# Patient Record
Sex: MALE | Race: OTHER | Employment: UNEMPLOYED | URBAN - METROPOLITAN AREA
[De-identification: names, ages, dates, MRNs, and addresses within clinical notes are randomized per-mention and may not be internally consistent; named-entity substitution may affect disease eponyms.]

---

## 2017-09-12 ENCOUNTER — APPOINTMENT (OUTPATIENT)
Dept: MRI IMAGING | Age: 37
DRG: 639 | End: 2017-09-12
Attending: SINGLE SPECIALTY
Payer: SELF-PAY

## 2017-09-12 ENCOUNTER — HOSPITAL ENCOUNTER (INPATIENT)
Age: 37
LOS: 2 days | Discharge: HOME | DRG: 639 | End: 2017-09-14
Attending: EMERGENCY MEDICINE | Admitting: FAMILY MEDICINE
Payer: SELF-PAY

## 2017-09-12 ENCOUNTER — APPOINTMENT (OUTPATIENT)
Dept: GENERAL RADIOLOGY | Age: 37
DRG: 639 | End: 2017-09-12
Attending: EMERGENCY MEDICINE
Payer: SELF-PAY

## 2017-09-12 DIAGNOSIS — E10.621 DIABETIC ULCER OF RIGHT MIDFOOT ASSOCIATED WITH TYPE 1 DIABETES MELLITUS, UNSPECIFIED ULCER STAGE (HCC): Primary | ICD-10-CM

## 2017-09-12 DIAGNOSIS — L97.419 DIABETIC ULCER OF RIGHT MIDFOOT ASSOCIATED WITH TYPE 1 DIABETES MELLITUS, UNSPECIFIED ULCER STAGE (HCC): Primary | ICD-10-CM

## 2017-09-12 PROBLEM — E11.9 DM (DIABETES MELLITUS) (HCC): Chronic | Status: ACTIVE | Noted: 2017-09-12

## 2017-09-12 PROBLEM — N17.9 AKI (ACUTE KIDNEY INJURY) (HCC): Status: ACTIVE | Noted: 2017-09-12

## 2017-09-12 PROBLEM — D64.9 ANEMIA: Status: ACTIVE | Noted: 2017-09-12

## 2017-09-12 LAB
ALBUMIN SERPL-MCNC: 2.1 G/DL (ref 3.4–5)
ALBUMIN/GLOB SERPL: 0.6 {RATIO} (ref 0.8–1.7)
ALP SERPL-CCNC: 109 U/L (ref 45–117)
ALT SERPL-CCNC: 19 U/L (ref 16–61)
ANION GAP BLD CALC-SCNC: 13 MMOL/L (ref 10–20)
ANION GAP SERPL CALC-SCNC: 9 MMOL/L (ref 3–18)
AST SERPL-CCNC: 16 U/L (ref 15–37)
BASOPHILS # BLD: 0.1 K/UL (ref 0–0.06)
BASOPHILS NFR BLD: 1 % (ref 0–2)
BILIRUB SERPL-MCNC: <0.1 MG/DL (ref 0.2–1)
BUN BLD-MCNC: 33 MG/DL (ref 7–18)
BUN SERPL-MCNC: 29 MG/DL (ref 7–18)
BUN/CREAT SERPL: 16 (ref 12–20)
CA-I BLD-MCNC: 1.18 MMOL/L (ref 1.12–1.32)
CALCIUM SERPL-MCNC: 7.9 MG/DL (ref 8.5–10.1)
CHLORIDE BLD-SCNC: 110 MMOL/L (ref 100–108)
CHLORIDE SERPL-SCNC: 112 MMOL/L (ref 100–108)
CO2 BLD-SCNC: 22 MMOL/L (ref 19–24)
CO2 SERPL-SCNC: 21 MMOL/L (ref 21–32)
CREAT SERPL-MCNC: 1.76 MG/DL (ref 0.6–1.3)
CREAT UR-MCNC: 1.8 MG/DL (ref 0.6–1.3)
CRP SERPL-MCNC: 2.6 MG/DL (ref 0–0.3)
DIFFERENTIAL METHOD BLD: ABNORMAL
EOSINOPHIL # BLD: 0.3 K/UL (ref 0–0.4)
EOSINOPHIL NFR BLD: 2 % (ref 0–5)
ERYTHROCYTE [DISTWIDTH] IN BLOOD BY AUTOMATED COUNT: 13.3 % (ref 11.6–14.5)
ERYTHROCYTE [SEDIMENTATION RATE] IN BLOOD: >140 MM/HR (ref 0–15)
EST. AVERAGE GLUCOSE BLD GHB EST-MCNC: 186 MG/DL
GLOBULIN SER CALC-MCNC: 3.7 G/DL (ref 2–4)
GLUCOSE BLD STRIP.AUTO-MCNC: 112 MG/DL (ref 70–110)
GLUCOSE BLD STRIP.AUTO-MCNC: 121 MG/DL (ref 74–106)
GLUCOSE SERPL-MCNC: 127 MG/DL (ref 74–99)
HBA1C MFR BLD: 8.1 % (ref 4.2–5.6)
HCT VFR BLD AUTO: 31 % (ref 36–48)
HCT VFR BLD CALC: 29 % (ref 36–49)
HGB BLD-MCNC: 10.2 G/DL (ref 13–16)
HGB BLD-MCNC: 9.9 G/DL (ref 12–16)
LACTATE BLD-SCNC: 1.2 MMOL/L (ref 0.4–2)
LYMPHOCYTES # BLD: 2.2 K/UL (ref 0.9–3.6)
LYMPHOCYTES NFR BLD: 17 % (ref 21–52)
MCH RBC QN AUTO: 29.3 PG (ref 24–34)
MCHC RBC AUTO-ENTMCNC: 32.9 G/DL (ref 31–37)
MCV RBC AUTO: 89.1 FL (ref 74–97)
MONOCYTES # BLD: 0.8 K/UL (ref 0.05–1.2)
MONOCYTES NFR BLD: 6 % (ref 3–10)
NEUTS SEG # BLD: 9.6 K/UL (ref 1.8–8)
NEUTS SEG NFR BLD: 74 % (ref 40–73)
PLATELET # BLD AUTO: 281 K/UL (ref 135–420)
PMV BLD AUTO: 10.3 FL (ref 9.2–11.8)
POTASSIUM BLD-SCNC: 4.5 MMOL/L (ref 3.5–5.5)
POTASSIUM SERPL-SCNC: 4.4 MMOL/L (ref 3.5–5.5)
PROT SERPL-MCNC: 5.8 G/DL (ref 6.4–8.2)
RBC # BLD AUTO: 3.48 M/UL (ref 4.7–5.5)
SODIUM BLD-SCNC: 140 MMOL/L (ref 136–145)
SODIUM SERPL-SCNC: 142 MMOL/L (ref 136–145)
WBC # BLD AUTO: 13 K/UL (ref 4.6–13.2)

## 2017-09-12 PROCEDURE — 73630 X-RAY EXAM OF FOOT: CPT

## 2017-09-12 PROCEDURE — 74011250636 HC RX REV CODE- 250/636: Performed by: FAMILY MEDICINE

## 2017-09-12 PROCEDURE — 65270000029 HC RM PRIVATE

## 2017-09-12 PROCEDURE — 83036 HEMOGLOBIN GLYCOSYLATED A1C: CPT | Performed by: FAMILY MEDICINE

## 2017-09-12 PROCEDURE — 73718 MRI LOWER EXTREMITY W/O DYE: CPT

## 2017-09-12 PROCEDURE — 85025 COMPLETE CBC W/AUTO DIFF WBC: CPT | Performed by: EMERGENCY MEDICINE

## 2017-09-12 PROCEDURE — 86140 C-REACTIVE PROTEIN: CPT | Performed by: FAMILY MEDICINE

## 2017-09-12 PROCEDURE — 80047 BASIC METABLC PNL IONIZED CA: CPT

## 2017-09-12 PROCEDURE — 87186 SC STD MICRODIL/AGAR DIL: CPT | Performed by: FAMILY MEDICINE

## 2017-09-12 PROCEDURE — 85652 RBC SED RATE AUTOMATED: CPT | Performed by: FAMILY MEDICINE

## 2017-09-12 PROCEDURE — 74011000258 HC RX REV CODE- 258: Performed by: PHYSICIAN ASSISTANT

## 2017-09-12 PROCEDURE — 80053 COMPREHEN METABOLIC PANEL: CPT | Performed by: EMERGENCY MEDICINE

## 2017-09-12 PROCEDURE — 96365 THER/PROPH/DIAG IV INF INIT: CPT

## 2017-09-12 PROCEDURE — 87040 BLOOD CULTURE FOR BACTERIA: CPT | Performed by: EMERGENCY MEDICINE

## 2017-09-12 PROCEDURE — 74011250636 HC RX REV CODE- 250/636: Performed by: EMERGENCY MEDICINE

## 2017-09-12 PROCEDURE — 82962 GLUCOSE BLOOD TEST: CPT

## 2017-09-12 PROCEDURE — 77030020263 HC SOL INJ SOD CL0.9% LFCR 1000ML

## 2017-09-12 PROCEDURE — 74011250636 HC RX REV CODE- 250/636: Performed by: PHYSICIAN ASSISTANT

## 2017-09-12 PROCEDURE — 83605 ASSAY OF LACTIC ACID: CPT

## 2017-09-12 PROCEDURE — 87070 CULTURE OTHR SPECIMN AEROBIC: CPT | Performed by: FAMILY MEDICINE

## 2017-09-12 PROCEDURE — 99284 EMERGENCY DEPT VISIT MOD MDM: CPT

## 2017-09-12 PROCEDURE — 87077 CULTURE AEROBIC IDENTIFY: CPT | Performed by: FAMILY MEDICINE

## 2017-09-12 RX ORDER — MORPHINE SULFATE 2 MG/ML
2 INJECTION, SOLUTION INTRAMUSCULAR; INTRAVENOUS
Status: DISCONTINUED | OUTPATIENT
Start: 2017-09-12 | End: 2017-09-14 | Stop reason: HOSPADM

## 2017-09-12 RX ORDER — INSULIN LISPRO 100 [IU]/ML
INJECTION, SOLUTION INTRAVENOUS; SUBCUTANEOUS EVERY 6 HOURS
Status: DISCONTINUED | OUTPATIENT
Start: 2017-09-12 | End: 2017-09-14

## 2017-09-12 RX ORDER — NALOXONE HYDROCHLORIDE 0.4 MG/ML
0.4 INJECTION, SOLUTION INTRAMUSCULAR; INTRAVENOUS; SUBCUTANEOUS AS NEEDED
Status: DISCONTINUED | OUTPATIENT
Start: 2017-09-12 | End: 2017-09-14 | Stop reason: HOSPADM

## 2017-09-12 RX ORDER — SODIUM CHLORIDE 9 MG/ML
125 INJECTION, SOLUTION INTRAVENOUS CONTINUOUS
Status: DISCONTINUED | OUTPATIENT
Start: 2017-09-12 | End: 2017-09-14 | Stop reason: HOSPADM

## 2017-09-12 RX ORDER — MAGNESIUM SULFATE 100 %
4 CRYSTALS MISCELLANEOUS AS NEEDED
Status: DISCONTINUED | OUTPATIENT
Start: 2017-09-12 | End: 2017-09-14 | Stop reason: HOSPADM

## 2017-09-12 RX ORDER — PRAVASTATIN SODIUM 20 MG/1
10 TABLET ORAL
Status: DISCONTINUED | OUTPATIENT
Start: 2017-09-12 | End: 2017-09-14 | Stop reason: HOSPADM

## 2017-09-12 RX ORDER — DEXTROSE 50 % IN WATER (D50W) INTRAVENOUS SYRINGE
25-50 AS NEEDED
Status: DISCONTINUED | OUTPATIENT
Start: 2017-09-12 | End: 2017-09-14 | Stop reason: HOSPADM

## 2017-09-12 RX ORDER — HYDROCODONE BITARTRATE AND ACETAMINOPHEN 5; 325 MG/1; MG/1
1 TABLET ORAL
Status: DISCONTINUED | OUTPATIENT
Start: 2017-09-12 | End: 2017-09-14 | Stop reason: HOSPADM

## 2017-09-12 RX ORDER — HEPARIN SODIUM 5000 [USP'U]/ML
5000 INJECTION, SOLUTION INTRAVENOUS; SUBCUTANEOUS EVERY 8 HOURS
Status: DISCONTINUED | OUTPATIENT
Start: 2017-09-12 | End: 2017-09-14 | Stop reason: HOSPADM

## 2017-09-12 RX ORDER — GUAIFENESIN 100 MG/5ML
81 LIQUID (ML) ORAL DAILY
Status: DISCONTINUED | OUTPATIENT
Start: 2017-09-13 | End: 2017-09-14 | Stop reason: HOSPADM

## 2017-09-12 RX ADMIN — HEPARIN SODIUM 5000 UNITS: 5000 INJECTION, SOLUTION INTRAVENOUS; SUBCUTANEOUS at 23:20

## 2017-09-12 RX ADMIN — SODIUM CHLORIDE 125 ML/HR: 900 INJECTION, SOLUTION INTRAVENOUS at 22:46

## 2017-09-12 RX ADMIN — PIPERACILLIN SODIUM,TAZOBACTAM SODIUM 3.38 G: 3; .375 INJECTION, POWDER, FOR SOLUTION INTRAVENOUS at 20:27

## 2017-09-12 RX ADMIN — SODIUM CHLORIDE 1250 MG: 900 INJECTION, SOLUTION INTRAVENOUS at 22:58

## 2017-09-12 NOTE — ED PROVIDER NOTES
HPI Comments: Patient is a 41 y/o male w/ PMH IDDM, previous diabetic foot ulcer, who presents to the ER c/o hole in right foot. Patient states he first noticed the hole in his right foot about 3 weeks prior. He was previously admitted for a similar episode in June of 2016, however states he has not followed up with anyone since his discharge. Patient denied any known injury to his foot. He denied any fevers, chills, SOB, chest pain, abdominal pain, nausea, vomiting and has no other complaints. Patient reports increased pain with weight bearing and ambulation. Patient is a 40 y.o. male presenting with foot pain. The history is provided by the patient. The history is limited by a language barrier (Turkish speaking; able to answer questions w/o ). No  was used. Foot Pain           Past Medical History:   Diagnosis Date    Cellulitis     rt. foot    Diabetes ([facility]     Osteomyelitis ([facility]     rt toe    Other ill-defined conditions        Past Surgical History:   Procedure Laterality Date    HX ORTHOPAEDIC      rt.toe         History reviewed. No pertinent family history. Social History     Social History    Marital status: SINGLE     Spouse name: N/A    Number of children: N/A    Years of education: N/A     Occupational History    Not on file. Social History Main Topics    Smoking status: Heavy Tobacco Smoker     Packs/day: 0.50    Smokeless tobacco: Never Used    Alcohol use No    Drug use: Yes     Special: Marijuana      Comment: 9/5/17    Sexual activity: Not on file     Other Topics Concern    Not on file     Social History Narrative         ALLERGIES: Review of patient's allergies indicates no known allergies. Review of Systems   Constitutional: Negative for chills, fatigue and fever. HENT: Negative. Negative for sore throat. Eyes: Negative. Respiratory: Negative for cough and shortness of breath.     Cardiovascular: Negative for chest pain and palpitations. Gastrointestinal: Negative for abdominal pain, nausea and vomiting. Genitourinary: Negative for dysuria. Musculoskeletal: Negative. Skin: Positive for wound. Right foot ulcer   Neurological: Negative for dizziness, weakness, light-headedness and headaches. Psychiatric/Behavioral: Negative. All other systems reviewed and are negative. Vitals:    09/12/17 1911   BP: 140/86   Pulse: 84   Resp: 16   Temp: 99.8 °F (37.7 °C)   SpO2: 100%   Weight: 53.9 kg (118 lb 13.3 oz)   Height: 5' (1.524 m)            Physical Exam   Constitutional: He is oriented to person, place, and time. He appears well-developed and well-nourished. He appears distressed. HENT:   Head: Normocephalic and atraumatic. Mouth/Throat: Oropharynx is clear and moist.   Eyes: Conjunctivae are normal. No scleral icterus. Neck: Normal range of motion. Neck supple. No JVD present. No tracheal deviation present. Cardiovascular: Normal rate, regular rhythm and normal heart sounds. Pulmonary/Chest: Effort normal and breath sounds normal. No respiratory distress. He has no wheezes. Abdominal: Soft. There is no tenderness. Musculoskeletal: Normal range of motion. Feet:    Neurological: He is alert and oriented to person, place, and time. He has normal strength. GCS eye subscore is 4. GCS verbal subscore is 5. GCS motor subscore is 6. Did not see pt ambulate; however walked into ER for triage   Skin: Skin is warm and dry. He is not diaphoretic. Psychiatric: He has a normal mood and affect. Nursing note and vitals reviewed. MDM  Number of Diagnoses or Management Options  Diabetic ulcer of right midfoot associated with type 1 diabetes mellitus, unspecified ulcer stage:   Diagnosis management comments: 7:48 PM  41 y/o male c/o right foot pain and hole in his foot; onset 3 weeks prior. Known IDDM, non compliant with follow ups.   Has not seen anyone since June last year when hospitalized for the same problem. Will plan on labs. Obvious swelling noted to right foot; normal distal pulses BL. Increased warmth to touch. Xray of right foot suggestive of osteomyelitis involving right 4th, 5th distal metatarsals. Rima Escobedo PA-C    8:42 PM  Discussed patient with Dr. Jose Alfredo Vargas, hospitalist.  [facility] to accept pt for admission of diabetic foot ulcer. Advised to consult with podiatry; already placed. Patient already receiving zosyn IV; however will withhold vancomycin until speaking with them. Pt hemodynamically stable at this point. Rima Escobedo PA-C    8:47 PM  Discussed patient with Dr. Marianna Jones, podiatry. Agrees to consult with pt. Advised to continue with ordered abx and will see patient on floor tomorrow.   Rima Escobedo PA-C        Clinical Impression:  Diabetic foot ulcer         Amount and/or Complexity of Data Reviewed  Clinical lab tests: ordered and reviewed  Tests in the radiology section of CPT®: ordered and reviewed  Discuss the patient with other providers: yes (gina No podiatry)    Risk of Complications, Morbidity, and/or Mortality  Presenting problems: moderate  Diagnostic procedures: moderate  Management options: moderate    Patient Progress  Patient progress: stable    ED Course       Procedures      Vitals:  Patient Vitals for the past 12 hrs:   Temp Pulse Resp BP SpO2   09/12/17 1911 99.8 °F (37.7 °C) 84 16 140/86 100 %         Medications ordered:   Medications   vancomycin (VANCOCIN) 1,250 mg in 0.9% sodium chloride 250 mL IVPB (not administered)   piperacillin-tazobactam (ZOSYN) 3.375 g in 0.9% sodium chloride (MBP/ADV) 100 mL MBP (not administered)   vancomycin (VANCOCIN) 1,250 mg in 0.9% sodium chloride 250 mL IVPB (not administered)   VANCOMYCIN INFORMATION NOTE (not administered)   VANCOMYCIN INFORMATION NOTE (not administered)   piperacillin-tazobactam (ZOSYN) 3.375 g in 0.9% sodium chloride (MBP/ADV) 100 mL MBP (0 g IntraVENous IV Completed 9/12/17 2122)         Lab findings:  Recent Results (from the past 12 hour(s))   CBC WITH AUTOMATED DIFF    Collection Time: 09/12/17  7:45 PM   Result Value Ref Range    WBC 13.0 4.6 - 13.2 K/uL    RBC 3.48 (L) 4.70 - 5.50 M/uL    HGB 10.2 (L) 13.0 - 16.0 g/dL    HCT 31.0 (L) 36.0 - 48.0 %    MCV 89.1 74.0 - 97.0 FL    MCH 29.3 24.0 - 34.0 PG    MCHC 32.9 31.0 - 37.0 g/dL    RDW 13.3 11.6 - 14.5 %    PLATELET 754 517 - 368 K/uL    MPV 10.3 9.2 - 11.8 FL    NEUTROPHILS 74 (H) 40 - 73 %    LYMPHOCYTES 17 (L) 21 - 52 %    MONOCYTES 6 3 - 10 %    EOSINOPHILS 2 0 - 5 %    BASOPHILS 1 0 - 2 %    ABS. NEUTROPHILS 9.6 (H) 1.8 - 8.0 K/UL    ABS. LYMPHOCYTES 2.2 0.9 - 3.6 K/UL    ABS. MONOCYTES 0.8 0.05 - 1.2 K/UL    ABS. EOSINOPHILS 0.3 0.0 - 0.4 K/UL    ABS. BASOPHILS 0.1 (H) 0.0 - 0.06 K/UL    DF AUTOMATED     METABOLIC PANEL, COMPREHENSIVE    Collection Time: 09/12/17  7:45 PM   Result Value Ref Range    Sodium 142 136 - 145 mmol/L    Potassium 4.4 3.5 - 5.5 mmol/L    Chloride 112 (H) 100 - 108 mmol/L    CO2 21 21 - 32 mmol/L    Anion gap 9 3.0 - 18 mmol/L    Glucose 127 (H) 74 - 99 mg/dL    BUN 29 (H) 7.0 - 18 MG/DL    Creatinine 1.76 (H) 0.6 - 1.3 MG/DL    BUN/Creatinine ratio 16 12 - 20      GFR est AA 53 (L) >60 ml/min/1.73m2    GFR est non-AA 44 (L) >60 ml/min/1.73m2    Calcium 7.9 (L) 8.5 - 10.1 MG/DL    Bilirubin, total <0.1 (L) 0.2 - 1.0 MG/DL    ALT (SGPT) 19 16 - 61 U/L    AST (SGOT) 16 15 - 37 U/L    Alk.  phosphatase 109 45 - 117 U/L    Protein, total 5.8 (L) 6.4 - 8.2 g/dL    Albumin 2.1 (L) 3.4 - 5.0 g/dL    Globulin 3.7 2.0 - 4.0 g/dL    A-G Ratio 0.6 (L) 0.8 - 1.7     C REACTIVE PROTEIN, QT    Collection Time: 09/12/17  7:45 PM   Result Value Ref Range    C-Reactive protein 2.6 (H) 0 - 0.3 mg/dL   SED RATE (ESR)    Collection Time: 09/12/17  7:45 PM   Result Value Ref Range    Sed rate, automated >140 (H) 0 - 15 mm/hr   POC CHEM8    Collection Time: 09/12/17  7:50 PM   Result Value Ref Range CO2, POC 22 19 - 24 MMOL/L    Glucose,  (H) 74 - 106 MG/DL    BUN, POC 33 (H) 7 - 18 MG/DL    Creatinine, POC 1.8 (H) 0.6 - 1.3 MG/DL    GFRAA, POC 52 (L) >60 ml/min/1.73m2    GFRNA, POC 43 (L) >60 ml/min/1.73m2    Sodium,  136 - 145 MMOL/L    Potassium, POC 4.5 3.5 - 5.5 MMOL/L    Calcium, ionized (POC) 1.18 1.12 - 1.32 MMOL/L    Chloride,  (H) 100 - 108 MMOL/L    Anion gap, POC 13 10 - 20      Hematocrit, POC 29 (L) 36 - 49 %    Hemoglobin, POC 9.9 (L) 12 - 16 G/DL   POC LACTIC ACID    Collection Time: 09/12/17  7:55 PM   Result Value Ref Range    Lactic Acid (POC) 1.2 0.4 - 2.0 mmol/L       EKG interpretation by ED Physician:      X-Ray, CT or other radiology findings or impressions:  XR FOOT RT MIN 3 V    (Results Pending)   MRI FOOT RT WO CONT    (Results Pending)       Progress notes, Consult notes or additional Procedure notes:       Reevaluation of patient:       Disposition:  Diagnosis:   1. Diabetic ulcer of right midfoot associated with type 1 diabetes mellitus, unspecified ulcer stage        Disposition:  Admitted    Follow-up Information     None           Patient's Medications   Start Taking    No medications on file   Continue Taking    AMLODIPINE (NORVASC) 5 MG TABLET    Take 1 Tab by mouth daily. ASPIRIN 81 MG CHEWABLE TABLET    Take 1 Tab by mouth daily. DAPTOMYCIN IV    480 mg by IntraVENous route daily. Indications: Continue until 6/20    HYDROCODONE-ACETAMINOPHEN (NORCO) 5-325 MG PER TABLET    Take 1 Tab by mouth every four (4) hours as needed for Pain. Max Daily Amount: 6 Tabs. Indications: do not fill unless keflex and bactrim are filled    INSULIN NPH/INSULIN REGULAR (HUMULIN 70/30) 100 UNIT/ML (70-30) INJECTION    12 Units by SubCUTAneous route Before breakfast and dinner. INSULIN SYRINGE-NEEDLE U-100 (INSULIN SYRINGE) 1 ML 28 X 1/2\" SYRG    1 Package by Does Not Apply route two (2) times a day. LOSARTAN (COZAAR) 50 MG TABLET    Take 1 Tab by mouth daily. METFORMIN (GLUCOPHAGE) 500 MG TABLET    Take 1 Tab by mouth two (2) times daily (with meals). PRAVASTATIN (PRAVACHOL) 10 MG TABLET    Take 1 Tab by mouth nightly.    These Medications have changed    No medications on file   Stop Taking    No medications on file

## 2017-09-12 NOTE — Clinical Note
Status[de-identified] Inpatient [101] Type of Bed: Medical [8] Inpatient Hospitalization Certified Necessary for the Following Reasons: 3. Patient receiving treatment that can only be provided in an inpatient setting (further clarification in H&P documentation) Admitting Diagnosis: Diabetic foot ulcer ([facility] [[id number]] Admitting Physician: Ash Monge Attending Physician: Ash Monge Estimated Length of Stay: 2 Midnights Discharge Plan[de-identified] Home with Office Follow-up

## 2017-09-12 NOTE — IP AVS SNAPSHOT
Ledy Newton 
 
 
 [address] [place] [address] 
[phone] Patient: Ashanti Howard MRN: [id number] WUB:[id number] Current Discharge Medication List  
  
START taking these medications Dose & Instructions Dispensing Information Comments Morning Noon Evening Bedtime  
 ciprofloxacin HCl 500 mg tablet Commonly known as:  CIPRO Your last dose was: Your next dose is:    
   
   
 Dose:  500 mg Take 1 Tab by mouth every twelve (12) hours for 30 days. Quantity:  60 Tab Refills:  0 CONTINUE these medications which have NOT CHANGED Dose & Instructions Dispensing Information Comments Morning Noon Evening Bedtime  
 amLODIPine 5 mg tablet Commonly known as:  Achilles [place] Your last dose was: Your next dose is:    
   
   
 Dose:  5 mg Take 1 Tab by mouth daily. Quantity:  30 Tab Refills:  5  
     
   
   
   
  
 aspirin 81 mg chewable tablet Your last dose was: Your next dose is:    
   
   
 Dose:  81 mg Take 1 Tab by mouth daily. Quantity:  30 Tab Refills:  0 HYDROcodone-acetaminophen 5-325 mg per tablet Commonly known as:  Josue Carter Your last dose was: Your next dose is:    
   
   
 Dose:  1 Tab Take 1 Tab by mouth every four (4) hours as needed for Pain. Max Daily Amount: 6 Tabs. Indications: do not fill unless keflex and bactrim are filled Quantity:  15 Tab Refills:  0  
     
   
   
   
  
 insulin NPH/insulin regular 100 unit/mL (70-30) injection Commonly known as:  HumuLIN 70/30 Your last dose was: Your next dose is:    
   
   
 Dose:  12 Units 12 Units by SubCUTAneous route Before breakfast and dinner. Quantity:  3 Vial  
Refills:  1 Insulin Syringe-Needle U-100 1 mL 28 gauge x 1/2\" Syrg Commonly known as:  INSULIN SYRINGE Your last dose was: Your next dose is:    
   
   
 Dose:  1 Package 1 Package by Does Not Apply route two (2) times a day. Quantity:  120 Syringe Refills:  1  
     
   
   
   
  
 losartan 50 mg tablet Commonly known as:  COZAAR Your last dose was: Your next dose is:    
   
   
 Dose:  50 mg Take 1 Tab by mouth daily. Quantity:  30 Tab Refills:  0  
     
   
   
   
  
 metFORMIN 500 mg tablet Commonly known as:  GLUCOPHAGE Your last dose was: Your next dose is:    
   
   
 Dose:  500 mg Take 1 Tab by mouth two (2) times daily (with meals). Quantity:  30 Tab Refills:  0  
     
   
   
   
  
 pravastatin 10 mg tablet Commonly known as:  PRAVACHOL Your last dose was: Your next dose is:    
   
   
 Dose:  10 mg Take 1 Tab by mouth nightly. Quantity:  30 Tab Refills:  0 STOP taking these medications DAPTOMYCIN IV Where to Get Your Medications Information on where to get these meds will be given to you by the nurse or doctor. ! Ask your nurse or doctor about these medications  
  ciprofloxacin HCl 500 mg tablet

## 2017-09-12 NOTE — ED NOTES
I performed a brief evaluation, including history and physical, of the patient here in triage and I have determined that pt will need further treatment and evaluation from the fast track provider. I have placed initial orders to help in expediting patients care.      September 12, 2017 at 7:11 PM - Neomia Bence, DO        Diabetic foot ulcer

## 2017-09-12 NOTE — IP AVS SNAPSHOT
[address] [place] [address] 
[phone] Patient: Arlene Peres MRN: [id number] [id number] You are allergic to the following No active allergies Recent Documentation Height Weight BMI Smoking Status 1.524 m 53.8 kg 23.16 kg/m2 Heavy Tobacco Smoker Unresulted Labs Order Current Status CULTURE, BLOOD Preliminary result CULTURE, BLOOD Preliminary result CULTURE, WOUND W GRAM STAIN Preliminary result Emergency Contacts Name Discharge Info Relation Home Work Mobile Elbert Thompson DISCHARGE CAREGIVER [3] Friend [5] [phone] About your hospitalization You were admitted on:  September 12, 2017 You last received care in the:  [email or web address] You were discharged on:  September 14, 2017 Unit phone number:  [phone] Why you were hospitalized Your primary diagnosis was:  Diabetic Foot Ulcer (Hcc) Your diagnoses also included:  Anemia, Dale (Acute Kidney Injury) (Hcc), Dm (Diabetes Mellitus) (Hcc) Providers Seen During Your Hospitalizations Provider Role Specialty Primary office phone Antoni Sanchez DO Attending Provider Emergency Medicine [phone] Marly Escamilla MD Attending Provider [place] [phone] Flower Rogers MD Attending Provider [place] [phone] Manda Cheng DO Attending Provider Internal Medicine [phone] Your Primary Care Physician (PCP) Primary Care Physician Office Phone Office Fax NONE ** None ** ** None ** Follow-up Information Follow up With Details Comments Contact Info [address] [facility] Call  [place] [id number] [address] [place] [address]) [phone] Current Discharge Medication List  
  
START taking these medications Dose & Instructions Dispensing Information Comments Morning Noon Evening Bedtime  
 ciprofloxacin HCl 500 mg tablet Commonly known as:  CIPRO Your last dose was: Your next dose is:    
   
   
 Dose:  500 mg Take 1 Tab by mouth every twelve (12) hours for 30 days. Quantity:  60 Tab Refills:  0 CONTINUE these medications which have NOT CHANGED Dose & Instructions Dispensing Information Comments Morning Noon Evening Bedtime  
 amLODIPine 5 mg tablet Commonly known as:  Achilles Reform Your last dose was: Your next dose is:    
   
   
 Dose:  5 mg Take 1 Tab by mouth daily. Quantity:  30 Tab Refills:  5  
     
   
   
   
  
 aspirin 81 mg chewable tablet Your last dose was: Your next dose is:    
   
   
 Dose:  81 mg Take 1 Tab by mouth daily. Quantity:  30 Tab Refills:  0 HYDROcodone-acetaminophen 5-325 mg per tablet Commonly known as:  Jouse Carter Your last dose was: Your next dose is:    
   
   
 Dose:  1 Tab Take 1 Tab by mouth every four (4) hours as needed for Pain. Max Daily Amount: 6 Tabs. Indications: do not fill unless keflex and bactrim are filled Quantity:  15 Tab Refills:  0  
     
   
   
   
  
 insulin NPH/insulin regular 100 unit/mL (70-30) injection Commonly known as:  HumuLIN 70/30 Your last dose was: Your next dose is:    
   
   
 Dose:  12 Units 12 Units by SubCUTAneous route Before breakfast and dinner. Quantity:  3 Vial  
Refills:  1 Insulin Syringe-Needle U-100 1 mL 28 gauge x 1/2\" Syrg Commonly known as:  INSULIN SYRINGE Your last dose was: Your next dose is:    
   
   
 Dose:  1 Package 1 Package by Does Not Apply route two (2) times a day. Quantity:  120 Syringe Refills:  1  
     
   
   
   
  
 losartan 50 mg tablet Commonly known as:  COZAAR Your last dose was:     
   
Your next dose is:    
   
   
 Dose:  50 mg  
 Take 1 Tab by mouth daily. Quantity:  30 Tab Refills:  0  
     
   
   
   
  
 metFORMIN 500 mg tablet Commonly known as:  GLUCOPHAGE Your last dose was: Your next dose is:    
   
   
 Dose:  500 mg Take 1 Tab by mouth two (2) times daily (with meals). Quantity:  30 Tab Refills:  0  
     
   
   
   
  
 pravastatin 10 mg tablet Commonly known as:  PRAVACHOL Your last dose was: Your next dose is:    
   
   
 Dose:  10 mg Take 1 Tab by mouth nightly. Quantity:  30 Tab Refills:  0 STOP taking these medications DAPTOMYCIN IV Where to Get Your Medications Information on where to get these meds will be given to you by the nurse or doctor. ! Ask your nurse or doctor about these medications  
  ciprofloxacin HCl 500 mg tablet Discharge Instructions Patient armband removed and shredded DISCHARGE SUMMARY from Nurse The following personal items are in your possession at time of discharge: 
 
Dental Appliances: None Visual Aid: None Home Medications: None Jewelry: None Clothing: At bedside, Shirt, Pants Other Valuables: Cell Phone, With patient PATIENT INSTRUCTIONS: 
 
 
F-face looks uneven A-arms unable to move or move unevenly S-speech slurred or non-existent T-time-call 911 as soon as signs and symptoms begin-DO NOT go Back to bed or wait to see if you get better-TIME IS BRAIN. Warning Signs of HEART ATTACK Call 911 if you have these symptoms: 
? Chest discomfort.  Most heart attacks involve discomfort in the center of the chest that lasts more than a few minutes, or that goes away and comes back. It can feel like uncomfortable pressure, squeezing, fullness, or pain. ? Discomfort in other areas of the upper body. Symptoms can include pain or discomfort in one or both arms, the back, neck, jaw, or stomach. ? Shortness of breath with or without chest discomfort. ? Other signs may include breaking out in a cold sweat, nausea, or lightheadedness. Don't wait more than five minutes to call [address]! Fast action can save your life. Calling 911 is almost always the fastest way to get lifesaving treatment. Emergency Medical Services staff can begin treatment when they arrive  up to an hour sooner than if someone gets to the hospital by car. The discharge information has been reviewed with the patient. The patient verbalized understanding. Discharge medications reviewed with the patient and appropriate educational materials and side effects teaching were provided. DISCHARGE SUMMARY from Nurse The following personal items are in your possession at time of discharge: 
 
Dental Appliances: None Visual Aid: None Home Medications: None Jewelry: None Clothing: At bedside, Shirt, Pants Other Valuables: Cell Phone, With patient PATIENT INSTRUCTIONS: 
 
 
F-face looks uneven A-arms unable to move or move unevenly S-speech slurred or non-existent T-time-call 911 as soon as signs and symptoms begin-DO NOT go Back to bed or wait to see if you get better-TIME IS BRAIN. Warning Signs of HEART ATTACK Call 911 if you have these symptoms: 
? Chest discomfort.  Most heart attacks involve discomfort in the center of the chest that lasts more than a few minutes, or that goes away and comes back. It can feel like uncomfortable pressure, squeezing, fullness, or pain. ? Discomfort in other areas of the upper body. Symptoms can include pain or discomfort in one or both arms, the back, neck, jaw, or stomach. ? Shortness of breath with or without chest discomfort. ? Other signs may include breaking out in a cold sweat, nausea, or lightheadedness. Don't wait more than five minutes to call [address]! Fast action can save your life. Calling 911 is almost always the fastest way to get lifesaving treatment. Emergency Medical Services staff can begin treatment when they arrive  up to an hour sooner than if someone gets to the hospital by car. The discharge information has been reviewed with the patient. The patient verbalized understanding. Discharge medications reviewed with the patient and appropriate educational materials and side effects teaching were provided. [facility] Activation Thank you for enrolling in [address] Please follow the instructions below to securely access your online medical record. [facility] allows you to send messages to your doctor, view your test results, renew your prescriptions, schedule appointments, and more. How Do I Sign Up? 1. In your internet browser, go to [email or web address]. 2. Click on the First Time User? Click Here link in the Sign In box. You will see the New Member Sign Up page. 3. Enter your [facility] Access Code exactly as it appears below. You will not need to use this code after youve completed the sign-up process. If you do not sign up before the expiration date, you must request a new code. [facility] Access Code: [email or web address] Expires: 12/12/2017 10:09 PM  
 
4. Enter the last four digits of your Social Security Number (xxxx) and Date of Birth (mm/dd/yyyy) as indicated and click Submit. You will be taken to the next sign-up page. 5. Create a QuickoL[employer] ID. This will be your [employer] login ID and cannot be changed, so think of one that is secure and easy to remember. 6. Create a [employer] password. You can change your password at any time. 7. Enter your Password Reset Question and Answer. This can be used at a later time if you forget your password. 8. Enter your e-mail address. You will receive e-mail notification when new information is available in [address] 9. Click Sign Up. You can now view your medical record. Additional Information Remember, [employer] is NOT to be used for urgent needs. For medical emergencies, dial 911. Now available from your iPhone and Android! Patient armband removed and shredded Discharge Instructions Attachments/References DIABETES: FOOT CARE ([place]) DIABETIC FOOT ULCER ([place]) MRSA ([place]) OSTEOMYELITIS ([place]) DIABETES: TYPE 2: GENERAL INFO ([place]) Discharge Orders None [employer] Announcement We are excited to announce that we are making your provider's discharge notes available to you in [employer]oL[employer]. You will see these notes when they are completed and signed by the physician that discharged you from your recent hospital stay. If you have any questions or concerns about any information you see in QuickoL[employer], please call the Health Information Department where you were seen or reach out to your Primary Care Provider for more information about your plan of care. Introducing [facility]! Juan Francisco Flores introduces [employer] patient portal. Now you can access parts of your medical record, email your doctor's office, and request medication refills online. 1. In your internet browser, go to [email or web address] HRt 2. Click on the First Time User? Click Here link in the Sign In box. You will see the New Member Sign Up page. 3. Enter your [employer] Access Code exactly as it appears below.  You will not need to use this code after youve completed the sign-up process. If you do not sign up before the expiration date, you must request a new code. · [employer] Access Code: [email or web address] Expires: 12/12/2017 10:09 PM 
 
4. Enter the last four digits of your Social Security Number (xxxx) and Date of Birth (mm/dd/yyyy) as indicated and click Submit. You will be taken to the next sign-up page. 5. Create a [employer] ID. This will be your [facility] login ID and cannot be changed, so think of one that is secure and easy to remember. 6. Create a [facility] password. You can change your password at any time. 7. Enter your Password Reset Question and Answer. This can be used at a later time if you forget your password. 8. Enter your e-mail address. You will receive e-mail notification when new information is available in [address] 9. Click Sign Up. You can now view and download portions of your medical record. 10. Click the Download Summary menu link to download a portable copy of your medical information. If you have questions, please visit the Frequently Asked Questions section of the [employer] website. Remember, [employer] is NOT to be used for urgent needs. For medical emergencies, dial 911. Now available from your iPhone and Android! General Information Please provide this summary of care documentation to your next provider. Patient Signature:  ____________________________________________________________ Date:  ____________________________________________________________  
  
Nida Davies Provider Signature:  ____________________________________________________________ Date:  ____________________________________________________________ More Information Liz de los pies en personas con diabetes: Instrucciones de cuidado - [ Diabetes Foot Health: Care Instructions ] Instrucciones de cuidado Cuando usted tiene diabetes, alfonso pies necesitan más cuidado y [place]. La diabetes puede dañar las terminaciones nerviosas y los vasos sanguíneos de los pies y provocar que usted no se dé cuenta de que alfonso pies están lesionados. La diabetes también limita la capacidad del cuerpo para atacar las infecciones y llevar la marimar a las zonas que la requieren. Nano herida real en el pie podría convertirse en Hedy Grebe o nano infección grave. Usted puede prevenir la mayoría de estos problemas teniendo un buen cuidado de alfonso pies. Cuidar alfonso pies puede ser rápido y fácil. La mayor parte del cuidado puede hacerse cuando usted se baña o se prepara para ir a dormir. La atención de seguimiento es nano parte clave de beard tratamiento y seguridad. Asegúrese de hacer y acudir a todas las citas, y llame a beard médico si está teniendo problemas. También es nano buena idea saber los resultados de los exámenes y mantener nano lista de los medicamentos que candido. Cómo puede cuidarse en el hogar? · Mantenga beard azúcar en la marimar cerca del nivel normal observando qué y cuánto come, monitoreando beard nivel de azúcar en la marimar, tomando los medicamentos si se los thomson recetado y haciendo ejercicio con regularidad. · No fume. Fumar afecta el flujo de marimar y puede [employer] problemas de los pies. Si necesita ayuda para dejar de fumar, hable con beard médico [employer] y medicamentos para dejar de fumar. Pueden aumentar alfonso probabilidades de dejar el hábito para siempre. · Consuma nano dieta baja en grasas. Un consumo alto de grasa puede provocar nano acumulación en los vasos sanguíneos y reducir el flujo de Rajesh. · Inspeccione alfonso pies a diario para rajiv si tienen ampollas, cortaduras, grietas o llagas. Si no puede rajiv kristin, utilice un lokesh o pídale a alguien [facility]. · Cuídese los pies: 
¨ Lávese los pies todos los días. Use agua tibia (no caliente).  Revise la temperatura del agua con beard randy u otra parte del cuerpo, no con alfonso pies. ¨ Seque kristin alfonso pies. Séquelos con toques suaves de toalla. No frote demasiado la piel de los pies. Seque kristin entre los dedos de los pies. Si la piel de los pies Abbott, pueden crecer bacterias u hongos, que pueden provocar evelio infección. ¨ Mantenga beard piel suave. Use crema humectante para la piel para mantener la piel de los pies [place] y prevenir callosidades y grietas. Demar no se ponga crema entre los dedos de los pies, y deje el uso de cualquier crema que le cause salpullido. ¨ Limpie con cuidado debajo de las [employer]. No utilice objetos cortantes para limpiar debajo de las uñas de los pies. Use el extremo sin светлана de evelio lima para uñas u otro instrumento redondeado. ¨ Recorte y lime las uñas de los pies en forma recta para prevenir las uñas encarnadas (enterradas). Use un cortaúñas, no tijeras. Use evelio lima de esmeril para [employer]. · Cámbiese los calcetines a diario. Las medias sin costura son las mejores, porque las costuras suelen rozar los pies. Puede encontrar calcetines en catálogos especializados para personas con diabetes. · Revise alfonso zapatos todos los días en busca de cosas alicia piedrecillas o revestimiento rasgado del zapato, que le puedan causar ampollas o llagas. · Cómprese zapatos que le queden kristin: 
¨ Busque zapatos que tengan suficiente espacio algilor [employer] dedos. [place] ayuda a prevenir juanetes y ampollas. ¨ Pruébese los zapatos con el tipo de calcetín que usará de manera habitual con ellos. ¨ Evite los zapatos plásticos. Éstos podrían rozar alfonso pies y causar ampollas. Los zapatos de buena calidad deben estar hechos de materiales que amisha flexibles y dejen circular el aire, alicia cuero o altaf. ¨ Moldee los zapatos nuevos poco a poco usándolos no más de evelio hora al día patria varios días.  Tómese más tiempo para revisar alfonso pies para zonas enrojecidas, ampollas u otros problemas después de usar unos zapatos nuevos. · No camine descalzo. No use sandalias ni zapatos con suelas muy delgadas. Las suelas delgadas se rompen con facilidad. Tampoco protegen alfonso pies del pavimento caliente o de las temperaturas frías. · Pídale a beard médico que le revise los pies en cada consulta. Si tiene algún [employer], consulte a beard médico. No intente tratar un problema del pie con colleen caseros. Los colleen o tratamientos caseros de venta kolby (alicia los eliminadores de callos) pueden ser dañinos. · Siempre busque un tratamiento temprano para los problemas de los pies. Evelio irritación real puede causar un problema importante si no se trata pronto y de [place]. Cuándo debe pedir ayuda? Llame a beard médico ahora mismo o busque atención médica inmediata si: · Presenta evelio llaga en el pie, evelio úlcera o evelio grieta en la piel que no sanan después de 4 días, callos o callosidades que sangran, o evelio uña encarnada. · Tiene zonas de la piel de color azulado o negruzco, que pueden significar moretones o problemas de flujo de [place]. · Tiene piel descamada o ampollas diminutas entre los dedos de los pies, o piel agrietada o supurante. · Tiene fiebre por más de 24 horas y [employer] · Siente nuevo entumecimiento u hormigueo en los pies que no desaparece después de  los pies o cambiar de posición. · Tiene evelio hinchazón inexplicable o inusual en el pie o el tobillo. Preste especial atención a los cambios en beard marlena y asegúrese de comunicarse con beard médico si: 
· No puede cuidarse los pies de [place] apropiada. Dónde puede encontrar más información en inglés? Steff Lockwood a http://cammy-gordy.info/. Escriba A739 en la búsqueda para aprender más acerca de \"Marlena de los pies en personas con diabetes: Instrucciones de cuidado - [ Diabetes Foot Health: Care Instructions ]. \" 
Revisado: 13 marzo, 2017 Versión del contenido: 11.3 © 7294-6657 Healthwise, Incorporated. Las instrucciones de cuidado fueron adaptadas bajo licencia por [employer] (which disclaims liability or warranty for this information). Si usted tiene [place] [employer] afección médica o sobre estas instrucciones, siempre pregunte a beard profesional de marlena. Healthwise, Incorporated niega toda garantía o responsabilidad por beard uso de esta información. Diabetic Foot Ulcer: Care Instructions Your Care Instructions Diabetes can damage the nerve endings and blood vessels in your feet. That means you are less likely to notice when your feet are injured. A small skin problem like a callus, blister, or cracked skin can turn into a larger sore, called a foot ulcer. Foot ulcers form most often on the pad (ball) of the foot or the bottom of the big toe. You can also get them on the top and bottom of each toe. Foot ulcers can get infected. If the infection is severe, then tissue in the foot can die. This is called gangrene. In that case, one or more of the toes, part or all of the foot, and sometimes part of the leg may have to be removed (amputated). Your doctor may have removed the dead tissue and cleaned the ulcer. Your foot wound may be wrapped in a protective bandage. It is very important to keep your weight off your injured foot. After a foot ulcer has formed, it will not heal as long as you keep putting weight on the area. Always get early treatment for foot problems. A minor irritation can lead to a major problem if it's not taken care of soon. Follow-up care is a key part of your treatment and safety. Be sure to make and go to all appointments, and call your doctor if you are having problems. It's also a good idea to know your test results and keep a list of the medicines you take. How can you care for yourself at home?  
· Follow your doctor's instructions about keeping pressure off the foot ulcer. You may need to use crutches or a wheelchair. Or you may wear a cast or a walking boot. · Follow your doctor's instructions on how to clean the ulcer and change the bandage. · If your doctor prescribed antibiotics, take them as directed. Do not stop taking them just because you feel better. You need to take the full course of antibiotics. To prevent foot ulcers · Keep your blood sugar close to normal by watching what and how much you eat. Track your blood sugar, take medicines if prescribed, and get regular exercise. · Do not smoke. Smoking affects blood flow and can make foot problems worse. If you need help quitting, talk to your doctor about stop-smoking programs and medicines. These can increase your chances of quitting for good. · Do not go barefoot. Protect your feet by wearing shoes that fit well. Choose shoes that are made of materials that are flexible and breathable, such as leather or cloth. · Inspect your feet daily for blisters, cuts, cracks, or sores. If you can't see well, use a mirror or have someone help you. · Have your doctor check your feet during each visit. If you have a foot problem, see your doctor. Do not try to treat your foot problem on your own. Home remedies or treatments that you can buy without a prescription (such as corn removers) can be harmful. When should you call for help? Call your doctor now or seek immediate medical care if: 
· You have symptoms of infection, such as: 
¨ Increased pain, swelling, warmth, or redness. ¨ Red streaks leading from the area. ¨ Pus draining from the area. ¨ A fever. Watch closely for changes in your health, and be sure to contact your doctor if: 
· You have a new problem with your feet, such as: ¨ A new sore or ulcer. ¨ A break in the skin that is not healing after several days. ¨ Bleeding corns or calluses. ¨ An ingrown toenail. · You do not get better as expected. Where can you learn more? Go to http://cammy-gordy.info/. Enter T131 in the search box to learn more about \"Diabetic Foot Ulcer: Care Instructions. \" Current as of: March 13, 2017 Content Version: 11.3 © 2435-4873 [employer]. Care instructions adapted under license by [employer] (which disclaims liability or warranty for this information). If you have questions about a medical condition or this instruction, always ask your healthcare professional. [address] any warranty or liability for your use of this information. MRSA: Care Instructions Your Care Instructions MRSA stands for methicillin-resistant Staphylococcus aureus. It is a type of bacteria that can cause a staph infection. But it cannot be killed by the antibiotic methicillin and some other antibiotics. This sometimes makes it harder to treat. The bacteria are widespread on skin and in the nose. MRSA can cause infections of the skin, heart, blood, and bones. The bacteria can spread quickly in the body and cause serious problems. MRSA can also be spread from person to person. Depending on how serious your infection is, the doctor may drain your wound and you may get antibiotics through a small tube placed in a vein (IV). Your doctor may also give you an antibiotic ointment to use on sores or in your nose. Follow-up care is a key part of your treatment and safety. Be sure to make and go to all appointments, and call your doctor if you are having problems. It's also a good idea to know your test results and keep a list of the medicines you take. How can you care for yourself at home? · Take your antibiotics as directed. Do not stop taking them just because you feel better. You need to take the full course of antibiotics.  
· Keep any cuts or other wounds covered while they heal. 
· Wash your hands often, especially after you touch elastic bandages or other dressings over a wound. This can keep the bacteria from spreading. Wrap bandages in a plastic bag before you throw them away. · Do not share towels, washcloths, razors, clothing, or other items that touched your wound or bandage. Wash your sheets, towels, and clothes with warm water and detergent. Dry them in a hot dryer, if possible. · Keep shared areas clean by wiping down surfaces (such as countertops, doorknobs, and light switches) with a disinfectant. When should you call for help? Call 911 anytime you think you may need emergency care. For example, call if: 
· You passed out (lost consciousness). Call your doctor now or seek immediate medical care if: 
· You have a cut or sore that is not healing. · Your infection is not going away after several days of treatment. · Your infection gets worse. · You get a fever, or your fever gets worse. Watch closely for changes in your health, and be sure to contact your doctor if: 
· You do not get better as expected. Where can you learn more? Go to http://cammyBetablegordy.info/. Enter J743 in the search box to learn more about \"MRSA: Care Instructions. \" Current as of: March 3, 2017 Content Version: 11.3 © 6617-5321 Tus re[employer]dos. Care instructions adapted under license by [employer] (which disclaims liability or warranty for this information). If you have questions about a medical condition or this instruction, always ask your healthcare professional. [place] [address] any warranty or liability for your use of this information. Osteomyelitis: Care Instructions Your Care Instructions Osteomyelitis (say \"ai-krrm-xl-ir-gk-WZ-tus\") is a bone infection. It is caused by bacteria. The bacteria can infect the bone where it has been injured, or they can be carried through the blood from another area in the body. Osteomyelitis can be a short- or long-term problem.  It is treated with antibiotics. You may get the antibiotics as pills or through a needle in a vein (IV). You will probably get treatment in the hospital at first. The type of treatment depends on the type of bacteria causing the infection, the bones affected, and how bad the infection is. Sometimes people need surgery to drain pus from bone or to fix damaged bone. Short-term osteomyelitis that is treated right away usually can be cured. But the long-term form sometimes comes back after treatment. You can help your chances of stopping the infection by taking your medicines as directed. Follow-up care is a key part of your treatment and safety. Be sure to make and go to all appointments, and call your doctor if you are having problems. It's also a good idea to know your test results and keep a list of the medicines you take. How can you care for yourself at home? · Take your antibiotics as directed. Do not stop taking them just because you feel better. You need to take the full course of antibiotics. · Take pain medicines exactly as directed. ¨ If the doctor gave you a prescription medicine for pain, take it as prescribed. ¨ If you are not taking a prescription pain medicine, ask your doctor if you can take an over-the-counter medicine. · Do mild exercise and stretching if your doctor says it is okay. This can help keep your bones and muscles healthy. Avoid strenuous work or exercise until your doctor says you can do it. · Consider physical therapy if your doctor suggests it. Physical therapy may help you have a normal range of movement. · Do not smoke. Smoking can slow healing of the infection. If you need help quitting, talk to your doctor about stop-smoking programs and medicines. These can increase your chances of quitting for good. When should you call for help? Call 911 anytime you think you may need emergency care. For example, call if: 
· You have severe bone pain. Call your doctor now or seek immediate medical care if: 
· You continue to have bone pain. · You have signs of infection, such as: 
¨ Increased pain, swelling, warmth, or redness. ¨ Red streaks leading from a wound. ¨ Pus draining from a wound. ¨ A fever. Watch closely for changes in your health, and be sure to contact your doctor if: 
· You do not get better as expected. Where can you learn more? Go to http://cammy-gordy.info/. Enter V131 in the search box to learn more about \"Osteomyelitis: Care Instructions. \" Current as of: March 20, 2017 Content Version: 11.3 © 2282-8478 [employer]. Care instructions adapted under license by [employer] (which disclaims liability or warranty for this information). If you have questions about a medical condition or this instruction, always ask your healthcare professional. [place] [address] any warranty or liability for your use of this information. Learning About Type 2 Diabetes What is type 2 diabetes? Insulin is a hormone that helps your body use sugar from your food as energy. Type 2 diabetes happens when your body can't use insulin the right way. Over time, the pancreas can't make enough insulin. If you don't have enough insulin, too much sugar stays in your blood. If you are overweight, get little or no exercise, or have type 2 diabetes in your family, you are more likely to have problems with the way insulin works in your body.  Americans, Hispanics, Native Americans,  Americans, and Pacific Islanders have a higher risk for type 2 diabetes. Type 2 diabetes can be prevented or delayed with a healthy lifestyle, which includes staying at a healthy weight, making smart food choices, and getting regular exercise. What can you expect with type 2 diabetes?  
Azael Jimenez keep hearing about how important it is to keep your blood sugar within a target range. That's because over time, high blood sugar can lead to serious problems. It can: 
· Harm your eyes, nerves, and kidneys. · Damage your blood vessels, leading to heart disease and stroke. · Reduce blood flow and cause nerve damage to parts of your body, especially your feet. This can cause slow healing and pain when you walk. · Make your immune system weak and less able to fight infections. When people hear the word \"diabetes,\" they often think of problems like these. But daily care and treatment can help prevent or delay these problems. The goal is to keep your blood sugar in a target range. That's the best way to reduce your chance of having more problems from diabetes. What are the symptoms? Some people who have type 2 diabetes may not have any symptoms early on. Many people with the disease don't even know they have it at first. But with time, diabetes starts to cause symptoms. You experience most symptoms of type 2 diabetes when your blood sugar is either too high or too low. The most common symptoms of high blood sugar include: · Thirst. 
· Frequent urination. · Weight loss. · Blurry vision. The symptoms of low blood sugar include: · Sweating. · Shakiness. · Weakness. · Hunger. · Confusion. How can you prevent type 2 diabetes? The best way to prevent or delay type 2 diabetes is to adopt healthy habits, which include: 
· Staying at a healthy weight. · Exercising regularly. · Eating healthy foods. How is type 2 diabetes treated? If you have type 2 diabetes, here are the most important things you can do. · Take your diabetes medicines. · Check your blood sugar as often as your doctor recommends. Also, get a hemoglobin A1c test at least every 6 months. · Try to eat a variety of foods and to spread carbohydrate throughout the day. Carbohydrate raises blood sugar higher and more quickly than any other nutrient does.  Carbohydrate is found in sugar, breads and cereals, fruit, starchy vegetables such as potatoes and corn, and milk and yogurt. · Get at least 30 minutes of exercise on most days of the week. Walking is a good choice. You also may want to do other activities, such as running, swimming, cycling, or playing tennis or team sports. If your doctor says it's okay, do muscle-strengthening exercises at least 2 times a week. · See your doctor for checkups and tests on a regular schedule. · If you have high blood pressure or high cholesterol, take the medicines as prescribed by your doctor. · Do not smoke. Smoking can make health problems worse. This includes problems you might have with type 2 diabetes. If you need help quitting, talk to your doctor about stop-smoking programs and medicines. These can increase your chances of quitting for good. Follow-up care is a key part of your treatment and safety. Be sure to make and go to all appointments, and call your doctor if you are having problems. It's also a good idea to know your test results and keep a list of the medicines you take. Where can you learn more? Go to http://cammy-gordy.info/. Enter J876 in the search box to learn more about \"Learning About Type 2 Diabetes. \" Current as of: March 13, 2017 Content Version: 11.3 © 8721-6970 [employer]. Care instructions adapted under license by [employer] (which disclaims liability or warranty for this information). If you have questions about a medical condition or this instruction, always ask your healthcare professional. [place] [address] any warranty or liability for your use of this information. July Coronado 
 
 
 [address] [place] [address] 
[phone] Patient: Yuli Tejada MRN: [id number] Tiene alergias a lo siguiente No tiene alergias Documentación recientes Height Weight BMI (IMC) Estatus de tabaquísmo 1.524 m 53.8 kg 23.16 kg/m2 Heavy Tobacco Smoker Unresulted Labs Order Current Status CULTURE, BLOOD Preliminary result CULTURE, BLOOD Preliminary result CULTURE, WOUND W GRAM STAIN Preliminary result Emergency Contacts Name Discharge Info Relation Home Work Mobile Elbert Thompson DISCHARGE CAREGIVER [3] Friend [5] [phone] Sobre ovalles hospitalización Le admitieron el:  September 12, 2017 [place] tratamiento más reciente fue el:  [address] CARDIAC TELE Le dieron de jordi el:  September 14, 2017 Número de teléfono de la unidad:  [phone] Por qué le ingresaron [place] diagnosis primaria es:  Diabetic Foot Ulcer (Hcc) [place] diagnosis también incluye:  Anemia, Dale (Acute Kidney Injury) (Hcc), Dm (Diabetes Mellitus) (Hcc) Proveedores de verse patria alfonso hospitalizaciones Personal Médico Rol Especialidad Teléfono principal de la oficina Tiffanie Landaverde DO Attending Provider Emergency Medicine [phone] Jadyn Petersen MD Attending Provider [facility] [phone] Yamil Rodríguez MD Attending Provider [facility] [phone] Amber Tineo DO Attending Provider Internal Medicine [phone] [place] médico de atención primaria (PCP ) Primary Care Physician Office Phone Office Fax NONE ** None ** ** None ** Follow-up Information Follow up With Details Comments Contact Info [facility] Call  [facility] [address] ([facility]) [address] [phone] Aprobación de la gestión actual lista de medicamentos EMPIECE a jose luis eleni Dosis e instrucciones Información de dispensación Comentarios Morning Noon Evening Bedtime  
 ciprofloxacin HCl 500 mg tablet También conocido alicia:  CIPRO Your last dose was: Your next dose is:    
   
   
 Dosis:  500 mg Take 1 Tab by mouth every twelve (12) hours for 30 days. cantidad:  60 Tab  
recargas:  0 CONTINUAR estos medicamentos que no [place] Dosis e instrucciones Información de dispensación Comentarios Morning Noon Evening Bedtime  
 amLODIPine 5 mg tablet También conocido alicia:  Tara Cordial Your last dose was: Your next dose is:    
   
   
 Dosis:  5 mg Take 1 Tab by mouth daily. cantidad:  30 Tab  
recargas:  5  
     
   
   
   
  
 aspirin 81 mg chewable tablet Your last dose was: Your next dose is:    
   
   
 Dosis:  81 mg Take 1 Tab by mouth daily. cantidad:  30 Tab  
recargas:  0 HYDROcodone-acetaminophen 5-325 mg per tablet También conocido alicia:  Jada Iron Your last dose was: Your next dose is:    
   
   
 Dosis:  1 Tab Take 1 Tab by mouth every four (4) hours as needed for Pain. Max Daily Amount: 6 Tabs. Indications: do not fill unless keflex and bactrim are filled  
 cantidad:  15 Tab  
recargas:  0  
     
   
   
   
  
 insulin NPH/insulin regular 100 unit/mL (70-30) injection También conocido alicia:  HumuLIN 70/30 Your last dose was: Your next dose is:    
   
   
 Dosis:  12 Units 12 Units by SubCUTAneous route Before breakfast and dinner. cantidad:  3 Vial  
recargas:  1 Insulin Syringe-Needle U-100 1 mL 28 gauge x 1/2\" Syrg También conocido alicia:  INSULIN SYRINGE Your last dose was: Your next dose is:    
   
   
 Dosis:  1 Package 1 Package by Does Not Apply route two (2) times a day. cantidad:  120 Syringe  
recargas:  1  
     
   
   
   
  
 losartan 50 mg tablet También conocido alicia:  COZAAR Your last dose was: Your next dose is:    
   
   
 Dosis:  50 mg Take 1 Tab by mouth daily. cantidad:  30 Tab  
recargas:  0  
     
   
   
   
  
 metFORMIN 500 mg tablet También conocido alicia:  GLUCOPHAGE Your last dose was: Your next dose is:    
   
   
 Dosis:  500 mg Take 1 Tab by mouth two (2) times daily (with meals). cantidad:  30 Tab  
recargas:  0  
     
   
   
   
  
 pravastatin 10 mg tablet También conocido alicia:  PRAVACHOL Your last dose was: Your next dose is:    
   
   
 Dosis:  10 mg Take 1 Tab by mouth nightly. cantidad:  30 Tab  
recargas:  0 DEJE de jose luis [employer] DAPTOMYCIN IV  
   
  
  
  
Dónde puede recoger alfonso medicamentos Información sobre dónde obtener estos medicamentos se le dará a usted por la enfermera o el médico.   
 ! Pregunte a beard médico o enfermero/a sobre estos medicamentos  
  ciprofloxacin HCl 500 mg tablet Discharge Instructions Patient armband removed and shredded DISCHARGE SUMMARY from Nurse The following personal items are in your possession at time of discharge: 
 
Dental Appliances: None Visual Aid: None Home Medications: None Jewelry: None Clothing: At bedside, Shirt, Pants Other Valuables: Cell Phone, With patient PATIENT INSTRUCTIONS: 
 
 
F-face looks uneven A-arms unable to move or move unevenly S-speech slurred or non-existent T-time-call 911 as soon as signs and symptoms begin-DO NOT go Back to bed or wait to see if you get better-TIME IS BRAIN. Warning Signs of HEART ATTACK Call 911 if you have these symptoms: 
? Chest discomfort. Most heart attacks involve discomfort in the center of the chest that lasts more than a few minutes, or that goes away and comes back. It can feel like uncomfortable pressure, squeezing, fullness, or pain. ? Discomfort in other areas of the upper body.  Symptoms can include pain or discomfort in one or both arms, the back, neck, jaw, or stomach. ? Shortness of breath with or without chest discomfort. ? Other signs may include breaking out in a cold sweat, nausea, or lightheadedness. Don't wait more than five minutes to call [address]! Fast action can save your life. Calling 911 is almost always the fastest way to get lifesaving treatment. Emergency Medical Services staff can begin treatment when they arrive  up to an hour sooner than if someone gets to the hospital by car. The discharge information has been reviewed with the patient. The patient verbalized understanding. Discharge medications reviewed with the patient and appropriate educational materials and side effects teaching were provided. DISCHARGE SUMMARY from Nurse The following personal items are in your possession at time of discharge: 
 
Dental Appliances: None Visual Aid: None Home Medications: None Jewelry: None Clothing: At bedside, Shirt, Pants Other Valuables: Cell Phone, With patient PATIENT INSTRUCTIONS: 
 
 
F-face looks uneven A-arms unable to move or move unevenly S-speech slurred or non-existent T-time-call 911 as soon as signs and symptoms begin-DO NOT go Back to bed or wait to see if you get better-TIME IS BRAIN. Warning Signs of HEART ATTACK Call 911 if you have these symptoms: 
? Chest discomfort. Most heart attacks involve discomfort in the center of the chest that lasts more than a few minutes, or that goes away and comes back. It can feel like uncomfortable pressure, squeezing, fullness, or pain. ? Discomfort in other areas of the upper body.  Symptoms can include pain or discomfort in one or both arms, the back, neck, jaw, or stomach. ? Shortness of breath with or without chest discomfort. ? Other signs may include breaking out in a cold sweat, nausea, or lightheadedness. Don't wait more than five minutes to call [address]! Fast action can save your life. Calling 911 is almost always the fastest way to get lifesaving treatment. Emergency Medical Services staff can begin treatment when they arrive  up to an hour sooner than if someone gets to the hospital by car. The discharge information has been reviewed with the patient. The patient verbalized understanding. Discharge medications reviewed with the patient and appropriate educational materials and side effects teaching were provided. Nemedia Activation Thank you for enrolling in [facility] Please follow the instructions below to securely access your online medical record. Nemedia allows you to send messages to your doctor, view your test results, renew your prescriptions, schedule appointments, and more. How Do I Sign Up? 1. In your internet browser, go to [email or web address] 2. Click on the First Time User? Click Here link in the Sign In box. You will see the New Member Sign Up page. 3. Enter your Nemedia Access Code exactly as it appears below. You will not need to use this code after youve completed the sign-up process. If you do not sign up before the expiration date, you must request a new code. Nemedia Access Code: [email or web address] Expires: 12/12/2017 10:09 PM  
 
4. Enter the last four digits of your Social Security Number (xxxx) and Date of Birth (mm/dd/yyyy) as indicated and click Submit. You will be taken to the next sign-up page. 5. Create a Nemedia ID. This will be your Nemedia login ID and cannot be changed, so think of one that is secure and easy to remember. 6. Create a Nemedia password. You can change your password at any time. 7. Enter your Password Reset Question and Answer. This can be used at a later time if you forget your password. 8. Enter your e-mail address. You will receive e-mail notification when new information is available in [address] 9. Click Sign Up. You can now view your medical record. Additional Information Remember, [employer] is NOT to be used for urgent needs. For medical emergencies, dial 911. Now available from your iPhone and Android! Patient armband removed and shredded Discharge Instructions Attachments/References DIABETES: FOOT CARE (Turkmen) DIABETIC FOOT ULCER (Turkmen) MRSA (Turkmen) OSTEOMYELITIS (Turkmen) DIABETES: TYPE 2: GENERAL INFO (Turkmen) Discharge Orders SegmentFault Announcement We are excited to announce that we are making your provider's discharge notes available to you in [employer]. You will see these notes when they are completed and signed by the physician that discharged you from your recent hospital stay. If you have any questions or concerns about any information you see in [employer], please call the Health Information Department where you were seen or reach out to your Primary Care Provider for more information about your plan of care. Introducing [facility]! [facility] introduces [employer] patient portal. Now you can access parts of your medical record, email your doctor's office, and request medication refills online. 1. In your internet browser, go to [email or web address] 2. Click on the First Time User? Click Here link in the Sign In box. You will see the New Member Sign Up page. 3. Enter your [employer] Access Code exactly as it appears below. You will not need to use this code after youve completed the sign-up process. If you do not sign up before the expiration date, you must request a new code. · [employer] Access Code: [email or web address] Expires: 12/12/2017 10:09 PM 
 
 4. Enter the last four digits of your Social Security Number (xxxx) and Date of Birth (mm/dd/yyyy) as indicated and click Submit. You will be taken to the next sign-up page. 5. Create a [employer] ID. This will be your [employer] login ID and cannot be changed, so think of one that is secure and easy to remember. 6. Create a [employer] password. You can change your password at any time. 7. Enter your Password Reset Question and Answer. This can be used at a later time if you forget your password. 8. Enter your e-mail address. You will receive e-mail notification when new information is available in [address] 9. Click Sign Up. You can now view and download portions of your medical record. 10. Click the Download Summary menu link to download a portable copy of your medical information. If you have questions, please visit the Frequently Asked Questions section of the [employer] website. Remember, [employer] is NOT to be used for urgent needs. For medical emergencies, dial 911. Now available from your iPhone and Android! General Information Please provide this summary of care documentation to your next provider. Patient Signature:  ____________________________________________________________ Date:  ____________________________________________________________  
  
Magno Endo Provider Signature:  ____________________________________________________________ Date:  ____________________________________________________________ More Information Liz de los pies en personas con diabetes: Instrucciones de cuidado - [ Diabetes Foot Health: Care Instructions ] Instrucciones de cuidado Cuando usted tiene diabetes, alfonso pies necesitan más cuidado y [place].  La diabetes puede dañar las terminaciones nerviosas y los vasos sanguíneos de los pies y provocar que usted no se dé cuenta de que alfonso pies están lesionados. La diabetes también limita la capacidad del cuerpo para atacar las infecciones y llevar la marimar a las zonas que la requieren. Evelio herida real en el pie podría convertirse en Lorice Commander o evelio infección grave. Usted puede prevenir la mayoría de estos problemas teniendo un buen cuidado de alfonso pies. Cuidar alfonso pies puede ser rápido y fácil. La mayor parte del cuidado puede hacerse cuando usted se baña o se prepara para ir a dormir. La atención de seguimiento es evelio parte clave de beard tratamiento y seguridad. Asegúrese de hacer y acudir a todas las citas, y llame a beard médico si está teniendo problemas. También es evelio buena idea saber los resultados de los exámenes y mantener evelio lista de los medicamentos que candido. Cómo puede cuidarse en el hogar? · Mantenga beard azúcar en la marimar cerca del nivel normal observando qué y cuánto come, monitoreando beard nivel de azúcar en la marimar, tomando los medicamentos si se los thomson recetado y haciendo ejercicio con regularidad. · No fume. Fumar afecta el flujo de marimar y puede [employer] problemas de los pies. Si necesita ayuda para dejar de fumar, hable con beard médico [employer] y medicamentos para dejar de fumar. Pueden aumentar alfonso probabilidades de dejar el hábito para siempre. · Consuma evelio dieta baja en grasas. Un consumo alto de grasa puede provocar evelio acumulación en los vasos sanguíneos y reducir el flujo de Rajesh. · Inspeccione alfonso pies a diario para rajiv si tienen ampollas, cortaduras, grietas o llagas. Si no puede rajiv kristin, utilice un lokesh o pídale a alguien [facility]. · Cuídese los pies: 
¨ Lávese los pies todos los días. Use agua tibia (no caliente). Revise la temperatura del agua con beard randy u otra parte del cuerpo, no con alfonso pies. ¨ Seque kristin alfonso pies. Séquelos con toques suaves de toalla. No frote demasiado la piel de los pies. Seque kristin entre los dedos de los pies.  Si la piel de los pies [place], pueden crecer bacterias u hongos, que pueden provocar evelio infección. ¨ Mantenga beard piel suave. Use crema humectante para la piel para mantener la piel de los pies [place] y prevenir callosidades y grietas. Demar no se ponga crema entre los dedos de los pies, y deje el uso de cualquier crema que le cause salpullido. ¨ Limpie con cuidado debajo de las [employer]. No utilice objetos cortantes para limpiar debajo de las uñas de los pies. Use el extremo sin светлана de evelio lima para uñas u otro instrumento redondeado. ¨ Recorte y lime las uñas de los pies en forma recta para prevenir las uñas encarnadas (enterradas). Use un cortaúñas, no tijeras. Use evelio lima de esmeril para [employer]. · Cámbiese los calcetines a diario. Las medias sin costura son las mejores, porque las costuras suelen rozar los pies. Puede encontrar calcetines en catálogos especializados para personas con diabetes. · Revise alfonso zapatos todos los días en busca de cosas alicia piedrecillas o revestimiento rasgado del zapato, que le puedan causar ampollas o llagas. · Cómprese zapatos que le queden kristin: 
¨ Busque zapatos que tengan suficiente espacio algilor [employer] dedos. Proctor ayuda a prevenir juanetes y ampollas. ¨ Pruébese los zapatos con el tipo de calcetín que usará de manera habitual con ellos. ¨ Evite los zapatos plásticos. Éstos podrían rozar alfonso pies y causar ampollas. Los zapatos de buena calidad deben estar hechos de materiales que amisha flexibles y dejen circular el aire, alicia cuero o altaf. ¨ Moldee los zapatos nuevos poco a poco usándolos no más de evelio hora al día patria varios días. Tómese más tiempo para revisar alfonso pies para zonas enrojecidas, ampollas u otros problemas después de usar unos zapatos nuevos. · No camine descalzo. No use sandalias ni zapatos con suelas muy delgadas. Las suelas delgadas se rompen con facilidad.  Tampoco protegen lafonso pies del pavimento caliente o de las temperaturas frías. · Pídale a beard médico que le revise los pies en cada consulta. Si tiene algún [employer], consulte a beard médico. No intente tratar un problema del pie con colleen caseros. Los colleen o tratamientos caseros de venta kolby (alicia los eliminadores de callos) pueden ser dañinos. · Siempre busque un tratamiento temprano para los problemas de los pies. Evelio irritación real puede causar un problema importante si no se trata pronto y de [place]. Cuándo debe pedir ayuda? Llame a beard médico ahora mismo o busque atención médica inmediata si: · Presenta evelio llaga en el pie, evelio úlcera o evelio grieta en la piel que no sanan después de 4 días, callos o callosidades que sangran, o evelio uña encarnada. · Tiene zonas de la piel de color azulado o negruzco, que pueden significar moretones o problemas de flujo de [place]. · Tiene piel descamada o ampollas diminutas entre los dedos de los pies, o piel agrietada o supurante. · Tiene fiebre por más de 24 horas y [place] llaga [employer] · Siente nuevo entumecimiento u hormigueo en los pies que no desaparece después de  los pies o cambiar de posición. · Tiene evelio hinchazón inexplicable o inusual en el pie o el tobillo. Preste especial atención a los cambios en beard marlena y asegúrese de comunicarse con beard médico si: 
· No puede cuidarse los pies de [place] apropiada. Dónde puede encontrar más información en inglés? Gabriela Montgomery a [email or web address] Escriba A739 en la búsqueda para aprender más acerca de \"Marlena de los pies en personas con diabetes: Instrucciones de cuidado - [ Diabetes Foot Health: Care Instructions ]. \" 
Revisado: 13 marzo, 2017 Versión del contenido: 11.3 © 4765-7608 [employer], [address]. Las instrucciones de cuidado fueron adaptadas bajo licencia por Good Help Connections (which disclaims liability or warranty for this information).  Si usted tiene preguntas sobre evelio afección médica o sobre estas instrucciones, siempre pregunte a beard profesional de marlena. [facility] niega toda garantía o responsabilidad por beard uso de esta información. Úlcera del pie diabético: Instrucciones de cuidado - [ Diabetic Foot Ulcer: Care Instructions ] Instrucciones de cuidado La diabetes puede dañar las terminaciones nerviosas y los vasos sanguíneos en los pies. Eso significa que usted tiene menos probabilidades de darse cuenta de lastimaduras en los pies. Un pequeño problema en la piel alicia un callo, evelio ampolla o piel resquebrajada puede volverse evelio llaga más noy, conocida alicia úlcera del pie. Las [employer] del pie se mirtha la mayoría de las veces en la zachary plantar del antepié o debajo del dedo alice. También pueden formarse arriba o abajo de cada dedo del pie. Las [employer] del pie pueden infectarse. Si la infección es grave, el tejido en el pie puede morir. Chamberino se llama gangrena. En julianne lorie, puede ser necesario cortar (amputar) rudy o más dedos del pie, parte del pie o el pie en beard totalidad y, a veces, parte de la pierna. Beard médico puede janeen extraído el tejido muerto y [address]. Es posible que tenga el pie envuelto con vendaje protector. Es muy importante que no ponga peso sobre el pie lesionado. Después de la formación de [place], esta no sanará mientras usted siga poniéndole peso a la zachary. Siempre busque un tratamiento temprano para los [facility] ContactUs.com. Evelio irritación real puede causar un problema importante si no se trata pronto. La atención de seguimiento es evelio parte clave de beard tratamiento y seguridad. Asegúrese de hacer y acudir a todas las citas, y llame a beard médico si está teniendo problemas. También es evelio buena idea saber los resultados de alfonso exámenes y mantener evelio lista de los medicamentos que candido. Cómo puede cuidarse en el hogar?  
· [address] beard médico acerca de evitar poner presión en la úlcera del pie. Morales Hayley necesite usar [employer] o evelio silla de maria ines. O podría usar un yeso o evelio bota ortopédica. · Siga las indicaciones de beard médico sobre cómo limpiarse la úlcera y cambiarse el vendaje. · Si beard médico le recetó antibióticos, tómelos según las indicaciones. No deje de tomarlos solo porque se sienta mejor. Debe jose luis todos los antibióticos hasta terminarlos. Para prevenir la formación de úlceras en el pie · Mantenga el azúcar en la marimar cerca del nivel normal vigilando lo que come y en qué cantidad. Vigile beard azúcar en la marimar, tome medicamentos si se los recetaron y twyla ejercicio en forma regular. · No fume. Fumar afecta la circulación de la marimar y puede [employer] problemas de los pies. Si necesita ayuda para dejar el hábito, hable con beard médico sobre programas y medicamentos para dejar de fumar. Estos pueden aumentar alfonso probabilidades de dejar de fumar para siempre. · No camine descalzo. Protéjase los pies usando zapatos que le [place]. Elija zapatos que estén hechos de materiales que amisha flexibles y que dejen circular el aire, alicia cuero o altaf. · Examínese los pies a diario para rajiv si tiene ampollas, rodríguez, grietas o llagas. Si no puede rajiv kristin, use un lokesh o twyla que alguien [place] [employer]. · Pídale a beard médico que le inspeccione los pies en todas las visitas. Si tiene algún [employer], consulte a beard médico. No intente tratar beard problema en el pie usted solo. Los colleen o tratamientos caseros de venta kolby (alicia los eliminadores de callos) pueden ser dañinos. Cuándo debe pedir ayuda? Llame a beard médico ahora mismo o busque atención médica inmediata si: · Tiene síntomas de infección, tales alicia: ¨ Aumento del dolor, la hinchazón, la temperatura o el enrojecimiento. ¨ Vetas rojizas que salen de la zachary. ¨ Pus que sale de la zachary. Una Amado.  
Preste especial atención a los cambios en beard marlena y asegúrese de comunicarse con beard médico si: 
 · Tiene un problema nuevo en los pies, alicia: 
¨ [facility] llaga o Sherine Chino. ¨ Un fawad en la piel que no caroline después de varios días. ¨ Helomas (ojos de lockett) o callos que sangran. ¨ Evelio uña encarnada. · No mejora alicia se esperaba. Dónde puede encontrar más información en inglés? Digna Cooper a [email or web address] Escriba T131 en la búsqueda para aprender más acerca de \"Úlcera del pie diabético: Instrucciones de cuidado - [ Diabetic Foot Ulcer: Care Instructions ]. \" 
Revisado: 13 marzo, 2017 Versión del contenido: 11.3 © 1151-4917 Healthwise, Incorporated. Las instrucciones de cuidado fueron adaptadas bajo licencia por Good [employer] Connections (which disclaims liability or warranty for this information). Si usted tiene [place] afección médica o sobre estas instrucciones, siempre pregunte a beard profesional de marlena. Healthwise, Incorporated niega toda garantía o responsabilidad por beard uso de esta información. MRSA: Instrucciones de cuidado - [ MRSA: Care Instructions ] Instrucciones de cuidado MRSA es la sigla en inglés de Staphylococcus aureus resistente a la meticilina. Es evelio clase de bacteria que puede causar evelio infección por estafilococo. Demar no puede ser Delcie José con el antibiótico meticilina ni con algunos otros antibióticos. [place] hace que a veces beard tratamiento sea más difícil. Las bacterias se encuentran dispersas por la piel y la nariz. El MRSA puede causar infecciones en la piel, el corazón, la marimar y [place]. Esta bacteria puede diseminarse rápidamente por el cuerpo y causar problemas graves. También se puede transmitir el MRSA de [place] persona a otra. Según la gravedad de beard infección, el médico podría drenar la herida y usted podría recibir antibióticos a través de un tubo pequeño colocado en evelio vena (IV). El médico podría también recetarle evelio pomada con antibiótico para aplicar sobre las llagas o en la [place]. La atención de seguimiento es evelio parte clave de ovalles tratamiento y seguridad. Asegúrese de hacer y acudir a todas las citas, y llame a ovalles médico si está teniendo problemas. También es evelio buena idea saber los resultados de los exámenes y mantener evelio lista de los medicamentos que candido. Cómo puede cuidarse en el hogar? · [facility] antibióticos megan alicia le indicaron. No deje de tomarlos por el hecho de sentirse mejor. Debe jose luis todos los antibióticos hasta terminarlos. · [place] cualquier fawad o herida hasta que sanen. · Lávese las bernarda con frecuencia, en especial, después de tocar las vendas elásticas u otros apósitos colocados sobre evelio herida. [place] puede impedir que las bacterias se propaguen. Envuelva las vendas en evelio bolsa plástica antes de desecharlas. · No comparta las toallas, las [place], las navajas (cuchillas) de afeitar, la ropa ni ningún otro elemento que haya estado en contacto con la herida o las vendas. Lave las sábanas, las toallas y la ropa con agua tibia y detergente. Séquelos en evelio secadora de aire caliente, si es posible. · Mantenga limpias las áreas compartidas, limpiando las superficies ([place] encimeras, las manijas y los interruptores de alberta) con un desinfectante. Cuándo debe pedir ayuda? Llame al 911 en cualquier momento que considere que necesita atención de [place]. Por ejemplo, llame si: 
· Se desmayó (perdió el conocimiento). Llame a ovalles médico ahora mismo o busque atención médica inmediata si: · Tiene un fawad o evelio llaga que no caroline. · La infección no desaparece después de varios días de tratamiento. · Ovalles infección empeora. · Empieza a tener kelleyre o la thomas [place]. Preste especial atención a los cambios en ovalles marlena y asegúrese de comunicarse con ovalles médico si: 
· No mejora alicia se esperaba. Dónde puede encontrar más información en inglés? Zeynep Mccrary a [email or web address]  
Rigo Phillip P238 en la búsqueda para aprender Edie Ferguson de \"MRSA: Instrucciones de cuidado - [ MRSA: Care Instructions ]. \" 
Revisado: 3 marzo, 2017 Versión del contenido: 11.3 © 5684-9744 Healthwise, Incorporated. Las instrucciones de cuidado fueron adaptadas bajo licencia por Good Help Connections (which disclaims liability or warranty for this information). Si usted tiene [place] afección médica o sobre estas instrucciones, siempre pregunte a beard profesional de marlena. Healthwise, Incorporated niega toda garantía o responsabilidad por beard uso de esta información. Osteomielitis: Instrucciones de cuidado - [ Osteomyelitis: Care Instructions ] Instrucciones de cuidado La osteomielitis es la infección de un hueso. Es causada por bacterias. Las bacterias pueden infectar un hueso en el que hay evelio lesión o pueden ser transportadas por la marimar desde otra parte del cuerpo. La osteomielitis puede ser un problema a corto o daksha plazo. Se trata con antibióticos. Podría recibir los antibióticos en forma oral (de pastillas) o intravenosa (IV). Es probable que yarelis reciba tratamiento en el hospital. El tipo de tratamiento depende de las bacterias que provoquen la infección, los huesos afectados y la gravedad de la infección. A veces, las personas necesitan cirugía para drenar pus del hueso o reparar el hueso lesionado. La osteomielitis a corto plazo que se trata de inmediato generalmente se puede curar. Demar, a veces, la osteomielitis a daksha plazo reaparece después del tratamiento. Leo los medicamentos según las indicaciones puede mejorar las probabilidades de detener la infección. La atención de seguimiento es evelio parte clave de beard tratamiento y seguridad. Asegúrese de hacer y acudir a todas las citas, y llame a beard médico si está teniendo problemas. También es evelio buena idea saber los resultados de los exámenes y mantener evelio lista de los medicamentos que candido. Cómo puede cuidarse en el hogar? · [employer] antibióticos según las indicaciones. No deje de tomarlos por el hecho de sentirse mejor. Debe jose luis todos los antibióticos hasta terminarlos. · [employer] analgésicos (medicamentos para el dolor) exactamente según las indicaciones. ¨ Si el médico le recetó un analgésico, tómelo según las indicaciones. ¨ Si no está tomando un analgésico recetado, pregúntele a beard médico si puede jose luis un medicamento de [employer]. · Liz Callie y estiramiento suaves si beard médico lo aprueba. [place] puede ayudar a [employer] y los músculos sanos. Evite hacer actividades o ejercicio vigorosos [employer] beard médico le diga que puede [place]. · Considere hacer fisioterapia si beard médico se lo sugiere. La fisioterapia podría ayudarlo a tener evelio amplitud de movimiento normal. 
· No fume. Fumar puede retardar la curación de la infección. Si necesita ayuda para dejar de fumar, hable con beard médico sobre programas y medicamentos para dejar de fumar. Estos pueden aumentar alfonso probabilidades de dejar el hábito para siempre. Cuándo debe pedir ayuda? Llame al 911 en cualquier momento que considere que necesita atención de [place]. Por ejemplo, llame si: · Tiene dolor intenso en el hueso. Llame a beard médico ahora mismo o busque atención médica inmediata si: 
· Sigue teniendo [employer]. · [id number] tales alicia: ¨ Aumento del dolor, la hinchazón, el enrojecimiento o la temperatura. ¨ Vetas rojizas que salen de evelio herida. ¨ Pus que sale de evelio herida. Colby Zora. Preste especial atención a los cambios en beard marlena y asegúrese de comunicarse con beard médico si: 
· No mejora alicia se esperaba. Dónde puede encontrar más información en inglés? Malena Farmer a [email or web address] Fay Maxwell R250 en la búsqueda para aprender más acerca de \"Osteomielitis: Instrucciones de cuidado - [ Osteomyelitis: Care Instructions ]. \" 
Revisado: 20 marzo, 2017 Versión del contenido: 11.3 © 6999-8340 Healthwise, Incorporated. Las instrucciones de cuidado fueron adaptadas bajo licencia por Good [place] Connections (which disclaims liability or warranty for this information). Si kashmir tiene [place] afección médica o sobre estas instrucciones, siempre pregunte a beard profesional de marlena. Healthwise, Incorporated niega toda garantía o responsabilidad por beard uso de esta información. Aprenda acerca de la diabetes tipo 2 - [ Learning About Type 2 Diabetes ] Qué es la diabetes tipo 2? La insulina es eevlio hormona que ayuda a beard cuerpo a usar el azúcar de alfonso alimentos alicia energía. La diabetes tipo 2 ocurre cuando beard cuerpo no puede utilizar la insulina de [employer]. Con el tiempo, el páncreas no puede producir suficiente insulina. Si no tiene suficiente insulina, demasiada azúcar se queda en beard marimar. Si tiene sobrepeso, hace poco o nada de ejercicio o hay diabetes tipo 2 en beard [place], kashmir es más propenso a tener problemas con la forma en que funciona la insulina en beard cuerpo. Los afroamericanos, los hispanos, los nativo-americanos, los asiático-americanos y los nativos de las ascencio del [place] tienen un riesgo más alto de diabetes tipo 2. La diabetes tipo 2 puede prevenirse o retrasarse con un estilo de kishore saludable, que incluye mantener un peso saludable, elegir alimentos sanos y hacer ejercicio regularmente. Qué puede esperar con la diabetes tipo 2? Seguirá [employer] lo importante que es mantener alfonso niveles de azúcar en la marimar dentro de los límites ideales. [place] se debe a que, con [employer], un nivel alto de azúcar en la marimar puede provocar problemas graves. Puede: 
· Dañarle los ojos, los nervios y los riñones. · Dañarle los vasos sanguíneos, lo que puede provocar enfermedades cardíacas y ataques cerebrales. · Reducir el flujo de marimar y causarle daño nervioso en partes de beard cuerpo, sobre todo [employer].  [place] puede hacer más lenta beard sanación y provocar dolor cuando camina. · Debilitar el sistema inmunitario y hacer que sea menos capaz de luchar contra infecciones. Cuando la priyanka Walker Alexa silvaa \"diabetes\", a menudo piensa en problemas alicia estos. Sin embargo, la atención diaria y el tratamiento pueden ayudar a prevenir o retrasar estos problemas. El objetivo es mantener el nivel de azúcar en la marimar dentro de los límites ideales. Es la mejor manera de reducir las probabilidades de tener más problemas por la diabetes. Cuáles son los síntomas? Es posible que [place] que tienen diabetes tipo 2 no tengan ningún síntoma en evelio etapa temprana. Muchas personas con la enfermedad al principio no saben siquiera que la tienen. Demar con el tiempo, la diabetes empieza a causar síntomas. La mayoría de los síntomas de la diabetes tipo 2 se experimentan cuando el nivel de azúcar en la marimar está demasiado alto o demasiado bajo. Los síntomas más comunes del nivel alto de azúcar en la marimar incluyen: · Sed. · Micción frecuente. · Pérdida de peso. · Priscille Lard. Los síntomas del nivel bajo de azúcar en la marimar incluyen: · Sudoración. · Temblores. · Debilidad. · Hambre. · Confusión. Cómo puede prevenir la diabetes tipo 2? La mejor manera de prevenir o retrasar la diabetes tipo 2 es adoptar hábitos sanos, que incluyen: · Mantenerse en un peso saludable. · Hacer ejercicio con regularidad. · [place] alimentos saludables. Cómo se trata la diabetes tipo 2? Si tiene diabetes tipo 2, estas son las cosas más importantes que puede hacer. · [place] alfonso medicamentos para la diabetes. · Revísese el azúcar en la marimar con la frecuencia que beard médico recomiende. Además, hágase evelio prueba de hemoglobina A1c al menos cada 6 meses. · Trate de comer alimentos variados y [employer] carbohidratos patria todo el día. Los carbohidratos aumentan el nivel de azúcar en la marimar y lo hacen con mayor rapidez que otros nutrientes.  Estos pueden encontrarse en el azúcar, los panes y cereales, las frutas, en las verduras con almidones alicia las maria y Jones, y en la [place] y el yogur. · [place] al menos 30 minutos de ejercicio la mayoría de los días de la [place]. Caminar es evelio buena opción. Es posible que también quiera hacer otras actividades, alicia correr, nadar, andar en bicicleta o practicar tenis o deportes de equipo. Si beard médico lo aprueba, [place] ejercicios de fortalecimiento muscular al menos 2 veces por semana. · Consulte a beard médico para realizarse chequeos y [address] de [place] regular. · Si tiene la presión arterial jordi o el colesterol alto, tome los Andrew Austin recetó beard médico. 
· No fume. Fumar puede [employer] problemas de [place]. [place] incluye los problemas que usted pueda tener con la diabetes tipo 2. Si necesita ayuda para dejar el hábito, hable con beard médico sobre programas y medicamentos para dejar de fumar. Estos pueden aumentar alfonso probabilidades de dejar el hábito para siempre. La atención de seguimiento es evelio parte clave de beard tratamiento y seguridad. Asegúrese de hacer y acudir a todas las citas, y llame a beard médico si está teniendo problemas. También es evelio buena idea saber los resultados de alfonso exámenes y mantener evelio lista de los medicamentos que candido. Dónde puede encontrar más información en inglés? Oralia Ivey a [email or web address] Ronel Robles [id number] en la búsqueda para aprender más acerca de \"Aprenda acerca de la diabetes tipo 2 - [ Learning About Type 2 Diabetes ]. \" 
Revisado: 13 marzo, 2017 Versión del contenido: 11.3 © 6825-3756 Healthwise, Incorporated. Las instrucciones de cuidado fueron adaptadas bajo licencia por Good Help Connections (which disclaims liability or warranty for this information). Si usted tiene [place] afección médica o sobre estas instrucciones, siempre pregunte a beard profesional de marlena.  [employer], [place] niega toda fortunatoa o responsabilidad por beard uso de esta información.

## 2017-09-12 NOTE — IP AVS SNAPSHOT
Summary of Care Report The Summary of Care report has been created to help improve care coordination. Users with access to [employer] or [address] (Web-based application) may access additional patient information including the Discharge Summary. If you are not currently a [address] user and need more information, please call the number listed below in the [place] [address] section and ask to be connected with Medical Records. Facility Information Name Address Phone [address] [facility] [address] [facility] [id number] [phone] Patient Information Patient Name Sex ANTONIO Raya ([id number]) Male 1980 Discharge Information Admitting Provider Service Area Unit Wendy Herrera MD / [address] [facility] [address] Cardiac [facility] / [phone] Discharge Provider Discharge Date/Time Discharge Disposition Destination (none) 2017 Morning (Pending) AHR (none) Patient Language Language ENGLISH [13] Hospital Problems as of 2017  Reviewed: 2016  3:05 PM by Mely Patterson CRNA Class Noted - Resolved Last Modified POA Active Problems * (Principal)Diabetic foot ulcer ([facility]  2015 - Present 2017 by Wendy Herrera MD Unknown Entered by Librado Argueta  2017 - Present 2017 by Wendy Herrera MD Yes Entered by Wendy Herrera MD  
  SAVANNA (acute kidney injury) ([address]  2017 - Present 2017 by Wendy Herrera MD Yes Entered by Wendy Herrera MD  
  DM (diabetes mellitus) ([address] (Chronic)  2017 - Present 2017 by Wendy Herrera MD Yes Entered by Wendy Herrera MD  
  
Non-Hospital Problems as of 2017  Reviewed: 2016  3:05 PM by Mely Patterson CRNA Class Noted - Resolved Last Modified Active Problems Cellulitis of left knee  6/15/2013 - Present 6/27/2013 Entered by Matthew Hardy DM (diabetes mellitus), secondary uncontrolled ([address]  6/15/2013 - Present 4/25/2016 by Marquita Dietz MD  
  Entered by Matthew Hardy Abscess of bursa, left knee  6/19/2013 - Present 6/27/2013 Entered by Sarai Pantoja MD  
  Sepsis [facility])  7/14/2014 - Present 7/18/2014 Entered by Hanna Garay MD  
  Abscess of right thigh  7/15/2014 - Present 7/18/2014 Entered by Kylee Kothari MD  
  Cellulitis  11/21/2014 - Present 11/28/2014 Entered by MAGDIEL Kevin Cellulitis and abscess  11/21/2014 - Present 11/28/2014 Entered by Matthew Hardy MD  
  Acute renal injury [facility])  5/18/2015 - Present 5/18/2015 by Khushboo Bain Entered by Khushboo Bain Hypertension  5/19/2015 - Present 4/25/2016 by Marquita Dietz MD  
  Entered by Khushboo Bain Iron deficiency anemia  5/21/2015 - Present 5/21/2015 by Khushboo Bain Entered by Khushboo Bain You are allergic to the following No active allergies Current Discharge Medication List  
  
START taking these medications Dose & Instructions Dispensing Information Comments  
 ciprofloxacin HCl 500 mg tablet Commonly known as:  CIPRO Dose:  500 mg Take 1 Tab by mouth every twelve (12) hours for 30 days. Quantity:  60 Tab Refills:  0 CONTINUE these medications which have NOT CHANGED Dose & Instructions Dispensing Information Comments  
 amLODIPine 5 mg tablet Commonly known as:  Curly Smith Dose:  5 mg Take 1 Tab by mouth daily. Quantity:  30 Tab Refills:  5  
   
 aspirin 81 mg chewable tablet Dose:  81 mg Take 1 Tab by mouth daily. Quantity:  30 Tab Refills:  0 HYDROcodone-acetaminophen 5-325 mg per tablet Commonly known as:  Rosetta Vu Dose:  1 Tab Take 1 Tab by mouth every four (4) hours as needed for Pain. Max Daily Amount: 6 Tabs. Indications: do not fill unless keflex and bactrim are filled Quantity:  15 Tab Refills:  0  
   
 insulin NPH/insulin regular 100 unit/mL (70-30) injection Commonly known as:  HumuLIN 70/30 Dose:  12 Units 12 Units by SubCUTAneous route Before breakfast and dinner. Quantity:  3 Vial  
Refills:  1 Insulin Syringe-Needle U-100 1 mL 28 gauge x 1/2\" Syrg Commonly known as:  INSULIN SYRINGE Dose:  1 Package 1 Package by Does Not Apply route two (2) times a day. Quantity:  120 Syringe Refills:  1  
   
 losartan 50 mg tablet Commonly known as:  COZAAR Dose:  50 mg Take 1 Tab by mouth daily. Quantity:  30 Tab Refills:  0  
   
 metFORMIN 500 mg tablet Commonly known as:  GLUCOPHAGE Dose:  500 mg Take 1 Tab by mouth two (2) times daily (with meals). Quantity:  30 Tab Refills:  0  
   
 pravastatin 10 mg tablet Commonly known as:  PRAVACHOL Dose:  10 mg Take 1 Tab by mouth nightly. Quantity:  30 Tab Refills:  0 STOP taking these medications Comments DAPTOMYCIN IV  
   
   
  
  
  
Current Immunizations Name Date Pneumococcal Polysaccharide (PPSV-23) 6/26/2013 Surgery Information ID Date/Time Status Primary Surgeon All Procedures Location [id number] 9/14/2017 Canceled Surekha Casey DPM right front right 5th resection of leg [facility] MAIN OR Follow-up Information Follow up With Details Comments Contact Info [address] [facility] Call  [place] [address] [address] ([facility]) [address] [phone] Discharge Instructions Patient armband removed and shredded DISCHARGE SUMMARY from Nurse The following personal items are in your possession at time of discharge: 
 
Dental Appliances: None Visual Aid: None Home Medications: None Jewelry: None Clothing: At bedside, Shirt, Pants Other Valuables: Cell Phone, With patient PATIENT INSTRUCTIONS: 
 
 
F-face looks uneven A-arms unable to move or move unevenly S-speech slurred or non-existent T-time-call 911 as soon as signs and symptoms begin-DO NOT go Back to bed or wait to see if you get better-TIME IS BRAIN. Warning Signs of HEART ATTACK Call 911 if you have these symptoms: 
? Chest discomfort. Most heart attacks involve discomfort in the center of the chest that lasts more than a few minutes, or that goes away and comes back. It can feel like uncomfortable pressure, squeezing, fullness, or pain. ? Discomfort in other areas of the upper body. Symptoms can include pain or discomfort in one or both arms, the back, neck, jaw, or stomach. ? Shortness of breath with or without chest discomfort. ? Other signs may include breaking out in a cold sweat, nausea, or lightheadedness. Don't wait more than five minutes to call [address]! Fast action can save your life. Calling 911 is almost always the fastest way to get lifesaving treatment. Emergency Medical Services staff can begin treatment when they arrive  up to an hour sooner than if someone gets to the hospital by car. The discharge information has been reviewed with the patient. The patient verbalized understanding. Discharge medications reviewed with the patient and appropriate educational materials and side effects teaching were provided. DISCHARGE SUMMARY from Nurse The following personal items are in your possession at time of discharge: 
 
Dental Appliances: None Visual Aid: None Home Medications: None Jewelry: None Clothing: At bedside, Shirt, Pants Other Valuables: Cell Phone, With patient PATIENT INSTRUCTIONS: 
 
 
F-face looks uneven A-arms unable to move or move unevenly S-speech slurred or non-existent T-time-call 911 as soon as signs and symptoms begin-DO NOT go Back to bed or wait to see if you get better-TIME IS BRAIN. Warning Signs of HEART ATTACK Call 911 if you have these symptoms: 
? Chest discomfort. Most heart attacks involve discomfort in the center of the chest that lasts more than a few minutes, or that goes away and comes back. It can feel like uncomfortable pressure, squeezing, fullness, or pain. ? Discomfort in other areas of the upper body. Symptoms can include pain or discomfort in one or both arms, the back, neck, jaw, or stomach. ? Shortness of breath with or without chest discomfort. ? Other signs may include breaking out in a cold sweat, nausea, or lightheadedness. Don't wait more than five minutes to call [address]! Fast action can save your life. Calling 911 is almost always the fastest way to get lifesaving treatment. Emergency Medical Services staff can begin treatment when they arrive  up to an hour sooner than if someone gets to the hospital by car. The discharge information has been reviewed with the patient. The patient verbalized understanding. Discharge medications reviewed with the patient and appropriate educational materials and side effects teaching were provided. [employer] Activation Thank you for enrolling in [facility]. Please follow the instructions below to securely access your online medical record. [employer] allows you to send messages to your doctor, view your test results, renew your prescriptions, schedule appointments, and more. How Do I Sign Up? 1. In your internet browser, go to [email or web address]. 2. Click on the First Time User? Click Here link in the Sign In box. You will see the New Member Sign Up page. 3. Enter your bubl Access Code exactly as it appears below. You will not need to use this code after youve completed the sign-up process. If you do not sign up before the expiration date, you must request a new code. bubl Access Code: [email or web address] Expires: 12/12/2017 10:09 PM  
 
4. Enter the last four digits of your Social Security Number (xxxx) and Date of Birth (mm/dd/yyyy) as indicated and click Submit. You will be taken to the next sign-up page. 5. Create a SoThreet ID. This will be your SoThreet login ID and cannot be changed, so think of one that is secure and easy to remember. 6. Create a bubl password. You can change your password at any time. 7. Enter your Password Reset Question and Answer. This can be used at a later time if you forget your password. 8. Enter your e-mail address. You will receive e-mail notification when new information is available in [address] 9. Click Sign Up. You can now view your medical record. Additional Information Remember, bubl is NOT to be used for urgent needs. For medical emergencies, dial 911. Now available from your iPhone and Android! Patient armband removed and shredded Chart Review Routing History Recipient Method Report Sent By Tianna Frank MD  
[facility] Phone: [phone] Mail IP Auto Routed Trans Abraham, Transcription [EDITRANS] 7/25/2012  4:30 PM 07/25/2012 Oleg Lira MD  
Phone: [phone] Fax IP Auto Routed Trans Abraham, Transcription [EDITRANS] 7/25/2012  4:30 PM 07/25/2012 Lizbeth Gonzalez MD  
Fax: [phone] Phone: [phone] Fax IP Auto Routed Trans Abraham, Shiela [EDITRANS] 7/25/2012  4:30 PM 07/25/2012 Praveena Patel MD  
[address] [place] [address] Phone: [phone] Mail IP Auto Routed Trans Abraham, Transcription [EDITRANS] 8/2/2012 10:00 AM 08/02/2012 Malinda Ceballos MD  
[facility] [address] [place] [address] Phone: [phone] Mail IP Auto Routed Trans Abraham, Transcription [EDITRANS] 8/21/2012  5:05 PM 08/21/2012 Bruna Pritchard MD  
[facility] [address] [place] [address] Phone: [phone] Mail IP Auto Routed Trans Abraham, Transcription [EDITRANS] 8/21/2012  5:05 PM 08/21/2012 Joan Buitrago MD  
Fax: [phone] Phone: [phone] Fax IP Auto Routed MD Joel [[id number]] 6/19/2013  4:20 PM 06/19/2013 Joan Buitrago MD  
Fax: [phone] Phone: [phone] Fax IP Auto Routed [place] [[id number]] 6/23/2013  9:01 PM 06/23/2013 Malinda Ceballos MD  
Phone: [phone] In [place] [facility]ed [facility] Malathi Arenas MD [[id number]] 7/2/2013  1:26 PM 07/02/2013 Mariah Bryant MD  
Fax: [phone] Phone: [phone] Fax IP Auto Routed [employer] [[id number]] 7/18/2014  2:02 PM 07/18/2014 Fuentes Lam MD  
Phone: [phone] In [employer] [employer] Routed WNIHARIKA RUDD Edang [[id number]] 7/18/2014  2:02 PM 07/18/2014 SAMANTHA Arenas MD  
Fax: [phone] Phone: [phone] Fax IP Auto Routed [employer]. Malathi Arenas MD [[id number]] 11/22/2014  9:49 AM 11/22/2014 Yesica Way MD  
Fax: [phone] Phone: [phone] Fax IP Auto Routed [employer], [address] [employer] 12/23/2014  6:48 AM 12/23/2014 Bao Evans MD  
Fax: [phone] Phone: [phone] Fax IP Auto Routed Omaira Sk[place] [[id number]] 5/20/2015  8:53 AM 05/20/2015 Dinorah Guerrero MD  
Fax: [phone] Phone: [phone] Fax IP Auto Routed Alley Tejada [[id number]] 5/20/2015  8:53 AM 05/20/2015 Thiago Robledo MD  
Phone: [phone] In [employer] [place] [employer] Routed Alley Tejada [[id number]] 5/20/2015  8:53 AM 05/20/2015 Candy Ruffin MD  
Fax: [phone] Phone: [phone] Fax IP Auto Routed [employer] [[id number]] 5/30/2015  9:54 PM 05/30/2015 Thiago Robledo MD  
Phone: [phone] In [employer] [place] [employer] Routed [employer] [[id number]] 5/30/2015  9:54 PM 05/30/2015 Isacc Zuniga MD  
Phone: [phone] In [employer] [place] [employer] Routed Chio Sanders MD [[id number]] 4/25/2016  6:43 AM 04/25/2016 Thiago Robledo MD  
Phone: [phone] In [employer] [place] [employer] Routed Chio Sanders MD [[id number]] 4/25/2016  6:43 AM 04/25/2016 Delores Tomas DPM  
Fax: [phone] Phone: [phone] Fax IP Auto Routed [employer], [address] 5/1/2016  5:26 PM 05/01/2016 Jennifer Oh MD  
Phone: [phone] In [place] [place] [employer] Routed [employer], [address] 5/1/2016  5:26 PM 05/01/2016 Thiago Robledo MD  
Phone: [phone] In [employer] [place] [employer] Routed [employer], [address] 5/1/2016  5:26 PM 05/01/2016

## 2017-09-13 LAB
ANION GAP SERPL CALC-SCNC: 10 MMOL/L (ref 3–18)
BASOPHILS # BLD: 0 K/UL (ref 0–0.06)
BASOPHILS NFR BLD: 0 % (ref 0–2)
BUN SERPL-MCNC: 26 MG/DL (ref 7–18)
BUN/CREAT SERPL: 20 (ref 12–20)
CALCIUM SERPL-MCNC: 8.1 MG/DL (ref 8.5–10.1)
CHLORIDE SERPL-SCNC: 113 MMOL/L (ref 100–108)
CO2 SERPL-SCNC: 21 MMOL/L (ref 21–32)
CREAT SERPL-MCNC: 1.31 MG/DL (ref 0.6–1.3)
DIFFERENTIAL METHOD BLD: ABNORMAL
EOSINOPHIL # BLD: 0.4 K/UL (ref 0–0.4)
EOSINOPHIL NFR BLD: 4 % (ref 0–5)
ERYTHROCYTE [DISTWIDTH] IN BLOOD BY AUTOMATED COUNT: 13.4 % (ref 11.6–14.5)
GLUCOSE BLD STRIP.AUTO-MCNC: 101 MG/DL (ref 70–110)
GLUCOSE BLD STRIP.AUTO-MCNC: 109 MG/DL (ref 70–110)
GLUCOSE BLD STRIP.AUTO-MCNC: 110 MG/DL (ref 70–110)
GLUCOSE BLD STRIP.AUTO-MCNC: 118 MG/DL (ref 70–110)
GLUCOSE BLD STRIP.AUTO-MCNC: 70 MG/DL (ref 70–110)
GLUCOSE SERPL-MCNC: 95 MG/DL (ref 74–99)
HCT VFR BLD AUTO: 29.1 % (ref 36–48)
HGB BLD-MCNC: 9.6 G/DL (ref 13–16)
LYMPHOCYTES # BLD: 2.7 K/UL (ref 0.9–3.6)
LYMPHOCYTES NFR BLD: 27 % (ref 21–52)
MCH RBC QN AUTO: 29.7 PG (ref 24–34)
MCHC RBC AUTO-ENTMCNC: 33 G/DL (ref 31–37)
MCV RBC AUTO: 90.1 FL (ref 74–97)
MONOCYTES # BLD: 0.7 K/UL (ref 0.05–1.2)
MONOCYTES NFR BLD: 7 % (ref 3–10)
NEUTS SEG # BLD: 6.2 K/UL (ref 1.8–8)
NEUTS SEG NFR BLD: 62 % (ref 40–73)
PLATELET # BLD AUTO: 257 K/UL (ref 135–420)
PMV BLD AUTO: 10.6 FL (ref 9.2–11.8)
POTASSIUM SERPL-SCNC: 4.6 MMOL/L (ref 3.5–5.5)
RBC # BLD AUTO: 3.23 M/UL (ref 4.7–5.5)
SODIUM SERPL-SCNC: 144 MMOL/L (ref 136–145)
WBC # BLD AUTO: 10 K/UL (ref 4.6–13.2)

## 2017-09-13 PROCEDURE — 74011250636 HC RX REV CODE- 250/636: Performed by: FAMILY MEDICINE

## 2017-09-13 PROCEDURE — 85025 COMPLETE CBC W/AUTO DIFF WBC: CPT | Performed by: FAMILY MEDICINE

## 2017-09-13 PROCEDURE — 74011250637 HC RX REV CODE- 250/637: Performed by: FAMILY MEDICINE

## 2017-09-13 PROCEDURE — 82962 GLUCOSE BLOOD TEST: CPT

## 2017-09-13 PROCEDURE — 36415 COLL VENOUS BLD VENIPUNCTURE: CPT | Performed by: FAMILY MEDICINE

## 2017-09-13 PROCEDURE — 65270000029 HC RM PRIVATE

## 2017-09-13 PROCEDURE — 74011250636 HC RX REV CODE- 250/636: Performed by: EMERGENCY MEDICINE

## 2017-09-13 PROCEDURE — 80048 BASIC METABOLIC PNL TOTAL CA: CPT | Performed by: FAMILY MEDICINE

## 2017-09-13 PROCEDURE — 74011000258 HC RX REV CODE- 258: Performed by: INTERNAL MEDICINE

## 2017-09-13 PROCEDURE — 74011000258 HC RX REV CODE- 258: Performed by: EMERGENCY MEDICINE

## 2017-09-13 PROCEDURE — 74011250636 HC RX REV CODE- 250/636: Performed by: INTERNAL MEDICINE

## 2017-09-13 RX ORDER — CLINDAMYCIN HYDROCHLORIDE 300 MG/1
300 CAPSULE ORAL 3 TIMES DAILY
Qty: 90 CAP | Refills: 1 | Status: SHIPPED | OUTPATIENT
Start: 2017-09-13 | End: 2017-09-14

## 2017-09-13 RX ORDER — CLINDAMYCIN HYDROCHLORIDE 150 MG/1
300 CAPSULE ORAL 3 TIMES DAILY
Status: DISCONTINUED | OUTPATIENT
Start: 2017-09-14 | End: 2017-09-14

## 2017-09-13 RX ADMIN — PIPERACILLIN SODIUM,TAZOBACTAM SODIUM 3.38 G: 3; .375 INJECTION, POWDER, FOR SOLUTION INTRAVENOUS at 15:50

## 2017-09-13 RX ADMIN — SODIUM CHLORIDE 1250 MG: 900 INJECTION, SOLUTION INTRAVENOUS at 23:38

## 2017-09-13 RX ADMIN — MORPHINE SULFATE 2 MG: 2 INJECTION, SOLUTION INTRAMUSCULAR; INTRAVENOUS at 18:19

## 2017-09-13 RX ADMIN — HEPARIN SODIUM 5000 UNITS: 5000 INJECTION, SOLUTION INTRAVENOUS; SUBCUTANEOUS at 15:49

## 2017-09-13 RX ADMIN — ASPIRIN 81 MG 81 MG: 81 TABLET ORAL at 11:00

## 2017-09-13 RX ADMIN — PIPERACILLIN SODIUM,TAZOBACTAM SODIUM 3.38 G: 3; .375 INJECTION, POWDER, FOR SOLUTION INTRAVENOUS at 03:27

## 2017-09-13 RX ADMIN — PIPERACILLIN SODIUM,TAZOBACTAM SODIUM 3.38 G: 3; .375 INJECTION, POWDER, FOR SOLUTION INTRAVENOUS at 11:00

## 2017-09-13 RX ADMIN — MORPHINE SULFATE 2 MG: 2 INJECTION, SOLUTION INTRAMUSCULAR; INTRAVENOUS at 00:05

## 2017-09-13 RX ADMIN — HEPARIN SODIUM 5000 UNITS: 5000 INJECTION, SOLUTION INTRAVENOUS; SUBCUTANEOUS at 05:36

## 2017-09-13 RX ADMIN — SODIUM CHLORIDE 125 ML/HR: 900 INJECTION, SOLUTION INTRAVENOUS at 09:38

## 2017-09-13 NOTE — PROGRESS NOTES
2205: Assumed pt care. Pt is awake in bed, no signs of distress, instructed to press call light if assistance is needed, call light within reach. 0715: Bedside and Verbal shift change report given to REBECA Davison (oncoming nurse) by Haja Rose RN (offgoing nurse). Report included the following information SBAR, Kardex, Intake/Output, MAR and Recent Results.

## 2017-09-13 NOTE — DISCHARGE SUMMARY
[facility]  Hospitalist Division    Discharge Summary      Patient: Randal Singh MRN: [id number]  CSN: [id number]    YOB: 1980  Age: 40 y.o. Sex: male    DOA: 9/12/2017 LOS:  LOS: 2 days   Discharge Date: 09/14/17     PCP:  None    Chief Complaint:    Chief Complaint   Patient presents with    Foot Pain     Diabetic foot ulcer ([facility]    Admission Diagnosis:   Hospital Problems as of 9/14/2017  Date Reviewed: 4/29/2016          Codes Class Noted - Resolved POA    Anemia ICD-10-CM: D64.9  ICD-9-CM: 285.9  9/12/2017 - Present Yes        SAVANNA (acute kidney injury) ([facility] ICD-10-CM: N17.9  ICD-9-CM: 584.9  9/12/2017 - Present Yes        DM (diabetes mellitus) ([facility] (Chronic) ICD-10-CM: E11.9  ICD-9-CM: 250.00  9/12/2017 - Present Yes        * (Principal)Diabetic foot ulcer ([facility] ICD-10-CM: E11.621, L97.509  ICD-9-CM: 250.80, 707.15  5/16/2015 - Present Unknown              Discharge Diagnoses:    Diabetic foot ulcer - started on IV vancomycin and Zosyn 9/12 evening. Pain control prn. 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 Refusing surgery. MRI R Foot pending. ESR and CRP elevated. Need biopsy ASAP as antibiotics present. Likely needs PICC  Anemia - Hg9.6. Normocytic. Chronic disease. Need iron and b12/folate. Possible csp if unexplained. SAVANNA  - Hydrate and recheck BMP. Hold all nephrotoxins. Cr 1.7 baseline 1-1. 3? DM - SSI . A1c=8.1 on 70/30 BID and metformin. Will try to use this if available in hospital to titrate home regimen       Hospital Course:   40 y. o. male who presents with right leg ulcer. Patient is Ukrainian speaking only. He reports that he developed foot pain about 15 days ago. The pain is moderate. He also has an ulcer . He is not sure if it has any drainage. He is known diabetic with hx non healing foot ulcer. He denies fever. Positive for chills.  In ED XR right foot shows some osteolytic changes in 4th and 5th metatarsals. Patient was started on Vancomycin and Zosyn. Podiatry consulted and recommend a stat MRI foot. Patient has elevated ESR > 140 and CRP 2.6. His MRI was diagnostic of 5th MTP osteomyelitis as well as partial fractures of 3rd and 4th toes. Podiatry consulted and offered definative management with surgery however the patient refused. They wrote \"Discussed in depth with the patient the dx and treatment options. We discussed that without surgical intervention the infection could possibly spread further up the leg. At this time he is refusing any type of surgical intervention. I explained to him in great detail via translation line that his MRI demonstrates osteomyelitis of the fifth metatarsal head. He is again refusing any type of surgical treatment at this time. Recommend patient follows up in outpatient clinic setting, either [facility] or [facility] clinic for continued wound care. Ok to be dc from our standpoint. \"  The patient had interpreters and verified his understanding and his capacity to do so was intact. At this point I spoke with Dr Denzil Babinski of the options in this case, the patient would be best served on IV antibiotics however he could not afford such, and likely cannot afford oral antibiotics. He has a history of poor compliance with infusions if he was setup at infusion clinic, thus PICC route was abandoned. He  had 6 weeks prior of Daptomycin covered at the infusion center for osteomyelitis in his R 3rd MTP. On discharge patient was provided a 30 day script of ciprofloxacin per Infectious disease consultant. Patient discharged 9/13 evening as no futher therapy could be offered.   Patient stayed one extra night as he did not have a verifiable home address to return to.      Significant Diagnostic Studies:  R XR foot  MRI R foot-Lateral forefoot soft tissue ulceration, with associated osteomyelitis  involving the fifth metatarsal, greatest distally, without abnormal marrow  signal tracking to the base of the proximal portion of the fifth metatarsal  shaft. Consults:  ID Dr Sujata Espinoza    Operative Procedures:  N/A-refused    Disposition:  Home/Shelter/Friend    Diet:  diabetic    Discharge Condition:   Poor - infected foot with high risk for spread    Discharge Medications:    Current Discharge Medication List      START taking these medications    Details   ciprofloxacin HCl (CIPRO) 500 mg tablet Take 1 Tab by mouth every twelve (12) hours for 30 days. Qty: 60 Tab, Refills: 0         CONTINUE these medications which have NOT CHANGED    Details   insulin NPH/insulin regular (HUMULIN 70/30) 100 unit/mL (70-30) injection 12 Units by SubCUTAneous route Before breakfast and dinner. Qty: 3 Vial, Refills: 1      Insulin Syringe-Needle U-100 (INSULIN SYRINGE) 1 mL 28 x 1/2\" syrg 1 Package by Does Not Apply route two (2) times a day. Qty: 120 Syringe, Refills: 1      amLODIPine (NORVASC) 5 mg tablet Take 1 Tab by mouth daily. Qty: 30 Tab, Refills: 5      HYDROcodone-acetaminophen (NORCO) 5-325 mg per tablet Take 1 Tab by mouth every four (4) hours as needed for Pain. Max Daily Amount: 6 Tabs. Indications: do not fill unless keflex and bactrim are filled  Qty: 15 Tab, Refills: 0      aspirin 81 mg chewable tablet Take 1 Tab by mouth daily. Qty: 30 Tab, Refills: 0      losartan (COZAAR) 50 mg tablet Take 1 Tab by mouth daily. Qty: 30 Tab, Refills: 0      metFORMIN (GLUCOPHAGE) 500 mg tablet Take 1 Tab by mouth two (2) times daily (with meals). Qty: 30 Tab, Refills: 0      pravastatin (PRAVACHOL) 10 mg tablet Take 1 Tab by mouth nightly.   Qty: 30 Tab, Refills: 0         STOP taking these medications       DAPTOMYCIN IV Comments:   Reason for Stopping:               Recent Results (from the past 24 hour(s))   GLUCOSE, POC    Collection Time: 09/13/17 11:37 AM   Result Value Ref Range    Glucose (POC) 101 70 - 110 mg/dL   GLUCOSE, POC    Collection Time: 09/13/17  6:37 PM   Result Value Ref Range    Glucose (POC) 70 70 - 110 mg/dL   GLUCOSE, POC    Collection Time: 09/13/17 11:56 PM   Result Value Ref Range    Glucose (POC) 110 70 - 110 mg/dL   FERRITIN    Collection Time: 09/14/17  3:55 AM   Result Value Ref Range    Ferritin 52 8 - 388 NG/ML   VITAMIN B12 & FOLATE    Collection Time: 09/14/17  3:55 AM   Result Value Ref Range    Vitamin B12 354 211 - 911 pg/mL    Folate 12.5 3.10 - 17.50 ng/mL   IRON PROFILE    Collection Time: 09/14/17  3:55 AM   Result Value Ref Range    Iron 36 (L) 50 - 175 ug/dL    TIBC 181 (L) 250 - 450 ug/dL    Iron % saturation 20 %   CBC WITH AUTOMATED DIFF    Collection Time: 09/14/17  3:55 AM   Result Value Ref Range    WBC 8.2 4.6 - 13.2 K/uL    RBC 3.41 (L) 4.70 - 5.50 M/uL    HGB 10.1 (L) 13.0 - 16.0 g/dL    HCT 30.6 (L) 36.0 - 48.0 %    MCV 89.7 74.0 - 97.0 FL    MCH 29.6 24.0 - 34.0 PG    MCHC 33.0 31.0 - 37.0 g/dL    RDW 13.5 11.6 - 14.5 %    PLATELET 084 966 - 879 K/uL    MPV 10.7 9.2 - 11.8 FL    NEUTROPHILS 62 40 - 73 %    LYMPHOCYTES 27 21 - 52 %    MONOCYTES 6 3 - 10 %    EOSINOPHILS 4 0 - 5 %    BASOPHILS 1 0 - 2 %    ABS. NEUTROPHILS 5.1 1.8 - 8.0 K/UL    ABS. LYMPHOCYTES 2.2 0.9 - 3.6 K/UL    ABS. MONOCYTES 0.5 0.05 - 1.2 K/UL    ABS. EOSINOPHILS 0.3 0.0 - 0.4 K/UL    ABS.  BASOPHILS 0.0 0.0 - 0.06 K/UL    DF AUTOMATED     METABOLIC PANEL, BASIC    Collection Time: 09/14/17  3:55 AM   Result Value Ref Range    Sodium 142 136 - 145 mmol/L    Potassium 4.3 3.5 - 5.5 mmol/L    Chloride 112 (H) 100 - 108 mmol/L    CO2 23 21 - 32 mmol/L    Anion gap 7 3.0 - 18 mmol/L    Glucose 89 74 - 99 mg/dL    BUN 14 7.0 - 18 MG/DL    Creatinine 0.94 0.6 - 1.3 MG/DL    BUN/Creatinine ratio 15 12 - 20      GFR est AA >60 >60 ml/min/1.73m2    GFR est non-AA >60 >60 ml/min/1.73m2    Calcium 7.8 (L) 8.5 - 10.1 MG/DL   GLUCOSE, POC    Collection Time: 09/14/17 10:05 AM   Result Value Ref Range    Glucose (POC) 142 (H) 70 - 110 mg/dL       Follow-Up And Discharge Instructions: Follow-up Information     Follow up With Details Comments [facility] Call  [address]  [phone]            Wound Care/Supplies:   Keep foot wound clean.  Change bandage daily      Dr Mariposa Bautista        Time Spent:  45min    Cc: None

## 2017-09-13 NOTE — PROGRESS NOTES
Problem: Falls - Risk of  Goal: *Absence of Falls  Document Kurtis Fall Risk and appropriate interventions in the flowsheet.   Outcome: Progressing Towards Goal  Fall Risk Interventions:

## 2017-09-13 NOTE — DIABETES MGMT
NUTRITIONAL ASSESSMENT GLYCEMIC CONTROL/ PLAN OF CARE     Brenda Gutiérrez           40 y.o.           9/12/2017                 1. Diabetic ulcer of right midfoot associated with type 1 diabetes mellitus, unspecified ulcer stage         INTERVENTIONS/PLAN:   1. Monitor for diet advancement and appetite status. 2.  Monitor glycemic control, labs and weights. 3.  Blood glucose control appears adequate today but when ready to start basal insulin, recommend starting with  5 units Levemir Q 12 hours (low dose). 4.  New generic Aga Matrix meter, 50 test strips and 100 lancets provided. ASSESSMENT:   Nutritional Status:  Pt is 112% ideal wt; BMI (calculated): 23.2 kg/m2 (normal weight classification but is weights are accurate, pt has had a 58# weight loss noted over past 16 months). Pt with hypoalbuminemia as evidenced by albumin of 2.1. Pt is currently NPO. Nutrition Diagnoses:   Increased nutrient needs due to wound healing as evidenced by right foot stage 3 ulcer with underlying osteomyelitis. Altered nutrition related labs due to diabetes as evidenced by A1C of 8.1%. Diabetes Management:   Pt known from previous admissions where he has received diabetes education. .  Pt known to have challenges with home diabetes care due to being self paying and having limited financial resources. At one time he was prescribed to take Novolin 70/30 BID but was taking only his evening dose to make it go further. Pt usually purchases his insulin at [employer] (generic Novolin 70/30 at $25 for 10 ml vial). He states he has recently been taking his insulin BID. Pt used to have a [employer] generic meter but he now reports it is lost and therefore is not checking his blood glucose. Pt seems upset about his current situation.     GFR - 43  Recent blood glucose:     9/13/17:  118, 101  9/12/17:  121, 112  Within target range (non-ICU: <140; ICU<180): [x] Yes   []  No    Current Insulin regimen:   Corrective lispro, normal insulin sensitivity q 6 hours  Home medication/insulin regimen:  Per pt:  Novolin 70/30 12 to 15 units BID  HbA1c: 8.1% - ave blood glucose has cordell ~ 183 mg/dL over past 3 months. Adequate glycemic control PTA:  [] Yes  [x] No but improved       SUBJECTIVE/OBJECTIVE:   Information obtained from:   Chart review, pt (using blue  phone # [phone])  Pt states he is homeless. He states his appetite was good PTA but it was hard to obtain food and he was eating whatever he could get. Pt reports he has not eaten or drank anything  since yesterday morning and is hungry  Pt denies having food allergies. His weight has been stable as far as he knows but he has not weighed himself in while. He reports his usual weight is 152#. Pt stating he does not want to have surgery on his foot. Reports having foot pain. RN notified. Pt known from previous admissions for diabetic foot ulcers. Pt now admitted with right foot ulcer with underlying osteomyelitis. PMHx includes: T1DM, HTN. Diet: NPO    No data found.         Medications: [x]                Reviewed   IVF:  NS at 100 ml/hr     Most Recent POC Glucose:   Recent Labs      09/13/17   0400  09/12/17   1945   GLU  95  127*         Labs:   Lab Results   Component Value Date/Time    Hemoglobin A1c 8.1 09/12/2017 07:45 PM     Lab Results   Component Value Date/Time    Hemoglobin A1c 8.1 09/12/2017 07:45 PM    Hemoglobin A1c 8.4 04/23/2016 10:45 AM    Hemoglobin A1c 11.2 05/16/2015 10:17 PM     Lab Results   Component Value Date/Time    Sodium 144 09/13/2017 04:00 AM    Potassium 4.6 09/13/2017 04:00 AM    Chloride 113 09/13/2017 04:00 AM    CO2 21 09/13/2017 04:00 AM    Anion gap 10 09/13/2017 04:00 AM    Glucose 95 09/13/2017 04:00 AM    BUN 26 09/13/2017 04:00 AM    Creatinine 1.31 09/13/2017 04:00 AM    Calcium 8.1 09/13/2017 04:00 AM    Magnesium 1.7 06/13/2013 07:30 AM    Phosphorus 5.0 06/21/2016 11:00 AM    Albumin 2.1 09/12/2017 07:45 PM Anthropometrics: IBW : 48.2 kg (106 lb 4.2 oz), % IBW (Calculated): 111.83 %, BMI (calculated): 23.2  Wt Readings from Last 1 Encounters:   09/12/17 53.9 kg (118 lb 13.3 oz)      Wt Readings from Last 3 Encounters:   09/12/17 53.9 kg (118 lb 13.3 oz)   09/12/17 53 kg (116 lb 13.5 oz)   05/01/16 80.2 kg (176 lb 11.2 oz)     Ht Readings from Last 1 Encounters:   09/12/17 5' (1.524 m)       Estimated Nutrition Needs:  1833 Kcals/day, Protein (g): 81 g Fluid (ml): 1800 ml  Based on:   [x]          Actual BW    []          ABW   []            Adjusted BW        Nutrition Diagnoses:   Increased nutrient needs due to wound healing as evidenced by Right foot stage 3 ulcer with underlying osteomyelitis. Altered nutrition related labs due to diabetes as evidenced by A1C of 8.1%. Nutrition Interventions:  None at this time - NPO  Goal:   Blood glucose will be within target range of  mg/dL by 9/16/17. Pt will maintain weight (+/- 1-2 kg) by 9/23/17.         Nutrition Monitoring and Evaluation      []     Monitor po intake on meal rounds  [x]     Continue inpatient monitoring and intervention  []     Other:      Nutrition Risk:  []   High     [x]  Moderate    []  Minimal/Uncompromised  Jordan Esaley RD, CDE   Office:  [address] [place] [address] Pager:  [phone]

## 2017-09-13 NOTE — PROGRESS NOTES
Consulted on 40year old DM male with history of DM foot ulcer noted to the right foot. Labs and x rays reviewed. Agree with abx- vanc/zosyn  Will order ESR/CRP-pending  Start local wound care with betadine and DSD to the right foot  Will order MRI-pending  Appreciate medicines admission. No plans for immediate surgical intervention at this time. Will await further imaging and lab studies.        Surekha Casey DPM  [facility]  [phone]

## 2017-09-13 NOTE — H&P
History and Physical    Patient: Simon Campos               Sex: male          DOA: 9/12/2017       YOB: 1980      Age:  40 y.o.        LOS:  LOS: 0 days        Chief Complaint   Patient presents with    Foot Pain         HPI:     Simon Campos is a 40 y.o. male who presents with right leg ulcer. Patient is Indonesian speaking only. He reports that he developed foot pain about 15 days ago. The pain is moderate. He also has an ulcer . He is not sure if it has any drainage. He is known diabetic with hx non healing foot ulcer. He denies fever. Positive for chills. In ED XR right foot shows some osteolytic changes in 4th and 5th metatarsals. Patient was started on Vancomycin and Zosyn. Podiatry consulted and recommend a stat MRI foot. Patient has elevated ESR > 140 and CRP 2.6. Past Medical History:   Diagnosis Date    Cellulitis     rt. foot    Diabetes ([address]     Osteomyelitis (HCC)     rt toe    Other ill-defined conditions    . Past Surgical History:   Procedure Laterality Date    HX ORTHOPAEDIC      rt.toe       No current facility-administered medications on file prior to encounter. Current Outpatient Prescriptions on File Prior to Encounter   Medication Sig Dispense Refill    DAPTOMYCIN  mg by IntraVENous route daily. Indications: Continue until 6/20      amLODIPine (NORVASC) 5 mg tablet Take 1 Tab by mouth daily. 30 Tab 5    HYDROcodone-acetaminophen (NORCO) 5-325 mg per tablet Take 1 Tab by mouth every four (4) hours as needed for Pain. Max Daily Amount: 6 Tabs. Indications: do not fill unless keflex and bactrim are filled 15 Tab 0    aspirin 81 mg chewable tablet Take 1 Tab by mouth daily. 30 Tab 0    losartan (COZAAR) 50 mg tablet Take 1 Tab by mouth daily. 30 Tab 0    metFORMIN (GLUCOPHAGE) 500 mg tablet Take 1 Tab by mouth two (2) times daily (with meals). 30 Tab 0    pravastatin (PRAVACHOL) 10 mg tablet Take 1 Tab by mouth nightly.  30 Tab 0    insulin NPH/insulin regular (HUMULIN 70/30) 100 unit/mL (70-30) injection 12 Units by SubCUTAneous route Before breakfast and dinner. 3 Vial 1    Insulin Syringe-Needle U-100 (INSULIN SYRINGE) 1 mL 28 x 1/2\" syrg 1 Package by Does Not Apply route two (2) times a day. 120 Syringe 1       Social History     Social History    Marital status: SINGLE     Spouse name: N/A    Number of children: N/A    Years of education: N/A     Occupational History    Not on file. Social History Main Topics    Smoking status: Heavy Tobacco Smoker     Packs/day: 0.50    Smokeless tobacco: Never Used    Alcohol use No    Drug use: Yes     Special: Marijuana      Comment: 17    Sexual activity: Not on file     Other Topics Concern    Not on file     Social History Narrative       Prior to Admission Medications   Prescriptions Last Dose Informant Patient Reported? Taking? DAPTOMYCIN IV   Yes No   Si mg by IntraVENous route daily. Indications: Continue until    HYDROcodone-acetaminophen (NORCO) 5-325 mg per tablet   No No   Sig: Take 1 Tab by mouth every four (4) hours as needed for Pain. Max Daily Amount: 6 Tabs. Indications: do not fill unless keflex and bactrim are filled   Insulin Syringe-Needle U-100 (INSULIN SYRINGE) 1 mL 28 x 1/2\" syrg   No No   Si Package by Does Not Apply route two (2) times a day. amLODIPine (NORVASC) 5 mg tablet   No No   Sig: Take 1 Tab by mouth daily. aspirin 81 mg chewable tablet   No No   Sig: Take 1 Tab by mouth daily. insulin NPH/insulin regular (HUMULIN 70/30) 100 unit/mL (70-30) injection   No No   Si Units by SubCUTAneous route Before breakfast and dinner. losartan (COZAAR) 50 mg tablet   No No   Sig: Take 1 Tab by mouth daily. metFORMIN (GLUCOPHAGE) 500 mg tablet   No No   Sig: Take 1 Tab by mouth two (2) times daily (with meals). pravastatin (PRAVACHOL) 10 mg tablet   No No   Sig: Take 1 Tab by mouth nightly.       Facility-Administered Medications: None History reviewed. No pertinent family history. No Known Allergies    Review of Systems   Constitutional: Positive for chills. Negative for fever. HENT: Negative. Eyes: Negative. Respiratory: Negative. Cardiovascular: Negative. Gastrointestinal: Negative. Genitourinary: Negative. Musculoskeletal: Positive for joint pain. Skin: Negative. Neurological: Negative. Endo/Heme/Allergies: Negative. Psychiatric/Behavioral: Negative. Physical Exam:       Visit Vitals    /86 (BP 1 Location: Right arm, BP Patient Position: At rest;Sitting)    Pulse 84    Temp 99.8 °F (37.7 °C)    Resp 16    Ht 5' (1.524 m)    Wt 53.9 kg (118 lb 13.3 oz)    SpO2 100%    BMI 23.21 kg/m2       Physical Exam   Constitutional: He appears well-developed and well-nourished. No distress. HENT:   Head: Normocephalic and atraumatic. Mouth/Throat: No oropharyngeal exudate. Eyes: Conjunctivae and EOM are normal. Pupils are equal, round, and reactive to light. No scleral icterus. Neck: Neck supple. Cardiovascular: Normal rate and normal heart sounds. No murmur heard. Pulmonary/Chest: Effort normal and breath sounds normal. No respiratory distress. He has no rales. Abdominal: Soft. Bowel sounds are normal. He exhibits no distension. There is no guarding. Musculoskeletal: He exhibits no edema. Feet:    Rt dorsalis pedis pulsation +2   Neurological: He is alert. Skin: Skin is warm. No rash noted. He is not diaphoretic. No erythema. No pallor. Psychiatric: He has a normal mood and affect. Ancillary Studies: All lab and imaging reviewed for the past 24 hours.     Recent Results (from the past 24 hour(s))   CBC WITH AUTOMATED DIFF    Collection Time: 09/12/17  7:45 PM   Result Value Ref Range    WBC 13.0 4.6 - 13.2 K/uL    RBC 3.48 (L) 4.70 - 5.50 M/uL    HGB 10.2 (L) 13.0 - 16.0 g/dL    HCT 31.0 (L) 36.0 - 48.0 %    MCV 89.1 74.0 - 97.0 FL    MCH 29.3 24.0 - 34.0 PG MCHC 32.9 31.0 - 37.0 g/dL    RDW 13.3 11.6 - 14.5 %    PLATELET 832 841 - 495 K/uL    MPV 10.3 9.2 - 11.8 FL    NEUTROPHILS 74 (H) 40 - 73 %    LYMPHOCYTES 17 (L) 21 - 52 %    MONOCYTES 6 3 - 10 %    EOSINOPHILS 2 0 - 5 %    BASOPHILS 1 0 - 2 %    ABS. NEUTROPHILS 9.6 (H) 1.8 - 8.0 K/UL    ABS. LYMPHOCYTES 2.2 0.9 - 3.6 K/UL    ABS. MONOCYTES 0.8 0.05 - 1.2 K/UL    ABS. EOSINOPHILS 0.3 0.0 - 0.4 K/UL    ABS. BASOPHILS 0.1 (H) 0.0 - 0.06 K/UL    DF AUTOMATED     METABOLIC PANEL, COMPREHENSIVE    Collection Time: 09/12/17  7:45 PM   Result Value Ref Range    Sodium 142 136 - 145 mmol/L    Potassium 4.4 3.5 - 5.5 mmol/L    Chloride 112 (H) 100 - 108 mmol/L    CO2 21 21 - 32 mmol/L    Anion gap 9 3.0 - 18 mmol/L    Glucose 127 (H) 74 - 99 mg/dL    BUN 29 (H) 7.0 - 18 MG/DL    Creatinine 1.76 (H) 0.6 - 1.3 MG/DL    BUN/Creatinine ratio 16 12 - 20      GFR est AA 53 (L) >60 ml/min/1.73m2    GFR est non-AA 44 (L) >60 ml/min/1.73m2    Calcium 7.9 (L) 8.5 - 10.1 MG/DL    Bilirubin, total <0.1 (L) 0.2 - 1.0 MG/DL    ALT (SGPT) 19 16 - 61 U/L    AST (SGOT) 16 15 - 37 U/L    Alk.  phosphatase 109 45 - 117 U/L    Protein, total 5.8 (L) 6.4 - 8.2 g/dL    Albumin 2.1 (L) 3.4 - 5.0 g/dL    Globulin 3.7 2.0 - 4.0 g/dL    A-G Ratio 0.6 (L) 0.8 - 1.7     C REACTIVE PROTEIN, QT    Collection Time: 09/12/17  7:45 PM   Result Value Ref Range    C-Reactive protein 2.6 (H) 0 - 0.3 mg/dL   SED RATE (ESR)    Collection Time: 09/12/17  7:45 PM   Result Value Ref Range    Sed rate, automated >140 (H) 0 - 15 mm/hr   POC CHEM8    Collection Time: 09/12/17  7:50 PM   Result Value Ref Range    CO2, POC 22 19 - 24 MMOL/L    Glucose,  (H) 74 - 106 MG/DL    BUN, POC 33 (H) 7 - 18 MG/DL    Creatinine, POC 1.8 (H) 0.6 - 1.3 MG/DL    GFRAA, POC 52 (L) >60 ml/min/1.73m2    GFRNA, POC 43 (L) >60 ml/min/1.73m2    Sodium,  136 - 145 MMOL/L    Potassium, POC 4.5 3.5 - 5.5 MMOL/L    Calcium, ionized (POC) 1.18 1.12 - 1.32 MMOL/L    Chloride, POC 110 (H) 100 - 108 MMOL/L    Anion gap, POC 13 10 - 20      Hematocrit, POC 29 (L) 36 - 49 %    Hemoglobin, POC 9.9 (L) 12 - 16 G/DL   POC LACTIC ACID    Collection Time: 09/12/17  7:55 PM   Result Value Ref Range    Lactic Acid (POC) 1.2 0.4 - 2.0 mmol/L       Assessment/Plan     Principal Problem:    Diabetic foot ulcer ([facility] (5/16/2015)    Active Problems:    Anemia (9/12/2017)      SAVANNA (acute kidney injury) ([facility] (9/12/2017)      DM (diabetes mellitus) ([facility] (9/12/2017)            PLAN:    Diabetic foot ulcer - appears infected . Continue IV vancomycin and Zosyn. Pain control prn. Podiatry consulted     Possible Rt foot Osteomyelitis - XR of the Rt foot per my read shows osteolytic bone changes suggestive of OM  MRI rT Foot pending. ESR and CRP elevated. Will as above continue Vancomycin and Zosyn. Podiatry will see patient in AM    Anemia - need follow up    SAVANNA - Hydrate and recheck BMP. Hold all nephrotoxins     DM - SSI .  Check A1c    DVT Prophylaxis - Heparin     Full code     Nacho Montalvo MD  9/12/2017  8:42 PM

## 2017-09-13 NOTE — PROGRESS NOTES
Pharmacy Dosing Services: Vancomycin    Indication: Skin and Soft Tissue infection (Diabetic foot infection)    Day of therapy: 0    Other Antimicrobials (Include dose, start day & day of therapy):  Piperacillin-Tazobactam 3.375 grams every 6 hours, first dose 2017 at 2027    Loading dose (date given): 1,250 mg  Current Maintenance dose: 1,250 mg every 24 hours    Goal Vancomycin Level: 15-20 mcg/mL  (Trough 15-20 for most infections, 20 for meningitis/osteomyelitis, pre-HD level ~25)    Vancomycin Level (if drawn): N/A    Significant Cultures: Blood x 2 taken    Renal function stable? (unstable defined as SCr increase of 0.5 mg/dL or > 50% increase from baseline, whichever is greater) (Y/N): Y    CAPD, Hemodialysis or Renal Replacement Therapy (Y/N): Y     Recent Labs      17   CREA  1.76*   BUN  29*   WBC  13.0     Temp (24hrs), Av.8 °F (37.7 °C), Min:99.8 °F (37.7 °C), Max:99.8 °F (37.7 °C)    Creatinine Clearance (Creatinine Clearance (ml/min)): 40.6 mL/min     Regimen assessment: Starting dose for diabetic foot infection  Maintenance dose: 1,250 mg every 24 hours  Next scheduled level: 09- at 1900       Pharmacy will follow daily and adjust medications as appropriate for renal function and/or serum levels. Thank you,  LIZETTE MOLINA

## 2017-09-13 NOTE — WOUND CARE
Received IP wound care consult and reviewed EMR. Patient currently being followed by podiatry for wound to Right Foot. Diagnostic testing in progress per podiatry and wound care orders placed per podiatry. IP wound care will defer wound to podiatry at this time.

## 2017-09-13 NOTE — PROGRESS NOTES
conducted an initial consultation and Spiritual Assessment for Arlene Peres, who is a 40 y.o.,male. Patients Primary Language is: [place]. According to the patients EMR Shinto Affiliation is: No Taoism. The reason the Patient came to the hospital is:   Patient Active Problem List    Diagnosis Date Noted    Anemia 09/12/2017    SAVANNA (acute kidney injury) ([facility] 09/12/2017    DM [facility]diabetes mellitus) ([facility] 09/12/2017    Iron deficiency anemia 05/21/2015    Hypertension 05/19/2015    Acute renal injury ([facility] 05/18/2015    Diabetic foot ulcer ([facility] 05/16/2015    Cellulitis 11/21/2014    Cellulitis and abscess 11/21/2014    Abscess of right thigh 07/15/2014    Sepsis ([facility] 07/14/2014    Abscess of bursa, left knee 06/19/2013    Cellulitis of left knee 06/15/2013    DM (diabetes mellitus), secondary uncontrolled ([facility] 06/15/2013        The  provided the following Interventions:   attempted to Initiate a relationship of care and support with patient in room 2106. Found patient after 3 attempted visits to be resting peacefully each time and therefore unable to communicate this morning. Offered silent prayer at bedside for patient. Assessment:  Patient does not have any Gnosticism/cultural needs that will affect patients preferences in health care. There are no further spiritual or Gnosticism issues which require Spiritual Care Services interventions at this time. Plan:  Chaplains will continue to follow and will provide pastoral care on an as needed/requested basis    . Reuben Melgar   Spiritual Care   [phone]

## 2017-09-13 NOTE — PROGRESS NOTES
Patient has refused definitive treatment for his osteomyelitis however ethically he is still entitled to antibiotics. Case discussed with Dr Rory Bahena. Prior cultures showing MRSA and strep sensitive to clindamycin. Will plan for PICC and IV clindamycin if possible, even daptomycin might be an option. If IV antibiotics refused or not possible could consider Bactrim/cipro or PO clinda. Patient may have used up all his resources for outpatient antibiotics at [place] [id number].

## 2017-09-13 NOTE — CONSULTS
Podiatry History and Physical    Subjective: All information obtained from translation line. Patient is a 40year old DM male who presents to the ED complaining of right foot ulcer. Patient is unsure how long the ulcer has been there. He denies any treatment for the area. He denies any pain. He has no other pedal complaints at this time. Date of Consultation:  September 13, 2017    Referring Physician: Dr. Madelin Asher      Patient Active Problem List    Diagnosis Date Noted    Anemia 09/12/2017    SAVANNA (acute kidney injury) ([address] 09/12/2017    DM (diabetes mellitus) ([address] 09/12/2017    Iron deficiency anemia 05/21/2015    Hypertension 05/19/2015    Acute renal injury ([address] 05/18/2015    Diabetic foot ulcer ([address] 05/16/2015    Cellulitis 11/21/2014    Cellulitis and abscess 11/21/2014    Abscess of right thigh 07/15/2014    Sepsis ([address] 07/14/2014    Abscess of bursa, left knee 06/19/2013    Cellulitis of left knee 06/15/2013    DM (diabetes mellitus), secondary uncontrolled ([address] 06/15/2013     Past Medical History:   Diagnosis Date    Cellulitis     rt. foot    Diabetes ([address]     Osteomyelitis (HCC)     rt toe    Other ill-defined conditions       History reviewed. No pertinent family history. Social History   Substance Use Topics    Smoking status: Heavy Tobacco Smoker     Packs/day: 0.50    Smokeless tobacco: Never Used    Alcohol use No     Past Surgical History:   Procedure Laterality Date    HX ORTHOPAEDIC      rt.toe      Prior to Admission medications    Medication Sig Start Date End Date Taking? Authorizing Provider   insulin NPH/insulin regular (HUMULIN 70/30) 100 unit/mL (70-30) injection 12 Units by SubCUTAneous route Before breakfast and dinner. 5/23/15  Yes Lilli Vaughn MD   Insulin Syringe-Needle U-100 (INSULIN SYRINGE) 1 mL 28 x 1/2\" syrg 1 Package by Does Not Apply route two (2) times a day.  5/23/15  Yes Lilli Vaughn MD   DAPTOMYCIN  mg by IntraVENous route daily. Indications: Continue until     Historical Provider   amLODIPine (NORVASC) 5 mg tablet Take 1 Tab by mouth daily. 5/3/16   Holly Lee MD   HYDROcodone-acetaminophen [facility]) 5-325 mg per tablet Take 1 Tab by mouth every four (4) hours as needed for Pain. Max Daily Amount: 6 Tabs. Indications: do not fill unless keflex and bactrim are filled 3/15/16   Mio Wilson MD   aspirin 81 mg chewable tablet Take 1 Tab by mouth daily. 5/23/15   Gui Crowell MD   losartan (COZAAR) 50 mg tablet Take 1 Tab by mouth daily. 5/23/15   Gui Crowell MD   metFORMIN (GLUCOPHAGE) 500 mg tablet Take 1 Tab by mouth two (2) times daily (with meals). 5/23/15   Gui Crowell MD   pravastatin (PRAVACHOL) 10 mg tablet Take 1 Tab by mouth nightly. 5/23/15   Gui Crowell MD     No Known Allergies     Review of Systems:    Constitutional: negative  Eyes: negative  Ears, nose, mouth, throat, and face: negative  Respiratory: negative  Cardiovascular: negative  Gastrointestinal: negative  Hematologic/lymphatic: negative  Musculoskeletal:negative  Neurological: negative  Endocrine: negative      Objective:     No data found.     Temp (24hrs), Av.9 °F (37.2 °C), Min:98.2 °F (36.8 °C), Max:99.8 °F (37.7 °C)    Data Review:   Recent Results (from the past 24 hour(s))   CULTURE, BLOOD    Collection Time: 17  7:30 PM   Result Value Ref Range    Special Requests: NO SPECIAL REQUESTS      Culture result: NO GROWTH AFTER 11 HOURS     CBC WITH AUTOMATED DIFF    Collection Time: 17  7:45 PM   Result Value Ref Range    WBC 13.0 4.6 - 13.2 K/uL    RBC 3.48 (L) 4.70 - 5.50 M/uL    HGB 10.2 (L) 13.0 - 16.0 g/dL    HCT 31.0 (L) 36.0 - 48.0 %    MCV 89.1 74.0 - 97.0 FL    MCH 29.3 24.0 - 34.0 PG    MCHC 32.9 31.0 - 37.0 g/dL    RDW 13.3 11.6 - 14.5 %    PLATELET 984 943 - 229 K/uL    MPV 10.3 9.2 - 11.8 FL    NEUTROPHILS 74 (H) 40 - 73 %    LYMPHOCYTES 17 (L) 21 - 52 %    MONOCYTES 6 3 - 10 %    EOSINOPHILS 2 0 - 5 %    BASOPHILS 1 0 - 2 %    ABS. NEUTROPHILS 9.6 (H) 1.8 - 8.0 K/UL    ABS. LYMPHOCYTES 2.2 0.9 - 3.6 K/UL    ABS. MONOCYTES 0.8 0.05 - 1.2 K/UL    ABS. EOSINOPHILS 0.3 0.0 - 0.4 K/UL    ABS. BASOPHILS 0.1 (H) 0.0 - 0.06 K/UL    DF AUTOMATED     METABOLIC PANEL, COMPREHENSIVE    Collection Time: 09/12/17  7:45 PM   Result Value Ref Range    Sodium 142 136 - 145 mmol/L    Potassium 4.4 3.5 - 5.5 mmol/L    Chloride 112 (H) 100 - 108 mmol/L    CO2 21 21 - 32 mmol/L    Anion gap 9 3.0 - 18 mmol/L    Glucose 127 (H) 74 - 99 mg/dL    BUN 29 (H) 7.0 - 18 MG/DL    Creatinine 1.76 (H) 0.6 - 1.3 MG/DL    BUN/Creatinine ratio 16 12 - 20      GFR est AA 53 (L) >60 ml/min/1.73m2    GFR est non-AA 44 (L) >60 ml/min/1.73m2    Calcium 7.9 (L) 8.5 - 10.1 MG/DL    Bilirubin, total <0.1 (L) 0.2 - 1.0 MG/DL    ALT (SGPT) 19 16 - 61 U/L    AST (SGOT) 16 15 - 37 U/L    Alk.  phosphatase 109 45 - 117 U/L    Protein, total 5.8 (L) 6.4 - 8.2 g/dL    Albumin 2.1 (L) 3.4 - 5.0 g/dL    Globulin 3.7 2.0 - 4.0 g/dL    A-G Ratio 0.6 (L) 0.8 - 1.7     CULTURE, BLOOD    Collection Time: 09/12/17  7:45 PM   Result Value Ref Range    Special Requests: NO SPECIAL REQUESTS      Culture result: NO GROWTH AFTER 11 HOURS     C REACTIVE PROTEIN, QT    Collection Time: 09/12/17  7:45 PM   Result Value Ref Range    C-Reactive protein 2.6 (H) 0 - 0.3 mg/dL   SED RATE (ESR)    Collection Time: 09/12/17  7:45 PM   Result Value Ref Range    Sed rate, automated >140 (H) 0 - 15 mm/hr   HEMOGLOBIN A1C WITH EAG    Collection Time: 09/12/17  7:45 PM   Result Value Ref Range    Hemoglobin A1c 8.1 (H) 4.2 - 5.6 %    Est. average glucose 186 mg/dL   POC CHEM8    Collection Time: 09/12/17  7:50 PM   Result Value Ref Range    CO2, POC 22 19 - 24 MMOL/L    Glucose,  (H) 74 - 106 MG/DL    BUN, POC 33 (H) 7 - 18 MG/DL    Creatinine, POC 1.8 (H) 0.6 - 1.3 MG/DL    GFRAA, POC 52 (L) >60 ml/min/1.73m2    GFRNA, POC 43 (L) >60 ml/min/1.73m2    Sodium,  136 - 145 MMOL/L    Potassium, POC 4.5 3.5 - 5.5 MMOL/L    Calcium, ionized (POC) 1.18 1.12 - 1.32 MMOL/L    Chloride,  (H) 100 - 108 MMOL/L    Anion gap, POC 13 10 - 20      Hematocrit, POC 29 (L) 36 - 49 %    Hemoglobin, POC 9.9 (L) 12 - 16 G/DL   POC LACTIC ACID    Collection Time: 09/12/17  7:55 PM   Result Value Ref Range    Lactic Acid (POC) 1.2 0.4 - 2.0 mmol/L   GLUCOSE, POC    Collection Time: 09/12/17 10:53 PM   Result Value Ref Range    Glucose (POC) 112 (H) 70 - 110 mg/dL   CULTURE, WOUND W GRAM STAIN    Collection Time: 09/12/17 11:00 PM   Result Value Ref Range    Special Requests: NO SPECIAL REQUESTS      GRAM STAIN MODERATE WBC'S      GRAM STAIN MANY GRAM POSITIVE COCCI IN PAIRS IN GROUPS      Culture result: PENDING    METABOLIC PANEL, BASIC    Collection Time: 09/13/17  4:00 AM   Result Value Ref Range    Sodium 144 136 - 145 mmol/L    Potassium 4.6 3.5 - 5.5 mmol/L    Chloride 113 (H) 100 - 108 mmol/L    CO2 21 21 - 32 mmol/L    Anion gap 10 3.0 - 18 mmol/L    Glucose 95 74 - 99 mg/dL    BUN 26 (H) 7.0 - 18 MG/DL    Creatinine 1.31 (H) 0.6 - 1.3 MG/DL    BUN/Creatinine ratio 20 12 - 20      GFR est AA >60 >60 ml/min/1.73m2    GFR est non-AA >60 >60 ml/min/1.73m2    Calcium 8.1 (L) 8.5 - 10.1 MG/DL   CBC WITH AUTOMATED DIFF    Collection Time: 09/13/17  4:00 AM   Result Value Ref Range    WBC 10.0 4.6 - 13.2 K/uL    RBC 3.23 (L) 4.70 - 5.50 M/uL    HGB 9.6 (L) 13.0 - 16.0 g/dL    HCT 29.1 (L) 36.0 - 48.0 %    MCV 90.1 74.0 - 97.0 FL    MCH 29.7 24.0 - 34.0 PG    MCHC 33.0 31.0 - 37.0 g/dL    RDW 13.4 11.6 - 14.5 %    PLATELET 301 677 - 001 K/uL    MPV 10.6 9.2 - 11.8 FL    NEUTROPHILS 62 40 - 73 %    LYMPHOCYTES 27 21 - 52 %    MONOCYTES 7 3 - 10 %    EOSINOPHILS 4 0 - 5 %    BASOPHILS 0 0 - 2 %    ABS. NEUTROPHILS 6.2 1.8 - 8.0 K/UL    ABS. LYMPHOCYTES 2.7 0.9 - 3.6 K/UL    ABS. MONOCYTES 0.7 0.05 - 1.2 K/UL    ABS. EOSINOPHILS 0.4 0.0 - 0.4 K/UL    ABS.  BASOPHILS 0.0 0.0 - 0.06 K/UL    DF AUTOMATED GLUCOSE, POC    Collection Time: 09/13/17  6:04 AM   Result Value Ref Range    Glucose (POC) 118 (H) 70 - 110 mg/dL   GLUCOSE, POC    Collection Time: 09/13/17  8:13 AM   Result Value Ref Range    Glucose (POC) 109 70 - 110 mg/dL   GLUCOSE, POC    Collection Time: 09/13/17 11:37 AM   Result Value Ref Range    Glucose (POC) 101 70 - 110 mg/dL   Foot Exam:  Vascular:   DP/PT pulses weakly palpable. CFT to digits b/l greater than three seconds. Skin temp warm to cool from proximal to distal.     Neuro:   epicritic absent intact    Derm:   Diffusly dry skin noted to plantar and dorsal aspects of both feet. Right foot Stage 3 ulcer sub met head 5:   Ulcer presents sub met head 5 to the right foot with moderate amount of drainage present. No maldor present. No purulent drainage present. Wound probes to bone. No ascending lymphangitis present. No creptius present. 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 Impression:   Right foot stage 3 ulcer with underlying osteomyelitis  Noncompliance  DM    Recommendation:   Patient seen this afternoon. Labs and x rays reviewed  Discussed in depth with the patient the dx and treatment options. We discussed that without surgical intervention the infection could possibly spread further up the leg. At this time he is refusing any type of surgical intervention. I explained to him in great detail via translation line that his MRI demonstrates osteomyelitis of the fifth metatarsal head. He is again refusing any type of surgical treatment at this time. Recommend patient follows up in outpatient clinic setting, either [facility] or [facility] clinic for continued wound care. Ok to be dc from our standpoint. Thank for you the opportunity for us to assist with the care of this patient. Greater than 30 minutes was spent face to face with the patient.

## 2017-09-13 NOTE — INTERDISCIPLINARY ROUNDS
IDR Summary      Patient: Yuli Tejada MRN: [id number]    Age: 40 y.o.  : 1980     Admit Diagnosis: Diabetic foot ulcer ([facility]  Diabetic foot ulcer [facility])  inpatient      Problems pertinent to hospital stay:     Consults:Case Management     Testing due for patient today?  YES    Nutrition plan:Yes     Mobility needs: No      Lines/Tubes:   IV: YES   Needed: YES  Velásquez: NO  Needed:NO  Central Line: NO Needed: NO      VTE Prophylaxis: Chemical            Care Management:  Discharge plan: Home with [facility]    PCP: None    : NO  Financial concerns:No   Interventions:       LOS: 1 days     Expected days until discharge: 3 days        Signed:     ELIN Linares 83  Pager:  [phone]  Office:  [phone]

## 2017-09-13 NOTE — PROGRESS NOTES
[facility]   Discharge Planning/ Assessment    Reasons for Intervention: Spoke with pt, using blue phone. He states he has no one to designate for dcp. He is homeless. Just came here from [facility], is staying at a friends. nuzhat tell me friends name, only knows him as daylin. He is refusing surg and states he just wants us to give him meds. Asked him if he wants me to find a shelter for him to go to, he refuses. Asked him who will pick him up , he does not know. May need cab voucher. Told him we cannot assist with meds as we have assisted before, he said he will just leave here and go to another hospital , he wants meds for his ft and does not want surg. Pt uninsured. will dc to his own care.     High Risk Criteria  [x] Yes  []No   Physician Referral  [] Yes  [x]No        Date    Nursing Referral  [] Yes  [x]No        Date    Patient/Family Request  [] Yes  [x]No        Date       Resources:    Medicare  [] Yes  []No   Medicaid  [] Yes  []No   No Resources  [x] Yes  []No   Private Insurance  [] Yes  []No    Name/Phone Number    Other  [] Yes  []No        (i.e. Workman's Comp)         Prior Services:    Prior Services  [x] Yes  []No   Home Health  [x] Yes  []No   [address]  [] Yes  []No        Number of [address]  [] Yes  []No       Meals on Wheels  [] Yes  []No   Office on Aging  [] Yes  []No   Transportation Services  [] Yes  []No   Nursing Home  [] Yes  []No        Nursing Home Name    [address]  [] Yes  []No        [address] Name    Other       Information Source:      Information obtained from  [x] Patient  [] Parent   [] [address]  [] Child  [] Spouse   [] Significant Other/Partner   [] Friend      [] EMS    [] Nursing Home Chart          [] Other:   Chart Review  [] Yes  []No     Family/Support System:    Patient lives with  [] Alone    [] Spouse   [] Significant Other  [] Children  [] Caretaker   [] Parent  [] Sibling     [x] Other       Other Support System:    Is the patient responsible for care of others  [] Yes  [x]No   Information of person caring for patient on  discharge    Managers financial affairs independently  [] Yes  [x]No   If no, explain:      Status Prior to Admission:    Mental Status  [] Awake  [] Alert  [x] Oriented  [] Quiet/Calm [] Lethargic/Sedated   [] Disoriented  [] Restless/Anxious  [] Combative   Personal Care  [] Dependent  [x] [address]  [] Requires Assistance   Meal Preparation Ability  [x] Independent   [] Standby Assistance   [] Minimal Assistance   [] Moderate Assistance  [] Maximum Assistance     [] Total Assistance   Chores  [x] Independent with Chores   [] [facility] Resident   [] Requires Assistance   Bowel/Bladder  [x] Continent  [] Catheter  [] Incontinent  [] Ostomy Self-Care    [] Urine Diversion Self-Care  [] Maximum Assistance     [] Total Assistance   Number of Persons needed for assistance    DME at home  [] Roosevelt Gale  [] David Gale   [] Commode    [] Bathroom/Grab Bars  [] Hospital Bed  [] Nebulizer  [] Oxygen           [] Raised Toilet Seat  [] Shower Chair  [] Side Rails for Bed   [] Tub Transfer Bench   [] Elicia Juarez  [] Nathan Lorenzo      [] Other:   Vendor      Treatment Presently Receiving:    Current Treatments  [] Chemotherapy  [] Dialysis  [] Insulin  [] IVAB [] IVF   [] O2  [] PCA   [] PT   [] RT   [] Tube Feedings   [] Wound Care     Psychosocial Evaluation:    Verbalized Knowledge of Disease Process  [x] Patient  []Family   Coping with Disease Process  [] Patient  []Family   Requires Further Counseling Coping with Disease Process  [] Patient  []Family     Identified Projected Needs:    Home Health Aid  [] Yes  []No   Transportation  [] Yes  []No   Education  [] Yes  []No        Specific Education     Financial Counseling  [] Yes  []No   Inability to Care for Self/Will Require 24 hour care  [] Yes  []No   Pain Management  [] Yes []No   Home Infusion Therapy  [] Yes  []No   Oxygen Therapy  [] Yes  []No   DME  [] Yes  []No   Long Term Care Placement  [] Yes  []No   Rehab  [] Yes  []No   Physical Therapy  [] Yes  []No   Needs Anticipated At This Time  [] Yes  [x]No     Intra-Hospital Referral:    [facility]  [] Yes  [x]No     [] Yes  [x]No   Patient Representative  [] Yes  [x]No   Staff for Teaching Needs  [] Yes  [x]No   Specialty Teaching Needs     Diabetic Educator  [] Yes  [x]No   Referral for Diabetic Educator Needed  [] Yes  [x]No  If Yes, place order for Nutritionist or Diabetic Consult     Tentative Discharge Plan:    Home with No Services  [x] Yes  []No   Home with Home Health Follow-up  [] Yes  []No        If Yes, specify type    [address]  [] Yes  []No        If Yes, specify type    Meals on Wheels  [] Yes  []No   Office of Aging  [] Yes  []No   NHP  [] Yes  []No   Return to the [facility]  [] Yes  []No   Rehab Therapy  [] Yes  []No   Acute Rehab  [] Yes  []No   Subacute Rehab  [] Yes  []No   Private Care  [] Yes  []No   Substance Abuse Referral  [] Yes  []No   Transportation  [] Yes  []No   Chore Service  [] Yes  []No   Inpatient Hospice  [] Yes  []No   OP RT  [] Yes  [] No   OP Hemo  [] Yes  [] No   OP PT  [] Yes  []No   Support Group  [] Yes  []No   Reach to Recovery  [] Yes  []No   OP Oncology Clinic  [] Yes  []No   Clinic Appointment  [] Yes  []No   DME  [] Yes  []No   Comments    Name of D/C Planner or  Given to Patient or Family Daniela puentes rn cm   Phone Number [phone]        Extension    Date 9/13/17   Time    If you are discharged home, whom do you designate to participate in your discharge plan and receive any information needed?      Enter name of designee         Phone # of designee         Address of designee         Updated         Patient refused to designate any           individual

## 2017-09-13 NOTE — PROGRESS NOTES
Patient has designated __________no one______________ to participate in his/her discharge plan and to receive any needed information.      Name:   Address:  Phone number:

## 2017-09-13 NOTE — PROGRESS NOTES
Spoke with patient and he has stated that the only way he would be able to pay for his meds is if we set him up with a payment plan, since this is not possible the patient will not be able to pay for his meds. He stated he needs to just leave then since he cant afford his medicine.

## 2017-09-13 NOTE — PROGRESS NOTES
Daily Progress Note: 9/13/2017 10:08 AM   Admit Date: 9/12/2017    Patient seen in follow up for multiple medical problems as listed below:  Patient Active Problem List   Diagnosis Code    Cellulitis of left knee L03. 80    DM (diabetes mellitus), secondary uncontrolled (HCC) E13.65    Abscess of bursa, left knee M71.062    Sepsis ([facility] A41.9    Abscess of right thigh L02.415    Cellulitis L03.90    Cellulitis and abscess L03.90, L02.91    Diabetic foot ulcer ([facility]) E11.621, L97.509    Acute renal injury ([facility] N17.9    Hypertension I10    Iron deficiency anemia D50.9    Anemia D64.9    SAVANNA (acute kidney injury) ([facility]) N17.9    DM (diabetes mellitus) ([facility]) E11.9       Assesment     40 y.o. male who presents with right leg ulcer. Patient is Upper sorbian speaking only. He reports that he developed foot pain about 15 days ago. The pain is moderate. He also has an ulcer . He is not sure if it has any drainage. He is known diabetic with hx non healing foot ulcer. He denies fever. Positive for chills. In ED XR right foot shows some osteolytic changes in 4th and 5th metatarsals. Patient was started on Vancomycin and Zosyn. Podiatry consulted and recommend a stat MRI foot. Patient has elevated ESR > 140 and CRP 2.6.      Diabetic foot ulcer - started on IV vancomycin and Zosyn 9/12 evening. Pain control prn. Podiatry consulted   Possible Rt foot Osteomyelitis - XR of the Rt foot osteolytic bone changes suggestive of OM  MRI R Foot pending. ESR and CRP elevated. Need biopsy ASAP as antibiotics present. Likely needs PICC  Anemia - Hg9.6. Normocytic. Chronic disease. Need iron and b12/folate. Possible csp if unexplained. SAVANNA  - Hydrate and recheck BMP. Hold all nephrotoxins. Cr 1.7 baseline 1-1. 3? DM - SSI . A1c=8.1 on 70/30 BID and metformin.  Will try to use this if available in hospital to titrate home regimen    DVT Protocol Active: yes  Code Status:  Full Code     Disposition: Req 72h hosp    Subjective: CC: Foot Pain    Interval History: Foot is foul smelling. Minimal pain. NPO. Denies history of kidney problems or anemia however there is a language barrier. MRI confirms osteo    ROS: 11 point ROS negative except for R foot issues. Objective:     Visit Vitals    /77 (BP 1 Location: Left arm, BP Patient Position: At rest)    Pulse 68    Temp 98.6 °F (37 °C)    Resp 16    Ht 5' (1.524 m)    Wt 53.9 kg (118 lb 13.3 oz)    SpO2 99%    BMI 23.21 kg/m2       Temp (24hrs), Av.9 °F (37.2 °C), Min:98.2 °F (36.8 °C), Max:99.8 °F (37.7 °C)        Intake/Output Summary (Last 24 hours) at 17 1008  Last data filed at 17 0700   Gross per 24 hour   Intake          1029.17 ml   Output                0 ml   Net          1029.17 ml       Gen: AOx3, NAD  HEENT:  DALY, EOMI. Neck: No Bruits/JVD   Lungs:   CTAB. Good respiratory effort  Heart:   RR S1 S2 without M/R/G  Abdomen: ND,NT, BSX4,   Extremities:   No LE edema. No cyanosis. Skin:  no jaundice. R lateral 5th MTP with foul smelling crater x1.5cm.  atleast 1cm deep      Data Review:     Meds/Labs/Tests reviewed    Current Shift:     Last three shifts:   1901 -  0700  In: 1029.2 [I.V.:1029.2]  Out: -   Recent Labs      17   WBC  10.0  13.0   RBC  3.23*  3.48*   HGB  9.6*  10.2*   HCT  29.1*  31.0*   PLT  257  281   GRANS  62  74*   LYMPH  27  17*   EOS  4  2       Recent Labs      170  17   BUN  26*  29*   CREA  1.31*  1.76*   CA  8.1*  7.9*   ALB   --   2.1*   K  4.6  4.4   NA  144  142   CL  113*  112*   CO2  21  21   GLU  95  127*        Lab Results   Component Value Date/Time    Glucose 95 2017 04:00 AM    Glucose 127 2017 07:45 PM    Glucose 199 2016 10:45 AM    Glucose 102 2016 11:00 AM    Glucose 186 2016 01:20 PM          Care coordination with Nursing/Consultants/staff: 15  Prior history, labs, and charting reviewed: 15    Procedures/Imaging:  R XR foot  MRI R foot-Lateral forefoot soft tissue ulceration, with associated osteomyelitis  involving the fifth metatarsal, greatest distally, without abnormal marrow  signal tracking to the base of the proximal portion of the fifth metatarsal  shaft.       Total time spent with chart review, patient examination/education, discussion with staff on case,documentation and medication management / adjustment  :  30 Minutes      Dr Stacey Keene  [phone]

## 2017-09-13 NOTE — PROGRESS NOTES
[facility] Dosing Services     Pharmacy adjusting dose for Piperacillin-Tazobactam (Zosyn) per renal protocol. Was on a regimen of: 3.375 gm IV q6h    Labs:   Serum Creatinine/CrCl: 1.31 mg/dl/54.6 ml/min      Plan:  Changed Zosyn to 3.375g IV 8h EXTENDED 4 HOUR INFUSION. Pharmacy to follow and will redose based on renal function changes.     Thank you

## 2017-09-13 NOTE — ED NOTES
Patient returned from MRI without incident and taken immediately to floor via wheelchair with ED technician. Antibiotics from pharmacy transported with patient.

## 2017-09-14 VITALS
HEIGHT: 60 IN | WEIGHT: 118.61 LBS | HEART RATE: 78 BPM | RESPIRATION RATE: 16 BRPM | TEMPERATURE: 98.9 F | BODY MASS INDEX: 23.29 KG/M2 | OXYGEN SATURATION: 98 % | DIASTOLIC BLOOD PRESSURE: 78 MMHG | SYSTOLIC BLOOD PRESSURE: 146 MMHG

## 2017-09-14 LAB
ANION GAP SERPL CALC-SCNC: 7 MMOL/L (ref 3–18)
BASOPHILS # BLD: 0 K/UL (ref 0–0.06)
BASOPHILS NFR BLD: 1 % (ref 0–2)
BUN SERPL-MCNC: 14 MG/DL (ref 7–18)
BUN/CREAT SERPL: 15 (ref 12–20)
CALCIUM SERPL-MCNC: 7.8 MG/DL (ref 8.5–10.1)
CHLORIDE SERPL-SCNC: 112 MMOL/L (ref 100–108)
CO2 SERPL-SCNC: 23 MMOL/L (ref 21–32)
CREAT SERPL-MCNC: 0.94 MG/DL (ref 0.6–1.3)
DIFFERENTIAL METHOD BLD: ABNORMAL
EOSINOPHIL # BLD: 0.3 K/UL (ref 0–0.4)
EOSINOPHIL NFR BLD: 4 % (ref 0–5)
ERYTHROCYTE [DISTWIDTH] IN BLOOD BY AUTOMATED COUNT: 13.5 % (ref 11.6–14.5)
FERRITIN SERPL-MCNC: 52 NG/ML (ref 8–388)
FOLATE SERPL-MCNC: 12.5 NG/ML (ref 3.1–17.5)
GLUCOSE BLD STRIP.AUTO-MCNC: 142 MG/DL (ref 70–110)
GLUCOSE SERPL-MCNC: 89 MG/DL (ref 74–99)
HCT VFR BLD AUTO: 30.6 % (ref 36–48)
HGB BLD-MCNC: 10.1 G/DL (ref 13–16)
IRON SATN MFR SERPL: 20 %
IRON SERPL-MCNC: 36 UG/DL (ref 50–175)
LYMPHOCYTES # BLD: 2.2 K/UL (ref 0.9–3.6)
LYMPHOCYTES NFR BLD: 27 % (ref 21–52)
MCH RBC QN AUTO: 29.6 PG (ref 24–34)
MCHC RBC AUTO-ENTMCNC: 33 G/DL (ref 31–37)
MCV RBC AUTO: 89.7 FL (ref 74–97)
MONOCYTES # BLD: 0.5 K/UL (ref 0.05–1.2)
MONOCYTES NFR BLD: 6 % (ref 3–10)
NEUTS SEG # BLD: 5.1 K/UL (ref 1.8–8)
NEUTS SEG NFR BLD: 62 % (ref 40–73)
PLATELET # BLD AUTO: 277 K/UL (ref 135–420)
PMV BLD AUTO: 10.7 FL (ref 9.2–11.8)
POTASSIUM SERPL-SCNC: 4.3 MMOL/L (ref 3.5–5.5)
RBC # BLD AUTO: 3.41 M/UL (ref 4.7–5.5)
SODIUM SERPL-SCNC: 142 MMOL/L (ref 136–145)
TIBC SERPL-MCNC: 181 UG/DL (ref 250–450)
VIT B12 SERPL-MCNC: 354 PG/ML (ref 211–911)
WBC # BLD AUTO: 8.2 K/UL (ref 4.6–13.2)

## 2017-09-14 PROCEDURE — 83540 ASSAY OF IRON: CPT | Performed by: INTERNAL MEDICINE

## 2017-09-14 PROCEDURE — 80048 BASIC METABOLIC PNL TOTAL CA: CPT | Performed by: INTERNAL MEDICINE

## 2017-09-14 PROCEDURE — 74011250637 HC RX REV CODE- 250/637: Performed by: FAMILY MEDICINE

## 2017-09-14 PROCEDURE — 36415 COLL VENOUS BLD VENIPUNCTURE: CPT | Performed by: INTERNAL MEDICINE

## 2017-09-14 PROCEDURE — 74011000258 HC RX REV CODE- 258: Performed by: INTERNAL MEDICINE

## 2017-09-14 PROCEDURE — 74011250636 HC RX REV CODE- 250/636: Performed by: INTERNAL MEDICINE

## 2017-09-14 PROCEDURE — 74011250637 HC RX REV CODE- 250/637: Performed by: INTERNAL MEDICINE

## 2017-09-14 PROCEDURE — 85025 COMPLETE CBC W/AUTO DIFF WBC: CPT | Performed by: INTERNAL MEDICINE

## 2017-09-14 PROCEDURE — 82728 ASSAY OF FERRITIN: CPT | Performed by: INTERNAL MEDICINE

## 2017-09-14 PROCEDURE — 77030020263 HC SOL INJ SOD CL0.9% LFCR 1000ML

## 2017-09-14 PROCEDURE — 74011250636 HC RX REV CODE- 250/636: Performed by: FAMILY MEDICINE

## 2017-09-14 PROCEDURE — 82962 GLUCOSE BLOOD TEST: CPT

## 2017-09-14 PROCEDURE — 82607 VITAMIN B-12: CPT | Performed by: INTERNAL MEDICINE

## 2017-09-14 RX ORDER — INSULIN LISPRO 100 [IU]/ML
INJECTION, SOLUTION INTRAVENOUS; SUBCUTANEOUS
Status: DISCONTINUED | OUTPATIENT
Start: 2017-09-14 | End: 2017-09-14 | Stop reason: HOSPADM

## 2017-09-14 RX ORDER — CIPROFLOXACIN 500 MG/1
500 TABLET ORAL EVERY 12 HOURS
Qty: 60 TAB | Refills: 0 | Status: SHIPPED | OUTPATIENT
Start: 2017-09-14 | End: 2017-09-14

## 2017-09-14 RX ORDER — CIPROFLOXACIN 250 MG/1
500 TABLET, FILM COATED ORAL EVERY 12 HOURS
Status: DISCONTINUED | OUTPATIENT
Start: 2017-09-14 | End: 2017-09-14 | Stop reason: HOSPADM

## 2017-09-14 RX ORDER — CIPROFLOXACIN 500 MG/1
500 TABLET ORAL EVERY 12 HOURS
Qty: 60 TAB | Refills: 0 | Status: SHIPPED | OUTPATIENT
Start: 2017-09-14 | End: 2017-10-14

## 2017-09-14 RX ADMIN — HEPARIN SODIUM 5000 UNITS: 5000 INJECTION, SOLUTION INTRAVENOUS; SUBCUTANEOUS at 06:43

## 2017-09-14 RX ADMIN — HEPARIN SODIUM 5000 UNITS: 5000 INJECTION, SOLUTION INTRAVENOUS; SUBCUTANEOUS at 00:14

## 2017-09-14 RX ADMIN — HYDROCODONE BITARTRATE AND ACETAMINOPHEN 1 TABLET: 5; 325 TABLET ORAL at 07:08

## 2017-09-14 RX ADMIN — ASPIRIN 81 MG 81 MG: 81 TABLET ORAL at 08:55

## 2017-09-14 RX ADMIN — PIPERACILLIN SODIUM,TAZOBACTAM SODIUM 3.38 G: 3; .375 INJECTION, POWDER, FOR SOLUTION INTRAVENOUS at 03:00

## 2017-09-14 RX ADMIN — CIPROFLOXACIN HYDROCHLORIDE 500 MG: 250 TABLET, FILM COATED ORAL at 12:01

## 2017-09-14 RX ADMIN — PRAVASTATIN SODIUM 10 MG: 20 TABLET ORAL at 00:13

## 2017-09-14 RX ADMIN — MORPHINE SULFATE 2 MG: 2 INJECTION, SOLUTION INTRAMUSCULAR; INTRAVENOUS at 08:52

## 2017-09-14 RX ADMIN — PIPERACILLIN SODIUM,TAZOBACTAM SODIUM 3.38 G: 3; .375 INJECTION, POWDER, FOR SOLUTION INTRAVENOUS at 06:42

## 2017-09-14 RX ADMIN — HYDROCODONE BITARTRATE AND ACETAMINOPHEN 1 TABLET: 5; 325 TABLET ORAL at 00:12

## 2017-09-14 RX ADMIN — CLINDAMYCIN HYDROCHLORIDE 300 MG: 150 CAPSULE ORAL at 08:55

## 2017-09-14 NOTE — PROGRESS NOTES
Pt still here this am after being dc'd last pm. Spoke with nurses, they only know that pt could not give them address to anywhere to go. He refused shelter last pm and said he would go to a friend's but he could not give them name or number of friend. Told nurse pt is dc'd and needs to come up with address if wants a cab. Notified .

## 2017-09-14 NOTE — DISCHARGE INSTRUCTIONS
Patient armband removed and shredded      DISCHARGE SUMMARY from Nurse    The following personal items are in your possession at time of discharge:    Dental Appliances: None  Visual Aid: None     Home Medications: None  Jewelry: None  Clothing: At bedside, Shirt, Pants  Other Valuables: Cell Phone, With patient             PATIENT INSTRUCTIONS:    After general anesthesia or intravenous sedation, for 24 hours or while taking prescription Narcotics:  · Limit your activities  · Do not drive and operate hazardous machinery  · Do not make important personal or business decisions  · Do  not drink alcoholic beverages  · If you have not urinated within 8 hours after discharge, please contact your surgeon on call. Report the following to your surgeon:  · Excessive pain, swelling, redness or odor of or around the surgical area  · Temperature over 100.5  · Nausea and vomiting lasting longer than 4 hours or if unable to take medications  · Any signs of decreased circulation or nerve impairment to extremity: change in color, persistent  numbness, tingling, coldness or increase pain  · Any questions        What to do at Home:  Recommended activity: Activity as tolerated    If you experience any of the following symptoms shortness of breath or chest pain, please follow up with PCP. *  Please give a list of your current medications to your Primary Care Provider. *  Please update this list whenever your medications are discontinued, doses are      changed, or new medications (including over-the-counter products) are added. *  Please carry medication information at all times in case of emergency situations. These are general instructions for a healthy lifestyle:    No smoking/ No tobacco products/ Avoid exposure to second hand smoke    Surgeon General's Warning:  Quitting smoking now greatly reduces serious risk to your health.     Obesity, smoking, and sedentary lifestyle greatly increases your risk for illness    A healthy diet, regular physical exercise & weight monitoring are important for maintaining a healthy lifestyle    You may be retaining fluid if you have a history of heart failure or if you experience any of the following symptoms:  Weight gain of 3 pounds or more overnight or 5 pounds in a week, increased swelling in our hands or feet or shortness of breath while lying flat in bed. Please call your doctor as soon as you notice any of these symptoms; do not wait until your next office visit. Recognize signs and symptoms of STROKE:    F-face looks uneven    A-arms unable to move or move unevenly    S-speech slurred or non-existent    T-time-call 911 as soon as signs and symptoms begin-DO NOT go       Back to bed or wait to see if you get better-TIME IS BRAIN. Warning Signs of HEART ATTACK     Call 911 if you have these symptoms:   Chest discomfort. Most heart attacks involve discomfort in the center of the chest that lasts more than a few minutes, or that goes away and comes back. It can feel like uncomfortable pressure, squeezing, fullness, or pain.  Discomfort in other areas of the upper body. Symptoms can include pain or discomfort in one or both arms, the back, neck, jaw, or stomach.  Shortness of breath with or without chest discomfort.  Other signs may include breaking out in a cold sweat, nausea, or lightheadedness. Don't wait more than five minutes to call 911 - MINUTES MATTER! Fast action can save your life. Calling 911 is almost always the fastest way to get lifesaving treatment. Emergency Medical Services staff can begin treatment when they arrive -- up to an hour sooner than if someone gets to the hospital by car. The discharge information has been reviewed with the patient. The patient verbalized understanding. Discharge medications reviewed with the patient and appropriate educational materials and side effects teaching were provided.               DISCHARGE SUMMARY from Nurse    The following personal items are in your possession at time of discharge:    Dental Appliances: None  Visual Aid: None     Home Medications: None  Jewelry: None  Clothing: At bedside, Shirt, Pants  Other Valuables: Cell Phone, With patient             PATIENT INSTRUCTIONS:    After general anesthesia or intravenous sedation, for 24 hours or while taking prescription Narcotics:  · Limit your activities  · Do not drive and operate hazardous machinery  · Do not make important personal or business decisions  · Do  not drink alcoholic beverages  · If you have not urinated within 8 hours after discharge, please contact your surgeon on call. Report the following to your surgeon:  · Excessive pain, swelling, redness or odor of or around the surgical area  · Temperature over 100.5  · Nausea and vomiting lasting longer than 4 hours or if unable to take medications  · Any signs of decreased circulation or nerve impairment to extremity: change in color, persistent  numbness, tingling, coldness or increase pain  · Any questions        What to do at Home:  Recommended activity: Activity as tolerated    If you experience any of the following symptoms fever, signs of infection, foul drainage from wound , please follow up with [facility] [place]. *  Please give a list of your current medications to your Primary Care Provider. *  Please update this list whenever your medications are discontinued, doses are      changed, or new medications (including over-the-counter products) are added. *  Please carry medication information at all times in case of emergency situations. These are general instructions for a healthy lifestyle:    No smoking/ No tobacco products/ Avoid exposure to second hand smoke    Surgeon General's Warning:  Quitting smoking now greatly reduces serious risk to your health.     Obesity, smoking, and sedentary lifestyle greatly increases your risk for illness    A healthy diet, regular physical exercise & weight monitoring are important for maintaining a healthy lifestyle    You may be retaining fluid if you have a history of heart failure or if you experience any of the following symptoms:  Weight gain of 3 pounds or more overnight or 5 pounds in a week, increased swelling in our hands or feet or shortness of breath while lying flat in bed. Please call your doctor as soon as you notice any of these symptoms; do not wait until your next office visit. Recognize signs and symptoms of STROKE:    F-face looks uneven    A-arms unable to move or move unevenly    S-speech slurred or non-existent    T-time-call 911 as soon as signs and symptoms begin-DO NOT go       Back to bed or wait to see if you get better-TIME IS BRAIN. Warning Signs of HEART ATTACK     Call 911 if you have these symptoms:   Chest discomfort. Most heart attacks involve discomfort in the center of the chest that lasts more than a few minutes, or that goes away and comes back. It can feel like uncomfortable pressure, squeezing, fullness, or pain.  Discomfort in other areas of the upper body. Symptoms can include pain or discomfort in one or both arms, the back, neck, jaw, or stomach.  Shortness of breath with or without chest discomfort.  Other signs may include breaking out in a cold sweat, nausea, or lightheadedness. Don't wait more than five minutes to call 911 - MINUTES MATTER! Fast action can save your life. Calling 911 is almost always the fastest way to get lifesaving treatment. Emergency Medical Services staff can begin treatment when they arrive -- up to an hour sooner than if someone gets to the hospital by car. The discharge information has been reviewed with the patient. The patient verbalized understanding. Discharge medications reviewed with the patient and appropriate educational materials and side effects teaching were provided. [employer]    Thank you for enrolling in [address] Please follow the instructions below to securely access your online medical record. [employer] allows you to send messages to your doctor, view your test results, renew your prescriptions, schedule appointments, and more. How Do I Sign Up? 1. In your internet browser, go to [email or web address]. 2. Click on the First Time User? Click Here link in the Sign In box. You will see the New Member Sign Up page. 3. Enter your [employer] Access Code exactly as it appears below. You will not need to use this code after youve completed the sign-up process. If you do not sign up before the expiration date, you must request a new code. [employer] Access Code: [email or web address]  Expires: 12/12/2017 10:09 PM     4. Enter the last four digits of your Social Security Number (xxxx) and Date of Birth (mm/dd/yyyy) as indicated and click Submit. You will be taken to the next sign-up page. 5. Create a [employer] ID. This will be your [employer] login ID and cannot be changed, so think of one that is secure and easy to remember. 6. Create a [employer] password. You can change your password at any time. 7. Enter your Password Reset Question and Answer. This can be used at a later time if you forget your password. 8. Enter your e-mail address. You will receive e-mail notification when new information is available in [address] 9. Click Sign Up. You can now view your medical record. Additional Information    Remember, [employer] is NOT to be used for urgent needs. For medical emergencies, dial 911. Now available from your iPhone and Android!     Patient armband removed and shredded

## 2017-09-14 NOTE — PROGRESS NOTES
1945: Assumed patient care. Received report from [address], [facility] (offgoing nurse). Report included SBAR, Kardex, and MAR. Patient in no sign of distress, denies pain at this time. Bed in lowest setting, call light and possessions within reach. Verbal shift change report given to REBECA Azevedo (oncoming nurse) by Yudith Rowell RN (offgoing nurse). Report included the following information SBAR, Kardex and MAR.

## 2017-09-14 NOTE — ANCILLARY DISCHARGE INSTRUCTIONS
Spoke with patient using the blue phone, gave patient list of shelters. Patient states that his friend Layne Gupta will be coming to pick him up, he doesn't know when because he may be at work. Informed patient to call him right now because he is discharged.

## 2017-09-14 NOTE — ROUTINE PROCESS
0730 Bedside, Verbal and Written shift change report given to [place]  (oncoming nurse) by Antony Betancourt RN (offgoing nurse). Report included the following information SBAR, Kardex and MAR. Pt lying in bed, awake and alert, in no acute distress, Luxem[place]ish speaking only. 0830 used blue  phone to assess patient's pain status, last bm, other symptoms. Infectious disease doctor arrived to continue to ask patient questions regarding right foot ulcer. 2618 PRN morphine given for 7/10 pain in right foot ulcer    0952 pt. States pain is tolerable at 3/10    1030 confirmed with Dr. Wells that patient will be discharged, pt states his friend will be here to pick him up at noon,  states meds have been sent to [facility] pharmacy and will be delivered to patient before discharge    1200 Discharge instructions gone over with patient using [employer] phone, patient verbalizes understanding of medications, including when next doses are due, discharge instructions printed in [address]      1400  gave patient bon [address] schedule for health care due to having no PCP

## 2017-09-14 NOTE — CONSULTS
INFECTIOUS DISEASE CONSULT NOTE       Requested by: Dr Candelario Gregory    Reason for consult: right foot infection    Date of admission: 9/12/2017    Date of consult: September 14, 2017      ABX:     Current abx Prior abx    Cipro 9/14-0 ann Flanagann 9/12-2, clinda 9/14-0     ASSESSMENT: -- RECOMMENDATIONS      Rt foot ulcer- concern for Rt 5th MT OM as seen on MRI  - sig elevated inflammatory markers  - D/w him that he had bone infection and usual Rx is removal of infected bone and long term IV Abx  - pt is ok for surgical debridement but doesn't want any amputation of his toes of foot as worried that he will lose his balance and will have tough time getting a job   - I spoke with Dr Salina Espinoza and given extent of involvement of bones, shaving or debridement only won't be helpful unless we remove part of bones which are affected  - looking at his last Cx, it will be better to give him Cipro.  Unfortunately can't get IV Abx for him as no insurance and hospital won't be able to cover his IV as he has been non compliant with outpt infusion center in past  - Will place him on oral Cipro for a month and ADR and S/E d/w him including c diff  - Pt asked to use Yogurt and probiotics in his diet to prevent C diff  - pt was asked to Fu with Podiatry in [facility] and based on clinical response, will be able to make further decision    Rt 3-4th MT possible fracture- seen on MRI - per others  - pt doesn't want any amputation   Previous RT DFU with debridements and long term Iv Abx for OM  - S/p amputation of right first toe sec to previous OM and Om of 4th and 5th MT in May 2015- treated with 6 weeks of IV Dapto and Ceftriaxone   - s/p debridement of ulcer, bx and trimming of callus with draining of abscess on 4/29/16 and Rx with 6 week sof Iv Dapto - remains at risk for recurrent DFU   DM-2 - good control d/w him  - his HbA1c is better needs to control even better to prevent cx of Diabetes   Anemia- stable Hb    SAVANNA - improved   PN sec to DM          MICROBIOLOGY:   9/12 blcx x2 IP   Wd cx IP    LINES/DRAINS:   PIV    HPI:      Mr Sheridan Bridges is a 40 y.o.  male  who came to ED on 9/12 for right foot pain. Pt is known to our service from past admission when he had foot infections in past. He is S/p amputation of right first toe sec to previous OM and Om of 4th and 5th MT in May 2015 which was treated with 6 weeks of IV Dapto and Ceftriaxone f/b debridement of ulcer, bx and trimming of callus with draining of abscess on 4/29/16 and Rx with 6 weeks of Iv Dapto in June 2016. t says moved to [place] for job but developed ulcer in his foot and is here to take care of it. Pt was admitted as with ulcer on his rt foot and had Xray and MRi s/o OM of 5th metatarsal and fracture on 3-4 metatarsals. Pt was seen by Dr Fahad Alatorre, podiatry and different surgical options were discussed with him. Pt refused any surgery yesterday and plan was to discharge him and fu in [facility] clinic for further management. We were asked for further eval. I spoke with him on  phone and talked about treatment of OM with long term abx and surgical intervention. He agreed for any shaving or debridement but doesn't want to get amputation particularly of his toe as thinks will be discriminated because of his amputated toes and won't be able to get a job. I spoke with Dr Wells and Dr Fahad Alatorre on phone. Given extent of infection and bones involvement, it is not possible to save toes per podiatry and pt is not agreeable with this sx plan. I also d/w discharge planner and pt has been provided with support in past with long term Iv Abx and other medications help. Pt has been non compliant and didn't show up at infusion center for Iv Abx in past. He was also non compliant with ID office follow ups. Unfortunately we don't have many options for him.  I explained will give a try with oral Cipro as had good bioavailability (as good as IV) and to fu with [facility]ry at [facility]. If his wounds heal well, he might not have to go for any thing further but if any worsening, he will have to undergo Sx as recommended by podiatry. Pt understands. Past medical history:     Past Medical History:   Diagnosis Date    Cellulitis     rt. foot    Diabetes ([facility] [address]     Osteomyelitis ([facility] [address]     rt toe    Other ill-defined conditions        Social History:     Social History     Social History    Marital status: SINGLE     Spouse name: N/A    Number of children: N/A    Years of education: N/A     Occupational History    Not on file. Social History Main Topics    Smoking status: Heavy Tobacco Smoker     Packs/day: 0.50    Smokeless tobacco: Never Used    Alcohol use No    Drug use: Yes     Special: Marijuana      Comment: 9/5/17    Sexual activity: Not on file     Other Topics Concern    Not on file     Social History Narrative       Family History:   History reviewed. No pertinent family history. Allergies:   No Known Allergies      Home Medications:     Prescriptions Prior to Admission   Medication Sig    insulin NPH/insulin regular (HUMULIN 70/30) 100 unit/mL (70-30) injection 12 Units by SubCUTAneous route Before breakfast and dinner.  Insulin Syringe-Needle U-100 (INSULIN SYRINGE) 1 mL 28 x 1/2\" syrg 1 Package by Does Not Apply route two (2) times a day.  DAPTOMYCIN  mg by IntraVENous route daily. Indications: Continue until 6/20    amLODIPine (NORVASC) 5 mg tablet Take 1 Tab by mouth daily.  HYDROcodone-acetaminophen (NORCO) 5-325 mg per tablet Take 1 Tab by mouth every four (4) hours as needed for Pain. Max Daily Amount: 6 Tabs. Indications: do not fill unless keflex and bactrim are filled    aspirin 81 mg chewable tablet Take 1 Tab by mouth daily.  losartan (COZAAR) 50 mg tablet Take 1 Tab by mouth daily.     metFORMIN (GLUCOPHAGE) 500 mg tablet Take 1 Tab by mouth two (2) times daily (with meals).  pravastatin (PRAVACHOL) 10 mg tablet Take 1 Tab by mouth nightly. Current Medications:     Current Facility-Administered Medications   Medication Dose Route Frequency    insulin lispro (HUMALOG) injection   SubCUTAneous AC&HS    piperacillin-tazobactam (ZOSYN) 3.375 g in 0.9% sodium chloride (MBP/ADV) 100 mL MBP ### EXTENDED 4 HOUR INFUSION ##   3.375 g IntraVENous Q8H    clindamycin (CLEOCIN) capsule 300 mg  300 mg Oral TID    vancomycin (VANCOCIN) 1,250 mg in 0.9% sodium chloride 250 mL IVPB  1,250 mg IntraVENous Q24H    [START ON 9/15/2017] VANCOMYCIN INFORMATION NOTE   Other ONCE    VANCOMYCIN INFORMATION NOTE   Other Rx Dosing/Monitoring    0.9% sodium chloride infusion  125 mL/hr IntraVENous CONTINUOUS    HYDROcodone-acetaminophen (NORCO) 5-325 mg per tablet 1 Tab  1 Tab Oral Q4H PRN    morphine injection 2 mg  2 mg IntraVENous Q4H PRN    naloxone (NARCAN) injection 0.4 mg  0.4 mg IntraVENous PRN    heparin (porcine) injection 5,000 Units  5,000 Units SubCUTAneous Q8H    glucose chewable tablet 16 g  4 Tab Oral PRN    glucagon (GLUCAGEN) injection 1 mg  1 mg IntraMUSCular PRN    dextrose (D50W) injection syrg 12.5-25 g  25-50 mL IntraVENous PRN    aspirin chewable tablet 81 mg  81 mg Oral DAILY    pravastatin (PRAVACHOL) tablet 10 mg  10 mg Oral QHS       Review of Systems:   12 points ROS done. Pertinent positives and negatives are as follows, ROS otherwise negative. Constitutional: Negative for fever, chills, diaphoresis and unexpected weight change. HENT: Negative for ear pain, congestion, sore throat, rhinorrhea. Eyes: Negative for pain, redness and visual disturbance. Respiratory: negative for shortness of breath, cough, chest tightness, wheezing. Cardiovascular: Negative for chest pain, palpitations and leg swelling.    Gastrointestinal: Negative for nausea, vomiting, abdominal pain or diarrhea  Genitourinary: Negative for dysuria   Musculoskeletal: pain of rt foot with ulcer   Skin: Rt planter ulcers, rash  Neurological: Negative for dizziness, syncope, light-headedness or headaches. Hematological:Negative for easy bruising or bleeding. Psychiatric/Behavioral: Negative anxiety, depression. Physical Exam:  Vitals  Temp (24hrs), Av.9 °F (37.2 °C), Min:98.9 °F (37.2 °C), Max:98.9 °F (37.2 °C)    Visit Vitals    /78 (BP 1 Location: Right arm, BP Patient Position: At rest)    Pulse 78    Temp 98.9 °F (37.2 °C)    Resp 16    Ht 5' (1.524 m)    Wt 53.8 kg (118 lb 9.7 oz)    SpO2 98%    BMI 23.16 kg/m2       General: Well-developed, 40y.o. year-old, male, in no acute distress  HEENT: Normocephalic, anicteric sclerae, Pupils equal, round reactive to light, no oropharyngeal lesions. No sinus tenderness. Neck:  Supple, no lymphadenopathy, masses or thyromegaly  Chest:  Symmetrical expansion  Lungs:  Clear to auscultation bilaterally, no dullness  Heart:  Regular rhythm, no murmurs, rubs or gallops, No JVD  Abdomen: Soft, non-tender,non distended, no organomegaly, BS+  Musculoskeletal: Rt foot with amputated great toe, plantar ulcer 5th metatarsal, concern for undermining, min drainage, no foul smell, probe to bone. No edema. No clubbing or cyanosis  CNS:               AAOx3. Cranial nerves II-XII intact. Grossly normal.No NR  SKIN:               As above, No skin lesion or rash.  Dry, warm, intact          Labs: Results:   Chemistry Recent Labs      17   0355  17   0400  17   1945   GLU  89  95  127*   NA  142  144  142   K  4.3  4.6  4.4   CL  112*  113*  112*   CO2  23  21  21   BUN  14  26*  29*   CREA  0.94  1.31*  1.76*   CA  7.8*  8.1*  7.9*   AGAP  7  10  9   BUCR  15  20  16   AP   --    --   109   TP   --    --   5.8*   ALB   --    --   2.1*   GLOB   --    --   3.7   AGRAT   --    --   0.6*      CBC w/Diff Recent Labs      17   0355  17   0400  17   1945   WBC  8.2  10.0  13.0   RBC  3.41*  3.23* 3.48*   HGB  10.1*  9.6*  10.2*   HCT  30.6*  29.1*  31.0*   PLT  277  257  281   GRANS  62  62  74*   LYMPH  27  27  17*   EOS  4  4  2      Microbiology Recent Labs      09/12/17   2300  09/12/17   1945  09/12/17   1930   CULT  PENDING  NO GROWTH AFTER 11 HOURS  NO GROWTH AFTER 11 HOURS          Imaging-      Results from [place] encounter on 09/12/17   XR FOOT RT MIN 3 V   Narrative History: Foot ulcer. Osteomyelitis. Diabetes. Comparison: 4/22/2016  AP, lateral, and oblique views of the right foot are submitted for evaluation. There is a soft tissue defect which probably represents a ulcer directly  adjacent to the head of the fifth metatarsal. This is new. In addition, there is  erosive changes and focal osteopenia involving the head of the fifth metatarsal  and the base of the proximal phalanx same digit. There was a pencil in cup type  deformity at the metatarsal phalangeal joint of the fifth digit on prior exam  but the osteopenia appears worse. This indicates osteomyelitis most likely  associated with a diabetic ulcer. There is fragmentation of the head of the  fourth metatarsal as seen on prior. The patient apparently has had resection of  the metatarsal phalangeal joint of the third metatarsal which is new from prior. There has been resection through the distal shaft of the first metatarsal, to  include the entire distal great toe. This is unchanged. Osseous mineralization  is otherwise normal throughout the rest of the foot including the operative  sites of the first third and fourth metatarsals. The regional soft tissue  structures demonstrate vascular calcifications which can be seen in diabetes. Impression Impression:      1.  Most likely ulceration involving the head of the fifth metatarsal with focal  osteopenia at the metatarsal phalangeal joint of the fifth digit and increasing  erosions of the head of the fifth metatarsal. This most likely represents  osteomyelitis superimposed on chronic deformity. 2. New resection of the metatarsal phalangeal joint of the third digit without  evidence of osteomyelitis. 3. Old resection involving the great toe. The most likely has been partial  resection of the head of the fourth metatarsal, unchanged          Results from [place] encounter on 07/14/14   CT FEMUR RT W CONT   Narrative CT of the right thigh with contrast:    Indication: Cellulitis. Concern for abscess. Procedure: Volume acquisition scans of the right thigh from above the hip to  the knee. Bone and soft tissue windows. Sagittal and coronal reformations with  bone targeting. Comparison MRI 6/24/13. Findings: There is edema in the posterior subcutaneous fat of the thigh and  some skin thickening, consistent with cellulitis. In the mid thigh region  posteriorly near the midline there is a irregularity of the skin with  underlying subcutaneous gas an enhancing rim measuring 2 cm suspect for small  subcutaneous abscess. Cellulitis extends to involve the surface of the  posterior compartment musculature but no definite adjacent intramuscular  process is appreciated. Inferior images demonstrate a knee effusion. No  periosteal reaction or bony destruction would suggest osteomyelitis of the  femur. Hip joint appears normal. Some bladder wall thickening is suggested but  the bladder is not distended. Impression Impression:    1. Small subcutaneous gas collection with adjacent enhancing rim and overlying  skin abnormality consistent with subcutaneous abscess in the posterior mid  thigh level. 2. There is adjacent cellulitis posteriorly. 3. No definite deep intramuscular abscess. No evidence of osteomyelitis. 4. Small knee effusion.     Initial after hours report was provided by the covering radiology resident.        ---------------------------------------------------------------------------------------------------------------  I have independently examined the patient and reviewed all lab studies and imgaing as well as review of nursing notes and physican notes from the past 24 hours. The plan of care has been discussed with the patient/relative and all questions are answered.        Sommer Mann MD  9/14/2017    [facility] AT THE [facility]  [phone]

## 2017-09-14 NOTE — PROGRESS NOTES
Patient with discharge orders, discharge instructions and medication prescription explained and given by Jean Pierre Appiah communicated with patient using the blue phone, patient does not have a ride home, tried to arrange a cab to take him home but patient stated he does not know the address of the nella that he is staying with and that he is not answering his phone, patient also stated to just drop him off [employer]. Notified nursing supervisor Adrianne that this pt has discharge order and that pt has no ride home,  patient does not know the address of the nella he is staying with and that he is not answering his phone. Adrianne stated that we can not arrange for a cab ride if there is no residential address but we can give him a bus ticket. Patient was informed that a cab can not be arranged if there is no residential address but we can provide him a bus ticket. Patient refused to take the bus ticket, pt stated if he will stay till morning somebody can come to pick him up. Notified Adrianne Nursing supervisor that pt refused the bus ticket and that if he can stay till morning somebody can come to pick him up, she stated patient can stay till morning. Relayed the conversation with nsg supervisor to [facility] and she informed patient through the blue phone, pt verbalized understanding.

## 2017-09-14 NOTE — PROGRESS NOTES
Problem: Falls - Risk of  Goal: *Absence of Falls  Document Kurtis Fall Risk and appropriate interventions in the flowsheet.    Outcome: Progressing Towards Goal  Fall Risk Interventions:              Medication Interventions: Teach patient to arise slowly

## 2017-09-16 LAB
BACTERIA SPEC CULT: ABNORMAL
GRAM STN SPEC: ABNORMAL
GRAM STN SPEC: ABNORMAL
SERVICE CMNT-IMP: ABNORMAL

## 2017-09-18 LAB
BACTERIA SPEC CULT: NORMAL
BACTERIA SPEC CULT: NORMAL
SERVICE CMNT-IMP: NORMAL
SERVICE CMNT-IMP: NORMAL

## 2017-10-04 NOTE — ANCILLARY DISCHARGE INSTRUCTIONS
[facility]  Discharge Phone Call       After-Care Discharge Phone Call Questions: no answer     Were you able to get your prescriptions filled? Comment:      [] Yes  []No    Comment if answer is \"No\"   Are you taking your medication(s) as your doctor ordered? Do you understand the purpose of your medications? Comment:    [] Yes  []No    Comment if answer is \"No\"   Are you taking any other medications that are not on the list?  Comment:      [] Yes  []No    Comment if answer is \"Yes\"   Do you have any questions about your medications? Are you aware of potential side effects? Comment:    [] Yes  []No    Comment if answer is \"Yes\"   Did you make your follow-up appointments (if the hospital did not do this before  discharge)? Comment:    [] Yes  []No    Comment if answer is \"No\"   Is there any reason you might not be able to keep your follow-up appointments? Comment:     [] Yes  []No    Comment if answer is \"Yes\"   Do you have any questions about your care plan? Are you aware of what health problems to be alert for? Comment:    [] Yes  []No    Comment if answer is \"Yes\"   Do you have a good understanding of how you should manage your health? Comment:    [] Yes  []No    Comment if answer is \"Yes\"   Do you know which symptoms to watch for that would mean you would need to call your doctor right away? Comment:      [] Yes  []No    Comment if answer is \"No\"   Do you have any questions about the follow up process or any instructions that we have provided? Comment:    [] Yes  []No    Comment if answer is \"Yes\"   Did staff take your preferences into account?         [] Yes  []No    Comment if answer is \"Yes\"

## 2018-02-22 ENCOUNTER — HOSPITAL ENCOUNTER (INPATIENT)
Age: 38
LOS: 6 days | Discharge: HOME | DRG: 872 | End: 2018-03-01
Attending: EMERGENCY MEDICINE | Admitting: HOSPITALIST
Payer: SELF-PAY

## 2018-02-22 ENCOUNTER — APPOINTMENT (OUTPATIENT)
Dept: GENERAL RADIOLOGY | Age: 38
DRG: 872 | End: 2018-02-22
Attending: EMERGENCY MEDICINE
Payer: SELF-PAY

## 2018-02-22 DIAGNOSIS — D64.9 ANEMIA OF ACUTE INFECTION: ICD-10-CM

## 2018-02-22 DIAGNOSIS — L03.115 CELLULITIS OF RIGHT ANKLE: Primary | ICD-10-CM

## 2018-02-22 DIAGNOSIS — L03.115 CELLULITIS OF FOOT, RIGHT: ICD-10-CM

## 2018-02-22 DIAGNOSIS — N17.9 ACUTE KIDNEY INJURY (HCC): ICD-10-CM

## 2018-02-22 PROCEDURE — 80053 COMPREHEN METABOLIC PANEL: CPT | Performed by: EMERGENCY MEDICINE

## 2018-02-22 PROCEDURE — 87040 BLOOD CULTURE FOR BACTERIA: CPT | Performed by: EMERGENCY MEDICINE

## 2018-02-22 PROCEDURE — 87186 SC STD MICRODIL/AGAR DIL: CPT | Performed by: EMERGENCY MEDICINE

## 2018-02-22 PROCEDURE — 85652 RBC SED RATE AUTOMATED: CPT | Performed by: EMERGENCY MEDICINE

## 2018-02-22 PROCEDURE — 83605 ASSAY OF LACTIC ACID: CPT

## 2018-02-22 PROCEDURE — 86140 C-REACTIVE PROTEIN: CPT | Performed by: EMERGENCY MEDICINE

## 2018-02-22 PROCEDURE — 87077 CULTURE AEROBIC IDENTIFY: CPT | Performed by: EMERGENCY MEDICINE

## 2018-02-22 PROCEDURE — 83036 HEMOGLOBIN GLYCOSYLATED A1C: CPT | Performed by: HOSPITALIST

## 2018-02-22 PROCEDURE — 85025 COMPLETE CBC W/AUTO DIFF WBC: CPT | Performed by: EMERGENCY MEDICINE

## 2018-02-22 PROCEDURE — 99285 EMERGENCY DEPT VISIT HI MDM: CPT

## 2018-02-22 PROCEDURE — 73610 X-RAY EXAM OF ANKLE: CPT

## 2018-02-22 RX ORDER — NALBUPHINE HYDROCHLORIDE 10 MG/ML
5 INJECTION, SOLUTION INTRAMUSCULAR; INTRAVENOUS; SUBCUTANEOUS
Status: COMPLETED | OUTPATIENT
Start: 2018-02-22 | End: 2018-02-23

## 2018-02-22 RX ORDER — ONDANSETRON 2 MG/ML
4 INJECTION INTRAMUSCULAR; INTRAVENOUS
Status: COMPLETED | OUTPATIENT
Start: 2018-02-22 | End: 2018-02-23

## 2018-02-22 RX ORDER — VANCOMYCIN/0.9 % SOD CHLORIDE 1 G/100 ML
1000 PLASTIC BAG, INJECTION (ML) INTRAVENOUS ONCE
Status: COMPLETED | OUTPATIENT
Start: 2018-02-22 | End: 2018-02-23

## 2018-02-22 RX ORDER — KETOROLAC TROMETHAMINE 30 MG/ML
15 INJECTION, SOLUTION INTRAMUSCULAR; INTRAVENOUS
Status: COMPLETED | OUTPATIENT
Start: 2018-02-22 | End: 2018-02-23

## 2018-02-22 NOTE — IP AVS SNAPSHOT
[address] [facility] 
 
 
 [address] [address] 
[phone] Patient: Sneha Triplett MRN: [id number] MORENO:[birth year] About your hospitalization You were admitted on:  February 23, 2018 You last received care in the:  [address] [place] [address] Road You were discharged on:  March 1, 2018 Why you were hospitalized Your primary diagnosis was:  Not on File Your diagnoses also included:  Cellulitis Of Foot, Right Follow-up Information Follow up With Details Comments Contact Info Benji Braun PA-C On 3/2/2018 10am [address] [facility] [address] [phone] 
[phone] Your Scheduled Appointments Friday March 02, 2018 10:00 AM EST New Patient with Benji Braun PA-C [address] [facility] ([facility]) Odette Momin [address] [phone]  
[phone] Friday March 02, 2018  1:00 PM EST INFUSION 30 with [facility] INFUSION NURSE 4  
[facility] OP INFUSION [facility] ([address]) [facility] [phone]  
[phone] Discharge Orders None A check selvin indicates which time of day the medication should be taken. My Medications CONTINUE taking these medications Instructions Each Dose to Equal  
 Morning Noon Evening Bedtime  
 amLODIPine 5 mg tablet Commonly known as:  Ying Pereira Your last dose was: Your next dose is: Take 1 Tab by mouth daily. 5 mg  
    
   
   
   
  
 aspirin 81 mg chewable tablet Your last dose was: Your next dose is: Take 1 Tab by mouth daily. 81 mg HYDROcodone-acetaminophen 5-325 mg per tablet Commonly known as:  Asif Bravo Your last dose was: Your next dose is: Take 1 Tab by mouth every four (4) hours as needed for Pain. Max Daily Amount: 6 Tabs.  Indications: do not fill unless keflex and bactrim are filled 1 Tab  
    
   
   
   
  
 insulin NPH/insulin regular 100 unit/mL (70-30) injection Commonly known as:  HumuLIN 70/30 U-100 Insulin Your last dose was: Your next dose is:    
   
   
 12 Units by SubCUTAneous route Before breakfast and dinner. 12 Units Insulin Syringe-Needle U-100 1 mL 28 gauge x 1/2\" Syrg Commonly known as:  INSULIN SYRINGE Your last dose was: Your next dose is:    
   
   
 1 Package by Does Not Apply route two (2) times a day. 1 Package  
    
   
   
   
  
 losartan 50 mg tablet Commonly known as:  COZAAR Your last dose was: Your next dose is: Take 1 Tab by mouth daily. 50 mg  
    
   
   
   
  
 metFORMIN 500 mg tablet Commonly known as:  GLUCOPHAGE Your last dose was: Your next dose is: Take 1 Tab by mouth two (2) times daily (with meals). 500 mg  
    
   
   
   
  
 pravastatin 10 mg tablet Commonly known as:  PRAVACHOL Your last dose was: Your next dose is: Take 1 Tab by mouth nightly. 10 mg Discharge Instructions Patient armband removed and shredded. DISCHARGE SUMMARY from Nurse PATIENT INSTRUCTIONS: 
 
 
F-face looks uneven A-arms unable to move or move unevenly S-speech slurred or non-existent T-time-call 911 as soon as signs and symptoms begin-DO NOT go Back to bed or wait to see if you get better-TIME IS BRAIN. Warning Signs of HEART ATTACK Call 911 if you have these symptoms: 
? Chest discomfort.  Most heart attacks involve discomfort in the center of the chest that lasts more than a few minutes, or that goes away and comes back. It can feel like uncomfortable pressure, squeezing, fullness, or pain. ? Discomfort in other areas of the upper body. Symptoms can include pain or discomfort in one or both arms, the back, neck, jaw, or stomach. ? Shortness of breath with or without chest discomfort. ? Other signs may include breaking out in a cold sweat, nausea, or lightheadedness. Don't wait more than five minutes to call [address]! Fast action can save your life. Calling 911 is almost always the fastest way to get lifesaving treatment. Emergency Medical Services staff can begin treatment when they arrive  up to an hour sooner than if someone gets to the hospital by car. The discharge information has been reviewed with the patient. The patient verbalized understanding. Discharge medications reviewed with the patient and appropriate educational materials and side effects teaching were provided. ___________________________________________________________________________________________________________________________________ Introducing [facility]! [facility] introduces JW Player patient portal. Now you can access parts of your medical record, email your doctor's office, and request medication refills online. 1. In your internet browser, go to [email or web address] [employer] 2. Click on the First Time User? Click Here link in the Sign In box. You will see the New Member Sign Up page. 3. Enter your JW Player Access Code exactly as it appears below. You will not need to use this code after youve completed the sign-up process. If you do not sign up before the expiration date, you must request a new code. · JW Player Access Code: [email or web address] Expires: 5/27/2018  6:55 AM 
 
4. Enter the last four digits of your Social Security Number (xxxx) and Date of Birth (mm/dd/yyyy) as indicated and click Submit. You will be taken to the next sign-up page. 5. Create a Synthonics ID. This will be your [employer] login ID and cannot be changed, so think of one that is secure and easy to remember. 6. Create a [employer] password. You can change your password at any time. 7. Enter your Password Reset Question and Answer. This can be used at a later time if you forget your password. 8. Enter your e-mail address. You will receive e-mail notification when new information is available in [address] 9. Click Sign Up. You can now view and download portions of your medical record. 10. Click the Download Summary menu link to download a portable copy of your medical information. If you have questions, please visit the Frequently Asked Questions section of the [employer] website. Remember, [employer]ics is NOT to be used for urgent needs. For medical emergencies, dial 911. Now available from your iPhone and Android! Unresulted Labs-Please follow up with your PCP about these lab tests Order Current Status CULTURE, BLOOD Preliminary result CULTURE, BLOOD Preliminary result Providers Seen During Your Hospitalization Provider Specialty Primary office phone Ynes Luz MD Emergency Medicine [phone] Zamzam Lopez MD Internal Medicine [phone] Jennifer Hill MD Internal Medicine [phone] Ingris Garza MD Internal Medicine [phone] Your Primary Care Physician (PCP) Primary Care Physician Office Phone Office Fax Yaya Blancos TOBIN [phone] You are allergic to the following No active allergies Recent Documentation Height Weight BMI Smoking Status 1.575 m 65.4 kg 26.37 kg/m2 Heavy Tobacco Smoker Emergency Contacts Name Discharge Info Relation Home Work Mobile Melven All DECLINED CAREGIVER [4] Friend [5]   [phone] Patient Belongings The following personal items are in your possession at time of discharge: Dental Appliances: None  Visual Aid: None      Home Medications: None   Jewelry: None  Clothing: Shirt, Pants, Jacket/Coat, Footwear    Other Valuables: Cell Phone Please provide this summary of care documentation to your next provider. Signatures-by signing, you are acknowledging that this After Visit Summary has been reviewed with you and you have received a copy. Patient Signature:  ____________________________________________________________ Date:  ____________________________________________________________  
  
Phyliss ee Provider Signature:  ____________________________________________________________ Date:  ____________________________________________________________

## 2018-02-23 PROBLEM — L03.115 CELLULITIS OF FOOT, RIGHT: Status: ACTIVE | Noted: 2018-02-23

## 2018-02-23 LAB
ALBUMIN SERPL-MCNC: 1.6 G/DL (ref 3.4–5)
ALBUMIN/GLOB SERPL: 0.2 {RATIO} (ref 0.8–1.7)
ALP SERPL-CCNC: 138 U/L (ref 45–117)
ALT SERPL-CCNC: 13 U/L (ref 16–61)
AMORPH CRY URNS QL MICRO: ABNORMAL
ANION GAP SERPL CALC-SCNC: 9 MMOL/L (ref 3–18)
APPEARANCE UR: CLEAR
AST SERPL-CCNC: 13 U/L (ref 15–37)
BACTERIA URNS QL MICRO: NEGATIVE /HPF
BASOPHILS # BLD: 0 K/UL (ref 0–0.06)
BASOPHILS NFR BLD: 0 % (ref 0–2)
BILIRUB SERPL-MCNC: 0.1 MG/DL (ref 0.2–1)
BILIRUB UR QL: NEGATIVE
BUN SERPL-MCNC: 33 MG/DL (ref 7–18)
BUN/CREAT SERPL: 15 (ref 12–20)
CALCIUM SERPL-MCNC: 8.5 MG/DL (ref 8.5–10.1)
CHLORIDE SERPL-SCNC: 108 MMOL/L (ref 100–108)
CO2 SERPL-SCNC: 19 MMOL/L (ref 21–32)
COLOR UR: YELLOW
CREAT SERPL-MCNC: 2.14 MG/DL (ref 0.6–1.3)
CRP SERPL-MCNC: 29.3 MG/DL (ref 0–0.3)
DIFFERENTIAL METHOD BLD: ABNORMAL
EOSINOPHIL # BLD: 0.2 K/UL (ref 0–0.4)
EOSINOPHIL NFR BLD: 1 % (ref 0–5)
EPITH CASTS URNS QL MICRO: ABNORMAL /LPF (ref 0–5)
ERYTHROCYTE [DISTWIDTH] IN BLOOD BY AUTOMATED COUNT: 15.1 % (ref 11.6–14.5)
ERYTHROCYTE [SEDIMENTATION RATE] IN BLOOD: >140 MM/HR (ref 0–15)
GLOBULIN SER CALC-MCNC: 7 G/DL (ref 2–4)
GLUCOSE BLD STRIP.AUTO-MCNC: 106 MG/DL (ref 70–110)
GLUCOSE BLD STRIP.AUTO-MCNC: 140 MG/DL (ref 70–110)
GLUCOSE BLD STRIP.AUTO-MCNC: 149 MG/DL (ref 70–110)
GLUCOSE SERPL-MCNC: 145 MG/DL (ref 74–99)
GLUCOSE UR STRIP.AUTO-MCNC: NEGATIVE MG/DL
HBA1C MFR BLD: 7.7 % (ref 4.2–5.6)
HCT VFR BLD AUTO: 24.4 % (ref 36–48)
HGB BLD-MCNC: 8 G/DL (ref 13–16)
HGB UR QL STRIP: NEGATIVE
KETONES UR QL STRIP.AUTO: NEGATIVE MG/DL
LACTATE BLD-SCNC: 1 MMOL/L (ref 0.4–2)
LEUKOCYTE ESTERASE UR QL STRIP.AUTO: NEGATIVE
LYMPHOCYTES # BLD: 1.3 K/UL (ref 0.9–3.6)
LYMPHOCYTES NFR BLD: 8 % (ref 21–52)
MCH RBC QN AUTO: 27.6 PG (ref 24–34)
MCHC RBC AUTO-ENTMCNC: 32.8 G/DL (ref 31–37)
MCV RBC AUTO: 84.1 FL (ref 74–97)
MONOCYTES # BLD: 1.1 K/UL (ref 0.05–1.2)
MONOCYTES NFR BLD: 7 % (ref 3–10)
NEUTS SEG # BLD: 13.6 K/UL (ref 1.8–8)
NEUTS SEG NFR BLD: 84 % (ref 40–73)
NITRITE UR QL STRIP.AUTO: NEGATIVE
PH UR STRIP: 5 [PH] (ref 5–8)
PLATELET # BLD AUTO: 347 K/UL (ref 135–420)
PMV BLD AUTO: 10 FL (ref 9.2–11.8)
POTASSIUM SERPL-SCNC: 3.9 MMOL/L (ref 3.5–5.5)
PROT SERPL-MCNC: 8.6 G/DL (ref 6.4–8.2)
PROT UR STRIP-MCNC: 100 MG/DL
RBC # BLD AUTO: 2.9 M/UL (ref 4.7–5.5)
RBC #/AREA URNS HPF: ABNORMAL /HPF (ref 0–5)
SODIUM SERPL-SCNC: 136 MMOL/L (ref 136–145)
SP GR UR REFRACTOMETRY: 1.02 (ref 1–1.03)
UROBILINOGEN UR QL STRIP.AUTO: 0.2 EU/DL (ref 0.2–1)
WBC # BLD AUTO: 16.1 K/UL (ref 4.6–13.2)
WBC URNS QL MICRO: ABNORMAL /HPF (ref 0–4)

## 2018-02-23 PROCEDURE — 96368 THER/DIAG CONCURRENT INF: CPT

## 2018-02-23 PROCEDURE — 74011250637 HC RX REV CODE- 250/637: Performed by: HOSPITALIST

## 2018-02-23 PROCEDURE — 81001 URINALYSIS AUTO W/SCOPE: CPT | Performed by: EMERGENCY MEDICINE

## 2018-02-23 PROCEDURE — 74011250636 HC RX REV CODE- 250/636: Performed by: EMERGENCY MEDICINE

## 2018-02-23 PROCEDURE — 77030020186 HC BOOT HL PROTCT SAGE -B

## 2018-02-23 PROCEDURE — 74011000250 HC RX REV CODE- 250: Performed by: EMERGENCY MEDICINE

## 2018-02-23 PROCEDURE — 96365 THER/PROPH/DIAG IV INF INIT: CPT

## 2018-02-23 PROCEDURE — 74011636637 HC RX REV CODE- 636/637: Performed by: INTERNAL MEDICINE

## 2018-02-23 PROCEDURE — 74011000258 HC RX REV CODE- 258: Performed by: INTERNAL MEDICINE

## 2018-02-23 PROCEDURE — 74011250636 HC RX REV CODE- 250/636: Performed by: INTERNAL MEDICINE

## 2018-02-23 PROCEDURE — 86901 BLOOD TYPING SEROLOGIC RH(D): CPT | Performed by: EMERGENCY MEDICINE

## 2018-02-23 PROCEDURE — 74011250636 HC RX REV CODE- 250/636: Performed by: HOSPITALIST

## 2018-02-23 PROCEDURE — 77030020263 HC SOL INJ SOD CL0.9% LFCR 1000ML

## 2018-02-23 PROCEDURE — 82962 GLUCOSE BLOOD TEST: CPT

## 2018-02-23 PROCEDURE — 74011000258 HC RX REV CODE- 258: Performed by: EMERGENCY MEDICINE

## 2018-02-23 PROCEDURE — 96366 THER/PROPH/DIAG IV INF ADDON: CPT

## 2018-02-23 PROCEDURE — 65270000029 HC RM PRIVATE

## 2018-02-23 PROCEDURE — 86920 COMPATIBILITY TEST SPIN: CPT | Performed by: EMERGENCY MEDICINE

## 2018-02-23 PROCEDURE — 96375 TX/PRO/DX INJ NEW DRUG ADDON: CPT

## 2018-02-23 RX ORDER — INSULIN GLARGINE 100 [IU]/ML
10 INJECTION, SOLUTION SUBCUTANEOUS DAILY
Status: DISCONTINUED | OUTPATIENT
Start: 2018-02-23 | End: 2018-03-01

## 2018-02-23 RX ORDER — INSULIN LISPRO 100 [IU]/ML
INJECTION, SOLUTION INTRAVENOUS; SUBCUTANEOUS
Status: DISCONTINUED | OUTPATIENT
Start: 2018-02-23 | End: 2018-03-01 | Stop reason: HOSPADM

## 2018-02-23 RX ORDER — AMLODIPINE BESYLATE 5 MG/1
5 TABLET ORAL DAILY
Status: DISCONTINUED | OUTPATIENT
Start: 2018-02-23 | End: 2018-03-01 | Stop reason: HOSPADM

## 2018-02-23 RX ORDER — MORPHINE SULFATE 2 MG/ML
2 INJECTION, SOLUTION INTRAMUSCULAR; INTRAVENOUS
Status: DISCONTINUED | OUTPATIENT
Start: 2018-02-23 | End: 2018-02-24 | Stop reason: SDUPTHER

## 2018-02-23 RX ORDER — HEPARIN SODIUM 5000 [USP'U]/ML
5000 INJECTION, SOLUTION INTRAVENOUS; SUBCUTANEOUS EVERY 8 HOURS
Status: DISCONTINUED | OUTPATIENT
Start: 2018-02-23 | End: 2018-03-01 | Stop reason: HOSPADM

## 2018-02-23 RX ORDER — MAGNESIUM SULFATE 100 %
4 CRYSTALS MISCELLANEOUS AS NEEDED
Status: DISCONTINUED | OUTPATIENT
Start: 2018-02-23 | End: 2018-03-01 | Stop reason: HOSPADM

## 2018-02-23 RX ORDER — DEXTROSE 50 % IN WATER (D50W) INTRAVENOUS SYRINGE
25-50 AS NEEDED
Status: DISCONTINUED | OUTPATIENT
Start: 2018-02-23 | End: 2018-03-01 | Stop reason: HOSPADM

## 2018-02-23 RX ORDER — PRAVASTATIN SODIUM 20 MG/1
10 TABLET ORAL
Status: DISCONTINUED | OUTPATIENT
Start: 2018-02-23 | End: 2018-02-28

## 2018-02-23 RX ORDER — ACETAMINOPHEN 325 MG/1
650 TABLET ORAL
Status: DISCONTINUED | OUTPATIENT
Start: 2018-02-23 | End: 2018-03-01 | Stop reason: HOSPADM

## 2018-02-23 RX ORDER — GUAIFENESIN 100 MG/5ML
81 LIQUID (ML) ORAL DAILY
Status: DISCONTINUED | OUTPATIENT
Start: 2018-02-23 | End: 2018-03-01 | Stop reason: HOSPADM

## 2018-02-23 RX ORDER — LOSARTAN POTASSIUM 50 MG/1
50 TABLET ORAL DAILY
Status: DISCONTINUED | OUTPATIENT
Start: 2018-02-23 | End: 2018-03-01 | Stop reason: HOSPADM

## 2018-02-23 RX ORDER — SODIUM CHLORIDE 9 MG/ML
100 INJECTION, SOLUTION INTRAVENOUS CONTINUOUS
Status: DISCONTINUED | OUTPATIENT
Start: 2018-02-23 | End: 2018-02-28

## 2018-02-23 RX ADMIN — HEPARIN SODIUM 5000 UNITS: 5000 INJECTION, SOLUTION INTRAVENOUS; SUBCUTANEOUS at 23:48

## 2018-02-23 RX ADMIN — FOLIC ACID: 5 INJECTION, SOLUTION INTRAMUSCULAR; INTRAVENOUS; SUBCUTANEOUS at 00:41

## 2018-02-23 RX ADMIN — KETOROLAC TROMETHAMINE 15 MG: 30 INJECTION, SOLUTION INTRAMUSCULAR at 00:13

## 2018-02-23 RX ADMIN — INSULIN GLARGINE 10 UNITS: 100 INJECTION, SOLUTION SUBCUTANEOUS at 13:20

## 2018-02-23 RX ADMIN — NALBUPHINE HYDROCHLORIDE 5 MG: 10 INJECTION, SOLUTION INTRAMUSCULAR; INTRAVENOUS; SUBCUTANEOUS at 00:12

## 2018-02-23 RX ADMIN — PRAVASTATIN SODIUM 10 MG: 20 TABLET ORAL at 22:41

## 2018-02-23 RX ADMIN — CEFEPIME HYDROCHLORIDE 2 G: 2 INJECTION, POWDER, FOR SOLUTION INTRAVENOUS at 08:51

## 2018-02-23 RX ADMIN — CEFEPIME HYDROCHLORIDE 2 G: 2 INJECTION, POWDER, FOR SOLUTION INTRAVENOUS at 00:05

## 2018-02-23 RX ADMIN — SODIUM CHLORIDE 100 ML/HR: 900 INJECTION, SOLUTION INTRAVENOUS at 05:47

## 2018-02-23 RX ADMIN — HEPARIN SODIUM 5000 UNITS: 5000 INJECTION, SOLUTION INTRAVENOUS; SUBCUTANEOUS at 15:40

## 2018-02-23 RX ADMIN — SODIUM CHLORIDE 750 MG: 900 INJECTION, SOLUTION INTRAVENOUS at 18:21

## 2018-02-23 RX ADMIN — MORPHINE SULFATE 2 MG: 2 INJECTION, SOLUTION INTRAMUSCULAR; INTRAVENOUS at 05:59

## 2018-02-23 RX ADMIN — SODIUM CHLORIDE 100 ML/HR: 900 INJECTION, SOLUTION INTRAVENOUS at 18:21

## 2018-02-23 RX ADMIN — ONDANSETRON 4 MG: 2 INJECTION INTRAMUSCULAR; INTRAVENOUS at 00:15

## 2018-02-23 RX ADMIN — CEFEPIME HYDROCHLORIDE 2 G: 2 INJECTION, POWDER, FOR SOLUTION INTRAVENOUS at 22:41

## 2018-02-23 RX ADMIN — SODIUM CHLORIDE 2000 ML: 900 INJECTION, SOLUTION INTRAVENOUS at 00:08

## 2018-02-23 RX ADMIN — MORPHINE SULFATE 2 MG: 2 INJECTION, SOLUTION INTRAMUSCULAR; INTRAVENOUS at 23:47

## 2018-02-23 RX ADMIN — MORPHINE SULFATE 2 MG: 2 INJECTION, SOLUTION INTRAMUSCULAR; INTRAVENOUS at 16:18

## 2018-02-23 RX ADMIN — SODIUM CHLORIDE 1000 ML: 900 INJECTION, SOLUTION INTRAVENOUS at 00:23

## 2018-02-23 RX ADMIN — VANCOMYCIN HYDROCHLORIDE 1000 MG: 10 INJECTION, POWDER, LYOPHILIZED, FOR SOLUTION INTRAVENOUS at 00:16

## 2018-02-23 RX ADMIN — ACETAMINOPHEN 650 MG: 325 TABLET, FILM COATED ORAL at 15:40

## 2018-02-23 NOTE — ED PROVIDER NOTES
HPI Comments: Molina Hi is a 45 y.o. Male, with h/o iddm, previous diabetic foot ulcer with c/o right ankle pain for last 3 days. No trauma. Pain severe constant throbbing worse with ambulation. No fever but has had sweats, with nausea, diarrhea. No cough, color change. Took tylenol with some relief but was not working today. Admitted last sept for diabetic foot ulcer, refused surgery and was d/c    The history is provided by the patient and medical records. A  was used. Past Medical History:   Diagnosis Date    Cellulitis     rt. foot    Diabetes ([facility]     Osteomyelitis ([facility]     rt toe    Other ill-defined conditions(799.89)        Past Surgical History:   Procedure Laterality Date    HX ORTHOPAEDIC      rt.toe         History reviewed. No pertinent family history. Social History     Social History    Marital status: SINGLE     Spouse name: N/A    Number of children: N/A    Years of education: N/A     Occupational History    Not on file. Social History Main Topics    Smoking status: Heavy Tobacco Smoker     Packs/day: 0.50    Smokeless tobacco: Never Used    Alcohol use No    Drug use: Yes     Special: Marijuana      Comment: 9/5/17    Sexual activity: Not on file     Other Topics Concern    Not on file     Social History Narrative         ALLERGIES: Review of patient's allergies indicates no known allergies. Review of Systems   Constitutional: Positive for appetite change, chills and diaphoresis. HENT: Negative for sore throat. Eyes: Negative for visual disturbance. Respiratory: Negative for cough. Cardiovascular: Negative for chest pain. Gastrointestinal: Negative for abdominal pain. Endocrine: Negative for polyuria. Genitourinary: Negative for difficulty urinating. Musculoskeletal: Positive for arthralgias, gait problem and joint swelling. Skin: Negative for wound.    Allergic/Immunologic: Positive for immunocompromised state (potentialy). Neurological: Negative for syncope. Psychiatric/Behavioral: Positive for sleep disturbance. Vitals:    02/23/18 0015 02/23/18 0030 02/23/18 0045 02/23/18 0100   BP: 132/69 108/61 111/61 103/59   Pulse:  74 73    Resp:  17 15    Temp:       SpO2: 100% 100% 100%    Weight:                Physical Exam   Constitutional: He is oriented to person, place, and time. Non-toxic appearance. He does not appear ill. He appears distressed (appears uncomfortable). HENT:   Head: Normocephalic and atraumatic. Right Ear: External ear normal.   Left Ear: External ear normal.   Nose: Nose normal.   Mouth/Throat: Oropharynx is clear and moist. No oropharyngeal exudate. Eyes: Conjunctivae are normal.   Neck: Normal range of motion. Cardiovascular: Normal rate, regular rhythm, normal heart sounds and intact distal pulses. Pulmonary/Chest: Effort normal and breath sounds normal. No respiratory distress. Abdominal: Soft. There is no tenderness. Genitourinary: Rectal exam shows guaiac negative stool (brown stool in vault). Musculoskeletal: Normal range of motion. He exhibits no edema. Feet:    Neurological: He is alert and oriented to person, place, and time. Skin: Skin is warm and dry. He is not diaphoretic. Psychiatric: His behavior is normal.   Nursing note and vitals reviewed.        [facility]      ED Course       Procedures    Vitals:  Patient Vitals for the past 12 hrs:   Temp Pulse Resp BP SpO2   02/23/18 0100 - - - 103/59 -   02/23/18 0045 - 73 15 111/61 100 %   02/23/18 0030 - 74 17 108/61 100 %   02/23/18 0015 - - - 132/69 100 %   02/22/18 2322 98 °F (36.7 °C) 77 16 90/54 100 %         Medications ordered:   Medications   vancomycin (VANCOCIN) 1000 mg in  ml infusion (1,000 mg IntraVENous New Bag 2/23/18 0016)   cefepime (MAXIPIME) 2 g in 0.9% sodium chloride (MBP/ADV) 100 mL MBP (2 g IntraVENous New Bag 2/23/18 0005)   VANCOMYCIN INFORMATION NOTE (not administered)   sodium chloride 0.9 % bolus infusion 2,000 mL (2,000 mL IntraVENous New Bag 2/23/18 0008)   nalbuphine (NUBAIN) injection 5 mg (5 mg IntraVENous Given 2/23/18 0012)   ketorolac (TORADOL) injection 15 mg (15 mg IntraVENous Given 2/23/18 0013)   ondansetron (ZOFRAN) injection 4 mg (4 mg IntraVENous Given 2/23/18 0015)   0.9% sodium chloride 1,000 mL with mvi, adult no. 4 with vit K 10 mL, thiamine 959 mg, folic acid 1 mg infusion ( IntraVENous New Bag 2/23/18 0041)         Lab findings:  Recent Results (from the past 12 hour(s))   CBC WITH AUTOMATED DIFF    Collection Time: 02/22/18 11:50 PM   Result Value Ref Range    WBC 16.1 (H) 4.6 - 13.2 K/uL    RBC 2.90 (L) 4.70 - 5.50 M/uL    HGB 8.0 (L) 13.0 - 16.0 g/dL    HCT 24.4 (L) 36.0 - 48.0 %    MCV 84.1 74.0 - 97.0 FL    MCH 27.6 24.0 - 34.0 PG    MCHC 32.8 31.0 - 37.0 g/dL    RDW 15.1 (H) 11.6 - 14.5 %    PLATELET 545 846 - 954 K/uL    MPV 10.0 9.2 - 11.8 FL    NEUTROPHILS 84 (H) 40 - 73 %    LYMPHOCYTES 8 (L) 21 - 52 %    MONOCYTES 7 3 - 10 %    EOSINOPHILS 1 0 - 5 %    BASOPHILS 0 0 - 2 %    ABS. NEUTROPHILS 13.6 (H) 1.8 - 8.0 K/UL    ABS. LYMPHOCYTES 1.3 0.9 - 3.6 K/UL    ABS. MONOCYTES 1.1 0.05 - 1.2 K/UL    ABS. EOSINOPHILS 0.2 0.0 - 0.4 K/UL    ABS. BASOPHILS 0.0 0.0 - 0.06 K/UL    DF AUTOMATED     METABOLIC PANEL, COMPREHENSIVE    Collection Time: 02/22/18 11:50 PM   Result Value Ref Range    Sodium 136 136 - 145 mmol/L    Potassium 3.9 3.5 - 5.5 mmol/L    Chloride 108 100 - 108 mmol/L    CO2 19 (L) 21 - 32 mmol/L    Anion gap 9 3.0 - 18 mmol/L    Glucose 145 (H) 74 - 99 mg/dL    BUN 33 (H) 7.0 - 18 MG/DL    Creatinine 2.14 (H) 0.6 - 1.3 MG/DL    BUN/Creatinine ratio 15 12 - 20      GFR est AA 42 (L) >60 ml/min/1.73m2    GFR est non-AA 35 (L) >60 ml/min/1.73m2    Calcium 8.5 8.5 - 10.1 MG/DL    Bilirubin, total 0.1 (L) 0.2 - 1.0 MG/DL    ALT (SGPT) 13 (L) 16 - 61 U/L    AST (SGOT) 13 (L) 15 - 37 U/L    Alk.  phosphatase 138 (H) 45 - 117 U/L    Protein, total 8.6 (H) 6.4 - 8.2 g/dL    Albumin 1.6 (L) 3.4 - 5.0 g/dL    Globulin 7.0 (H) 2.0 - 4.0 g/dL    A-G Ratio 0.2 (L) 0.8 - 1.7     SED RATE (ESR)    Collection Time: 02/22/18 11:50 PM   Result Value Ref Range    Sed rate, automated >140 (H) 0 - 15 mm/hr   C REACTIVE PROTEIN, QT    Collection Time: 02/22/18 11:50 PM   Result Value Ref Range    C-Reactive protein 29.3 (H) 0 - 0.3 mg/dL   POC LACTIC ACID    Collection Time: 02/22/18 11:54 PM   Result Value Ref Range    Lactic Acid (POC) 1.0 0.4 - 2.0 mmol/L       EKG interpretation by ED Physician:      X-Ray, CT or other radiology findings or impressions:  XR ANKLE RT MIN 3 V    (Results Pending)   no fx. Vascular calcifications    Progress notes, Consult notes or additional Procedure notes: Will need admission for suspect infection, skin, possible joint  Aggressive fluids given, abx. Pt also likely nutrionally depleted as well  D/w pt need for admission  Dw/ Dr Demetrius Resendiz on call for hospitalist who will admit    ED Critical Care Note    System at risk for life threatening failure: cardiac, renal, skin  Associated problems: infection, anemia, renal failure    Critical Care services provided: bedside management, consult, documentation  Excluded procedures (time not included in critical care): pulse ox interp    Total Critical Care Time (in minutes) 43          Reevaluation of patient:   stable    Disposition:  Diagnosis:   1. Cellulitis of right ankle    2. Anemia of acute infection    3. IDDM (insulin dependent diabetes mellitus) ([facility]    4. Acute kidney injury ([facility] [address]        Disposition: admit    Follow-up Information     None            Patient's Medications   Start Taking    No medications on file   Continue Taking    AMLODIPINE (NORVASC) 5 MG TABLET    Take 1 Tab by mouth daily. ASPIRIN 81 MG CHEWABLE TABLET    Take 1 Tab by mouth daily. HYDROCODONE-ACETAMINOPHEN (NORCO) 5-325 MG PER TABLET    Take 1 Tab by mouth every four (4) hours as needed for Pain.  Max Daily Amount: 6 Tabs. Indications: do not fill unless keflex and bactrim are filled    INSULIN NPH/INSULIN REGULAR (HUMULIN 70/30) 100 UNIT/ML (70-30) INJECTION    12 Units by SubCUTAneous route Before breakfast and dinner. INSULIN SYRINGE-NEEDLE U-100 (INSULIN SYRINGE) 1 ML 28 X 1/2\" SYRG    1 Package by Does Not Apply route two (2) times a day. LOSARTAN (COZAAR) 50 MG TABLET    Take 1 Tab by mouth daily. METFORMIN (GLUCOPHAGE) 500 MG TABLET    Take 1 Tab by mouth two (2) times daily (with meals). PRAVASTATIN (PRAVACHOL) 10 MG TABLET    Take 1 Tab by mouth nightly.    These Medications have changed    No medications on file   Stop Taking    No medications on file

## 2018-02-23 NOTE — PROGRESS NOTES
Patient evaluated. H/P reviewed and lab reviewed. Will continue current plan of care.     Juana Rangel

## 2018-02-23 NOTE — DIABETES MGMT
Diabetes Patient/Family Education Record    Factors That  May Influence Patients Ability  to Learn or  Comply with Recommendations   [x]   Language barrier - speaks Kittitian    []   Cultural needs   []   Motivation    []   Cognitive limitation    []   Physical   []   Education    []   Physiological factors   []   Hearing/vision/speaking impairment   []   Oriental orthodox beliefs    [x]   Financial factors   []  Other:   []  No factors identified at this time.      Person Instructed:   [x]   Patient   []   Family   []  Other     Preference for Learning:   [x]   Verbal   [x]   Written   []  Demonstration     Level of Comprehension & Competence:    []  Good                                      [x] Fair                                     []  Poor                             [x]  Needs Reinforcement   [x]  Teachback completed    Education Component:   [x]  Medication management, including how to administer insulin (if appropriate) and potential medication interactions    []  Nutritional management    []  Exercise   []  Signs, symptoms, and treatment of hyperglycemia and hypoglycemia   [] Prevention, recognition and treatment of hyperglycemia and hypoglycemia   []  Importance of blood glucose monitoring and how to obtain a blood glucose meter    []  Instruction on use of the blood glucose meter   [x]  Discuss the importance of HbA1C monitoring    []  Sick day guidelines   []  Proper use and disposal of lancets, needles, syringes or insulin pens (if appropriate)   []  Potential long-term complications (retinopathy, kidney disease, neuropathy, foot care)   [x] Information about whom to contact in case of emergency or for more information    [x]  Goal:  Patient/family will demonstrate understanding of Diabetes Self Management Skills by: (date) ___3/3/18____  Plan for post-discharge education or self-management support:    [] Outpatient class schedule provided            [] Patient Declined    [] Scheduled for outpatient classes (date) _______       Zeinab Harding RD, CDE   Office:  [address] [place] [address] Pager:  [phone]

## 2018-02-23 NOTE — PROGRESS NOTES
[place]   Discharge Planning/ Assessment    Reasons for Intervention: Chart reviewed. Met with pt., verified all demographics. [facility] has NO ins. [facility] hasn't been going to [facility].  : Ying Peña, friends, with whom he lives with. [facility] he is legal.  [facility] is from [place]. States has not worked in 3 months. Uses no DME. Independent with ADL's prior to admit. Has been helped numerous times for [facility] [place]; will have to check to see if will be able to assist pt again. PLAN: home when medically stable. Please try to utilize $4 [employer] drug list @ discharge, if possible, thanks. Will cont to follow for needs. Radhika Hassan RN,ext. [phone].      High Risk Criteria   [x] Yes  []No   Physician Referral  [] Yes  [x]No        Date    Nursing Referral  [] Yes  [x]No        Date    Patient/Family Request  [] Yes  [x]No        Date       Resources:    Medicare  [] Yes  [x]No   Medicaid  [] Yes  [x]No   No Resources  [x] Yes  []No   Private Insurance  [] Yes  [x]No    Name/Phone Number    Other  [] Yes  [x]No        (i.e. Workman's Comp)         Prior Services:    Prior Services  [] Yes  [x]No   Home Health  [] Yes  [x]No   [address] [facility]  [] Yes  [x]No        Number of [address]  [] Yes  [x]No       Meals on Wheels  [] Yes  [x]No   Office on Aging  [] Yes  [x]No   Transportation Services  [] Yes  [x]No   Nursing Home  [] Yes  [x]No        Nursing Home Name    [address]  [] Yes  [x]No        [address] Name    Other       Information Source:      Information obtained from  [x] Patient  [] Parent   [] [address]  [] Child  [] Spouse   [] Significant Other/Partner   [] Friend      [] EMS    [] Nursing Home Chart          [] Other:   Chart Review  [x] Yes  []No     Family/Support System:    Patient lives with  [] Alone    [] Spouse   [] Significant Other  [] Children  [] Caretaker   [] Parent  [] Sibling     [x] Other  friends      Other Support System:    Is the patient responsible for care of others  [] Yes  [x]No   Information of person caring for patient on  discharge    Managers financial affairs independently  [x] Yes  []No   If no, explain:      Status Prior to Admission:    Mental Status  [x] Awake  [x] Alert  [x] Oriented  [x] Quiet/Calm [] Lethargic/Sedated   [] Disoriented  [] Restless/Anxious  [] Combative   Personal Care  [] Dependent  [x] [address]  [] Requires Assistance   Meal Preparation Ability  [x] Independent   [] Standby Assistance   [] Minimal Assistance   [] Moderate Assistance  [] Maximum Assistance     [] Total Assistance   Chores  [x] Independent with Chores   [] [address] [facility] Resident   [] Requires Assistance   Bowel/Bladder  [x] Continent  [] Catheter  [] Incontinent  [] Ostomy Self-Care    [] Urine Diversion Self-Care  [] Maximum Assistance     [] Total Assistance   Number of Persons needed for assistance    DME at home  [] Karen Rowe  [] David Rowe   [] Commode    [] Bathroom/Grab Bars  [] Hospital Bed  [] Nebulizer  [] Oxygen           [] Raised Toilet Seat  [] Shower Chair  [] Side Rails for Bed   [] Tub Transfer Bench   []  Lis  [] Nathan Reed      [] Other:   Vendor      Treatment Presently Receiving:    Current Treatments  [] Chemotherapy  [] Dialysis  [x] Insulin  [x] IVAB [x] IVF   [] O2  [] PCA   [] PT   [] RT   [] Tube Feedings   [] Wound Care     Psychosocial Evaluation:    Verbalized Knowledge of Disease Process  [] Patient  []Family   Coping with Disease Process  [] Patient  []Family   Requires Further Counseling Coping with Disease Process  [] Patient  []Family     Identified Projected Needs:    Home Health Aid  [] Yes  [x]No   Transportation  [] Yes  [x]No   Education  [] Yes  [x]No        Specific Education     Financial Counseling  [] Yes  [x]No   Inability to Care for Self/Will Require 24 hour care  [] Yes  [x]No   Pain Management  [] Yes  [x]No   Home Infusion Therapy  [] Yes  [x]No   Oxygen Therapy  [] Yes  [x]No   DME  [] Yes  [x]No   Long Term Care Placement  [] Yes  [x]No   Rehab  [] Yes  [x]No   Physical Therapy  [] Yes  [x]No   Needs Anticipated At This Time  [] Yes  [x]No     Intra-Hospital Referral:    [address] [facility]  [] Yes  [x]No     [] Yes  [x]No   Patient Representative  [] Yes  [x]No   Staff for Teaching Needs  [] Yes  [x]No   Specialty Teaching Needs     Diabetic Educator  [] Yes  [x]No   Referral for Diabetic Educator Needed  [] Yes  [x]No  If Yes, place order for Nutritionist or Diabetic Consult     Tentative Discharge Plan:    Home with No Services  [x] Yes  []No   Home with [address]  [] Yes  [x]No        If Yes, specify type    [address]  [] Yes  [x]No        If Yes, specify type    Meals on Wheels  [] Yes  [x]No   Office of Aging  [] Yes  [x]No   NHP  [] Yes  [x]No   Return to the Nursing Home  [] Yes  [x]No   Rehab Therapy  [] Yes  [x]No   Acute Rehab  [] Yes  [x]No   Subacute Rehab  [] Yes  [x]No   Private Care  [] Yes  [x]No   Substance Abuse Referral  [] Yes  [x]No   Transportation  [] Yes  [x]No   Chore Service  [] Yes  [x]No   Inpatient Hospice  [] Yes  [x]No   OP RT  [] Yes  [x] No   OP Hemo  [] Yes  [x] No   OP PT  [] Yes  [x]No   Support Group  [] Yes  [x]No   Reach to Recovery  [] Yes  [x]No   OP Oncology Clinic  [] Yes  [x]No   Clinic Appointment  [] Yes  [x]No   DME  [] Yes  [x]No   Comments    Name of D/C Planner or  Given to Patient or Family Geraldine Samuelss   Phone Number Pager: [phone]        Extension Ext [phone]   [phone]   Date 2-   Time    If you are discharged home, whom do you designate to participate in your discharge plan and receive any information needed?      Enter name of Agata Freed and/or Valentin Troy         Phone # of [facility] [phone]        Address of [address]        Updated Patient refused to designate any           individual

## 2018-02-23 NOTE — PROGRESS NOTES
1226: Admitted from ED, [facility], [address] speaking but able to speak and understand English but to a limited degree; blue phone provided; Admission history and assessment done; wound on right foot cleaned with dermal wound cleanser; covered with 4 x 4; elevated on blue boot and pillow; IVF of NS initiated at 100/hr          : Oriented to unit, callbell use, tv use, phone use; discussed plan of care; explained NPO status as ordered; able to understand instructions given    0559: Morphine 2 mg IV given as requested for 10/10 right foot pain- see flowsheet    0640: sleeping; no behavioral indicators for pain noted at this time; on NPO status    0720: Bedside and Verbal shift change report given to Krissy Roa RN (oncoming nurse) by Kerwin Higgins RN (offgoing nurse). Report included the following information SBAR, Kardex, Intake/Output and Recent Results.

## 2018-02-23 NOTE — ACP (ADVANCE CARE PLANNING)
Patient has designated __his friends______________________ to participate in his/her discharge plan and to receive any needed information.      Name:  Trav Bansal and/or Esther Hint   Address:  [place]   [address] [facility]   Phone number: [phone]

## 2018-02-23 NOTE — PROGRESS NOTES
conducted an initial consultation and Spiritual Assessment for Myla Martinez, who is a 45 y. o.,male. Patients Primary Language is: [place]. According to the patients EMR Church Affiliation is: No Muslim. The reason the Patient came to the hospital is:   Patient Active Problem List    Diagnosis Date Noted    Cellulitis of foot, right 02/23/2018    Anemia 09/12/2017    SAVANNA (acute kidney injury) ([facility] 09/12/2017    DM (diabetes mellitus) ([facility] 09/12/2017    Iron deficiency anemia 05/21/2015    Hypertension 05/19/2015    Acute renal injury ([address] 05/18/2015    Diabetic foot ulcer ([facility] 05/16/2015    Cellulitis 11/21/2014    Cellulitis and abscess 11/21/2014    Abscess of right thigh 07/15/2014    Sepsis ([address] 07/14/2014    Abscess of bursa, left knee 06/19/2013    Cellulitis of left knee 06/15/2013    DM (diabetes mellitus), secondary uncontrolled ([facility] 06/15/2013        The  provided the following Interventions:  Initiated a relationship of care and support. Explored issues of javier, spirituality and/or Pentecostal needs while hospitalized. Listened empathically. Provided chaplaincy education. Provided information about Spiritual Care Services. Offered prayer and assurance of continued prayers on patient's behalf. Chart reviewed. The following outcomes were achieved:  Patient shared some information about their medical narrative and spiritual journey/beliefs. Patient processed feeling about current hospitalization. Patient expressed gratitude for the 's visit. Assessment:  Patient did not indicate any spiritual or Pentecostal issues which require Spiritual Care Services interventions at this time. Patient does not have any Pentecostal/cultural needs that will affect patients preferences in health care. Plan:  Chaplains will continue to follow and will provide pastoral care on an as needed or requested basis.    recommends bedside caregivers page  on duty if patient shows signs of acute spiritual or emotional distress.     [facility]   Staff [address] [place] [address]   [phone]

## 2018-02-23 NOTE — PROGRESS NOTES
Pharmacy Dosing Services: Vancomycin    Indication: Bone and joint infection    Day of therapy: 0    Other Antimicrobials (Include dose, start day & day of therapy):  Cefepime 2g IV (started )      Loading dose (date given): 1g (not a full load)  Current Maintenance dose: New start    Goal Vancomycin Level: 15-20  (Trough 15-20 for most infections, 20 for meningitis/osteomyelitis, pre-HD level ~25)    Vancomycin Level (if drawn): none at this time     Significant Cultures:    blood- pending    Renal function stable? (unstable defined as SCr increase of 0.5 mg/dL or > 50% increase from baseline, whichever is greater) (Y/N): N     CAPD, Hemodialysis or Renal Replacement Therapy (Y/N): N     Recent Labs      18   2350   CREA  2.14*   BUN  33*   WBC  16.1*     Temp (24hrs), Av °F (36.7 °C), Min:98 °F (36.7 °C), Max:98 °F (36.7 °C)    Creatinine Clearance (Creatinine Clearance (ml/min)): ~35 ml/min     Regimen assessment: New start; 1g was initially given, not a full load for the patient  Patient has SAVANNA, observe for renal function to improve and adjust the dose accordingly    Was on Cefepime (Maxipime) regimen of: 2 gm IV q8h. Changed Cefepime to 2 gm IV q12h per renal protocol    Maintenance dose: 750mg IV q24h  Next scheduled level:  prior to 1800 dose         Pharmacy will follow daily and adjust medications as appropriate for renal function and/or serum levels.     Thank you,  Avis Mayo

## 2018-02-23 NOTE — DIABETES MGMT
NUTRITIONAL ASSESSMENT GLYCEMIC CONTROL/ PLAN OF CARE     Mena Byrd           38 y.o.           2/22/2018                 1. Cellulitis of right ankle    2. Anemia of acute infection    3. IDDM (insulin dependent diabetes mellitus) ([facility]    4. Acute kidney injury ([facility]       INTERVENTIONS/PLAN:   1. Provided pt with new Aga Matrix glucometer, 50 test strips and 100 lancets. 2.  On-going diabetes education - Haitian language materials and A1C provided with contact information. 3.  Monitor po intake when diet advanced, labs, glycemic control and weights. ASSESSMENT:   Nutritional Status:  Pt is ~ 120% ideal wt with BMI (calculated): 26.4 kg/m2 (overweight classification however pt does not appear to be overweight). Pt with hypoalbuminemia as evidenced by alb of 1.6. Pt appears well nourished at this time and is known to usually consume 100% meals (currently NPO). Nutrition Diagnoses: Altered nutrition related labs due to diabetes as evidenced by A1C of 7.7%. Diabetes Management:   Pt known from previous admissions and has received diabetes education several times in the past.  Pt is self paying and has challenges with affording diabetes supplies. He was provided with a generic glucometer at prior admission 9/13/17 however he states he no longer has it and does not know what happened to it. He states he purchases his insulin at [place] and has been taking it BID as directed. Of note, at a prior admission when pt had a high A1C he revealed he was taking only his PM dose of 70/30 so as to make his insulin last longer.       Recent blood glucose:     2/23/18:  106  Within target range (non-ICU: <140; ICU<180): [x] Yes   []  No    Current Insulin regimen:   Lantus 10 units/d  Corrective lispro ACHS, normal insulin sensitivity  Home medication/insulin regimen:  Novolin NPH 12 units BID - purchases at  [facility]   HbA1c: 7.7% -ave BG has been ~ 171 mg/dL over past 3 months  Adequate glycemic control PTA:  [] Yes  [x] No, but much improved from previous admissions. SUBJECTIVE/OBJECTIVE:   Information obtained from: chart review, pt  Pt speaks Moroccan (speaks [employer]) but is able to understand [place]. Pt known from previous admissions. He reports his appetite was good PTA and he is currently complaining that his mouth is dry and wants water. He related his weight was stable PTA. Pt admitted with cellulitis right foot, ARF, anemia and PMHx of foot infection, T2DM. Diet: NPO    No data found.     Medications: [x]                Reviewed   IVF:  NS at 100 ml/hr    Most Recent POC Glucose:   Recent Labs      02/22/18   2350   GLU  145*         Labs:   Lab Results   Component Value Date/Time    Hemoglobin A1c 7.7 (H) 02/22/2018 11:50 PM     Lab Results   Component Value Date/Time    Hemoglobin A1c 7.7 (H) 02/22/2018 11:50 PM    Hemoglobin A1c 8.1 (H) 09/12/2017 07:45 PM    Hemoglobin A1c 8.4 (H) 04/23/2016 10:45 AM   5/16/15 A1C - 11.2%  Lab Results   Component Value Date/Time    Sodium 136 02/22/2018 11:50 PM    Potassium 3.9 02/22/2018 11:50 PM    Chloride 108 02/22/2018 11:50 PM    CO2 19 (L) 02/22/2018 11:50 PM    Anion gap 9 02/22/2018 11:50 PM    Glucose 145 (H) 02/22/2018 11:50 PM    BUN 33 (H) 02/22/2018 11:50 PM    Creatinine 2.14 (H) 02/22/2018 11:50 PM    Calcium 8.5 02/22/2018 11:50 PM    Magnesium 1.7 (L) 06/13/2013 07:30 AM    Phosphorus 5.0 (H) 06/21/2016 11:00 AM    Albumin 1.6 (L) 02/22/2018 11:50 PM       Anthropometrics: IBW : 56.2 kg (124 lb), % IBW (Calculated): 120.16 %, BMI (calculated): 26.4  Wt Readings from Last 1 Encounters:   02/23/18 67.6 kg (149 lb)      Ht Readings from Last 1 Encounters:   02/23/18 5' 3\" (1.6 m)     Last Weight Metrics:  Weight Loss Metrics 2/23/2018 9/14/2017 5/1/2016 3/15/2016 8/2/2015 5/19/2015 5/9/2015   Today's Wt 149 lb 118 lb 9.7 oz 176 lb 11.2 oz 165 lb 154 lb 152 lb 170 lb   BMI 26.39 kg/m2 23.16 kg/m2 34.51 kg/m2 28.31 kg/m2 26.42 kg/m2 26.08 kg/m2 33.2 kg/m2       Estimated Nutrition Needs:  1938 Kcals/day, Protein (g): 88 g Fluid (ml): 1900 ml  Based on:   [x]          Actual BW    []          ABW   []            Adjusted BW           Nutrition Interventions:  None at this time -  NPO    Goal:   Blood glucose will be within target range of  mg/dL by 2/26/18. Pt will consume >75% meals by 2/28/18.         Nutrition Monitoring and Evaluation      [x]     Monitor po intake on meal rounds  [x]     Continue inpatient monitoring and intervention  []     Other:      Nutrition Risk:  []   High     [x]  Moderate    []  Minimal/Uncompromised    Will LEON Lauren, CDE   Office:  [address] [place] [address] Pager:  [phone]

## 2018-02-23 NOTE — PROGRESS NOTES
Problem: Falls - Risk of  Goal: *Absence of Falls  Document Kurtis Fall Risk and appropriate interventions in the flowsheet.    Outcome: Progressing Towards Goal  Fall Risk Interventions:  Mobility Interventions: Bed/chair exit alarm, Patient to call before getting OOB, Strengthening exercises (ROM-active/passive)         Medication Interventions: Evaluate medications/consider consulting pharmacy, Patient to call before getting OOB    Elimination Interventions: Call light in reach, Patient to call for help with toileting needs, Toileting schedule/hourly rounds, Urinal in reach

## 2018-02-23 NOTE — H&P
[facility]  Hospitalist Division      History & Physical    Patient: Jolie Perez MRN: [id number]  CSN: [id number]    YOB: 1980  Age: 45 y.o. Sex: male    DOA: 2/22/2018 LOS:  LOS: 0 days        DOA: 2/22/2018        Assessment/Plan     Active Problems:    Cellulitis of foot, right (2/23/2018)        Plan:  1. Cellulitis of the R foot - IV Vanco , IV Cefepime , Podiatry consult , r/o Osteo - recent surg to the R foot - at [place] , need further info - no recent injury or trauma to the R foot   2. Leukocytosis - likely from underlying infn , on IV Abx , monitor   3. ARF - IVF , monitor creatinine   4. Anemia - chronic - monitor H&H   5. T2DM - ? Poorly controlled, doesn't check his sugars , says he takes insulin , check A1c , resume insulin NPH with SS insulin coverage   6. HTN - resume home meds , monitor BP   DVT Px - Heparin   FC                 HPI:     Jolie Perez is a 45 y.o. male who is a Latvian speaking man -- comes to the ER with c/o R leg swelling , R foot pain when he walks & drainage from R foot. Pt mentions the swelling & drainage started 2-3 days ago -- & the pain has been increasing. Pt also mentions that he had surg to the R foot a few months ago in [place] - need to get records, doesn't know why he had Surg but my guess is diabetic foot since he is a poorly controlled diabetic  ER eval - pt noted to have swollen leg with oozing from R foot   Will admit for IV Abx - Podiatry consult in AM     Past Medical History:   Diagnosis Date    Cellulitis     rt. foot    Diabetes ([address]     Osteomyelitis ([facility]     rt toe    Other ill-defined conditions(799.89)        Past Surgical History:   Procedure Laterality Date    HX ORTHOPAEDIC      rt.toe       History reviewed. No pertinent family history.     Social History     Social History    Marital status: SINGLE     Spouse name: N/A    Number of children: N/A    Years of education: N/A     Social History Main Topics    Smoking status: Heavy Tobacco Smoker     Packs/day: 0.50    Smokeless tobacco: Never Used    Alcohol use No    Drug use: Yes     Special: Marijuana      Comment: 9/5/17    Sexual activity: Not Asked     Other Topics Concern    None     Social History Narrative       Prior to Admission medications    Medication Sig Start Date End Date Taking? Authorizing Provider   amLODIPine (NORVASC) 5 mg tablet Take 1 Tab by mouth daily. 5/3/16   Marylee Budd, MD   HYDROcodone-acetaminophen [facility]) 5-325 mg per tablet Take 1 Tab by mouth every four (4) hours as needed for Pain. Max Daily Amount: 6 Tabs. Indications: do not fill unless keflex and bactrim are filled 3/15/16   Megan Ayers MD   aspirin 81 mg chewable tablet Take 1 Tab by mouth daily. 5/23/15   Samir Niño MD   losartan (COZAAR) 50 mg tablet Take 1 Tab by mouth daily. 5/23/15   Samir Niño MD   metFORMIN (GLUCOPHAGE) 500 mg tablet Take 1 Tab by mouth two (2) times daily (with meals). 5/23/15   Samir Niño MD   pravastatin (PRAVACHOL) 10 mg tablet Take 1 Tab by mouth nightly. 5/23/15   Samir Niño MD   insulin NPH/insulin regular (HUMULIN 70/30) 100 unit/mL (70-30) injection 12 Units by SubCUTAneous route Before breakfast and dinner. 5/23/15   Samir Niño MD   Insulin Syringe-Needle U-100 (INSULIN SYRINGE) 1 mL 28 x 1/2\" syrg 1 Package by Does Not Apply route two (2) times a day. 5/23/15   Samir Niño MD       No Known Allergies    Review of Systems  A comprehensive review of systems was negative except for that written in the History of Present Illness. Physical Exam:      Visit Vitals    BP 95/56    Pulse 63    Temp 98 °F (36.7 °C)    Resp 16    Wt 47.2 kg (104 lb)    SpO2 100%    BMI 20.31 kg/m2       Physical Exam:    Gen: In general, this is a well nourished male in no acute distress  HEENT: Sclerae nonicteric.  Oral mucous membranes moist. Dentition normal  Neck: Supple with midline trachea. CV: RRR without murmur or rub appreciated. Resp:Respirations are unlabored without use of accessory muscles. Lung fields bilaterally without wheezes or rhonchi. Abd: Soft, nontender, nondistended. Extrem: Extremities are warm, without cyanosis or clubbing. 2+ pitting pretibial edema R leg. Amputation of R foot 1st digit, Oozing of fluid from R foot   Skin: Warm, no visible rashes. Neuro: Patient is alert, oriented, and cooperative. No obvious focal defects. Moves all 4 extremities. Labs Reviewed:    Recent Results (from the past 24 hour(s))   CBC WITH AUTOMATED DIFF    Collection Time: 02/22/18 11:50 PM   Result Value Ref Range    WBC 16.1 (H) 4.6 - 13.2 K/uL    RBC 2.90 (L) 4.70 - 5.50 M/uL    HGB 8.0 (L) 13.0 - 16.0 g/dL    HCT 24.4 (L) 36.0 - 48.0 %    MCV 84.1 74.0 - 97.0 FL    MCH 27.6 24.0 - 34.0 PG    MCHC 32.8 31.0 - 37.0 g/dL    RDW 15.1 (H) 11.6 - 14.5 %    PLATELET 811 860 - 926 K/uL    MPV 10.0 9.2 - 11.8 FL    NEUTROPHILS 84 (H) 40 - 73 %    LYMPHOCYTES 8 (L) 21 - 52 %    MONOCYTES 7 3 - 10 %    EOSINOPHILS 1 0 - 5 %    BASOPHILS 0 0 - 2 %    ABS. NEUTROPHILS 13.6 (H) 1.8 - 8.0 K/UL    ABS. LYMPHOCYTES 1.3 0.9 - 3.6 K/UL    ABS. MONOCYTES 1.1 0.05 - 1.2 K/UL    ABS. EOSINOPHILS 0.2 0.0 - 0.4 K/UL    ABS. BASOPHILS 0.0 0.0 - 0.06 K/UL    DF AUTOMATED     METABOLIC PANEL, COMPREHENSIVE    Collection Time: 02/22/18 11:50 PM   Result Value Ref Range    Sodium 136 136 - 145 mmol/L    Potassium 3.9 3.5 - 5.5 mmol/L    Chloride 108 100 - 108 mmol/L    CO2 19 (L) 21 - 32 mmol/L    Anion gap 9 3.0 - 18 mmol/L    Glucose 145 (H) 74 - 99 mg/dL    BUN 33 (H) 7.0 - 18 MG/DL    Creatinine 2.14 (H) 0.6 - 1.3 MG/DL    BUN/Creatinine ratio 15 12 - 20      GFR est AA 42 (L) >60 ml/min/1.73m2    GFR est non-AA 35 (L) >60 ml/min/1.73m2    Calcium 8.5 8.5 - 10.1 MG/DL    Bilirubin, total 0.1 (L) 0.2 - 1.0 MG/DL    ALT (SGPT) 13 (L) 16 - 61 U/L    AST (SGOT) 13 (L) 15 - 37 U/L    Alk.  phosphatase 138 (H) 45 - 117 U/L    Protein, total 8.6 (H) 6.4 - 8.2 g/dL    Albumin 1.6 (L) 3.4 - 5.0 g/dL    Globulin 7.0 (H) 2.0 - 4.0 g/dL    A-G Ratio 0.2 (L) 0.8 - 1.7     SED RATE (ESR)    Collection Time: 02/22/18 11:50 PM   Result Value Ref Range    Sed rate, automated >140 (H) 0 - 15 mm/hr   C REACTIVE PROTEIN, QT    Collection Time: 02/22/18 11:50 PM   Result Value Ref Range    C-Reactive protein 29.3 (H) 0 - 0.3 mg/dL   POC LACTIC ACID    Collection Time: 02/22/18 11:54 PM   Result Value Ref Range    Lactic Acid (POC) 1.0 0.4 - 2.0 mmol/L   TYPE & SCREEN    Collection Time: 02/23/18 12:30 AM   Result Value Ref Range    Crossmatch Expiration 02/26/2018     ABO/Rh(D) B POSITIVE     Antibody screen NEG        Imaging Reviewed:    X Ray R ankle done , await imaging report           Vaibhav Han MD  2/23/2018, 3:40 AM

## 2018-02-24 LAB
ALBUMIN SERPL-MCNC: 1.1 G/DL (ref 3.4–5)
ALBUMIN/GLOB SERPL: 0.2 {RATIO} (ref 0.8–1.7)
ALP SERPL-CCNC: 130 U/L (ref 45–117)
ALT SERPL-CCNC: 8 U/L (ref 16–61)
ANION GAP SERPL CALC-SCNC: 10 MMOL/L (ref 3–18)
APTT PPP: 46.2 SEC (ref 23–36.4)
AST SERPL-CCNC: 8 U/L (ref 15–37)
BASOPHILS # BLD: 0 K/UL (ref 0–0.06)
BASOPHILS NFR BLD: 0 % (ref 0–2)
BILIRUB SERPL-MCNC: 0.1 MG/DL (ref 0.2–1)
BUN SERPL-MCNC: 16 MG/DL (ref 7–18)
BUN/CREAT SERPL: 14 (ref 12–20)
CALCIUM SERPL-MCNC: 7.4 MG/DL (ref 8.5–10.1)
CHLORIDE SERPL-SCNC: 110 MMOL/L (ref 100–108)
CHOLEST SERPL-MCNC: 93 MG/DL
CO2 SERPL-SCNC: 18 MMOL/L (ref 21–32)
CREAT SERPL-MCNC: 1.16 MG/DL (ref 0.6–1.3)
DIFFERENTIAL METHOD BLD: ABNORMAL
EOSINOPHIL # BLD: 0.1 K/UL (ref 0–0.4)
EOSINOPHIL NFR BLD: 1 % (ref 0–5)
ERYTHROCYTE [DISTWIDTH] IN BLOOD BY AUTOMATED COUNT: 15.4 % (ref 11.6–14.5)
GLOBULIN SER CALC-MCNC: 4.5 G/DL (ref 2–4)
GLUCOSE BLD STRIP.AUTO-MCNC: 104 MG/DL (ref 70–110)
GLUCOSE BLD STRIP.AUTO-MCNC: 134 MG/DL (ref 70–110)
GLUCOSE BLD STRIP.AUTO-MCNC: 157 MG/DL (ref 70–110)
GLUCOSE BLD STRIP.AUTO-MCNC: 165 MG/DL (ref 70–110)
GLUCOSE SERPL-MCNC: 98 MG/DL (ref 74–99)
HCT VFR BLD AUTO: 18.4 % (ref 36–48)
HCT VFR BLD AUTO: 19.7 % (ref 36–48)
HCT VFR BLD AUTO: 25.2 % (ref 36–48)
HDLC SERPL-MCNC: 23 MG/DL (ref 40–60)
HDLC SERPL: 4 {RATIO} (ref 0–5)
HGB BLD-MCNC: 6 G/DL (ref 13–16)
HGB BLD-MCNC: 6.4 G/DL (ref 13–16)
HGB BLD-MCNC: 8.2 G/DL (ref 13–16)
LDLC SERPL CALC-MCNC: 49 MG/DL (ref 0–100)
LIPID PROFILE,FLP: ABNORMAL
LYMPHOCYTES # BLD: 1.5 K/UL (ref 0.9–3.6)
LYMPHOCYTES NFR BLD: 12 % (ref 21–52)
MCH RBC QN AUTO: 27.6 PG (ref 24–34)
MCHC RBC AUTO-ENTMCNC: 32.6 G/DL (ref 31–37)
MCV RBC AUTO: 84.8 FL (ref 74–97)
MONOCYTES # BLD: 0.9 K/UL (ref 0.05–1.2)
MONOCYTES NFR BLD: 7 % (ref 3–10)
NEUTS SEG # BLD: 9.8 K/UL (ref 1.8–8)
NEUTS SEG NFR BLD: 80 % (ref 40–73)
PLATELET # BLD AUTO: 348 K/UL (ref 135–420)
PMV BLD AUTO: 10.2 FL (ref 9.2–11.8)
POTASSIUM SERPL-SCNC: 4 MMOL/L (ref 3.5–5.5)
PROT SERPL-MCNC: 5.6 G/DL (ref 6.4–8.2)
RBC # BLD AUTO: 2.17 M/UL (ref 4.7–5.5)
SODIUM SERPL-SCNC: 138 MMOL/L (ref 136–145)
TRIGL SERPL-MCNC: 105 MG/DL (ref ?–150)
VLDLC SERPL CALC-MCNC: 21 MG/DL
WBC # BLD AUTO: 12.3 K/UL (ref 4.6–13.2)

## 2018-02-24 PROCEDURE — 85018 HEMOGLOBIN: CPT | Performed by: INTERNAL MEDICINE

## 2018-02-24 PROCEDURE — 85025 COMPLETE CBC W/AUTO DIFF WBC: CPT | Performed by: INTERNAL MEDICINE

## 2018-02-24 PROCEDURE — 74011250637 HC RX REV CODE- 250/637: Performed by: HOSPITALIST

## 2018-02-24 PROCEDURE — 80053 COMPREHEN METABOLIC PANEL: CPT | Performed by: INTERNAL MEDICINE

## 2018-02-24 PROCEDURE — 74011636637 HC RX REV CODE- 636/637: Performed by: INTERNAL MEDICINE

## 2018-02-24 PROCEDURE — 77030020263 HC SOL INJ SOD CL0.9% LFCR 1000ML

## 2018-02-24 PROCEDURE — P9016 RBC LEUKOCYTES REDUCED: HCPCS | Performed by: EMERGENCY MEDICINE

## 2018-02-24 PROCEDURE — 36430 TRANSFUSION BLD/BLD COMPNT: CPT

## 2018-02-24 PROCEDURE — 82962 GLUCOSE BLOOD TEST: CPT

## 2018-02-24 PROCEDURE — 74011250636 HC RX REV CODE- 250/636: Performed by: HOSPITALIST

## 2018-02-24 PROCEDURE — 65270000029 HC RM PRIVATE

## 2018-02-24 PROCEDURE — 74011000258 HC RX REV CODE- 258: Performed by: INTERNAL MEDICINE

## 2018-02-24 PROCEDURE — 85730 THROMBOPLASTIN TIME PARTIAL: CPT | Performed by: INTERNAL MEDICINE

## 2018-02-24 PROCEDURE — 74011250636 HC RX REV CODE- 250/636: Performed by: INTERNAL MEDICINE

## 2018-02-24 PROCEDURE — 80061 LIPID PANEL: CPT | Performed by: INTERNAL MEDICINE

## 2018-02-24 PROCEDURE — 36415 COLL VENOUS BLD VENIPUNCTURE: CPT | Performed by: INTERNAL MEDICINE

## 2018-02-24 RX ORDER — MORPHINE SULFATE 4 MG/ML
2 INJECTION, SOLUTION INTRAMUSCULAR; INTRAVENOUS
Status: DISCONTINUED | OUTPATIENT
Start: 2018-02-24 | End: 2018-03-01 | Stop reason: HOSPADM

## 2018-02-24 RX ORDER — SODIUM CHLORIDE 9 MG/ML
250 INJECTION, SOLUTION INTRAVENOUS AS NEEDED
Status: DISCONTINUED | OUTPATIENT
Start: 2018-02-24 | End: 2018-03-01 | Stop reason: HOSPADM

## 2018-02-24 RX ADMIN — SODIUM CHLORIDE 1000 MG: 900 INJECTION, SOLUTION INTRAVENOUS at 23:39

## 2018-02-24 RX ADMIN — MORPHINE SULFATE 2 MG: 4 INJECTION, SOLUTION INTRAMUSCULAR; INTRAVENOUS at 18:48

## 2018-02-24 RX ADMIN — CEFEPIME HYDROCHLORIDE 2 G: 2 INJECTION, POWDER, FOR SOLUTION INTRAVENOUS at 18:51

## 2018-02-24 RX ADMIN — ACETAMINOPHEN 650 MG: 325 TABLET, FILM COATED ORAL at 04:01

## 2018-02-24 RX ADMIN — INSULIN LISPRO 2 UNITS: 100 INJECTION, SOLUTION INTRAVENOUS; SUBCUTANEOUS at 18:52

## 2018-02-24 RX ADMIN — PRAVASTATIN SODIUM 10 MG: 20 TABLET ORAL at 23:39

## 2018-02-24 RX ADMIN — SODIUM CHLORIDE 100 ML/HR: 900 INJECTION, SOLUTION INTRAVENOUS at 12:13

## 2018-02-24 RX ADMIN — ASPIRIN 81 MG 81 MG: 81 TABLET ORAL at 10:47

## 2018-02-24 RX ADMIN — LOSARTAN POTASSIUM 50 MG: 50 TABLET ORAL at 10:47

## 2018-02-24 RX ADMIN — INSULIN GLARGINE 10 UNITS: 100 INJECTION, SOLUTION SUBCUTANEOUS at 10:47

## 2018-02-24 RX ADMIN — SODIUM CHLORIDE 1000 MG: 900 INJECTION, SOLUTION INTRAVENOUS at 18:51

## 2018-02-24 RX ADMIN — AMLODIPINE BESYLATE 5 MG: 5 TABLET ORAL at 10:47

## 2018-02-24 RX ADMIN — INSULIN LISPRO 2 UNITS: 100 INJECTION, SOLUTION INTRAVENOUS; SUBCUTANEOUS at 23:40

## 2018-02-24 RX ADMIN — SODIUM CHLORIDE 250 ML: 900 INJECTION, SOLUTION INTRAVENOUS at 12:14

## 2018-02-24 RX ADMIN — CEFEPIME HYDROCHLORIDE 2 G: 2 INJECTION, POWDER, FOR SOLUTION INTRAVENOUS at 10:48

## 2018-02-24 NOTE — PROGRESS NOTES
Pharmacy adjusting dose for Cefepime (Maxipime) per renal protocol. Was on a regimen of: 2 gm IV q12h. Labs:   Serum Creatinine - 1.16 mg/dl  CrCl - 72 ml/min  Renal improvement    Plan:  Changed Cefepime to 2 gm IV q8h. Pharmacy to follow and will redose based on renal function changes. Thanks.

## 2018-02-24 NOTE — PROGRESS NOTES
Problem: Falls - Risk of  Goal: *Absence of Falls  Document Kurtis Fall Risk and appropriate interventions in the flowsheet.    Outcome: Progressing Towards Goal  Fall Risk Interventions:  Mobility Interventions: Patient to call before getting OOB, PT Consult for mobility concerns, PT Consult for assist device competence         Medication Interventions: Patient to call before getting OOB, Teach patient to arise slowly    Elimination Interventions: Urinal in reach, Call light in reach

## 2018-02-24 NOTE — CONSULTS
Podiatry Consult    Subjective:         Date of Consultation: February 24, 2018     Referring Physician: Dr Fuller Speaker  Mr Jailene Radford is a 45 y.o. male who was admitted through the ER with c/o painful foot with swelling of the right leg. He admits to drainage from R foot for about 2-3 days ago. He states that the pain has been increasing. He post -op from [facility]) Nov.  Admitting x-rays suggestive of osteomyelitis of the right foot calcaneal / cuboid. Patient Active Problem List    Diagnosis Date Noted    Cellulitis of foot, right 02/23/2018    Anemia 09/12/2017    SAVANNA (acute kidney injury) ([address] 09/12/2017    DM (diabetes mellitus) ([address] 09/12/2017    Iron deficiency anemia 05/21/2015    Hypertension 05/19/2015    Acute renal injury ([address] 05/18/2015    Diabetic foot ulcer ([address] 05/16/2015    Cellulitis 11/21/2014    Cellulitis and abscess 11/21/2014    Abscess of right thigh 07/15/2014    Sepsis ([address] 07/14/2014    Abscess of bursa, left knee 06/19/2013    Cellulitis of left knee 06/15/2013    DM (diabetes mellitus), secondary uncontrolled ([address] 06/15/2013     Past Medical History:   Diagnosis Date    Cellulitis     rt. foot    Diabetes ([address]     Osteomyelitis (HCC)     rt toe    Other ill-defined conditions(799.89)       Past Surgical History:   Procedure Laterality Date    HX ORTHOPAEDIC      rt.toe      History reviewed. No pertinent family history.    Social History   Substance Use Topics    Smoking status: Heavy Tobacco Smoker     Packs/day: 0.50    Smokeless tobacco: Never Used    Alcohol use No     Current Facility-Administered Medications   Medication Dose Route Frequency Provider Last Rate Last Dose    0.9% sodium chloride infusion 250 mL  250 mL IntraVENous PRN Jo Ann Collado MD 15 mL/hr at 02/24/18 1214 250 mL at 02/24/18 1214    cefepime (MAXIPIME) 2 g in 0.9% sodium chloride (MBP/ADV) 100 mL MBP  2 g IntraVENous Q8H Jo Ann Collado  mL/hr at 02/24/18 1048 2 g at 02/24/18 1048    vancomycin (VANCOCIN) 1,000 mg in 0.9% sodium chloride (MBP/ADV) 250 mL  1,000 mg IntraVENous Q12H Sharon Dickerson MD        [START ON 2/25/2018] VANCOMYCIN INFORMATION NOTE   Other ONCE Sharon Dickerson MD        amLODIPine (NORVASC) tablet 5 mg  5 mg Oral DAILY Julio Cuevas MD   5 mg at 02/24/18 1047    aspirin chewable tablet 81 mg  81 mg Oral DAILY Julio Cuevas MD   81 mg at 02/24/18 1047    losartan (COZAAR) tablet 50 mg  50 mg Oral DAILY Julio Cuevas MD   50 mg at 02/24/18 1047    pravastatin (PRAVACHOL) tablet 10 mg  10 mg Oral QHS Julio Cuevas MD   10 mg at 02/23/18 2241    0.9% sodium chloride infusion  100 mL/hr IntraVENous CONTINUOUS Julio Cuevas MD   Stopped at 02/24/18 1245    acetaminophen (TYLENOL) tablet 650 mg  650 mg Oral Q6H PRN Julio Cuevas MD   650 mg at 02/24/18 0401    morphine injection 2 mg  2 mg IntraVENous Q4H PRN Julio Cuevas MD   2 mg at 02/23/18 2347    heparin (porcine) injection 5,000 Units  5,000 Units SubCUTAneous Q8H Julio Cuevas MD   Stopped at 02/24/18 1048    Please enter patient height for dosing purposes  1 Each Other Carmela Caal MD   1 Each at 02/24/18 0900    VANCOMYCIN INFORMATION NOTE 1 Each  1 Each Other Rx Dosing/Monitoring Sharon Dickerson MD        insulin glargine (LANTUS) injection 10 Units  10 Units SubCUTAneous DAILY Sharon Dickerson MD   10 Units at 02/24/18 1047    insulin lispro (HUMALOG) injection   SubCUTAneous AC&HS Sharon Dickerson MD   Stopped at 02/23/18 1323    glucose chewable tablet 16 g  4 Tab Oral PRN Sharon Dickerson MD        glucagon (GLUCAGEN) injection 1 mg  1 mg IntraMUSCular PRN Sharon Dickerson MD        dextrose (D50W) injection syrg 12.5-25 g  25-50 mL IntraVENous PRN Sharon Dickerson MD        VANCOMYCIN INFORMATION NOTE   Other Rx Dosing/Monitoring Fernando Alicea MD          No Known Allergies     Review of Systems:  Other than the above HPI;       General: denies chronic fatigue, weight loss, fever, anemia, bruising, depression, nervousness, panic attacks  HEENT: denies ringing in ears, ear infections, dizzy spells,  sinus trouble, hoarseness, eye infections  GI: denies diarrhea, gas, bloating, heartburn, regurgitation, difficulty swallowing, painful swallowing, nausea, vomiting, constipation, abdominal pain, decreased appetite, blood in stools, black stools, jaundice, dark urine  Lungs: denies pneumonia, asthma, cough, SOB, hemoptysis  Heart: denies chest pain, irregular heart beat, ankle swelling, HTN  Skin: denies rashes, hives, allergic reaction  Urinary: denies UTI, kidney stones, decreased urine force and flow, urination at night, blood in urine, painful urination  Bones and Joints: denies arthritis, rheumatism, back pain, gout, osteoporosis  Neurologic: denies stroke, seizures, headaches, numbness, tingling    Objective:     Patient Vitals for the past 8 hrs:   BP Temp Pulse Resp SpO2   18 1259 112/69 97.9 °F (36.6 °C) 69 14 99 %   18 1219 107/67 98.1 °F (36.7 °C) 64 12 99 %   18 0809 102/63 99.3 °F (37.4 °C) 68 18 99 %     Temp (24hrs), Av.6 °F (37.6 °C), Min:97.9 °F (36.6 °C), Max:102.4 °F (39.1 °C)      Physical Exam:    Loc Rowell  is a 45 y.o. male who is pleasant, alert and oriented x3, in no apparent distress, and looks their given age. Patient is well-developed and nourished, with good attention to hygiene and body habitus. Mood and affect normal, appropriate to situation.      Upon inspection and palpation of the right foot. Right foot There is diffuse moderate edema with mild erythema and some increase in temperature.  There is noted prior amputation sites and eschar sub lateral metatarsal head. NO active weeping.      All dorsal incision well healed.       Vascular Exam:   Dorsalis Pedis 1/4 and Posterior Tibial trace RIGHT and Dorsalis Pedis 1/4 and Posterior Tibial trace LEFT .  Skin temp warm to warm. Pedal hair is absent. There is diffuse edema.     Neurological Exam:   Light touch protective sensation is absent with Loss of protective sensation. No tinel's sign of the major nerves crossing the ankle.      Musculoskeletal Exam:    Muscle tone is normal for age and his given situation. The muscle strength is - 5/5 for the flexors, extensors, inverters, and everter's.      Dermatological Exam:    Skin is of abnormal texture and turgor with atrophic skin changes noting decreased hair growth, pigmentary changes, skin texture (thin,shiney), skin color (rubor, red) . There is noted pressure poin plantar lateral to the right 5th metatarsal head with hypertrophic thicken tissue.  There is diffuse xerosis bilateral.     Radiographic Exam: (see report for details)  Suggestive osteomyelitis    Wound Presentation:  Wound Toe Left (Active)   Number of days:1189       Wound Foot Right;Plantar (Active)   Number of days:1014       Wound Foot Right (Active)   Number of days:1012       Wound Foot Right (Active)   Number of days:666       Wound Foot Right (Active)   Number of days:164       Wound Foot Right;Lateral (Active)   Number of days:1            Lab Review:   Recent Results (from the past 24 hour(s))   GLUCOSE, POC    Collection Time: 02/23/18  4:11 PM   Result Value Ref Range    Glucose (POC) 140 (H) 70 - 110 mg/dL   GLUCOSE, POC    Collection Time: 02/23/18  8:45 PM   Result Value Ref Range    Glucose (POC) 149 (H) 70 - 433 mg/dL   METABOLIC PANEL, COMPREHENSIVE    Collection Time: 02/24/18  5:40 AM   Result Value Ref Range    Sodium 138 136 - 145 mmol/L    Potassium 4.0 3.5 - 5.5 mmol/L    Chloride 110 (H) 100 - 108 mmol/L    CO2 18 (L) 21 - 32 mmol/L    Anion gap 10 3.0 - 18 mmol/L    Glucose 98 74 - 99 mg/dL    BUN 16 7.0 - 18 MG/DL    Creatinine 1.16 0.6 - 1.3 MG/DL    BUN/Creatinine ratio 14 12 - 20      GFR est AA >60 >60 ml/min/1.73m2    GFR est non-AA >60 >60 ml/min/1.73m2    Calcium 7.4 (L) 8.5 - 10.1 MG/DL    Bilirubin, total 0.1 (L) 0.2 - 1.0 MG/DL    ALT (SGPT) 8 (L) 16 - 61 U/L    AST (SGOT) 8 (L) 15 - 37 U/L    Alk. phosphatase 130 (H) 45 - 117 U/L    Protein, total 5.6 (L) 6.4 - 8.2 g/dL    Albumin 1.1 (L) 3.4 - 5.0 g/dL    Globulin 4.5 (H) 2.0 - 4.0 g/dL    A-G Ratio 0.2 (L) 0.8 - 1.7     LIPID PANEL    Collection Time: 02/24/18  5:40 AM   Result Value Ref Range    LIPID PROFILE          Cholesterol, total 93 <200 MG/DL    Triglyceride 105 <150 MG/DL    HDL Cholesterol 23 (L) 40 - 60 MG/DL    LDL, calculated 49 0 - 100 MG/DL    VLDL, calculated 21 MG/DL    CHOL/HDL Ratio 4.0 0 - 5.0     PTT    Collection Time: 02/24/18  5:40 AM   Result Value Ref Range    aPTT 46.2 (H) 23.0 - 36.4 SEC   CBC WITH AUTOMATED DIFF    Collection Time: 02/24/18  5:40 AM   Result Value Ref Range    WBC 12.3 4.6 - 13.2 K/uL    RBC 2.17 (L) 4.70 - 5.50 M/uL    HGB 6.0 (L) 13.0 - 16.0 g/dL    HCT 18.4 (L) 36.0 - 48.0 %    MCV 84.8 74.0 - 97.0 FL    MCH 27.6 24.0 - 34.0 PG    MCHC 32.6 31.0 - 37.0 g/dL    RDW 15.4 (H) 11.6 - 14.5 %    PLATELET 592 118 - 161 K/uL    MPV 10.2 9.2 - 11.8 FL    NEUTROPHILS 80 (H) 40 - 73 %    LYMPHOCYTES 12 (L) 21 - 52 %    MONOCYTES 7 3 - 10 %    EOSINOPHILS 1 0 - 5 %    BASOPHILS 0 0 - 2 %    ABS. NEUTROPHILS 9.8 (H) 1.8 - 8.0 K/UL    ABS. LYMPHOCYTES 1.5 0.9 - 3.6 K/UL    ABS. MONOCYTES 0.9 0.05 - 1.2 K/UL    ABS. EOSINOPHILS 0.1 0.0 - 0.4 K/UL    ABS.  BASOPHILS 0.0 0.0 - 0.06 K/UL    DF AUTOMATED     GLUCOSE, POC    Collection Time: 02/24/18  8:07 AM   Result Value Ref Range    Glucose (POC) 104 70 - 110 mg/dL   HGB & HCT    Collection Time: 02/24/18  9:45 AM   Result Value Ref Range    HGB 6.4 (L) 13.0 - 16.0 g/dL    HCT 19.7 (L) 36.0 - 48.0 %   GLUCOSE, POC    Collection Time: 02/24/18 11:48 AM   Result Value Ref Range    Glucose (POC) 134 (H) 70 - 110 mg/dL       Impression:     Right foot cellulitis r/o osteomyeltis    Recommendation:     Discussed with the patient via Nurse Noe Webb Lucina Burton all the above clinical and radiographic findings. Reviewed will order both MRI and overlay bone scans (TC/Indium) to r/o osteomyelitis vs Charcot. We discussed that pending further studies - if osteomyelitis may need BKA given location of the infection into the calcaneal / cuboid area. I would like to thank Dr. Cassidy Yeager and nursing staff for assistance in the team approach and care for the patient.     Signed By: Dr Amy Hanks [facility] Ankle  C) [phone]    February 24, 2018

## 2018-02-24 NOTE — ROUTINE PROCESS
Podiatrist just consulted with the patient. He told the patient that depending on the imaging results, a below the knee amputation may be necessary (depending on the extension of the infection. The doctor also stated that there is a possibility for it to be a case of Charko (small fractures that happen and mimic symptoms of osteomyelitis), commonly seen in diabetic patients.

## 2018-02-24 NOTE — PROGRESS NOTES
Pharmacy Dosing Services: Vancomycin    Indication: Bone and joint infection    Day of therapy: 1    Other Antimicrobials (Include dose, start day & day of therapy):  Cefepime 2g IV q 8h (started )      Loading dose (date given): 1g (not a full load)  Current Maintenance dose: 750 mg IV q24h    Goal Vancomycin Level: 15-20  (Trough 15-20 for most infections, 20 for meningitis/osteomyelitis, pre-HD level ~25)    Vancomycin Level (if drawn): none at this time     Significant Cultures:    blood- GPC, S Aureus     Renal function stable? (unstable defined as SCr increase of 0.5 mg/dL or > 50% increase from baseline, whichever is greater) (Y/N): stabilized     CAPD, Hemodialysis or Renal Replacement Therapy (Y/N): N     Recent Labs      18   0540  18   2350   CREA  1.16  2.14*   BUN  16  33*   WBC  12.3  16.1*     Temp (24hrs), Av.7 °F (37.6 °C), Min:98.1 °F (36.7 °C), Max:102.4 °F (39.1 °C)    Creatinine Clearance (Creatinine Clearance (ml/min)): 72 ml/min     Regimen assessment: renal function improvement, will increase dose to 1 g IV q12h  Maintenance dose: 1000 mg IV q12h  Next scheduled level:  prior to 2300 dose       Pharmacy will follow daily and adjust medications as appropriate for renal function and/or serum levels.     Thank you,  Georgia Talley, PHARMD

## 2018-02-24 NOTE — PROGRESS NOTES
Hospitalist Progress Note  Elzbieta Oliva MD  Internal medicine/ Hospitalist    Daily Progress Note: 2/24/2018 9:22 AM      Interval history / Subjective:   Mona Allan is a 45 y.o. male who is a [place] speaking man -- comes to the ER with c/o R leg swelling , R foot pain when he walks & drainage from R foot. Pt mentioned the swelling & drainage started 2-3 days ago -- & the pain has been increasing. Patient reported that he had right foot surgery few months ago but not able to specify why. Review of records everywhere indicates h/o osteomyelitis of ankle,diabetes,gas gangrene,hypertension,anemia. In the ER,xray of rt ankle c/w rt calcaneus,  cuboid, and base of the fifth metatarsal osteomyelitis. Pt is on cefepime,vanc. Small open wound is growing MRSA. Podiatry consulted.     Current Facility-Administered Medications   Medication Dose Route Frequency    0.9% sodium chloride infusion 250 mL  250 mL IntraVENous PRN    amLODIPine (NORVASC) tablet 5 mg  5 mg Oral DAILY    aspirin chewable tablet 81 mg  81 mg Oral DAILY    losartan (COZAAR) tablet 50 mg  50 mg Oral DAILY    pravastatin (PRAVACHOL) tablet 10 mg  10 mg Oral QHS    0.9% sodium chloride infusion  100 mL/hr IntraVENous CONTINUOUS    acetaminophen (TYLENOL) tablet 650 mg  650 mg Oral Q6H PRN    morphine injection 2 mg  2 mg IntraVENous Q4H PRN    heparin (porcine) injection 5,000 Units  5,000 Units SubCUTAneous Q8H    Please enter patient height for dosing purposes  1 Each Other DAILY    vancomycin (VANCOCIN) 750 mg in 0.9% sodium chloride (MBP/ADV) 250 mL ADV  750 mg IntraVENous Q24H    [START ON 2/25/2018] VANCOMYCIN INFORMATION NOTE   Other ONCE    VANCOMYCIN INFORMATION NOTE 1 Each  1 Each Other Rx Dosing/Monitoring    cefepime (MAXIPIME) 2 g in 0.9% sodium chloride (MBP/ADV) 100 mL MBP  2 g IntraVENous Q12H    insulin glargine (LANTUS) injection 10 Units  10 Units SubCUTAneous DAILY    insulin lispro (HUMALOG) injection SubCUTAneous AC&HS    glucose chewable tablet 16 g  4 Tab Oral PRN    glucagon (GLUCAGEN) injection 1 mg  1 mg IntraMUSCular PRN    dextrose (D50W) injection syrg 12.5-25 g  25-50 mL IntraVENous PRN    VANCOMYCIN INFORMATION NOTE   Other Rx Dosing/Monitoring        Review of Systems  Feeling fine    Objective:     Visit Vitals    /63    Pulse 68    Temp 99.3 °F (37.4 °C)    Resp 18    Ht 5' 4\" (1.626 m)    Wt 69.4 kg (153 lb)    SpO2 99%    BMI 26.26 kg/m2      O2 Device: Room air    Temp (24hrs), Av.7 °F (37.6 °C), Min:98.1 °F (36.7 °C), Max:102.4 °F (39.1 °C)      701 -  1900  In: -   Out: 700 [Urine:700]  1901 -  0700  In: 2653 [P.O.:840; I.V.:2720]  Out: 26 [Urine:4260]  P/E  NAD,watching TV  Heent:perrla,at/nc,mouth moist.  Lungs ctab  Heart s1s2 nl,no m/g  Abdm:soft,not tender,bs present. Extr:deformity rt foot,warm,there is a small open wound lateral aspect of rt foot,no purulent drainage. Neuro:aaox3    Data Review    Recent Results (from the past 12 hour(s))   METABOLIC PANEL, COMPREHENSIVE    Collection Time: 18  5:40 AM   Result Value Ref Range    Sodium 138 136 - 145 mmol/L    Potassium 4.0 3.5 - 5.5 mmol/L    Chloride 110 (H) 100 - 108 mmol/L    CO2 18 (L) 21 - 32 mmol/L    Anion gap 10 3.0 - 18 mmol/L    Glucose 98 74 - 99 mg/dL    BUN 16 7.0 - 18 MG/DL    Creatinine 1.16 0.6 - 1.3 MG/DL    BUN/Creatinine ratio 14 12 - 20      GFR est AA >60 >60 ml/min/1.73m2    GFR est non-AA >60 >60 ml/min/1.73m2    Calcium 7.4 (L) 8.5 - 10.1 MG/DL    Bilirubin, total 0.1 (L) 0.2 - 1.0 MG/DL    ALT (SGPT) 8 (L) 16 - 61 U/L    AST (SGOT) 8 (L) 15 - 37 U/L    Alk.  phosphatase 130 (H) 45 - 117 U/L    Protein, total 5.6 (L) 6.4 - 8.2 g/dL    Albumin 1.1 (L) 3.4 - 5.0 g/dL    Globulin 4.5 (H) 2.0 - 4.0 g/dL    A-G Ratio 0.2 (L) 0.8 - 1.7     LIPID PANEL    Collection Time: 18  5:40 AM   Result Value Ref Range    LIPID PROFILE          Cholesterol, total 93 <200 MG/DL    Triglyceride 105 <150 MG/DL    HDL Cholesterol 23 (L) 40 - 60 MG/DL    LDL, calculated 49 0 - 100 MG/DL    VLDL, calculated 21 MG/DL    CHOL/HDL Ratio 4.0 0 - 5.0     PTT    Collection Time: 02/24/18  5:40 AM   Result Value Ref Range    aPTT 46.2 (H) 23.0 - 36.4 SEC   CBC WITH AUTOMATED DIFF    Collection Time: 02/24/18  5:40 AM   Result Value Ref Range    WBC 12.3 4.6 - 13.2 K/uL    RBC 2.17 (L) 4.70 - 5.50 M/uL    HGB 6.0 (L) 13.0 - 16.0 g/dL    HCT 18.4 (L) 36.0 - 48.0 %    MCV 84.8 74.0 - 97.0 FL    MCH 27.6 24.0 - 34.0 PG    MCHC 32.6 31.0 - 37.0 g/dL    RDW 15.4 (H) 11.6 - 14.5 %    PLATELET 317 929 - 192 K/uL    MPV 10.2 9.2 - 11.8 FL    NEUTROPHILS 80 (H) 40 - 73 %    LYMPHOCYTES 12 (L) 21 - 52 %    MONOCYTES 7 3 - 10 %    EOSINOPHILS 1 0 - 5 %    BASOPHILS 0 0 - 2 %    ABS. NEUTROPHILS 9.8 (H) 1.8 - 8.0 K/UL    ABS. LYMPHOCYTES 1.5 0.9 - 3.6 K/UL    ABS. MONOCYTES 0.9 0.05 - 1.2 K/UL    ABS. EOSINOPHILS 0.1 0.0 - 0.4 K/UL    ABS. BASOPHILS 0.0 0.0 - 0.06 K/UL    DF AUTOMATED     GLUCOSE, POC    Collection Time: 02/24/18  8:07 AM   Result Value Ref Range    Glucose (POC) 104 70 - 110 mg/dL         Assessment/Plan:     Active Problems:    Cellulitis of foot, right (2/23/2018)    Bacteremia    Osteomyelitis rt ankle    Diabetes    Anemia    Leukocytosis    SAVANNA    DM2    HTN    Care Plan  1-Sepsis:elevated wbc,bacteremia,elevated temp    -Blood cx c/w Staph aureus. Patient has h/o wound cx with MRSA rt foot    -Currently on vanc and cefepime.    -On iv fluid    -Will wait for sensitivity     2-Bacteremia with staph aureus    -Sensitivity pending    -Continue current atbx for now    -Will involve ID    3-Osteomyelitis rt ankle/cellulitis/Small open wound    -Xray c/w progressive erosive changes involving the anterior aspect of the calcaneus,  cuboid, and base of the fifth metatarsal compatible with sequela of osteomyelitis     -Podiatry consulted.     -Pt underwent right foot surgery few month ago    4-Anemia:Hgb 6.0 today    -Pt with h/o anemia likely due to chronic disease. Iron study,B12,Folate from previous admission reviewed    -Will transfuse 2 units    5-HTN    -Controlled    -On norvax.     6-DM2    -Myxu7r=5.7    -On ssi and lantus    -Blood glucose controlled    -Diabetic diet    7-SAVANNA:Creatinine 2.14 on admission    -Resolved with iv fluid    -Creat today 1.16    DVT prophylaxis:sc heparin  Full code  Disposition:tbd

## 2018-02-24 NOTE — ROUTINE PROCESS
[address] [facility] with Laboratory called, notified this nurse pt had positive blood cultures in anaerobic bottle - gram positive cocci in pairs and clusters. 200 - Notified primary nurse Jim James RN.    [phone] - Paged Dr Andrea Marie, awaiting return call back.

## 2018-02-24 NOTE — PROGRESS NOTES
1900 -- Bedside, Verbal and Written shift change report given to [address] (oncoming nurse) by Luisa Davison (offgoing nurse). Report included the following information SBAR, Kardex, Intake/Output, MAR and Recent Results.  Skin assessment completed. 1934 -- Critical lab value called in by Swapna CUEVAS, gram positive cocci pairs and clusters. MD paged    2009 -- MD returned call no new orders. 2010 -- Contact precautions initiated.       2240 -- PM medications administered, pt tolerated with ease, will continue to monitor. 2345 -- PRN pain medication administered,  pt tolerated with ease, will continue to monitor.     0000 -- Shift reassessment, pt condition unchanged, will continue to monitor. 0400 -- PRN fever reducer medication administered 102.4,  pt tolerated with ease, will continue to monitor.     0430 --  Shift reassessment, pt condition unchanged, will continue to monitor. Temp reassessed WDL, 99.5.     0700 -- Bedside, Verbal and Written shift change report given to (oncoming nurse) by Marcos maki). Report included the following information SBAR, Kardex, Intake/Output, MAR and Recent Results. Skin assessment completed.

## 2018-02-25 LAB
ALBUMIN SERPL-MCNC: 1.2 G/DL (ref 3.4–5)
ALBUMIN/GLOB SERPL: 0.2 {RATIO} (ref 0.8–1.7)
ALP SERPL-CCNC: 153 U/L (ref 45–117)
ALT SERPL-CCNC: 11 U/L (ref 16–61)
ANION GAP SERPL CALC-SCNC: 8 MMOL/L (ref 3–18)
AST SERPL-CCNC: 14 U/L (ref 15–37)
BACTERIA SPEC CULT: ABNORMAL
BACTERIA SPEC CULT: ABNORMAL
BASOPHILS # BLD: 0 K/UL (ref 0–0.06)
BASOPHILS NFR BLD: 0 % (ref 0–2)
BILIRUB SERPL-MCNC: 0.3 MG/DL (ref 0.2–1)
BUN SERPL-MCNC: 14 MG/DL (ref 7–18)
BUN/CREAT SERPL: 13 (ref 12–20)
CALCIUM SERPL-MCNC: 7.7 MG/DL (ref 8.5–10.1)
CHLORIDE SERPL-SCNC: 111 MMOL/L (ref 100–108)
CO2 SERPL-SCNC: 20 MMOL/L (ref 21–32)
CREAT SERPL-MCNC: 1.12 MG/DL (ref 0.6–1.3)
DATE LAST DOSE: NORMAL
DIFFERENTIAL METHOD BLD: ABNORMAL
EOSINOPHIL # BLD: 0.2 K/UL (ref 0–0.4)
EOSINOPHIL NFR BLD: 2 % (ref 0–5)
ERYTHROCYTE [DISTWIDTH] IN BLOOD BY AUTOMATED COUNT: 15 % (ref 11.6–14.5)
GLOBULIN SER CALC-MCNC: 5 G/DL (ref 2–4)
GLUCOSE BLD STRIP.AUTO-MCNC: 125 MG/DL (ref 70–110)
GLUCOSE BLD STRIP.AUTO-MCNC: 152 MG/DL (ref 70–110)
GLUCOSE BLD STRIP.AUTO-MCNC: 153 MG/DL (ref 70–110)
GLUCOSE BLD STRIP.AUTO-MCNC: 89 MG/DL (ref 70–110)
GLUCOSE SERPL-MCNC: 79 MG/DL (ref 74–99)
GRAM STN SPEC: ABNORMAL
GRAM STN SPEC: ABNORMAL
HCT VFR BLD AUTO: 23.1 % (ref 36–48)
HGB BLD-MCNC: 7.6 G/DL (ref 13–16)
LYMPHOCYTES # BLD: 1.6 K/UL (ref 0.9–3.6)
LYMPHOCYTES NFR BLD: 13 % (ref 21–52)
MCH RBC QN AUTO: 27.7 PG (ref 24–34)
MCHC RBC AUTO-ENTMCNC: 32.9 G/DL (ref 31–37)
MCV RBC AUTO: 84.3 FL (ref 74–97)
MONOCYTES # BLD: 1 K/UL (ref 0.05–1.2)
MONOCYTES NFR BLD: 8 % (ref 3–10)
NEUTS SEG # BLD: 9.7 K/UL (ref 1.8–8)
NEUTS SEG NFR BLD: 77 % (ref 40–73)
PLATELET # BLD AUTO: 370 K/UL (ref 135–420)
PMV BLD AUTO: 9.6 FL (ref 9.2–11.8)
POTASSIUM SERPL-SCNC: 4.4 MMOL/L (ref 3.5–5.5)
PROT SERPL-MCNC: 6.2 G/DL (ref 6.4–8.2)
RBC # BLD AUTO: 2.74 M/UL (ref 4.7–5.5)
REPORTED DOSE,DOSE: NORMAL UNITS
REPORTED DOSE/TIME,TMG: 1100
SERVICE CMNT-IMP: ABNORMAL
SODIUM SERPL-SCNC: 139 MMOL/L (ref 136–145)
VANCOMYCIN TROUGH SERPL-MCNC: 18.7 UG/ML (ref 10–20)
WBC # BLD AUTO: 12.5 K/UL (ref 4.6–13.2)

## 2018-02-25 PROCEDURE — 74011000258 HC RX REV CODE- 258: Performed by: INTERNAL MEDICINE

## 2018-02-25 PROCEDURE — 74011250636 HC RX REV CODE- 250/636: Performed by: INTERNAL MEDICINE

## 2018-02-25 PROCEDURE — 65270000029 HC RM PRIVATE

## 2018-02-25 PROCEDURE — 80202 ASSAY OF VANCOMYCIN: CPT | Performed by: INTERNAL MEDICINE

## 2018-02-25 PROCEDURE — 82272 OCCULT BLD FECES 1-3 TESTS: CPT | Performed by: INTERNAL MEDICINE

## 2018-02-25 PROCEDURE — 74011250637 HC RX REV CODE- 250/637: Performed by: HOSPITALIST

## 2018-02-25 PROCEDURE — 80053 COMPREHEN METABOLIC PANEL: CPT | Performed by: INTERNAL MEDICINE

## 2018-02-25 PROCEDURE — 74011636637 HC RX REV CODE- 636/637: Performed by: INTERNAL MEDICINE

## 2018-02-25 PROCEDURE — 36415 COLL VENOUS BLD VENIPUNCTURE: CPT | Performed by: INTERNAL MEDICINE

## 2018-02-25 PROCEDURE — 74011250636 HC RX REV CODE- 250/636: Performed by: HOSPITALIST

## 2018-02-25 PROCEDURE — 82962 GLUCOSE BLOOD TEST: CPT

## 2018-02-25 PROCEDURE — 85025 COMPLETE CBC W/AUTO DIFF WBC: CPT | Performed by: INTERNAL MEDICINE

## 2018-02-25 PROCEDURE — 77030020263 HC SOL INJ SOD CL0.9% LFCR 1000ML

## 2018-02-25 PROCEDURE — 77030011256 HC DRSG MEPILEX <16IN NO BORD MOLN -A

## 2018-02-25 RX ADMIN — CEFEPIME HYDROCHLORIDE 2 G: 2 INJECTION, POWDER, FOR SOLUTION INTRAVENOUS at 18:32

## 2018-02-25 RX ADMIN — HEPARIN SODIUM 5000 UNITS: 5000 INJECTION, SOLUTION INTRAVENOUS; SUBCUTANEOUS at 18:33

## 2018-02-25 RX ADMIN — MORPHINE SULFATE 2 MG: 4 INJECTION, SOLUTION INTRAMUSCULAR; INTRAVENOUS at 20:59

## 2018-02-25 RX ADMIN — INSULIN LISPRO 2 UNITS: 100 INJECTION, SOLUTION INTRAVENOUS; SUBCUTANEOUS at 18:33

## 2018-02-25 RX ADMIN — SODIUM CHLORIDE 100 ML/HR: 900 INJECTION, SOLUTION INTRAVENOUS at 22:51

## 2018-02-25 RX ADMIN — SODIUM CHLORIDE 100 ML/HR: 900 INJECTION, SOLUTION INTRAVENOUS at 09:11

## 2018-02-25 RX ADMIN — LOSARTAN POTASSIUM 50 MG: 50 TABLET ORAL at 09:13

## 2018-02-25 RX ADMIN — INSULIN LISPRO 2 UNITS: 100 INJECTION, SOLUTION INTRAVENOUS; SUBCUTANEOUS at 12:49

## 2018-02-25 RX ADMIN — CEFEPIME HYDROCHLORIDE 2 G: 2 INJECTION, POWDER, FOR SOLUTION INTRAVENOUS at 09:11

## 2018-02-25 RX ADMIN — HEPARIN SODIUM 5000 UNITS: 5000 INJECTION, SOLUTION INTRAVENOUS; SUBCUTANEOUS at 09:13

## 2018-02-25 RX ADMIN — SODIUM CHLORIDE 1000 MG: 900 INJECTION, SOLUTION INTRAVENOUS at 12:49

## 2018-02-25 RX ADMIN — INSULIN GLARGINE 10 UNITS: 100 INJECTION, SOLUTION SUBCUTANEOUS at 09:12

## 2018-02-25 RX ADMIN — ASPIRIN 81 MG 81 MG: 81 TABLET ORAL at 09:13

## 2018-02-25 RX ADMIN — CEFEPIME HYDROCHLORIDE 2 G: 2 INJECTION, POWDER, FOR SOLUTION INTRAVENOUS at 02:38

## 2018-02-25 RX ADMIN — AMLODIPINE BESYLATE 5 MG: 5 TABLET ORAL at 09:13

## 2018-02-25 RX ADMIN — PRAVASTATIN SODIUM 10 MG: 20 TABLET ORAL at 22:50

## 2018-02-25 RX ADMIN — MORPHINE SULFATE 2 MG: 4 INJECTION, SOLUTION INTRAMUSCULAR; INTRAVENOUS at 09:30

## 2018-02-25 NOTE — PROGRESS NOTES
Problem: Falls - Risk of  Goal: *Absence of Falls  Document Kurtis Fall Risk and appropriate interventions in the flowsheet.    Outcome: Progressing Towards Goal  Fall Risk Interventions:  Mobility Interventions: Patient to call before getting OOB         Medication Interventions: Patient to call before getting OOB    Elimination Interventions: Urinal in reach

## 2018-02-25 NOTE — PROGRESS NOTES
1900 -- Bedside, Verbal and Written shift change report given to [address] (oncoming nurse) by Yonis Bay (offgoing nurse). Report included the following information SBAR, Kardex, Intake/Output, MAR and Recent Results.  Skin assessment completed. 2020 -- Post transfusion labs, WDL.     2340 -- PM medications administered, pt tolerated with ease, will continue to monitor.      0015 -- Shift reassessment, pt condition unchanged, will continue to monitor.      0400 --  Shift reassessment, pt condition unchanged, will continue to monitor.       0700 -- Bedside, Verbal and Written shift change report given to Yonis Bay  (oncoming nurse) by Andrew Holley nurse). Report included the following information SBAR, Kardex, Intake/Output, MAR and Recent Results. Skin assessment completed.

## 2018-02-25 NOTE — ROUTINE PROCESS
Bedside and Verbal shift change report given to Sae Galo RN (oncoming nurse) by Margi De Leon RN (offgoing nurse). Report included the following information SBAR, Recent Results and Med Rec Status. 18 - Spoke to MRI technician and let her know that screening was complete and signed, but e-sign was not available. Hard copy in the chart. MRI tech said that there were 6 STAT exams ahead of this one. That if she did not make it to him today, she would be the first one tomorrow morning.

## 2018-02-25 NOTE — PROGRESS NOTES
Hospitalist Progress Note  Mary Peoples MD  Internal medicine/ Hospitalist    Daily Progress Note: 2/25/2018 9:22 AM      Interval history / Subjective:   Louis Armstrong is a 45 y.o. male who is a Israeli speaking man -- comes to the ER with c/o R leg swelling , R foot pain when he walks & drainage from R foot. Pt mentioned the swelling & drainage started 2-3 days ago -- & the pain has been increasing. Patient reported that he had right foot surgery few months ago but not able to specify why. Review of records everywhere indicates h/o osteomyelitis of ankle,diabetes,gas gangrene,hypertension,anemia. In the ER,xray of rt ankle c/w rt calcaneus,  cuboid, and base of the fifth metatarsal osteomyelitis. Pt is on cefepime,vanc. Blood cx c/w MRSA. Podiatry consulted.     Current Facility-Administered Medications   Medication Dose Route Frequency    0.9% sodium chloride infusion 250 mL  250 mL IntraVENous PRN    cefepime (MAXIPIME) 2 g in 0.9% sodium chloride (MBP/ADV) 100 mL MBP  2 g IntraVENous Q8H    vancomycin (VANCOCIN) 1,000 mg in 0.9% sodium chloride (MBP/ADV) 250 mL  1,000 mg IntraVENous Q12H    VANCOMYCIN INFORMATION NOTE   Other ONCE    morphine injection 2 mg  2 mg IntraVENous Q4H PRN    amLODIPine (NORVASC) tablet 5 mg  5 mg Oral DAILY    aspirin chewable tablet 81 mg  81 mg Oral DAILY    losartan (COZAAR) tablet 50 mg  50 mg Oral DAILY    pravastatin (PRAVACHOL) tablet 10 mg  10 mg Oral QHS    0.9% sodium chloride infusion  100 mL/hr IntraVENous CONTINUOUS    acetaminophen (TYLENOL) tablet 650 mg  650 mg Oral Q6H PRN    heparin (porcine) injection 5,000 Units  5,000 Units SubCUTAneous Q8H    Please enter patient height for dosing purposes  1 Each Other DAILY    VANCOMYCIN INFORMATION NOTE 1 Each  1 Each Other Rx Dosing/Monitoring    insulin glargine (LANTUS) injection 10 Units  10 Units SubCUTAneous DAILY    insulin lispro (HUMALOG) injection   SubCUTAneous AC&HS    glucose chewable tablet 16 g  4 Tab Oral PRN    glucagon (GLUCAGEN) injection 1 mg  1 mg IntraMUSCular PRN    dextrose (D50W) injection syrg 12.5-25 g  25-50 mL IntraVENous PRN    VANCOMYCIN INFORMATION NOTE   Other Rx Dosing/Monitoring        Review of Systems  Feeling fine    Objective:     Visit Vitals    /78    Pulse 77    Temp 99.3 °F (37.4 °C)    Resp 20    Ht 5' 4\" (1.626 m)    Wt 69.4 kg (153 lb)    SpO2 99%    BMI 26.26 kg/m2      O2 Device: Room air    Temp (24hrs), Av.8 °F (37.1 °C), Min:97.9 °F (36.6 °C), Max:99.7 °F (37.6 °C)          1901 -  0700  In: 5281.5 [P.O.:720; I.V.:3941.5]  Out: 5373 [Urine:4335]  P/E  NAD,watching TV  Heent:perrla,at/nc,mouth moist.  Lungs ctab  Heart s1s2 nl,no m/g  Abdm:soft,not tender,bs present. Extr:deformity rt foot,warm,there is a small open wound lateral aspect of rt foot,no purulent drainage. Neuro:aaox3    Data Review    Recent Results (from the past 12 hour(s))   CBC WITH AUTOMATED DIFF    Collection Time: 18  5:53 AM   Result Value Ref Range    WBC 12.5 4.6 - 13.2 K/uL    RBC 2.74 (L) 4.70 - 5.50 M/uL    HGB 7.6 (L) 13.0 - 16.0 g/dL    HCT 23.1 (L) 36.0 - 48.0 %    MCV 84.3 74.0 - 97.0 FL    MCH 27.7 24.0 - 34.0 PG    MCHC 32.9 31.0 - 37.0 g/dL    RDW 15.0 (H) 11.6 - 14.5 %    PLATELET 924 330 - 264 K/uL    MPV 9.6 9.2 - 11.8 FL    NEUTROPHILS 77 (H) 40 - 73 %    LYMPHOCYTES 13 (L) 21 - 52 %    MONOCYTES 8 3 - 10 %    EOSINOPHILS 2 0 - 5 %    BASOPHILS 0 0 - 2 %    ABS. NEUTROPHILS 9.7 (H) 1.8 - 8.0 K/UL    ABS. LYMPHOCYTES 1.6 0.9 - 3.6 K/UL    ABS. MONOCYTES 1.0 0.05 - 1.2 K/UL    ABS. EOSINOPHILS 0.2 0.0 - 0.4 K/UL    ABS.  BASOPHILS 0.0 0.0 - 0.06 K/UL    DF AUTOMATED     METABOLIC PANEL, COMPREHENSIVE    Collection Time: 18  5:53 AM   Result Value Ref Range    Sodium 139 136 - 145 mmol/L    Potassium 4.4 3.5 - 5.5 mmol/L    Chloride 111 (H) 100 - 108 mmol/L    CO2 20 (L) 21 - 32 mmol/L    Anion gap 8 3.0 - 18 mmol/L    Glucose 79 74 - 99 mg/dL    BUN 14 7.0 - 18 MG/DL    Creatinine 1.12 0.6 - 1.3 MG/DL    BUN/Creatinine ratio 13 12 - 20      GFR est AA >60 >60 ml/min/1.73m2    GFR est non-AA >60 >60 ml/min/1.73m2    Calcium 7.7 (L) 8.5 - 10.1 MG/DL    Bilirubin, total 0.3 0.2 - 1.0 MG/DL    ALT (SGPT) 11 (L) 16 - 61 U/L    AST (SGOT) 14 (L) 15 - 37 U/L    Alk. phosphatase 153 (H) 45 - 117 U/L    Protein, total 6.2 (L) 6.4 - 8.2 g/dL    Albumin 1.2 (L) 3.4 - 5.0 g/dL    Globulin 5.0 (H) 2.0 - 4.0 g/dL    A-G Ratio 0.2 (L) 0.8 - 1.7     GLUCOSE, POC    Collection Time: 02/25/18  7:36 AM   Result Value Ref Range    Glucose (POC) 89 70 - 110 mg/dL         Assessment/Plan:     Active Problems:    Cellulitis of foot, right (2/23/2018)    Bacteremia    Osteomyelitis rt ankle    Diabetes    Anemia    Leukocytosis    SAVANNA    DM2    HTN    Care Plan  1-Sepsis:elevated wbc,bacteremia,elevated temp    -Blood cx c/w MRSA. Patient has h/o wound cx with MRSA rt foot    -Continue vanc. D/ccefepime today 2/15    -On iv fluid     2-Bacteremia MRSA    -Continue vanc    -Consult ID in am    3-Osteomyelitis rt ankle/cellulitis/Small open wound    -Xray c/w progressive erosive changes involving the anterior aspect of the calcaneus,  cuboid, and base of the fifth metatarsal compatible with sequela of osteomyelitis     -Podiatry consulted. -Pt underwent right foot surgery few month ago    -MRI of foot pending. May need amputation if OM confirmed. 4-Anemia:Hgb 6.0 2/24    -Pt with h/o anemia likely due to chronic disease. Iron study,B12,Folate from previous admission reviewed    -Transfused 2 units on 2/24.    -Hgb today 2/25 7. 6. If further drop,consider GI consult since pt has h/o GI bleed    -Will order stool for occult blood    5-HTN    -Controlled    -On norvax.     6-DM2    -Iylh0c=0.7    -On ssi and lantus    -Blood glucose controlled    -Diabetic diet    7-SAVANNA:Creatinine 2.14 on admission    -Resolved with iv fluid    -Creat today 1.12    DVT prophylaxis:sc heparin  Full code  Disposition:tbd

## 2018-02-25 NOTE — PROGRESS NOTES
Problem: Falls - Risk of  Goal: *Absence of Falls  Document Kurtis Fall Risk and appropriate interventions in the flowsheet.    Outcome: Progressing Towards Goal  Fall Risk Interventions:  Mobility Interventions: Patient to call before getting OOB, PT Consult for mobility concerns, PT Consult for assist device competence         Medication Interventions: Patient to call before getting OOB, Teach patient to arise slowly    Elimination Interventions: Urinal in reach

## 2018-02-26 ENCOUNTER — APPOINTMENT (OUTPATIENT)
Dept: MRI IMAGING | Age: 38
DRG: 872 | End: 2018-02-26
Attending: PODIATRIST
Payer: SELF-PAY

## 2018-02-26 LAB
ABO + RH BLD: NORMAL
ALBUMIN SERPL-MCNC: 1.2 G/DL (ref 3.4–5)
ALBUMIN/GLOB SERPL: 0.2 {RATIO} (ref 0.8–1.7)
ALP SERPL-CCNC: 171 U/L (ref 45–117)
ALT SERPL-CCNC: 21 U/L (ref 16–61)
ANION GAP SERPL CALC-SCNC: 10 MMOL/L (ref 3–18)
AST SERPL-CCNC: 21 U/L (ref 15–37)
BASOPHILS # BLD: 0 K/UL (ref 0–0.06)
BASOPHILS NFR BLD: 0 % (ref 0–2)
BILIRUB SERPL-MCNC: 0.2 MG/DL (ref 0.2–1)
BLD PROD TYP BPU: NORMAL
BLD PROD TYP BPU: NORMAL
BLOOD GROUP ANTIBODIES SERPL: NORMAL
BPU ID: NORMAL
BPU ID: NORMAL
BUN SERPL-MCNC: 17 MG/DL (ref 7–18)
BUN/CREAT SERPL: 17 (ref 12–20)
CALCIUM SERPL-MCNC: 7.6 MG/DL (ref 8.5–10.1)
CALLED TO:,BCALL1: NORMAL
CHLORIDE SERPL-SCNC: 111 MMOL/L (ref 100–108)
CO2 SERPL-SCNC: 20 MMOL/L (ref 21–32)
CREAT SERPL-MCNC: 1 MG/DL (ref 0.6–1.3)
CROSSMATCH RESULT,%XM: NORMAL
CROSSMATCH RESULT,%XM: NORMAL
DIFFERENTIAL METHOD BLD: ABNORMAL
EOSINOPHIL # BLD: 0.3 K/UL (ref 0–0.4)
EOSINOPHIL NFR BLD: 3 % (ref 0–5)
ERYTHROCYTE [DISTWIDTH] IN BLOOD BY AUTOMATED COUNT: 15 % (ref 11.6–14.5)
GLOBULIN SER CALC-MCNC: 5.2 G/DL (ref 2–4)
GLUCOSE BLD STRIP.AUTO-MCNC: 138 MG/DL (ref 70–110)
GLUCOSE BLD STRIP.AUTO-MCNC: 152 MG/DL (ref 70–110)
GLUCOSE BLD STRIP.AUTO-MCNC: 167 MG/DL (ref 70–110)
GLUCOSE BLD STRIP.AUTO-MCNC: 83 MG/DL (ref 70–110)
GLUCOSE SERPL-MCNC: 77 MG/DL (ref 74–99)
HCT VFR BLD AUTO: 23.7 % (ref 36–48)
HEMOCCULT STL QL: NEGATIVE
HGB BLD-MCNC: 7.9 G/DL (ref 13–16)
LYMPHOCYTES # BLD: 1.9 K/UL (ref 0.9–3.6)
LYMPHOCYTES NFR BLD: 18 % (ref 21–52)
MCH RBC QN AUTO: 28.1 PG (ref 24–34)
MCHC RBC AUTO-ENTMCNC: 33.3 G/DL (ref 31–37)
MCV RBC AUTO: 84.3 FL (ref 74–97)
MONOCYTES # BLD: 0.7 K/UL (ref 0.05–1.2)
MONOCYTES NFR BLD: 7 % (ref 3–10)
NEUTS SEG # BLD: 8 K/UL (ref 1.8–8)
NEUTS SEG NFR BLD: 72 % (ref 40–73)
PLATELET # BLD AUTO: 392 K/UL (ref 135–420)
PMV BLD AUTO: 10.2 FL (ref 9.2–11.8)
POTASSIUM SERPL-SCNC: 4 MMOL/L (ref 3.5–5.5)
PROT SERPL-MCNC: 6.4 G/DL (ref 6.4–8.2)
RBC # BLD AUTO: 2.81 M/UL (ref 4.7–5.5)
SODIUM SERPL-SCNC: 141 MMOL/L (ref 136–145)
SPECIMEN EXP DATE BLD: NORMAL
STATUS OF UNIT,%ST: NORMAL
STATUS OF UNIT,%ST: NORMAL
UNIT DIVISION, %UDIV: 0
UNIT DIVISION, %UDIV: 0
WBC # BLD AUTO: 11 K/UL (ref 4.6–13.2)

## 2018-02-26 PROCEDURE — 74011250636 HC RX REV CODE- 250/636: Performed by: INTERNAL MEDICINE

## 2018-02-26 PROCEDURE — 87040 BLOOD CULTURE FOR BACTERIA: CPT | Performed by: HOSPITALIST

## 2018-02-26 PROCEDURE — 74011250636 HC RX REV CODE- 250/636: Performed by: HOSPITALIST

## 2018-02-26 PROCEDURE — 74011636637 HC RX REV CODE- 636/637: Performed by: INTERNAL MEDICINE

## 2018-02-26 PROCEDURE — A9577 INJ MULTIHANCE: HCPCS | Performed by: HOSPITALIST

## 2018-02-26 PROCEDURE — 36415 COLL VENOUS BLD VENIPUNCTURE: CPT | Performed by: INTERNAL MEDICINE

## 2018-02-26 PROCEDURE — 85025 COMPLETE CBC W/AUTO DIFF WBC: CPT | Performed by: INTERNAL MEDICINE

## 2018-02-26 PROCEDURE — 77030020263 HC SOL INJ SOD CL0.9% LFCR 1000ML

## 2018-02-26 PROCEDURE — 82962 GLUCOSE BLOOD TEST: CPT

## 2018-02-26 PROCEDURE — 80053 COMPREHEN METABOLIC PANEL: CPT | Performed by: INTERNAL MEDICINE

## 2018-02-26 PROCEDURE — 73720 MRI LWR EXTREMITY W/O&W/DYE: CPT

## 2018-02-26 PROCEDURE — 65270000029 HC RM PRIVATE

## 2018-02-26 PROCEDURE — 74011250637 HC RX REV CODE- 250/637: Performed by: HOSPITALIST

## 2018-02-26 PROCEDURE — 74011000258 HC RX REV CODE- 258: Performed by: INTERNAL MEDICINE

## 2018-02-26 RX ADMIN — INSULIN GLARGINE 10 UNITS: 100 INJECTION, SOLUTION SUBCUTANEOUS at 09:00

## 2018-02-26 RX ADMIN — LOSARTAN POTASSIUM 50 MG: 50 TABLET ORAL at 09:13

## 2018-02-26 RX ADMIN — MORPHINE SULFATE 2 MG: 4 INJECTION, SOLUTION INTRAMUSCULAR; INTRAVENOUS at 21:29

## 2018-02-26 RX ADMIN — HEPARIN SODIUM 5000 UNITS: 5000 INJECTION, SOLUTION INTRAVENOUS; SUBCUTANEOUS at 00:32

## 2018-02-26 RX ADMIN — PRAVASTATIN SODIUM 10 MG: 20 TABLET ORAL at 21:16

## 2018-02-26 RX ADMIN — ASPIRIN 81 MG 81 MG: 81 TABLET ORAL at 09:13

## 2018-02-26 RX ADMIN — SODIUM CHLORIDE 1000 MG: 900 INJECTION, SOLUTION INTRAVENOUS at 18:38

## 2018-02-26 RX ADMIN — AMLODIPINE BESYLATE 5 MG: 5 TABLET ORAL at 09:13

## 2018-02-26 RX ADMIN — HEPARIN SODIUM 5000 UNITS: 5000 INJECTION, SOLUTION INTRAVENOUS; SUBCUTANEOUS at 16:00

## 2018-02-26 RX ADMIN — CEFEPIME HYDROCHLORIDE 2 G: 2 INJECTION, POWDER, FOR SOLUTION INTRAVENOUS at 09:10

## 2018-02-26 RX ADMIN — CEFEPIME HYDROCHLORIDE 2 G: 2 INJECTION, POWDER, FOR SOLUTION INTRAVENOUS at 21:00

## 2018-02-26 RX ADMIN — SODIUM CHLORIDE 1000 MG: 900 INJECTION, SOLUTION INTRAVENOUS at 11:43

## 2018-02-26 RX ADMIN — INSULIN LISPRO 2 UNITS: 100 INJECTION, SOLUTION INTRAVENOUS; SUBCUTANEOUS at 11:30

## 2018-02-26 RX ADMIN — INSULIN LISPRO 2 UNITS: 100 INJECTION, SOLUTION INTRAVENOUS; SUBCUTANEOUS at 18:42

## 2018-02-26 RX ADMIN — SODIUM CHLORIDE 1000 MG: 900 INJECTION, SOLUTION INTRAVENOUS at 00:32

## 2018-02-26 RX ADMIN — SODIUM CHLORIDE 100 ML/HR: 900 INJECTION, SOLUTION INTRAVENOUS at 18:44

## 2018-02-26 RX ADMIN — CEFEPIME HYDROCHLORIDE 2 G: 2 INJECTION, POWDER, FOR SOLUTION INTRAVENOUS at 02:22

## 2018-02-26 RX ADMIN — GADOBENATE DIMEGLUMINE 15 ML: 529 INJECTION, SOLUTION INTRAVENOUS at 15:59

## 2018-02-26 RX ADMIN — MORPHINE SULFATE 2 MG: 4 INJECTION, SOLUTION INTRAMUSCULAR; INTRAVENOUS at 13:41

## 2018-02-26 RX ADMIN — HEPARIN SODIUM 5000 UNITS: 5000 INJECTION, SOLUTION INTRAVENOUS; SUBCUTANEOUS at 09:12

## 2018-02-26 NOTE — PROGRESS NOTES
Chart reviewed. Plan remains home when medically stable. Please try to utilize $4 [employer] drug list @ discharge, if possible. Pt has been helped may times with med assist, will not be able to assist on this admission. Will cont to follow for needs. Radhika Hassan RN,ext. [phone].

## 2018-02-26 NOTE — CONSULTS
Infectious Disease Consultation Note    Requested by:  [address] [place] [address]    Reason: recurrent osteomyelitis    Current abx Prior abx   Vancomycin 2/23 - 3 Cefepime 2/23 - 3      ASSESSMENT - > REC:     Osteomyelitis, R calcaneus/cuboid  - with draining sinus tract   - prior cultures grew MRSA  - MRI 2/24:sinus tract extending to calcaneocuboid articulation w/ contiguous and extensive marrow signal abnormality c/w osteomyelitis. Findings suggesting infected calcaneocuboid articulation were seen. - nuclear scans in progress -> continue Vancomycin  -> appears to need surgical debridement - await nuclear scans and Podiatry plans   BSI MRSA  - 1 of 2 blcx + 2/22  - from OM  - rpt blcx 2/26 IP x 2 -> Vancomycin as above - will need at least 4 weeks for BSI but longer for osteomyelitis   Recurrent foot infections  - s/p R 1st toe amp 2012, rx 4th/5th MT OM 2015  - s/p 11/1/2017 R foot excision of bone cortex, 5th MT w/ irrigation and debridement of R foot down to the bone. Pathology: acute osteomyelitis. Cultures grew MRA and Streptococcus mitis/oralis    DM    H/o noncompliance      MICROBIOLOGY:   2/22 blcx MRSA 1 of 2     LINES AND CATHETERS:   piv      HPI:    45year-old  male w/ DM, recurrent foot infections admitted 2/23/2018 due to right foot pain and drainage. Our group has been seeing Mr. Jailene Radford during hospitalizations for recurrent foot infections since 2011. He has had amputation of the Right first toe for osteomyelitis in May 2012, left knee abscess and cellulitis drained June 2013, right thigh abscess I&D July 2014 and medical treatment of 4th and 5th metatarsal osteomyelitis in May 2015 (6 weeks Daptomycin, Ceftriaxone). In April 2016 he had a nonhealing ulcer of the right foot that underwent debridement and another six weeks of Daptomycin.   He returned in September 2017 with a right foot ulcer and MRI suggesting osteomyelitis R 5th metatarsal.  He refused surgery, fearing amputation and went home to take Ciprofloxacin x 30 days. He presented to [facility] in October 2017. On 11/1/2017 he was taken to the OR right foot excision of bone cortex, right fifth metatarsal with irrigation and debridement of the right foot down to the bone. Pathology of bone showed acute osteomyelitis. Cultures grew MRA and Streptococcus mitis/oralis. He was given IV Vancomycin. He presented to [facility] ED 2/22 reporting 3 months of foot pain and 2-3 days of drainage from the right foot. Foot xray showed progressive erosive changes anterior calcaneus, cuboid and base 5th MT compatible with sequela of osteomyelitis. MRI 2/24 showed a sinus tract extending to the calcaneocuboid articulation with contiguous and extensive marrow signal abnormality compatible with osteomyelitis. Findings suggesting infected calcaneocuboid articulation were seen. Bone and WBC scans are in progress. vancomycin and cefepime were started on 2/23. One of two blood cultures from admission 2/22 is growing MRSA. Two blood cultures were repeated today    Past Medical History:   Diagnosis Date    Cellulitis     rt. foot    Diabetes ([address]     Osteomyelitis ([address]     rt toe    Other ill-defined conditions(799.89)        Past Surgical History:   Procedure Laterality Date    HX ORTHOPAEDIC      rt.toe       Allergies: Review of patient's allergies indicates no known allergies.  .    Current Facility-Administered Medications   Medication Dose Route Frequency    vancomycin (VANCOCIN) 1,000 mg in 0.9% sodium chloride (MBP/ADV) 250 mL  1,000 mg IntraVENous Q8H    [START ON 2/27/2018] VANCOMYCIN INFORMATION NOTE   Other ONCE    0.9% sodium chloride infusion 250 mL  250 mL IntraVENous PRN    cefepime (MAXIPIME) 2 g in 0.9% sodium chloride (MBP/ADV) 100 mL MBP  2 g IntraVENous Q8H    morphine injection 2 mg  2 mg IntraVENous Q4H PRN    amLODIPine (NORVASC) tablet 5 mg  5 mg Oral DAILY    aspirin chewable tablet 81 mg  81 mg Oral DAILY    losartan (COZAAR) tablet 50 mg  50 mg Oral DAILY    pravastatin (PRAVACHOL) tablet 10 mg  10 mg Oral QHS    0.9% sodium chloride infusion  100 mL/hr IntraVENous CONTINUOUS    acetaminophen (TYLENOL) tablet 650 mg  650 mg Oral Q6H PRN    heparin (porcine) injection 5,000 Units  5,000 Units SubCUTAneous Q8H    Please enter patient height for dosing purposes  1 Each Other DAILY    insulin glargine (LANTUS) injection 10 Units  10 Units SubCUTAneous DAILY    insulin lispro (HUMALOG) injection   SubCUTAneous AC&HS    glucose chewable tablet 16 g  4 Tab Oral PRN    glucagon (GLUCAGEN) injection 1 mg  1 mg IntraMUSCular PRN    dextrose (D50W) injection syrg 12.5-25 g  25-50 mL IntraVENous PRN    VANCOMYCIN INFORMATION NOTE   Other Rx Dosing/Monitoring       History reviewed. No pertinent family history. Social History     Social History    Marital status: SINGLE     Spouse name: N/A    Number of children: N/A    Years of education: N/A     Occupational History    Not on file. Social History Main Topics    Smoking status: Heavy Tobacco Smoker     Packs/day: 0.50    Smokeless tobacco: Never Used    Alcohol use No    Drug use: Yes     Special: Marijuana      Comment: 17    Sexual activity: Not on file     Other Topics Concern    Not on file     Social History Narrative     History   Smoking Status    Heavy Tobacco Smoker    Packs/day: 0.50   Smokeless Tobacco    Never Used        Temp (24hrs), Av.7 °F (37.1 °C), Min:98.3 °F (36.8 °C), Max:98.9 °F (37.2 °C)    Visit Vitals    /65 (BP 1 Location: Right arm)    Pulse 60    Temp 98.3 °F (36.8 °C)    Resp 16    Ht 5' 4\" (1.626 m)    Wt 68.5 kg (151 lb 0.2 oz)    SpO2 95%    BMI 27.62 kg/m2       ROS:Review of systems not obtained due to patient factors. Physical Exam:    General: Well developed, well nourished 45 y.o.  male in no acute distress.   ENT: ENT exam normal, no neck nodes or sinus tenderness  Head: normocephalic, without obvious abnormality  Mouth:  mucous membranes moist, pharynx normal without lesions  Neck: supple, symmetrical, trachea midline   Cardio:  regular rate and rhythm, S1, S2 normal, no murmur, click, rub or gallop  Chest: inspection normal - no chest wall deformities or tenderness, respiratory effort normal  Lungs: clear to auscultation, no wheezes or rales and unlabored breathing  Abdomen: soft, non-tender. Bowel sounds normal. No masses, no organomegaly. Extremities:  no redness or tenderness in the calves or thighs, no edema, right foot missing 1st toe, has healed surgical incisions on lateral aspect with small crusted wound in dorsolateral aspect of hindfoot, currently not draining  Neuro: Grossly normal      Labs: Results:   Chemistry Recent Labs      02/26/18 0450 02/25/18   0553  02/24/18   0540   GLU  77  79  98   NA  141  139  138   K  4.0  4.4  4.0   CL  111*  111*  110*   CO2  20*  20*  18*   BUN  17  14  16   CREA  1.00  1.12  1.16   CA  7.6*  7.7*  7.4*   AGAP  10  8  10   BUCR  17  13  14   AP  171*  153*  130*   TP  6.4  6.2*  5.6*   ALB  1.2*  1.2*  1.1*   GLOB  5.2*  5.0*  4.5*   AGRAT  0.2*  0.2*  0.2*      CBC w/Diff Recent Labs      02/26/18   0450 02/25/18   0553  02/24/18   2020   02/24/18   0540   WBC  11.0  12.5   --    --   12.3   RBC  2.81*  2.74*   --    --   2.17*   HGB  7.9*  7.6*  8.2*   < >  6.0*   HCT  23.7*  23.1*  25.2*   < >  18.4*   PLT  392  370   --    --   348   GRANS  72  77*   --    --   80*   LYMPH  18*  13*   --    --   12*   EOS  3  2   --    --   1    < > = values in this interval not displayed.       Microbiology Recent Labs      02/26/18   0905  02/26/18   0845   CULT  PENDING  PENDING        INDIA [facility]   [phone]

## 2018-02-26 NOTE — PROGRESS NOTES
Physical Therapy Screening:  Services are indicated and therapy order is required. post surgery with activity level. An InBasket screening referral was triggered for physical therapy based on results obtained during the nursing admission assessment. The patients chart was reviewed and the patient is appropriate for a skilled therapy evaluation. Please order a consult for physical therapy if you are in agreement and would like an evaluation to be completed. Thank you.     Rigoberto Turpin, PT

## 2018-02-26 NOTE — PROGRESS NOTES
[facility]  Hospitalist Division        Inpatient Daily Progress Note    Daily progress Note    Patient: Marybeth Quezada MRN: [id number]  [facility]: [id number]    YOB: 1980  Age: 45 y.o. Sex: male    DOA: 2/22/2018 LOS:  LOS: 3 days                    Chief Complaint:     Interval History:    Matthew Belgica a 45 y. o. male who is a Italian speaking -- who presented to the ER with c/o Right leg swelling and Right foot pain when he walks as well as dainage from Right foot. Pt mentioned the swelling and drainage started 2-3 days prior to ED presentation and the pain had been increasing. Patient reported that he had right foot surgery a few months ago but was not able to specify why. Review of records everywhere indicated h/o osteomyelitis of ankle, diabetes, gas gangrene, hypertension and anemia. In the ER, xray of Right ankle c/w Right calcaneus, cuboid, and base of the fifth metatarsal osteomyelitis . Patient was started on cefepime and vanc and admitted for ongoing management. .Blood cx c/w MRSA. Podiatry consulted. MRI Right foot ordered and results currently pending        Subjective:      C/o RLE pain     Objective:      Visit Vitals    /65 (BP 1 Location: Right arm)    Pulse 60    Temp 98.3 °F (36.8 °C)    Resp 16    Ht 5' 4\" (1.626 m)    Wt 68.5 kg (151 lb 0.2 oz)    SpO2 95%    BMI 27.62 kg/m2         Physical Exam:  General appearance: alert, cooperative, no distress, appears stated age  Lungs: clear to auscultation bilaterally, no wheezes or rhonchi   Heart: regular rate and rhythm, S1, S2 normal, no murmur, click, rub or gallop  Abdomen: soft, non tender, non distended. Normoactive bowel sounds  Extremities: extremities normal, atraumatic, no cyanosis.  RLE swelling, discoloration   Skin: Skin color, texture, turgor normal. No rashes or lesions  Neurologic: moves all 4 extremities   PSY: Mood and affect normal, appropriately behaved      Intake and Output:  Current Shift:  02/26 0701 - 02/26 1900  In: -   Out: 437 [Urine:875]  Last three shifts:  02/24 1901 - 02/26 0700  In: 5870 [P.O.:1440; I.V.:4430]  Out: 2063 [Urine:4550]    Recent Results (from the past 24 hour(s))   GLUCOSE, POC    Collection Time: 02/25/18  5:17 PM   Result Value Ref Range    Glucose (POC) 153 (H) 70 - 110 mg/dL   GLUCOSE, POC    Collection Time: 02/25/18  9:49 PM   Result Value Ref Range    Glucose (POC) 125 (H) 70 - 110 mg/dL   VANCOMYCIN, TROUGH    Collection Time: 02/25/18 10:00 PM   Result Value Ref Range    Vancomycin,trough 18.7 10.0 - 20.0 ug/mL    Reported dose date: 20180225      Reported dose time: 1100      Reported dose: 1G UNITS   CBC WITH AUTOMATED DIFF    Collection Time: 02/26/18  4:50 AM   Result Value Ref Range    WBC 11.0 4.6 - 13.2 K/uL    RBC 2.81 (L) 4.70 - 5.50 M/uL    HGB 7.9 (L) 13.0 - 16.0 g/dL    HCT 23.7 (L) 36.0 - 48.0 %    MCV 84.3 74.0 - 97.0 FL    MCH 28.1 24.0 - 34.0 PG    MCHC 33.3 31.0 - 37.0 g/dL    RDW 15.0 (H) 11.6 - 14.5 %    PLATELET 566 641 - 307 K/uL    MPV 10.2 9.2 - 11.8 FL    NEUTROPHILS 72 40 - 73 %    LYMPHOCYTES 18 (L) 21 - 52 %    MONOCYTES 7 3 - 10 %    EOSINOPHILS 3 0 - 5 %    BASOPHILS 0 0 - 2 %    ABS. NEUTROPHILS 8.0 1.8 - 8.0 K/UL    ABS. LYMPHOCYTES 1.9 0.9 - 3.6 K/UL    ABS. MONOCYTES 0.7 0.05 - 1.2 K/UL    ABS. EOSINOPHILS 0.3 0.0 - 0.4 K/UL    ABS.  BASOPHILS 0.0 0.0 - 0.06 K/UL    DF AUTOMATED     METABOLIC PANEL, COMPREHENSIVE    Collection Time: 02/26/18  4:50 AM   Result Value Ref Range    Sodium 141 136 - 145 mmol/L    Potassium 4.0 3.5 - 5.5 mmol/L    Chloride 111 (H) 100 - 108 mmol/L    CO2 20 (L) 21 - 32 mmol/L    Anion gap 10 3.0 - 18 mmol/L    Glucose 77 74 - 99 mg/dL    BUN 17 7.0 - 18 MG/DL    Creatinine 1.00 0.6 - 1.3 MG/DL    BUN/Creatinine ratio 17 12 - 20      GFR est AA >60 >60 ml/min/1.73m2    GFR est non-AA >60 >60 ml/min/1.73m2    Calcium 7.6 (L) 8.5 - 10.1 MG/DL    Bilirubin, total 0.2 0.2 - 1.0 MG/DL ALT (SGPT) 21 16 - 61 U/L    AST (SGOT) 21 15 - 37 U/L    Alk. phosphatase 171 (H) 45 - 117 U/L    Protein, total 6.4 6.4 - 8.2 g/dL    Albumin 1.2 (L) 3.4 - 5.0 g/dL    Globulin 5.2 (H) 2.0 - 4.0 g/dL    A-G Ratio 0.2 (L) 0.8 - 1.7     GLUCOSE, POC    Collection Time: 02/26/18  8:26 AM   Result Value Ref Range    Glucose (POC) 83 70 - 110 mg/dL   CULTURE, BLOOD    Collection Time: 02/26/18  8:45 AM   Result Value Ref Range    Special Requests: PERIPHERAL      Culture result: PENDING    CULTURE, BLOOD    Collection Time: 02/26/18  9:05 AM   Result Value Ref Range    Special Requests: PERIPHERAL      Culture result: PENDING    GLUCOSE, POC    Collection Time: 02/26/18 11:42 AM   Result Value Ref Range    Glucose (POC) 152 (H) 70 - 110 mg/dL           Lab Results   Component Value Date/Time    Glucose 77 02/26/2018 04:50 AM    Glucose 79 02/25/2018 05:53 AM    Glucose 98 02/24/2018 05:40 AM    Glucose 145 (H) 02/22/2018 11:50 PM    Glucose 89 09/14/2017 03:55 AM        Assessment/Plan:     Patient Active Problem List   Diagnosis Code    Cellulitis of left knee L03. 80    DM (diabetes mellitus), secondary uncontrolled (HCC) E13.65    Abscess of bursa, left knee M71.062    Sepsis ([facility] A41.9    Abscess of right thigh L02.415    Cellulitis L03.90    Cellulitis and abscess L03.90, L02.91    Diabetic foot ulcer (HCC) E11.621, L97.509    Acute renal injury ([facility] N17.9    Hypertension I10    Iron deficiency anemia D50.9    Anemia D64.9    SAVANNA (acute kidney injury) ([facility] N17.9    DM (diabetes mellitus) ([facility]) E11.9    Cellulitis of foot, right L03.115       Care Plan  Sepsis:elevated wbc, bacteremia, elevated temp  - Blood cx c/w MRSA.  Patient has h/o wound cx with MRSA rt foot  - Continue vanc, cefepime    - On iv fluid      Bacteremia MRSA  - Continue vanc, cefepime   - Consult ID in am     Osteomyelitis rt ankle/cellulitis/Small open wound  -Xray c/w progressive erosive changes involving the anterior aspect of the calcaneus, cuboid, and base of the fifth metatarsal compatible with sequela of osteomyelitis   -Podiatry consulted- appreciate assistance   -MRI of Right foot- result pending      Anemia: Hgb 6.0 2/24  - Pt with h/o anemia likely due to chronic disease  - Transfused 2 units on 2/24  - Hgb 7.9      HTN  - Amlodipine      DM2  - Ggxj4r=7.7  - SSI, Lantus   - Diabetic diet     DVT prophylaxis  - Heparin       JESSI [facility]  Pager:  [phone]  Office:  [phone]

## 2018-02-26 NOTE — PROGRESS NOTES
Problem: Falls - Risk of  Goal: *Absence of Falls  Document Kurtis Fall Risk and appropriate interventions in the flowsheet.    Outcome: Progressing Towards Goal  Fall Risk Interventions:  Mobility Interventions: PT Consult for mobility concerns, PT Consult for assist device competence         Medication Interventions: Evaluate medications/consider consulting pharmacy    Elimination Interventions: Call light in reach, Patient to call for help with toileting needs, Urinal in reach

## 2018-02-26 NOTE — PROGRESS NOTES
Pharmacy Dosing Services: Vancomycin    Indication: Bone and joint infection    Day of therapy: 3    Other Antimicrobials (Include dose, start day & day of therapy):  Cefepime 2g IV q 8h (started )      Loading dose (date given): 1g (not a full load)  Current Maintenance dose: 1000 mg IV q12h    Goal Vancomycin Level: 15-20  (Trough 15-20 for most infections, 20 for meningitis/osteomyelitis, pre-HD level ~25)    Vancomycin Level (if drawn):    - 18.7 (9hr post dose)    Significant Cultures:    blood- GPC, S Aureus     Renal function stable? (unstable defined as SCr increase of 0.5 mg/dL or > 50% increase from baseline, whichever is greater) (Y/N): stabilized     CAPD, Hemodialysis or Renal Replacement Therapy (Y/N): N     Recent Labs      18   0450  18   0553  18   0540   CREA  1.00  1.12  1.16   BUN  17  14  16   WBC  11.0  12.5  12.3     Temp (24hrs), Av.9 °F (37.2 °C), Min:98.7 °F (37.1 °C), Max:99.3 °F (37.4 °C)    Creatinine Clearance (Creatinine Clearance (ml/min)): 83 ml/min     Regimen assessment: renal function improved significantly  Extrapolated trough level subtherapeutic around 12  Increase frequency to q8h    Maintenance dose: 1000 mg IV q8h  Next scheduled level:  prior to 0900 dose       Pharmacy will follow daily and adjust medications as appropriate for renal function and/or serum levels.     Thank you,  ANTHONY Post

## 2018-02-26 NOTE — PROGRESS NOTES
1900 -- Bedside, Verbal and Written shift change report given to [address] (oncoming nurse) by Michael Lopez (offgoing nurse). Report included the following information SBAR, Kardex, Intake/Output, MAR and Recent Results.  Skin assessment completed. Pt educated on need for stool sample, supplies placed in room, pt will contact staff when sample is ready. MD Ventura Driscoll ordered (02/24/18) MRI and overlay bone scans (TC/Indium) to r/o osteomyelitis vs Charcot, per MRI tech (02/25/18 at 1300) 6 STAT exams pushed pt to be done on 02/26/18, first thing in morning. MRI screening hardcopy placed in chart. 2100 -- Pt sitting up at edge ob bed, R foot wound bleeding, area cleaned and mepilex applied. PRN pain medication administered, pt tolerated with ease, will continue to monitor. Linen and gown changed.      4903 -- PM medications administered, pt tolerated with ease, will continue to monitor.      0015 -- Shift reassessment, pt condition unchanged, will continue to monitor.      0445 --  Shift reassessment, pt condition unchanged, will continue to monitor. 0700 -- Contacted by Nuclear [facility] Cora Dias. Indium bone scan has to have imaging materials ordered 24 hours in advance, thus the test will not be started until 2/27. The testing is completed in 3 phases. The first two phases will be done 2/27 at 730 and 1230 with the last phase being completed on 2/28 in the afternoon.      0730 -- Bedside, Verbal and Written shift change report given to [facility] (oncoming nurse) by Andrew Holley nurse). Report included the following information SBAR, Kardex, Intake/Output, MAR and Recent Results. Skin assessment completed.

## 2018-02-26 NOTE — CONSULTS
Podiatry Progress NOTE    Subjective:         Patient resting. NAD. No complaints to the right foot. Patient Active Problem List    Diagnosis Date Noted    Cellulitis of foot, right 02/23/2018    Anemia 09/12/2017    SAVANNA (acute kidney injury) ([facility] 09/12/2017    DM (diabetes mellitus) ([facility] 09/12/2017    Iron deficiency anemia 05/21/2015    Hypertension 05/19/2015    Acute renal injury ([facility] 05/18/2015    Diabetic foot ulcer ([facility] 05/16/2015    Cellulitis 11/21/2014    Cellulitis and abscess 11/21/2014    Abscess of right thigh 07/15/2014    Sepsis ([facility] 07/14/2014    Abscess of bursa, left knee 06/19/2013    Cellulitis of left knee 06/15/2013    DM (diabetes mellitus), secondary uncontrolled ([facility] 06/15/2013     Past Medical History:   Diagnosis Date    Cellulitis     rt. foot    Diabetes ([facility]     Osteomyelitis (HCC)     rt toe    Other ill-defined conditions(799.89)       Past Surgical History:   Procedure Laterality Date    HX ORTHOPAEDIC      rt.toe      History reviewed. No pertinent family history.    Social History   Substance Use Topics    Smoking status: Heavy Tobacco Smoker     Packs/day: 0.50    Smokeless tobacco: Never Used    Alcohol use No     Current Facility-Administered Medications   Medication Dose Route Frequency Provider Last Rate Last Dose    vancomycin (VANCOCIN) 1,000 mg in 0.9% sodium chloride (MBP/ADV) 250 mL  1,000 mg IntraVENous Q8H Korey Fry  mL/hr at 02/26/18 1143 1,000 mg at 02/26/18 1143    [START ON 2/27/2018] VANCOMYCIN INFORMATION NOTE   Other ONCE Korey Fry MD        0.9% sodium chloride infusion 250 mL  250 mL IntraVENous PRN Roldan Morelos MD 15 mL/hr at 02/24/18 1214 250 mL at 02/24/18 1214    cefepime (MAXIPIME) 2 g in 0.9% sodium chloride (MBP/ADV) 100 mL MBP  2 g IntraVENous Q8H Roldan Morelos  mL/hr at 02/26/18 0910 2 g at 02/26/18 0910    morphine injection 2 mg  2 mg IntraVENous Q4H PRN Roldan Morelos MD 2 mg at 02/25/18 2059    amLODIPine (NORVASC) tablet 5 mg  5 mg Oral DAILY Abel Romo MD   5 mg at 02/26/18 0913    aspirin chewable tablet 81 mg  81 mg Oral DAILY Abel Romo MD   81 mg at 02/26/18 0913    losartan (COZAAR) tablet 50 mg  50 mg Oral DAILY Abel Romo MD   50 mg at 02/26/18 0913    pravastatin (PRAVACHOL) tablet 10 mg  10 mg Oral QHS Abel Romo MD   10 mg at 02/25/18 2250    0.9% sodium chloride infusion  100 mL/hr IntraVENous CONTINUOUS Abel Romo  mL/hr at 02/25/18 2251 100 mL/hr at 02/25/18 2251    acetaminophen (TYLENOL) tablet 650 mg  650 mg Oral Q6H PRN Abel Romo MD   650 mg at 02/24/18 0401    heparin (porcine) injection 5,000 Units  5,000 Units SubCUTAneous Q8H Abel Romo MD   5,000 Units at 02/26/18 0912    Please enter patient height for dosing purposes  1 Each Other DAILY Luther Reagan MD   1 Each at 02/25/18 0900    insulin glargine (LANTUS) injection 10 Units  10 Units SubCUTAneous DAILY Luther Reagan MD   10 Units at 02/26/18 0900    insulin lispro (HUMALOG) injection   SubCUTAneous AC&HS Luther Reagan MD   2 Units at 02/26/18 1130    glucose chewable tablet 16 g  4 Tab Oral PRN Luther Reagan MD        glucagon (GLUCAGEN) injection 1 mg  1 mg IntraMUSCular PRN Luther Reagan MD        dextrose (D50W) injection syrg 12.5-25 g  25-50 mL IntraVENous PRN Luther Reagan MD        VANCOMYCIN INFORMATION NOTE   Other Rx Dosing/Monitoring Evette Fitzpatrick MD          No Known Allergies     Review of Systems:  Other than the above HPI;       General: denies chronic fatigue, weight loss, fever, anemia, bruising, depression, nervousness, panic attacks  HEENT: denies ringing in ears, ear infections, dizzy spells,  sinus trouble, hoarseness, eye infections  GI: denies diarrhea, gas, bloating, heartburn, regurgitation, difficulty swallowing, painful swallowing, nausea, vomiting, constipation, abdominal pain, decreased appetite, blood in stools, black stools, jaundice, dark urine  Lungs: denies pneumonia, asthma, cough, SOB, hemoptysis  Heart: denies chest pain, irregular heart beat, ankle swelling, HTN  Skin: denies rashes, hives, allergic reaction  Urinary: denies UTI, kidney stones, decreased urine force and flow, urination at night, blood in urine, painful urination  Bones and Joints: denies arthritis, rheumatism, back pain, gout, osteoporosis  Neurologic: denies stroke, seizures, headaches, numbness, tingling    Objective:     Patient Vitals for the past 8 hrs:   BP Temp Pulse Resp SpO2 Weight   18 1144 104/65 98.3 °F (36.8 °C) 60 16 95 % -   18 0909 154/77 98.6 °F (37 °C) 63 16 95 % -   18 0529 131/77 98.8 °F (37.1 °C) 65 18 97 % 68.5 kg (151 lb 0.2 oz)     Temp (24hrs), Av.7 °F (37.1 °C), Min:98.3 °F (36.8 °C), Max:98.9 °F (37.2 °C)      Physical Exam:    Torin Hidalgo  is a 45 y.o. male who is pleasant, alert and oriented x3, in no apparent distress, and looks their given age. Patient is well-developed and nourished, with good attention to hygiene and body habitus. Mood and affect normal, appropriate to situation.      Upon inspection and palpation of the right foot. Right foot There is diffuse decreasing edema and erythema with mild increase in temperature.  There is noted prior amputation sites and eschar sub lateral metatarsal head. Pinpoint opening along prior amp incision      All dorsal incision well healed.       Vascular Exam:   Dorsalis Pedis 1/4 and Posterior Tibial trace RIGHT and Dorsalis Pedis 1/4 and Posterior Tibial trace LEFT . Skin temp warm to warm. Pedal hair is absent. There is diffuse edema.     Neurological Exam:   Light touch protective sensation is absent with Loss of protective sensation. No tinel's sign of the major nerves crossing the ankle.      Musculoskeletal Exam:    Muscle tone is normal for age and his given situation.  The muscle strength is - 5/5 for the flexors, extensors, inverters, and everter's.      Dermatological Exam:    Skin is of abnormal texture and turgor with atrophic skin changes noting decreased hair growth, pigmentary changes, skin texture (thin,shiney), skin color (rubor, red) . There is noted pressure poin plantar lateral to the right 5th metatarsal head with hypertrophic thicken tissue. There is diffuse xerosis bilateral.     Radiographic Exam: (see report for details)  Suggestive osteomyelitis    Wound Presentation:  Wound Toe Left (Active)   Number of days:1189       Wound Foot Right;Plantar (Active)   Number of days:1014       Wound Foot Right (Active)   Number of days:1012       Wound Foot Right (Active)   Number of days:666       Wound Foot Right (Active)   Number of days:164       Wound Foot Right;Lateral (Active)   Number of days:1            Lab Review:   Recent Results (from the past 24 hour(s))   GLUCOSE, POC    Collection Time: 02/25/18  5:17 PM   Result Value Ref Range    Glucose (POC) 153 (H) 70 - 110 mg/dL   GLUCOSE, POC    Collection Time: 02/25/18  9:49 PM   Result Value Ref Range    Glucose (POC) 125 (H) 70 - 110 mg/dL   VANCOMYCIN, TROUGH    Collection Time: 02/25/18 10:00 PM   Result Value Ref Range    Vancomycin,trough 18.7 10.0 - 20.0 ug/mL    Reported dose date: 20180225      Reported dose time: 1100      Reported dose: 1G UNITS   CBC WITH AUTOMATED DIFF    Collection Time: 02/26/18  4:50 AM   Result Value Ref Range    WBC 11.0 4.6 - 13.2 K/uL    RBC 2.81 (L) 4.70 - 5.50 M/uL    HGB 7.9 (L) 13.0 - 16.0 g/dL    HCT 23.7 (L) 36.0 - 48.0 %    MCV 84.3 74.0 - 97.0 FL    MCH 28.1 24.0 - 34.0 PG    MCHC 33.3 31.0 - 37.0 g/dL    RDW 15.0 (H) 11.6 - 14.5 %    PLATELET 437 138 - 337 K/uL    MPV 10.2 9.2 - 11.8 FL    NEUTROPHILS 72 40 - 73 %    LYMPHOCYTES 18 (L) 21 - 52 %    MONOCYTES 7 3 - 10 %    EOSINOPHILS 3 0 - 5 %    BASOPHILS 0 0 - 2 %    ABS. NEUTROPHILS 8.0 1.8 - 8.0 K/UL    ABS. LYMPHOCYTES 1.9 0.9 - 3.6 K/UL    ABS. MONOCYTES 0.7 0.05 - 1.2 K/UL    ABS. EOSINOPHILS 0.3 0.0 - 0.4 K/UL    ABS. BASOPHILS 0.0 0.0 - 0.06 K/UL    DF AUTOMATED     METABOLIC PANEL, COMPREHENSIVE    Collection Time: 02/26/18  4:50 AM   Result Value Ref Range    Sodium 141 136 - 145 mmol/L    Potassium 4.0 3.5 - 5.5 mmol/L    Chloride 111 (H) 100 - 108 mmol/L    CO2 20 (L) 21 - 32 mmol/L    Anion gap 10 3.0 - 18 mmol/L    Glucose 77 74 - 99 mg/dL    BUN 17 7.0 - 18 MG/DL    Creatinine 1.00 0.6 - 1.3 MG/DL    BUN/Creatinine ratio 17 12 - 20      GFR est AA >60 >60 ml/min/1.73m2    GFR est non-AA >60 >60 ml/min/1.73m2    Calcium 7.6 (L) 8.5 - 10.1 MG/DL    Bilirubin, total 0.2 0.2 - 1.0 MG/DL    ALT (SGPT) 21 16 - 61 U/L    AST (SGOT) 21 15 - 37 U/L    Alk. phosphatase 171 (H) 45 - 117 U/L    Protein, total 6.4 6.4 - 8.2 g/dL    Albumin 1.2 (L) 3.4 - 5.0 g/dL    Globulin 5.2 (H) 2.0 - 4.0 g/dL    A-G Ratio 0.2 (L) 0.8 - 1.7     GLUCOSE, POC    Collection Time: 02/26/18  8:26 AM   Result Value Ref Range    Glucose (POC) 83 70 - 110 mg/dL   CULTURE, BLOOD    Collection Time: 02/26/18  8:45 AM   Result Value Ref Range    Special Requests: PERIPHERAL      Culture result: PENDING    CULTURE, BLOOD    Collection Time: 02/26/18  9:05 AM   Result Value Ref Range    Special Requests: PERIPHERAL      Culture result: PENDING    GLUCOSE, POC    Collection Time: 02/26/18 11:42 AM   Result Value Ref Range    Glucose (POC) 152 (H) 70 - 110 mg/dL       Impression:     Right foot cellulitis r/o osteomyeltis    Recommendation:     Awainting MRI and overlay bone scans (TC/Indium) to r/o osteomyelitis vs cellulitis. May need BKA given location of the infection into the calcaneal / cuboid area.

## 2018-02-26 NOTE — PROGRESS NOTES
3 phase bone scan and Indium WBC study will be started tomorrow 2/27/18. Unable to begin today due to Indium being a special order radiopharmaceutical that our off-site pharmacy has to order in advance. Patient will come down to nuclear medicine department tomorrow at 0730/0800 for imaging and blood draw. Patient will return to nuclear medicine department for delayed images at 1230/1300 tomorrow (2/27/18) and 1230/1300 Wednesday (2/28/18).

## 2018-02-27 ENCOUNTER — APPOINTMENT (OUTPATIENT)
Dept: NUCLEAR MEDICINE | Age: 38
DRG: 872 | End: 2018-02-27
Attending: PODIATRIST
Payer: SELF-PAY

## 2018-02-27 LAB
ANION GAP SERPL CALC-SCNC: 8 MMOL/L (ref 3–18)
BUN SERPL-MCNC: 15 MG/DL (ref 7–18)
BUN/CREAT SERPL: 17 (ref 12–20)
CALCIUM SERPL-MCNC: 7.8 MG/DL (ref 8.5–10.1)
CHLORIDE SERPL-SCNC: 111 MMOL/L (ref 100–108)
CO2 SERPL-SCNC: 22 MMOL/L (ref 21–32)
CREAT SERPL-MCNC: 0.9 MG/DL (ref 0.6–1.3)
GLUCOSE BLD STRIP.AUTO-MCNC: 134 MG/DL (ref 70–110)
GLUCOSE BLD STRIP.AUTO-MCNC: 137 MG/DL (ref 70–110)
GLUCOSE BLD STRIP.AUTO-MCNC: 179 MG/DL (ref 70–110)
GLUCOSE SERPL-MCNC: 82 MG/DL (ref 74–99)
POTASSIUM SERPL-SCNC: 4.1 MMOL/L (ref 3.5–5.5)
REPORTED DOSE,DOSE: ABNORMAL UNITS
SODIUM SERPL-SCNC: 141 MMOL/L (ref 136–145)
VANCOMYCIN TROUGH SERPL-MCNC: 28.8 UG/ML (ref 10–20)

## 2018-02-27 PROCEDURE — 74011250636 HC RX REV CODE- 250/636: Performed by: INTERNAL MEDICINE

## 2018-02-27 PROCEDURE — A9503 TC99M MEDRONATE: HCPCS

## 2018-02-27 PROCEDURE — 74011636637 HC RX REV CODE- 636/637: Performed by: INTERNAL MEDICINE

## 2018-02-27 PROCEDURE — 80048 BASIC METABOLIC PNL TOTAL CA: CPT | Performed by: INTERNAL MEDICINE

## 2018-02-27 PROCEDURE — 65270000029 HC RM PRIVATE

## 2018-02-27 PROCEDURE — 74011000258 HC RX REV CODE- 258: Performed by: INTERNAL MEDICINE

## 2018-02-27 PROCEDURE — 93306 TTE W/DOPPLER COMPLETE: CPT

## 2018-02-27 PROCEDURE — 80202 ASSAY OF VANCOMYCIN: CPT | Performed by: HOSPITALIST

## 2018-02-27 PROCEDURE — 82962 GLUCOSE BLOOD TEST: CPT

## 2018-02-27 PROCEDURE — 74011250636 HC RX REV CODE- 250/636: Performed by: HOSPITALIST

## 2018-02-27 PROCEDURE — 77030020263 HC SOL INJ SOD CL0.9% LFCR 1000ML

## 2018-02-27 PROCEDURE — 74011250637 HC RX REV CODE- 250/637: Performed by: HOSPITALIST

## 2018-02-27 PROCEDURE — 36415 COLL VENOUS BLD VENIPUNCTURE: CPT | Performed by: INTERNAL MEDICINE

## 2018-02-27 RX ADMIN — PRAVASTATIN SODIUM 10 MG: 20 TABLET ORAL at 22:00

## 2018-02-27 RX ADMIN — MORPHINE SULFATE 2 MG: 4 INJECTION, SOLUTION INTRAMUSCULAR; INTRAVENOUS at 20:15

## 2018-02-27 RX ADMIN — MORPHINE SULFATE 2 MG: 4 INJECTION, SOLUTION INTRAMUSCULAR; INTRAVENOUS at 13:28

## 2018-02-27 RX ADMIN — INSULIN LISPRO 2 UNITS: 100 INJECTION, SOLUTION INTRAVENOUS; SUBCUTANEOUS at 12:00

## 2018-02-27 RX ADMIN — LOSARTAN POTASSIUM 50 MG: 50 TABLET ORAL at 10:47

## 2018-02-27 RX ADMIN — HEPARIN SODIUM 5000 UNITS: 5000 INJECTION, SOLUTION INTRAVENOUS; SUBCUTANEOUS at 01:19

## 2018-02-27 RX ADMIN — SODIUM CHLORIDE 1000 MG: 900 INJECTION, SOLUTION INTRAVENOUS at 01:18

## 2018-02-27 RX ADMIN — SODIUM CHLORIDE 750 MG: 900 INJECTION, SOLUTION INTRAVENOUS at 15:31

## 2018-02-27 RX ADMIN — HEPARIN SODIUM 5000 UNITS: 5000 INJECTION, SOLUTION INTRAVENOUS; SUBCUTANEOUS at 10:45

## 2018-02-27 RX ADMIN — HEPARIN SODIUM 5000 UNITS: 5000 INJECTION, SOLUTION INTRAVENOUS; SUBCUTANEOUS at 17:41

## 2018-02-27 RX ADMIN — CEFEPIME HYDROCHLORIDE 2 G: 2 INJECTION, POWDER, FOR SOLUTION INTRAVENOUS at 03:00

## 2018-02-27 RX ADMIN — SODIUM CHLORIDE 100 ML/HR: 900 INJECTION, SOLUTION INTRAVENOUS at 20:17

## 2018-02-27 RX ADMIN — ASPIRIN 81 MG 81 MG: 81 TABLET ORAL at 10:47

## 2018-02-27 RX ADMIN — CEFEPIME HYDROCHLORIDE 2 G: 2 INJECTION, POWDER, FOR SOLUTION INTRAVENOUS at 10:47

## 2018-02-27 RX ADMIN — MORPHINE SULFATE 2 MG: 4 INJECTION, SOLUTION INTRAMUSCULAR; INTRAVENOUS at 05:04

## 2018-02-27 RX ADMIN — INSULIN GLARGINE 10 UNITS: 100 INJECTION, SOLUTION SUBCUTANEOUS at 09:00

## 2018-02-27 RX ADMIN — CEFEPIME HYDROCHLORIDE 2 G: 2 INJECTION, POWDER, FOR SOLUTION INTRAVENOUS at 17:40

## 2018-02-27 RX ADMIN — HEPARIN SODIUM 5000 UNITS: 5000 INJECTION, SOLUTION INTRAVENOUS; SUBCUTANEOUS at 23:46

## 2018-02-27 RX ADMIN — AMLODIPINE BESYLATE 5 MG: 5 TABLET ORAL at 10:47

## 2018-02-27 NOTE — ROUTINE PROCESS
1181 Patient off the floor to Radiology. 9047 Patient back in the room. Breakfast given. 1200 Patient off the floor to [address] Patient back in the room. Pain med given for c/o pain to right leg. 1430 Patient off the floor for Echocardiogram.  1534 Patient back in the room.

## 2018-02-27 NOTE — PROGRESS NOTES
Infectious Disease Follow-up     Admit Date: 2/22/2018    Current abx Prior abx   Vancomycin 2/23 - 3 Cefepime 2/23 - 3       ASSESSMENT - > REC:      Osteomyelitis, R calcaneus/cuboid  - with draining sinus tract   - prior cultures grew MRSA  - MRI 2/24:sinus tract extending to calcaneocuboid articulation w/ contiguous and extensive marrow signal abnormality c/w osteomyelitis. Findings suggesting infected calcaneocuboid articulation were seen. - nuclear scans in progress -> continue Vancomycin  -> appears to need surgical debridement vs BKA- await nuclear scans and Podiatry plans   BSI MRSA  - 1 of 2 blcx + 2/22  - from OM  - rpt blcx 2/26 IP x 2 -> Vancomycin as above - will need at least 4 weeks for BSI but longer for osteomyelitis if not surgically removed  -> monitor repeat blood cultures x 2 from 2/26  -> 2DECHO   Recurrent foot infections  - s/p R 1st toe amp 2012, rx 4th/5th MT OM 2015  - s/p 11/1/2017 R foot excision of bone cortex, 5th MT w/ irrigation and debridement of R foot down to the bone. Pathology: acute osteomyelitis. Cultures grew MRA and Streptococcus mitis/oralis     DM     H/o noncompliance        MICROBIOLOGY:   2/22               blcx MRSA 1 of 2                       LINES AND CATHETERS:   piv      Active Hospital Problems    Diagnosis Date Noted    Cellulitis of foot, right 02/23/2018         Subjective: Interval notes reviewed. No complaints. Denies foot or other pain. Afebrile.     Current Facility-Administered Medications   Medication Dose Route Frequency    vancomycin (VANCOCIN) 1,000 mg in 0.9% sodium chloride (MBP/ADV) 250 mL  1,000 mg IntraVENous Q8H    VANCOMYCIN INFORMATION NOTE   Other ONCE    0.9% sodium chloride infusion 250 mL  250 mL IntraVENous PRN    cefepime (MAXIPIME) 2 g in 0.9% sodium chloride (MBP/ADV) 100 mL MBP  2 g IntraVENous Q8H    morphine injection 2 mg  2 mg IntraVENous Q4H PRN    amLODIPine (NORVASC) tablet 5 mg  5 mg Oral DAILY    aspirin chewable tablet 81 mg  81 mg Oral DAILY    losartan (COZAAR) tablet 50 mg  50 mg Oral DAILY    pravastatin (PRAVACHOL) tablet 10 mg  10 mg Oral QHS    0.9% sodium chloride infusion  100 mL/hr IntraVENous CONTINUOUS    acetaminophen (TYLENOL) tablet 650 mg  650 mg Oral Q6H PRN    heparin (porcine) injection 5,000 Units  5,000 Units SubCUTAneous Q8H    Please enter patient height for dosing purposes  1 Each Other DAILY    insulin glargine (LANTUS) injection 10 Units  10 Units SubCUTAneous DAILY    insulin lispro (HUMALOG) injection   SubCUTAneous AC&HS    glucose chewable tablet 16 g  4 Tab Oral PRN    glucagon (GLUCAGEN) injection 1 mg  1 mg IntraMUSCular PRN    dextrose (D50W) injection syrg 12.5-25 g  25-50 mL IntraVENous PRN    VANCOMYCIN INFORMATION NOTE   Other Rx Dosing/Monitoring         Objective:     Visit Vitals    BP (P) 122/75 (BP 1 Location: Right arm)    Pulse (P) 61    Temp (P) 98.6 °F (37 °C)    Resp (P) 20    Ht 5' 4\" (1.626 m)    Wt 68.5 kg (151 lb)    SpO2 (P) 96%    BMI 27.62 kg/m2       Temp (24hrs), Av.5 °F (36.9 °C), Min:98.3 °F (36.8 °C), Max:98.7 °F (37.1 °C)      General: Well developed, well nourished 45 y.o.  male in no acute distress. HEENT: no scleral icterus, no oral lesions  Neck: supple, symmetrical, trachea midline   Cardio:  regular rate and rhythm, S1, S2 normal, no murmur, click, rub or gallop  Lungs: clear to auscultation, no wheezes or rales and unlabored breathing  Abdomen: soft, non-tender. Bowel sounds normal. No masses, no organomegaly.   Extremities:  no redness or tenderness in the calves or thighs, no edema, right foot missing 1st toe, has healed surgical incisions on lateral aspect with small crusted wound in dorsolateral aspect of hindfoot, currently not draining    Labs: Results:   Chemistry Recent Labs      18   0350  18   0450  18   0553   GLU  82  77  79   NA  141  141  139   K  4.1  4.0  4.4   CL  111*  111* 111*   CO2  22  20*  20*   BUN  15  17  14   CREA  0.90  1.00  1.12   CA  7.8*  7.6*  7.7*   AGAP  8  10  8   BUCR  17  17  13   AP   --   171*  153*   TP   --   6.4  6.2*   ALB   --   1.2*  1.2*   GLOB   --   5.2*  5.0*   AGRAT   --   0.2*  0.2*      CBC w/Diff Recent Labs      02/26/18   0450  02/25/18   0553  02/24/18 2020   WBC  11.0  12.5   --    RBC  2.81*  2.74*   --    HGB  7.9*  7.6*  8.2*   HCT  23.7*  23.1*  25.2*   PLT  392  370   --    GRANS  72  77*   --    LYMPH  18*  13*   --    EOS  3  2   --             Microbiology Results  Recent Labs      02/26/18   0905  02/26/18   0845   CULT  NO GROWTH AFTER 22 HOURS  NO GROWTH AFTER 22 HOURS       Tracy Edmond MD  February 27, 2018  [facility] AT THE [facility]  [phone]

## 2018-02-27 NOTE — PROGRESS NOTES
Bedside shift report received from  [address] [place] [address]: [address] [facility], on room air, resting in bed watching tv; with Vancomycin infusing well; on contact isolation; shift assessment done; assisted with pt's need    2129: Morphine 2 mg Iv given as requested for pain- see flowsheet; scheduled meds given    2220: resting in bed, IVF infusing well; no needs voiced at this time    0100: due ATB given; resting comfortably    0200: sleeping; IVf infusing well    0504: pain med given- see flowsheet    0600: sleeping; IVF infusing well    0700: resting in bed, no complaints voiced    Bedside and Verbal shift change report given to [address] [place] [address],[address] (oncoming nurse) by Marylou Richards RN (offgoing nurse). Report included the following information SBAR, Kardex, Intake/Output and Recent Results.

## 2018-02-27 NOTE — PROGRESS NOTES
Patient not available, has been seen by , will follow up on a later date.     [facility]   Staff [address] [facility]   [phone]

## 2018-02-27 NOTE — PROGRESS NOTES
Problem: Falls - Risk of  Goal: *Absence of Falls  Document Kurtis Fall Risk and appropriate interventions in the flowsheet.    Outcome: Progressing Towards Goal  Fall Risk Interventions:  Mobility Interventions: Patient to call before getting OOB, OT consult for ADLs, PT Consult for mobility concerns, Strengthening exercises (ROM-active/passive), PT Consult for assist device competence         Medication Interventions: Patient to call before getting OOB    Elimination Interventions: Call light in reach, Patient to call for help with toileting needs

## 2018-02-27 NOTE — ROUTINE PROCESS
Bedside shift change report given to Jacques Kirkland RN (oncoming nurse) by James Monroy RN (offgoing nurse). Report included the following information SBAR, Kardex, Intake/Output, MAR and Recent Results.

## 2018-02-27 NOTE — PROGRESS NOTES
Pharmacy Dosing Services: Vancomycin    Indication: osteomyelitis    Day of therapy: 4    Other Antimicrobials (Include dose, start day & day of therapy):  Cefepime 2g IV q8h. Day0:2/23, day 4. Current Maintenance dose: 1000 mg IV q8h    Goal Vancomycin Level: 15-20    Vancomycin Level (if drawn): 28.8    Significant Cultures: Blood: negative    Renal function stable? (unstable defined as SCr increase of 0.5 mg/dL or > 50% increase from baseline, whichever is greater) (Y/N): stabilized     CAPD, Hemodialysis or Renal Replacement Therapy (Y/N): N     Creatinine Clearance (Creatinine Clearance (ml/min)): 93 ml/min     Regimen assessment: supertherapeutic  Maintenance dose: decrease dose to 750 mg iv q12h  Next scheduled level: 3/1, 1330       Pharmacy will follow daily and adjust medications as appropriate for renal function and/or serum levels.     Thank you,  Tomeka Schroeder

## 2018-02-27 NOTE — MED STUDENT NOTES
*ATTENTION:  This note has been created by a medical student for educational purposes only. Please do not refer to the content of this note for clinical decision-making, billing, or other purposes. Please see attending physicians note to obtain clinical information on this patient. *       Student Progress Note  Please refer to attendings daily rounding note for full details      Patient: Mena Byrd MRN: [id number]  CSN: [id number]    YOB: 1980  Age: 45 y.o. Sex: male    DOA: 2/22/2018 LOS:  LOS: 4 days          Chief Complaint: Infection of right foot     Subjective:   HPI: Mena Byrd is a 45year old [place] male presenting with infection of his right foot. He states that his symptoms started 10 days ago with fevers, chills, vomiting, diarrhea, blurred vision and intermittent headache. He states that he had similar symptoms 6 months ago. He presented to the ER 7 days ago and was admitted. Today he states his fevers, vomiting, diarrhea, and headache have resolved, but he continues to have pain at his foot that is a constant 4/10. PMH:   Diabetes mellitus  Hypercholesterolemia     Allergies:   none    Home Meds:   Insulin 70 units daily. Pravastatin 10mg PO every day. FamHx:  Mother: patient unable to answer due to language  Father: patient unable to answer due to language    Social Hx:  Tobacco: 3-4 cigarettes daily  Alcohol: none  Illicit Drugs: marijuana (1 \"hit\" 1-2 times per week)    ROS:  Constitutional: Denies weight loss, fatigue, fever, chills, night sweats. HEENT: Denies headache, vision changes. Respiratory: Denies cough, SOB, wheezing. Cardiovascular: Denies chest pain. Gastrointestinal: Denies abdomen pain, N/V/D, dark or bloody stool. Genitourinary: Denies dysuria, hematuria. Neurologic: Denies dizziness, weakness.       Objective:      Visit Vitals    BP (P) 122/75 (BP 1 Location: Right arm)    Pulse (P) 61    Temp (P) 98.6 °F (37 °C)    Resp (P) 20  Ht 5' 4\" (1.626 m)    Wt 151 lb (68.5 kg)    SpO2 (P) 96%    BMI 27.62 kg/m2         Physical Exam:  General: Well appearing, well nourished. Alert and cooperative, in NAD. Appropriate mood and affect. Skin: Warm, dry; skin at lower extremity shows mild bruising over bilateral anterior legs. HEENT: Normocephalic, atraumatic. PERRL, EOMI, vision grossly intact. Absent red reflex of left eye. Neck supple, without lymphadenopathy or thyromegaly. Trachea midline. Cardiovascular: RRR. Radial pulses strong and equal bilaterally. Capillary refill <2 seconds. Respiratory: Mild expiratory wheeze of lower right lung base; CTA in all other lung fields without rales, rubs, or rhonchi. Abdominal: Soft, non-distended, non-tender. Normoactive bowel sounds. No guarding or rebound tenderness. Musculoskeletal: Normal muscular development. ROM spine and extremities grossly intact. Lower extremity: Skin of right foot is warm, dry, dark, 2+ edema, mild erythema, mild tender to palpation with signs of local origin of infection at lateral base of 5th metatarsal, no open wound, blood or pus; amputated right great toe. Labs:   Blood cultures grew MRSA and Streptococcus mitis/oralis. 2/26/18 20:45  Glucose (POC) 138 (H) 70 - 110 mg/dL Final     2/26/2018  06:57  Component Value Flag Ref Range Units Status   Sodium 141  136 - 145 mmol/L Final   Potassium 4.0  3.5 - 5.5 mmol/L Final   Chloride 111 (H) 100 - 108 mmol/L Final   CO2 20 (L) 21 - 32 mmol/L Final   Anion gap 10  3.0 - 18 mmol/L Final   Glucose 77  74 - 99 mg/dL Final   BUN 17  7.0 - 18 MG/DL Final   Creatinine 1.00  0.6 - 1.3 MG/DL Final   BUN/Creatinine ratio 17  12 - 20   Final   GFR est AA >60  >60 ml/min/1.73m2 Final   GFR est non-AA >60  >60 ml/min/1.73m2 Final   Calcium 7.6 (L) 8.5 - 10.1 MG/DL Final   Bilirubin, total 0.2  0.2 - 1.0 MG/DL Final   ALT (SGPT) 21  16 - 61 U/L Final   AST (SGOT) 21  15 - 37 U/L Final   Alk.  phosphatase 171 (H) 45 - 117 U/L Final   Protein, total 6.4  6.4 - 8.2 g/dL Final   Albumin 1.2 (L) 3.4 - 5.0 g/dL Final   Globulin 5.2 (H) 2.0 - 4.0 g/dL Final   A-G Ratio 0.2 (L) 0.8 - 1.7   Final     2/26/2018  06:23  Component Value Flag Ref Range Units Status   WBC 11.0  4.6 - 13.2 K/uL Final   RBC 2.81 (L) 4.70 - 5.50 M/uL Final   HGB 7.9 (L) 13.0 - 16.0 g/dL Final   HCT 23.7 (L) 36.0 - 48.0 % Final   MCV 84.3  74.0 - 97.0 FL Final   MCH 28.1  24.0 - 34.0 PG Final   MCHC 33.3  31.0 - 37.0 g/dL Final   RDW 15.0 (H) 11.6 - 14.5 % Final   PLATELET 736  405 - 747 K/uL Final   MPV 10.2  9.2 - 11.8 FL Final   NEUTROPHILS 72  40 - 73 % Final   LYMPHOCYTES 18 (L) 21 - 52 % Final   MONOCYTES 7  3 - 10 % Final   EOSINOPHILS 3  0 - 5 % Final   BASOPHILS 0  0 - 2 % Final   ABS. NEUTROPHILS 8.0  1.8 - 8.0 K/UL Final   ABS. LYMPHOCYTES 1.9  0.9 - 3.6 K/UL Final   ABS. MONOCYTES 0.7  0.05 - 1.2 K/UL Final   ABS. EOSINOPHILS 0.3  0.0 - 0.4 K/UL Final   ABS. BASOPHILS 0.0  0.0 - 0.06 K/UL Final   DF AUTOMATED     Final       Imaging:   XR and MRI of right foot shows erosive changes at cuboid, calcaneus, and base of 5th metatarsal consistent with osteomyelitis. Assessment/Plan:   1) Osteomyelitis: use vancomycin for MRSA coverage and ceftriaxone for Strep coverage (avoid PCN due to allergy). Patient evaluated by podiatry and scheduled for surgery today for amputation of right lower foot. 2) Cellulitis: likely due to poorly controlled diabetes,  obtain good antibiotic control and pain control as necessary. 3) Bacteremia: antibiotic control  4) Diabetes mellitus: obtain A1c for eval, consider increase of insulin as needed. 5) Hypercholesterolemia: continue medication as directed. Maria Dolores Trinh  2/27/2018  9:35 AM    *ATTENTION:  This note has been created by a medical student for educational purposes only. Please do not refer to the content of this note for clinical decision-making, billing, or other purposes.   Please see attending [phone] note to obtain clinical information on this patient. *

## 2018-02-27 NOTE — PROGRESS NOTES
Problem: Falls - Risk of  Goal: *Absence of Falls  Document Kurtis Fall Risk and appropriate interventions in the flowsheet.    Outcome: Progressing Towards Goal  Fall Risk Interventions:  Mobility Interventions: Patient to call before getting OOB, PT Consult for mobility concerns         Medication Interventions: Patient to call before getting OOB, Teach patient to arise slowly    Elimination Interventions: Call light in reach, Patient to call for help with toileting needs

## 2018-02-27 NOTE — PROGRESS NOTES
[facility]  Hospitalist Division        Inpatient Daily Progress Note    Daily progress Note    Patient: Jone Tiwari MRN: [id number]  [facility]: [id number]    YOB: 1980  Age: 45 y.o. Sex: male    DOA: 2/22/2018 LOS:  LOS: 4 days                    Chief Complaint:     Interval History:    Parris Overton a 45 y. o. male who is a [place] speaking -- who presented to the ER with c/o Right leg swelling and Right foot pain when he walks as well as dainage from Right foot. Pt mentioned the swelling and drainage started 2-3 days prior to ED presentation and the pain had been increasing. Patient reported that he had right foot surgery a few months ago but was not able to specify why. Review of records everywhere indicated h/o osteomyelitis of ankle, diabetes, gas gangrene, hypertension and anemia. In the ER, xray of Right ankle c/w Right calcaneus, cuboid, and base of the fifth metatarsal osteomyelitis . Patient was started on cefepime and vanc and admitted for ongoing management. .Blood cx c/w MRSA. Podiatry following. MRI Right foot Lateral mid foot soft tissue wound with sinus tract extending to the calcaneocuboid articulation. There is contiguous and extensive marrow signal abnormality compatible with osteomyelitis involving the majority of the cuboid, calcaneus, inferior talar head and neck, navicular, as well as the fourth and fifth metatarsal shafts proximally. On postcontrast imaging, no evidence of necrotic or nonviable bone demonstrated. 2. Large ankle joint effusion/synovitis with complex appearing and multilocular phlegmon infiltrating through the lateral aspect of Kager's fat pad as well as the sinus tarsi. Suspect infected calcaneocuboid articulation effusion given proximity to adjacent lateral skin wound. NM bone scan results pending. ID consulted.        Subjective:     No complaints.      Objective:      Visit Vitals    /79 (BP 1 Location: Right arm)    Pulse 61    Temp 97.8 °F (36.6 °C)    Resp 20    Ht 5' 4\" (1.626 m)    Wt 68.5 kg (151 lb)    SpO2 99%    BMI 27.62 kg/m2         Physical Exam:  General appearance: alert, cooperative, no distress, appears stated age  Lungs: clear to auscultation throughout   Heart: regular rate and rhythm, S1, S2 normal, no murmur, click, rub or gallop  Abdomen: soft, non tender, non distended. Normoactive bowel sounds  Extremities: extremities normal, atraumatic, no cyanosis. RLE swelling, discoloration   Skin: Skin color, texture, turgor normal. No rashes or lesions  Neurologic: moves all 4 extremities   PSY: Mood and affect normal, appropriately behaved      Intake and Output:  Current Shift:  02/27 0701 - 02/27 1900  In: -   Out: 500 [Urine:500]  Last three shifts:  02/25 1901 - 02/27 0700  In: 4548.3 [P.O.:600;  I.V.:3948.3]  Out: 2175 [Urine:5180]    Recent Results (from the past 24 hour(s))   GLUCOSE, POC    Collection Time: 02/26/18  6:00 PM   Result Value Ref Range    Glucose (POC) 167 (H) 70 - 110 mg/dL   GLUCOSE, POC    Collection Time: 02/26/18  8:44 PM   Result Value Ref Range    Glucose (POC) 138 (H) 70 - 177 mg/dL   METABOLIC PANEL, BASIC    Collection Time: 02/27/18  3:50 AM   Result Value Ref Range    Sodium 141 136 - 145 mmol/L    Potassium 4.1 3.5 - 5.5 mmol/L    Chloride 111 (H) 100 - 108 mmol/L    CO2 22 21 - 32 mmol/L    Anion gap 8 3.0 - 18 mmol/L    Glucose 82 74 - 99 mg/dL    BUN 15 7.0 - 18 MG/DL    Creatinine 0.90 0.6 - 1.3 MG/DL    BUN/Creatinine ratio 17 12 - 20      GFR est AA >60 >60 ml/min/1.73m2    GFR est non-AA >60 >60 ml/min/1.73m2    Calcium 7.8 (L) 8.5 - 10.1 MG/DL   VANCOMYCIN, TROUGH    Collection Time: 02/27/18 10:35 AM   Result Value Ref Range    Vancomycin,trough 28.8 (HH) 10.0 - 20.0 ug/mL    Reported dose: 1000 MG IV Q8H UNITS   GLUCOSE, POC    Collection Time: 02/27/18 11:11 AM   Result Value Ref Range    Glucose (POC) 179 (H) 70 - 110 mg/dL   GLUCOSE, POC    Collection Time: 02/27/18  4:17 PM   Result Value Ref Range    Glucose (POC) 137 (H) 70 - 110 mg/dL           Lab Results   Component Value Date/Time    Glucose 82 02/27/2018 03:50 AM    Glucose 77 02/26/2018 04:50 AM    Glucose 79 02/25/2018 05:53 AM    Glucose 98 02/24/2018 05:40 AM    Glucose 145 (H) 02/22/2018 11:50 PM        Assessment/Plan:     Patient Active Problem List   Diagnosis Code    Cellulitis of left knee L03. [place]    DM (diabetes mellitus), secondary uncontrolled ([facility]) E13.65    Abscess of bursa, left knee M71.062    Sepsis ([facility] [address] A41.9    Abscess of right thigh L02.415    Cellulitis L03.90    Cellulitis and abscess L03.90, L02.91    Diabetic foot ulcer ([facility]) E11.621, L97.509    Acute renal injury ([facility] [address] N17.9    Hypertension I10    Iron deficiency anemia D50.9    Anemia D64.9    SAVANNA (acute kidney injury) ([facility] [address] N17.9    DM (diabetes mellitus) ([facility]) E11.9    Cellulitis of foot, right L03.115       Care Plan  Sepsis:elevated wbc, bacteremia, elevated temp  - Blood cx c/w MRSA. Patient has h/o wound cx with MRSA rt foot  - Continue vanc   - IVF       Bacteremia MRSA  - Continue vanc   - ID consulted      Osteomyelitis rt ankle/cellulitis/Small open wound  -Xray c/w progressive erosive changes involving the anterior aspect of the calcaneus, cuboid, and base of the fifth metatarsal compatible with sequela of osteomyelitis   -Podiatry following appreciate assistance   -MRI of Right foot- 1. Lateral mid foot soft tissue wound with sinus tract extending to the calcaneocuboid articulation. There is contiguous and extensive marrow signal abnormality compatible with osteomyelitis involving the majority of the cuboid, calcaneus, inferior talar head and neck, navicular, as well as the fourth and fifth metatarsal shafts proximally. On postcontrast imaging, no evidence of necrotic or nonviable bone demonstrated 2.  Large ankle joint effusion/synovitis with complex appearing and multilocular phlegmon infiltrating through the lateral aspect of Kager's fat pad as well as the sinus tarsi.  Suspect infected calcaneocuboid articulation effusion given proximity to adjacent lateral skin wound  - NM bone scan pending      Anemia: Hgb 6.0 2/24  - Pt with h/o anemia likely due to chronic disease  - Transfused 2 units on 2/24  - Hgb 7.9      HTN  - Amlodipine      DM2  - Ispn9g=8.7  - SSI, Lantus   - Diabetic diet     DVT prophylaxis  - Heparin       JESSI Murphy-BC  [facility]  Pager:  [phone]  Office:  [phone]

## 2018-02-27 NOTE — PROGRESS NOTES
IDR Summary      Patient: Sherryle Care MRN: [id number]    Age: 45 y.o.  : 1980     Admit Diagnosis: Cellulitis of foot, right      Problems pertinent to hospital stay:     Consults:Case Management     Testing due for patient today? YES    Nutrition plan:Yes     Mobility needs: Yes      Lines/Tubes:   IV: YES   Needed: YES  Velásquez: NO  Needed:NO  Central Line: YES Needed: YES      VTE Prophylaxis: Chemical    Influenza Vaccine received? YES        Care Management:  Discharge plan: home   PCP: Deepti Ibrhaim PA-C    : NO  Financial concerns:No   Interventions:       LOS: 4 days     Expected days until discharge: TBD- awaiting bone scan results       Signed:      [facility]  Hospitalist Division  Pager:  [phone]  Office:  [phone]

## 2018-02-27 NOTE — PROGRESS NOTES
Problem: Falls - Risk of  Goal: *Absence of Falls  Document Kurtis Fall Risk and appropriate interventions in the flowsheet.    Outcome: Progressing Towards Goal  Fall Risk Interventions:  Mobility Interventions: Patient to call before getting OOB         Medication Interventions: Evaluate medications/consider consulting pharmacy, Patient to call before getting OOB    Elimination Interventions: Call light in reach, Patient to call for help with toileting needs, Toileting schedule/hourly rounds, Urinal in reach

## 2018-02-28 ENCOUNTER — ANESTHESIA (OUTPATIENT)
Dept: SURGERY | Age: 38
End: 2018-02-28

## 2018-02-28 ENCOUNTER — APPOINTMENT (OUTPATIENT)
Dept: NUCLEAR MEDICINE | Age: 38
DRG: 872 | End: 2018-02-28
Attending: PODIATRIST
Payer: SELF-PAY

## 2018-02-28 ENCOUNTER — ANESTHESIA EVENT (OUTPATIENT)
Dept: SURGERY | Age: 38
End: 2018-02-28

## 2018-02-28 LAB
ANION GAP SERPL CALC-SCNC: 8 MMOL/L (ref 3–18)
BASOPHILS # BLD: 0 K/UL (ref 0–0.06)
BASOPHILS NFR BLD: 0 % (ref 0–2)
BUN SERPL-MCNC: 14 MG/DL (ref 7–18)
BUN/CREAT SERPL: 15 (ref 12–20)
CALCIUM SERPL-MCNC: 8 MG/DL (ref 8.5–10.1)
CHLORIDE SERPL-SCNC: 109 MMOL/L (ref 100–108)
CO2 SERPL-SCNC: 25 MMOL/L (ref 21–32)
CREAT SERPL-MCNC: 0.93 MG/DL (ref 0.6–1.3)
DIFFERENTIAL METHOD BLD: ABNORMAL
EOSINOPHIL # BLD: 0.4 K/UL (ref 0–0.4)
EOSINOPHIL NFR BLD: 4 % (ref 0–5)
ERYTHROCYTE [DISTWIDTH] IN BLOOD BY AUTOMATED COUNT: 14.8 % (ref 11.6–14.5)
GLUCOSE BLD STRIP.AUTO-MCNC: 124 MG/DL (ref 70–110)
GLUCOSE BLD STRIP.AUTO-MCNC: 128 MG/DL (ref 70–110)
GLUCOSE BLD STRIP.AUTO-MCNC: 79 MG/DL (ref 70–110)
GLUCOSE BLD STRIP.AUTO-MCNC: 86 MG/DL (ref 70–110)
GLUCOSE SERPL-MCNC: 82 MG/DL (ref 74–99)
HCT VFR BLD AUTO: 26 % (ref 36–48)
HGB BLD-MCNC: 8.3 G/DL (ref 13–16)
LYMPHOCYTES # BLD: 1.8 K/UL (ref 0.9–3.6)
LYMPHOCYTES NFR BLD: 19 % (ref 21–52)
MCH RBC QN AUTO: 27.1 PG (ref 24–34)
MCHC RBC AUTO-ENTMCNC: 31.9 G/DL (ref 31–37)
MCV RBC AUTO: 85 FL (ref 74–97)
MONOCYTES # BLD: 0.8 K/UL (ref 0.05–1.2)
MONOCYTES NFR BLD: 8 % (ref 3–10)
NEUTS SEG # BLD: 6.5 K/UL (ref 1.8–8)
NEUTS SEG NFR BLD: 69 % (ref 40–73)
PLATELET # BLD AUTO: 447 K/UL (ref 135–420)
PMV BLD AUTO: 9.6 FL (ref 9.2–11.8)
POTASSIUM SERPL-SCNC: 4.2 MMOL/L (ref 3.5–5.5)
RBC # BLD AUTO: 3.06 M/UL (ref 4.7–5.5)
SODIUM SERPL-SCNC: 142 MMOL/L (ref 136–145)
WBC # BLD AUTO: 9.6 K/UL (ref 4.6–13.2)

## 2018-02-28 PROCEDURE — 82962 GLUCOSE BLOOD TEST: CPT

## 2018-02-28 PROCEDURE — 74011250636 HC RX REV CODE- 250/636: Performed by: INTERNAL MEDICINE

## 2018-02-28 PROCEDURE — 85025 COMPLETE CBC W/AUTO DIFF WBC: CPT | Performed by: NURSE PRACTITIONER

## 2018-02-28 PROCEDURE — 80048 BASIC METABOLIC PNL TOTAL CA: CPT | Performed by: NURSE PRACTITIONER

## 2018-02-28 PROCEDURE — 65270000029 HC RM PRIVATE

## 2018-02-28 PROCEDURE — 74011000258 HC RX REV CODE- 258: Performed by: NURSE PRACTITIONER

## 2018-02-28 PROCEDURE — 74011000258 HC RX REV CODE- 258: Performed by: INTERNAL MEDICINE

## 2018-02-28 PROCEDURE — 74011636637 HC RX REV CODE- 636/637: Performed by: INTERNAL MEDICINE

## 2018-02-28 PROCEDURE — 77030020253 HC SOL INJ D545NS .05 DEX .45 SAL

## 2018-02-28 PROCEDURE — 74011000250 HC RX REV CODE- 250: Performed by: INTERNAL MEDICINE

## 2018-02-28 PROCEDURE — 74011250636 HC RX REV CODE- 250/636: Performed by: HOSPITALIST

## 2018-02-28 PROCEDURE — 74011250637 HC RX REV CODE- 250/637: Performed by: HOSPITALIST

## 2018-02-28 PROCEDURE — 36415 COLL VENOUS BLD VENIPUNCTURE: CPT | Performed by: NURSE PRACTITIONER

## 2018-02-28 PROCEDURE — A9570 INDIUM IN-111 AUTO WBC: HCPCS

## 2018-02-28 RX ORDER — DEXTROSE MONOHYDRATE AND SODIUM CHLORIDE 5; .45 G/100ML; G/100ML
125 INJECTION, SOLUTION INTRAVENOUS CONTINUOUS
Status: DISCONTINUED | OUTPATIENT
Start: 2018-02-28 | End: 2018-03-01 | Stop reason: HOSPADM

## 2018-02-28 RX ADMIN — HEPARIN SODIUM 5000 UNITS: 5000 INJECTION, SOLUTION INTRAVENOUS; SUBCUTANEOUS at 09:02

## 2018-02-28 RX ADMIN — LOSARTAN POTASSIUM 50 MG: 50 TABLET ORAL at 09:03

## 2018-02-28 RX ADMIN — ASPIRIN 81 MG 81 MG: 81 TABLET ORAL at 09:03

## 2018-02-28 RX ADMIN — SODIUM CHLORIDE 750 MG: 900 INJECTION, SOLUTION INTRAVENOUS at 02:29

## 2018-02-28 RX ADMIN — HEPARIN SODIUM 5000 UNITS: 5000 INJECTION, SOLUTION INTRAVENOUS; SUBCUTANEOUS at 15:56

## 2018-02-28 RX ADMIN — MORPHINE SULFATE 2 MG: 4 INJECTION, SOLUTION INTRAMUSCULAR; INTRAVENOUS at 21:24

## 2018-02-28 RX ADMIN — DEXTROSE MONOHYDRATE AND SODIUM CHLORIDE 125 ML/HR: 5; .45 INJECTION, SOLUTION INTRAVENOUS at 11:29

## 2018-02-28 RX ADMIN — CEFEPIME HYDROCHLORIDE 2 G: 2 INJECTION, POWDER, FOR SOLUTION INTRAVENOUS at 09:09

## 2018-02-28 RX ADMIN — AMLODIPINE BESYLATE 5 MG: 5 TABLET ORAL at 09:03

## 2018-02-28 RX ADMIN — INSULIN GLARGINE 10 UNITS: 100 INJECTION, SOLUTION SUBCUTANEOUS at 09:03

## 2018-02-28 RX ADMIN — CEFEPIME HYDROCHLORIDE 2 G: 2 INJECTION, POWDER, FOR SOLUTION INTRAVENOUS at 01:46

## 2018-02-28 RX ADMIN — WATER 2 G: 1 INJECTION INTRAMUSCULAR; INTRAVENOUS; SUBCUTANEOUS at 15:56

## 2018-02-28 RX ADMIN — DAPTOMYCIN 403 MG: 500 INJECTION, POWDER, LYOPHILIZED, FOR SOLUTION INTRAVENOUS at 16:41

## 2018-02-28 NOTE — PROGRESS NOTES
IDR Summary      Patient: Giulia Braun MRN: [id number]    Age: 45 y.o.  : 1980     Admit Diagnosis: Cellulitis of foot, right  CELLULITIS      Problems pertinent to hospital stay: for right BKA    Consults:Case Management     Testing due for patient today? YES    Nutrition plan:Yes     Mobility needs: Yes      Lines/Tubes:   IV: YES   Needed: YES  Velásquez: NO  Needed:NO  Central Line: YES Needed: YES      VTE Prophylaxis: Chemical    Influenza Vaccine received? YES        Care Management:  Discharge plan: home   PCP: Ann Tejada PA-C    : NO  Financial concerns:No   Interventions:       LOS: 5 days     Expected days until discharge: TBD- for right BKA today      Signed:      NELSON BaileyC  [facility]  Hospitalist Division  Pager:  [phone]  Office:  [phone]

## 2018-02-28 NOTE — PROGRESS NOTES
Bedside and Verbal shift change report given to TITUS (oncoming nurse) by Joan Pisano (offgoing nurse). Report included the following information SBAR, Kardex and MAR. Pt is currently sleeping. Pt speaks very little english, blue translation phone is at the bedside.

## 2018-02-28 NOTE — PROGRESS NOTES
Infectious Disease Follow-up     Admit Date: 2/22/2018    Current abx Prior abx   Daptomycin , Ceftriaxone 2/26 -  0   Cefepime 2/23 - 6 Vancomycin 2/23 - 6       ASSESSMENT - > REC:      Osteomyelitis, R calcaneus/cuboid  - with draining sinus tract   - prior cultures grew MRSA  - MRI 2/24:sinus tract extending to calcaneocuboid articulation w/ contiguous and extensive marrow signal abnormality c/w osteomyelitis. Findings suggesting infected calcaneocuboid articulation were seen. - was scheduled for BKA today but now refusing -> dc Vancomycin  -> Daptomycin 6 mg/kg q 24 h and Ceftriaxone 2 gm q 24  x 8 weeks (patient aware this may not be successful in treating the osteomyelitis and may still need amputation after treatment)  -> PICC   BSI MRSA  - 1 of 2 blcx + 2/22  - from OM  - rpt blcx 2/26 NGTD x 2  - TTE 2/27: no vegetations -> abx change as above  -> OK for PICC     Recurrent foot infections  - s/p R 1st toe amp 2012, rx 4th/5th MT OM 2015  - s/p 11/1/2017 R foot excision of bone cortex, 5th MT w/ irrigation and debridement of R foot down to the bone. Pathology: acute osteomyelitis. Cultures grew MRA and Streptococcus mitis/oralis     DM     H/o noncompliance        MICROBIOLOGY:   2/22               blcx MRSA 1 of 2                       LINES AND CATHETERS:   piv      Active Hospital Problems    Diagnosis Date Noted    Cellulitis of foot, right 02/23/2018       Subjective: Interval notes reviewed. Had apparently agreed to [address] earlier and this was scheduled for this pm but now refuses when discussed with him via blue phone by MARTINA Scott. Wants to try IV abx and says if this does not work [place].     Current Facility-Administered Medications   Medication Dose Route Frequency    dextrose 5 % - 0.45% NaCl infusion  125 mL/hr IntraVENous CONTINUOUS    vancomycin (VANCOCIN) 750 mg in 0.9% sodium chloride (MBP/ADV) 250 mL ADV  750 mg IntraVENous Q12H    [START ON 3/1/2018] VANCOMYCIN INFORMATION NOTE   Other ONCE    0.9% sodium chloride infusion 250 mL  250 mL IntraVENous PRN    cefepime (MAXIPIME) 2 g in 0.9% sodium chloride (MBP/ADV) 100 mL MBP  2 g IntraVENous Q8H    morphine injection 2 mg  2 mg IntraVENous Q4H PRN    amLODIPine (NORVASC) tablet 5 mg  5 mg Oral DAILY    aspirin chewable tablet 81 mg  81 mg Oral DAILY    losartan (COZAAR) tablet 50 mg  50 mg Oral DAILY    pravastatin (PRAVACHOL) tablet 10 mg  10 mg Oral QHS    acetaminophen (TYLENOL) tablet 650 mg  650 mg Oral Q6H PRN    heparin (porcine) injection 5,000 Units  5,000 Units SubCUTAneous Q8H    insulin glargine (LANTUS) injection 10 Units  10 Units SubCUTAneous DAILY    insulin lispro (HUMALOG) injection   SubCUTAneous AC&HS    glucose chewable tablet 16 g  4 Tab Oral PRN    glucagon (GLUCAGEN) injection 1 mg  1 mg IntraMUSCular PRN    dextrose (D50W) injection syrg 12.5-25 g  25-50 mL IntraVENous PRN    VANCOMYCIN INFORMATION NOTE   Other Rx Dosing/Monitoring         Objective:     Visit Vitals    /72    Pulse 67    Temp 98.8 °F (37.1 °C)    Resp 18    Ht 5' 2\" (1.575 m)    Wt 67.1 kg (147 lb 14.9 oz)    SpO2 98%    BMI 27.06 kg/m2       Temp (24hrs), Av.4 °F (36.9 °C), Min:97.8 °F (36.6 °C), Max:98.8 °F (37.1 °C)      General: Well developed, well nourished 45 y.o.  male in no acute distress. HEENT: no scleral icterus, no oral lesions  Neck: supple, symmetrical, trachea midline   Cardio:  regular rate and rhythm, S1, S2 normal, no murmur, click, rub or gallop  Lungs: clear to auscultation, no wheezes or rales and unlabored breathing  Abdomen: soft, non-tender. Bowel sounds normal. No masses, no organomegaly.   Extremities:  no redness or tenderness in the calves or thighs, no edema, right foot missing 1st toe, has healed surgical incisions on lateral aspect with small crusted wound in dorsolateral aspect of hindfoot, currently not draining    Labs: Results:   Chemistry Recent Labs      02/28/18   0345  02/27/18   0350  02/26/18   0450   GLU  82  82  77   NA  142  141  141   K  4.2  4.1  4.0   CL  109*  111*  111*   CO2  25  22  20*   BUN  14  15  17   CREA  0.93  0.90  1.00   CA  8.0*  7.8*  7.6*   AGAP  8  8  10   BUCR  15  17  17   AP   --    --   171*   TP   --    --   6.4   ALB   --    --   1.2*   GLOB   --    --   5.2*   AGRAT   --    --   0.2*      CBC w/Diff Recent Labs      02/28/18   0345  02/26/18   0450   WBC  9.6  11.0   RBC  3.06*  2.81*   HGB  8.3*  7.9*   HCT  26.0*  23.7*   PLT  447*  392   GRANS  69  72   LYMPH  19*  18*   EOS  4  3            Microbiology Results  Recent Labs      02/26/18   0905  02/26/18   0845   CULT  NO GROWTH 2 DAYS  NO GROWTH 2 DAYS       Jazzmine Dow MD  February 28, 2018  [facility] AT THE [facility]  [phone]

## 2018-02-28 NOTE — PROGRESS NOTES
Chart reviewed. Transition plan remains home when medically stable. Please try to utilize $4 [employer] drug list @ discharge, if possible. Pt has been helped may times with med assist, will not be able to assist on this admission. Will cont to follow for needs. Radhika HassanRN,ext. [phone].

## 2018-02-28 NOTE — ANESTHESIA PREPROCEDURE EVALUATION
Anesthetic History   No history of anesthetic complications            Review of Systems / Medical History  Patient summary reviewed and pertinent labs reviewed    Pulmonary  Within defined limits                 Neuro/Psych   Within defined limits           Cardiovascular    Hypertension: poorly controlled              Exercise tolerance: >4 METS     GI/Hepatic/Renal  Within defined limits              Endo/Other    Diabetes: poorly controlled, type 2         Other Findings                   Anesthesia Plan

## 2018-02-28 NOTE — PROGRESS NOTES
[facility]  Hospitalist Division        Inpatient Daily Progress Note    Daily progress Note    Patient: Isabel Smiley MRN: [id number]  CSN: [id number]    YOB: 1980  Age: 45 y.o. Sex: male    DOA: 2/22/2018 LOS:  LOS: 5 days                    Chief Complaint:     Interval History:    Yonatan Burgos a 45 y. o. male who is a Korean speaking -- who presented to the ER with c/o Right leg swelling and Right foot pain when he walks as well as dainage from Right foot. Pt mentioned the swelling and drainage started 2-3 days prior to ED presentation and the pain had been increasing. Patient reported that he had right foot surgery a few months ago but was not able to specify why. Review of records everywhere indicated h/o osteomyelitis of ankle, diabetes, gas gangrene, hypertension and anemia. In the ER, xray of Right ankle c/w Right calcaneus, cuboid, and base of the fifth metatarsal osteomyelitis . Patient was started on cefepime and vanc and admitted for ongoing management. .Blood cx c/w MRSA. Podiatry following. MRI Right foot Lateral mid foot soft tissue wound with sinus tract extending to the calcaneocuboid articulation. There is contiguous and extensive marrow signal abnormality compatible with osteomyelitis involving the majority of the cuboid, calcaneus, inferior talar head and neck, navicular, as well as the fourth and fifth metatarsal shafts proximally. On postcontrast imaging, no evidence of necrotic or nonviable bone demonstrated. 2. Large ankle joint effusion/synovitis with complex appearing and multilocular phlegmon infiltrating through the lateral aspect of Kager's fat pad as well as the sinus tarsi. Suspect infected calcaneocuboid articulation effusion given proximity to adjacent lateral skin wound. NM bone scan results pending. ID consulted.  Discussed patient with Dr. Inderjit Hoffman this morning and decision to proceed with Vascular consult for right BKA deemed appropriate. Vascular Surgery consulted. Dr. Maggi Harris discussed Right BKA with patient and he was agreeable. Patient then went for completion of NM and upon returning- NP Alice used [employer] phone,  #[id number], to further discuss Right BKA. During conversation via  phone patient then refused to proceed with amputation. Patient requested IV antibiotics and it was explained that IV treatment is not guaranteed and patient may ultimately require amputation. Patient verbalized understanding and stated he would make arrangements to return to [place] any further treatment.        Subjective:     Concerned about right BKA. Asking to eat as he is now refusing surgical intervention     Objective:      Visit Vitals    /72    Pulse 67    Temp 98.8 °F (37.1 °C)    Resp 18    Ht 5' 2\" (1.575 m)    Wt 67.1 kg (147 lb 14.9 oz)    SpO2 98%    BMI 27.06 kg/m2         Physical Exam:  General appearance: alert, cooperative, no distress, appears stated age  Lungs: clear to auscultation bilaterally, no wheezes   Heart: regular rate and rhythm, S1, S2 normal, no murmur, click, rub or gallop  Abdomen: soft, non tender, non distended. Active bowel sounds   Extremities: extremities normal, atraumatic, no cyanosis. RLE swollen   Skin: Skin color, texture, turgor normal. No rashes or lesions  Neurologic: moves all 4 extremities   PSY: appropriately behaved      Intake and Output:  Current Shift:  02/28 0701 - 02/28 1900  In: 960 [P.O.:960]  Out: 600 [Urine:600]  Last three shifts:  02/26 1901 - 02/28 0700  In: 5618.3 [P.O.:1200;  I.V.:4418.3]  Out: 3660 [Urine:3660]    Recent Results (from the past 24 hour(s))   GLUCOSE, POC    Collection Time: 02/27/18  8:38 PM   Result Value Ref Range    Glucose (POC) 134 (H) 70 - 110 mg/dL   CBC WITH AUTOMATED DIFF    Collection Time: 02/28/18  3:45 AM   Result Value Ref Range    WBC 9.6 4.6 - 13.2 K/uL    RBC 3.06 (L) 4.70 - 5.50 M/uL    HGB 8.3 (L) 13.0 - 16.0 g/dL    HCT 26.0 (L) 36.0 - 48.0 %    MCV 85.0 74.0 - 97.0 FL    MCH 27.1 24.0 - 34.0 PG    MCHC 31.9 31.0 - 37.0 g/dL    RDW 14.8 (H) 11.6 - 14.5 %    PLATELET 881 (H) 392 - 420 K/uL    MPV 9.6 9.2 - 11.8 FL    NEUTROPHILS 69 40 - 73 %    LYMPHOCYTES 19 (L) 21 - 52 %    MONOCYTES 8 3 - 10 %    EOSINOPHILS 4 0 - 5 %    BASOPHILS 0 0 - 2 %    ABS. NEUTROPHILS 6.5 1.8 - 8.0 K/UL    ABS. LYMPHOCYTES 1.8 0.9 - 3.6 K/UL    ABS. MONOCYTES 0.8 0.05 - 1.2 K/UL    ABS. EOSINOPHILS 0.4 0.0 - 0.4 K/UL    ABS. BASOPHILS 0.0 0.0 - 0.06 K/UL    DF AUTOMATED     METABOLIC PANEL, BASIC    Collection Time: 02/28/18  3:45 AM   Result Value Ref Range    Sodium 142 136 - 145 mmol/L    Potassium 4.2 3.5 - 5.5 mmol/L    Chloride 109 (H) 100 - 108 mmol/L    CO2 25 21 - 32 mmol/L    Anion gap 8 3.0 - 18 mmol/L    Glucose 82 74 - 99 mg/dL    BUN 14 7.0 - 18 MG/DL    Creatinine 0.93 0.6 - 1.3 MG/DL    BUN/Creatinine ratio 15 12 - 20      GFR est AA >60 >60 ml/min/1.73m2    GFR est non-AA >60 >60 ml/min/1.73m2    Calcium 8.0 (L) 8.5 - 10.1 MG/DL   GLUCOSE, POC    Collection Time: 02/28/18  7:32 AM   Result Value Ref Range    Glucose (POC) 86 70 - 110 mg/dL   GLUCOSE, POC    Collection Time: 02/28/18 11:06 AM   Result Value Ref Range    Glucose (POC) 124 (H) 70 - 110 mg/dL           Lab Results   Component Value Date/Time    Glucose 82 02/28/2018 03:45 AM    Glucose 82 02/27/2018 03:50 AM    Glucose 77 02/26/2018 04:50 AM    Glucose 79 02/25/2018 05:53 AM    Glucose 98 02/24/2018 05:40 AM        Assessment/Plan:     Patient Active Problem List   Diagnosis Code    Cellulitis of left knee L03. 80    DM (diabetes mellitus), secondary uncontrolled (HCC) E13.65    Abscess of bursa, left knee M71.062    Sepsis ([facility] [address] A41.9    Abscess of right thigh L02.415    Cellulitis L03.90    Cellulitis and abscess L03.90, L02.91    Diabetic foot ulcer (HCC) E11.621, L97.509    Acute renal injury ([facility] N17.9    Hypertension I10    Iron deficiency anemia D50.9    Anemia D64.9    SAVANNA (acute kidney injury) ([address] N17.9    DM (diabetes mellitus) ([facility]) E11.9    Cellulitis of foot, right L03.115       Care Plan  Sepsis:elevated wbc, bacteremia, elevated temp  - Blood cx c/w MRSA. Patient has h/o wound cx with MRSA rt foot  - Change to Daptomycin, Rocephin       Bacteremia MRSA  - Antibiotics as above per ID      Osteomyelitis rt ankle/cellulitis/Small open wound  -Xray c/w progressive erosive changes involving the anterior aspect of the calcaneus, cuboid, and base of the fifth metatarsal compatible with sequela of osteomyelitis   -Podiatry following appreciate assistance   -MRI of Right foot- 1. Lateral mid foot soft tissue wound with sinus tract extending to the calcaneocuboid articulation. There is contiguous and extensive marrow signal abnormality compatible with osteomyelitis involving the majority of the cuboid, calcaneus, inferior talar head and neck, navicular, as well as the fourth and fifth metatarsal shafts proximally. On postcontrast imaging, no evidence of necrotic or nonviable bone demonstrated 2. Large ankle joint effusion/synovitis with complex appearing and multilocular phlegmon infiltrating through the lateral aspect of Kager's fat pad as well as the sinus tarsi.  Suspect infected calcaneocuboid articulation effusion given proximity to adjacent lateral skin wound  - NM bone scan pending   - Consulted Vascular Surgery for Right BKA today- patient is refusing amputation and has requested antibiotics and verbalized understanding treatment is not guaranteed      Anemia:   - Stable H/H     HTN  - Well controlled with Amlodipine      DM2  - Rshg8c=3.7  - SSI, Lantus   - Diabetic diet     DVT prophylaxis  - Heparin     Dispo: Planning for discharge on 3/1 with outpatient IV antibiotics through infusion center         ELIN Tabares [address]  Pager:  [phone]  Office:  [phone]

## 2018-02-28 NOTE — PROGRESS NOTES
Vascular Surgery    Pt has told IM that he refuses any surgery. I will sign off and be available.     Nitin Ceja MD, FACS

## 2018-02-28 NOTE — ROUTINE PROCESS
1920 Bedside and Verbal shift change report given to VIRGIL Jhaveri RN (oncoming nurse) by Zaria Juárez RN (offgoing nurse). Report included the following information SBAR, Kardex, Intake/Output, MAR and Recent Results.

## 2018-02-28 NOTE — PROGRESS NOTES
NUTRITION follow-up/Plan of care    RECOMMENDATIONS:     1. Resume diabetic diet when medically indicated  2. Monitor weight, labs and PO intake  3. RD to follow    GOALS:     1. Met/Ongoing: PO intake meets >75% of protein/calorie needs by 3/5  2. Met/Ongoing: Maintain weight (+/- 1-2 lb by 3/5)    ASSESSMENT:     Weight status is classified as overweight per BMI of 27. PO intake appears adequate. Labs noted. BG range from  over past 24 hours. Nutrition recommendations listed. RD to follow. Nutrition Risk:  []   High [x]  Moderate [] Low    SUBJECTIVE/OBJECTIVE:     Pt admitted with cellulitis right foot, ARF, anemia and PMHx of foot infection, T2DM. Patient with a good appetite and weight stable PTA. Currently NPO for procedure (right BKA). Patient eating well (100% of meals) prior to NPO status. Will monitor. Information Obtained From:   [x] Chart Review  [x] Patient  [] Family/Caregiver  [x] Nurse/Physician   [] Patient Rounds/Interdisciplinary Meeting    Diet: NPO  Patient Vitals for the past 100 hrs:   % Diet Eaten   02/27/18 1838 100 %   02/27/18 1300 100 %   02/27/18 1230 100 %   02/25/18 1922 100 %   02/25/18 1300 100 %   02/25/18 0930 100 %     Medications: [x] Reviewed   Encounter Diagnoses     ICD-10-CM ICD-9-CM   1. Cellulitis of right ankle L03.115 682.6   2. Anemia of acute infection D64.9 285.9   3. IDDM (insulin dependent diabetes mellitus) ([facility]) E11.9 250.00    Z79.4 V58.67   4.  Acute kidney injury ([facility] N17.9 584.9     Past Medical History:   Diagnosis Date    Cellulitis     rt. foot    Diabetes ([facility]     Osteomyelitis ([facility]     rt toe    Other ill-defined conditions(799.89)      Labs:  Lab Results   Component Value Date/Time    Sodium 142 02/28/2018 03:45 AM    Potassium 4.2 02/28/2018 03:45 AM    Chloride 109 (H) 02/28/2018 03:45 AM    CO2 25 02/28/2018 03:45 AM    Anion gap 8 02/28/2018 03:45 AM    Glucose 82 02/28/2018 03:45 AM    BUN 14 02/28/2018 03:45 AM    Creatinine 0.93 02/28/2018 03:45 AM    Calcium 8.0 (L) 02/28/2018 03:45 AM    Magnesium 1.7 (L) 06/13/2013 07:30 AM    Phosphorus 5.0 (H) 06/21/2016 11:00 AM    Albumin 1.2 (L) 02/26/2018 04:50 AM     Anthropometrics: BMI (calculated): 27 Last 3 Recorded Weights in this Encounter    02/26/18 0529 02/27/18 0356 02/28/18 0326   Weight: 68.5 kg (151 lb 0.2 oz) 68.5 kg (151 lb) 67.1 kg (147 lb 14.9 oz)    Ht Readings from Last 1 Encounters:   02/28/18 5' 2\" (1.575 m)     []  Weight Loss  []  Weight Gain  [x]  Weight Stable   []  New wt n/a on record     Estimated Nutrition Needs:   1938 Kcals/day  Protein (g): 88 g    Nutrition Problems Identified:   [] Suboptimal PO intake   [] Food Allergies  [] Difficulty chewing/swallowing/poor dentition  [] Constipation/Diarrhea   [] Nausea/Vomiting   [] None  [x] Other: Wound healing    Plan:   [x] Therapeutic Diet  []  Obtained/adjusted food preferences/tolerances and/or snacks options   []  Supplements added   [] Occupational therapy following for feeding techniques  []  HS snack added   []  Modify diet texture   []  Modify diet for food allergies   []  Assist with menu selection   [x]  Monitor PO intake on meal rounds   [x]  Continue inpatient monitoring and intervention   []  Participated in discharge planning/Interdisciplinary rounds/Team meetings   []  Other:     Education Needs:   [] Not appropriate for teaching at this time due to:   [x] Identified and addressed ( has been educated on diabetic diet)    Nutrition Monitoring and Evaluation:   [x] Continue inpatient monitoring and interventions    [] Other:     Terrance Flowers

## 2018-02-28 NOTE — PROGRESS NOTES
Bedside shift report received from [facility]: Basim Frank, on room air, resting in bed, IVF infusing well; shift assessment completed,; encouraged to call  Nurse with needs at all times    2015: Morphine 2 mg IV given as requested for right foot pain- see flowsheet    2100: resting in bed with eyes closed; no behavioral indicators for pain noted at this time    2200: due meds given; ; no coverage given    0000: sleeping comfortably    0200: sleeping; IVF infusing well    0500: sleeping; no apparent distress noted    0700: resting comfortably    Bedside and Verbal shift change report given to Torri Redmond (oncoming nurse) by Dorian Crigler RN (offgoing nurse). Report included the following information SBAR, Kardex, Intake/Output and Recent Results.

## 2018-02-28 NOTE — CONSULTS
Podiatry Progress NOTE    Subjective:         Patient resting. NAD. No complaints to the right foot. Tolerated bone scan without issue. Patient Active Problem List    Diagnosis Date Noted    Cellulitis of foot, right 02/23/2018    Anemia 09/12/2017    SAVANNA (acute kidney injury) ([address] 09/12/2017    DM (diabetes mellitus) ([address] 09/12/2017    Iron deficiency anemia 05/21/2015    Hypertension 05/19/2015    Acute renal injury ([address] 05/18/2015    Diabetic foot ulcer ([address] 05/16/2015    Cellulitis 11/21/2014    Cellulitis and abscess 11/21/2014    Abscess of right thigh 07/15/2014    Sepsis ([address] 07/14/2014    Abscess of bursa, left knee 06/19/2013    Cellulitis of left knee 06/15/2013    DM (diabetes mellitus), secondary uncontrolled ([address] 06/15/2013     Past Medical History:   Diagnosis Date    Cellulitis     rt. foot    Diabetes ([address]     Osteomyelitis (HCC)     rt toe    Other ill-defined conditions(799.89)       Past Surgical History:   Procedure Laterality Date    HX ORTHOPAEDIC      rt.toe      History reviewed. No pertinent family history.    Social History   Substance Use Topics    Smoking status: Heavy Tobacco Smoker     Packs/day: 0.50    Smokeless tobacco: Never Used    Alcohol use No     Current Facility-Administered Medications   Medication Dose Route Frequency Provider Last Rate Last Dose    dextrose 5 % - 0.45% NaCl infusion  125 mL/hr IntraVENous CONTINUOUS Harpal Triston,  mL/hr at 02/28/18 1129 125 mL/hr at 02/28/18 1129    DAPTOmycin (CUBICIN) 403 mg in 0.9% sodium chloride 50 mL IVPB  6 mg/kg IntraVENous Q24H Dinh Nicole MD   403 mg at 02/28/18 1641    cefTRIAXone (ROCEPHIN) 2 g in sterile water (preservative free) 20 mL IV syringe  2 g IntraVENous Q24H Dinh Nicole MD   2 g at 02/28/18 1556    [START ON 3/1/2018] VANCOMYCIN INFORMATION NOTE   Other ONCE Dinh Nicole MD        0.9% sodium chloride infusion 250 mL  250 mL IntraVENous PRN Nsekenene Subhash Daly MD 15 mL/hr at 02/24/18 1214 250 mL at 02/24/18 1214    morphine injection 2 mg  2 mg IntraVENous Q4H PRN Elzbieta Oliva MD   2 mg at 02/27/18 2015    amLODIPine (NORVASC) tablet 5 mg  5 mg Oral DAILY Jessy Nunez MD   5 mg at 02/28/18 6278    aspirin chewable tablet 81 mg  81 mg Oral DAILY Jessy Nunez MD   81 mg at 02/28/18 3567    losartan (COZAAR) tablet 50 mg  50 mg Oral DAILY Jessy Nunez MD   50 mg at 02/28/18 6367    acetaminophen (TYLENOL) tablet 650 mg  650 mg Oral Q6H PRN Jessy uNnez MD   650 mg at 02/24/18 0401    heparin (porcine) injection 5,000 Units  5,000 Units SubCUTAneous Q8H Jessy Nunez MD   5,000 Units at 02/28/18 1556    insulin glargine (LANTUS) injection 10 Units  10 Units SubCUTAneous DAILY Elzbieta Oliva MD   10 Units at 02/28/18 8977    insulin lispro (HUMALOG) injection   SubCUTAneous AC&HS Elzbieta Oliva MD   Stopped at 02/27/18 1630    glucose chewable tablet 16 g  4 Tab Oral PRN Elzbieta Oliva MD        glucagon (GLUCAGEN) injection 1 mg  1 mg IntraMUSCular PRN Elzbieta Oliva MD        dextrose (D50W) injection syrg 12.5-25 g  25-50 mL IntraVENous PRN Elzbieta Oliva MD        VANCOMYCIN INFORMATION NOTE   Other Rx Dosing/Monitoring Comfort Nguyen MD          No Known Allergies     Review of Systems:  Other than the above HPI;       General: denies chronic fatigue, weight loss, fever, anemia, bruising, depression, nervousness, panic attacks  HEENT: denies ringing in ears, ear infections, dizzy spells,  sinus trouble, hoarseness, eye infections  GI: denies diarrhea, gas, bloating, heartburn, regurgitation, difficulty swallowing, painful swallowing, nausea, vomiting, constipation, abdominal pain, decreased appetite, blood in stools, black stools, jaundice, dark urine  Lungs: denies pneumonia, asthma, cough, SOB, hemoptysis  Heart: denies chest pain, irregular heart beat, ankle swelling, HTN  Skin: denies rashes, hives, allergic reaction  Urinary: denies UTI, kidney stones, decreased urine force and flow, urination at night, blood in urine, painful urination  Bones and Joints: denies arthritis, rheumatism, back pain, gout, osteoporosis  Neurologic: denies stroke, seizures, headaches, numbness, tingling    Objective:     Patient Vitals for the past 8 hrs:   BP Temp Pulse Resp SpO2   18 1500 131/68 98.7 °F (37.1 °C) 69 18 98 %   18 1151 126/72 98.8 °F (37.1 °C) 67 18 98 %     Temp (24hrs), Av.5 °F (36.9 °C), Min:98.2 °F (36.8 °C), Max:98.8 °F (37.1 °C)      Physical Exam:    Maira Garcia  is a 45 y.o. male who is pleasant, alert and oriented x3, in no apparent distress, and looks their given age. Patient is well-developed and nourished, with good attention to hygiene and body habitus. Mood and affect normal, appropriate to situation.      Upon inspection and palpation of the right foot. Right foot There is diffuse decreasing edema and erythema with mild increase in temperature.  There is noted prior amputation sites and eschar sub lateral metatarsal head. Pinpoint opening along prior amp incision      All dorsal incision well healed.       Vascular Exam:   Dorsalis Pedis 1/4 and Posterior Tibial trace RIGHT and Dorsalis Pedis 1/4 and Posterior Tibial trace LEFT . Skin temp warm to warm. Pedal hair is absent. There is diffuse edema.     Neurological Exam:   Light touch protective sensation is absent with Loss of protective sensation. No tinel's sign of the major nerves crossing the ankle.      Musculoskeletal Exam:    Muscle tone is normal for age and his given situation. The muscle strength is - 5/5 for the flexors, extensors, inverters, and everter's.      Dermatological Exam:    Skin is of abnormal texture and turgor with atrophic skin changes noting decreased hair growth, pigmentary changes, skin texture (thin,shiney), skin color (rubor, red) .  There is noted pressure poin plantar lateral to the right 5th metatarsal head with hypertrophic thicken tissue. There is diffuse xerosis bilateral.     Radiographic Exam: (see report for details)  Suggestive osteomyelitis    Wound Presentation:  Wound Toe Left (Active)   Number of days:1189       Wound Foot Right;Plantar (Active)   Number of days:1014       Wound Foot Right (Active)   Number of days:1012       Wound Foot Right (Active)   Number of days:666       Wound Foot Right (Active)   Number of days:164       Wound Foot Right;Lateral (Active)   Number of days:1            Lab Review:   Recent Results (from the past 24 hour(s))   GLUCOSE, POC    Collection Time: 02/27/18  8:38 PM   Result Value Ref Range    Glucose (POC) 134 (H) 70 - 110 mg/dL   CBC WITH AUTOMATED DIFF    Collection Time: 02/28/18  3:45 AM   Result Value Ref Range    WBC 9.6 4.6 - 13.2 K/uL    RBC 3.06 (L) 4.70 - 5.50 M/uL    HGB 8.3 (L) 13.0 - 16.0 g/dL    HCT 26.0 (L) 36.0 - 48.0 %    MCV 85.0 74.0 - 97.0 FL    MCH 27.1 24.0 - 34.0 PG    MCHC 31.9 31.0 - 37.0 g/dL    RDW 14.8 (H) 11.6 - 14.5 %    PLATELET 480 (H) 635 - 420 K/uL    MPV 9.6 9.2 - 11.8 FL    NEUTROPHILS 69 40 - 73 %    LYMPHOCYTES 19 (L) 21 - 52 %    MONOCYTES 8 3 - 10 %    EOSINOPHILS 4 0 - 5 %    BASOPHILS 0 0 - 2 %    ABS. NEUTROPHILS 6.5 1.8 - 8.0 K/UL    ABS. LYMPHOCYTES 1.8 0.9 - 3.6 K/UL    ABS. MONOCYTES 0.8 0.05 - 1.2 K/UL    ABS. EOSINOPHILS 0.4 0.0 - 0.4 K/UL    ABS.  BASOPHILS 0.0 0.0 - 0.06 K/UL    DF AUTOMATED     METABOLIC PANEL, BASIC    Collection Time: 02/28/18  3:45 AM   Result Value Ref Range    Sodium 142 136 - 145 mmol/L    Potassium 4.2 3.5 - 5.5 mmol/L    Chloride 109 (H) 100 - 108 mmol/L    CO2 25 21 - 32 mmol/L    Anion gap 8 3.0 - 18 mmol/L    Glucose 82 74 - 99 mg/dL    BUN 14 7.0 - 18 MG/DL    Creatinine 0.93 0.6 - 1.3 MG/DL    BUN/Creatinine ratio 15 12 - 20      GFR est AA >60 >60 ml/min/1.73m2    GFR est non-AA >60 >60 ml/min/1.73m2    Calcium 8.0 (L) 8.5 - 10.1 MG/DL   GLUCOSE, POC    Collection Time: 02/28/18  7:32 AM   Result Value Ref Range    Glucose (POC) 86 70 - 110 mg/dL   GLUCOSE, POC    Collection Time: 02/28/18 11:06 AM   Result Value Ref Range    Glucose (POC) 124 (H) 70 - 110 mg/dL   GLUCOSE, POC    Collection Time: 02/28/18  5:34 PM   Result Value Ref Range    Glucose (POC) 79 70 - 110 mg/dL       Impression:     Right foot widespread osteomyelitis     Recommendation:     ESR, MRI, and bone scan positive for widespread osteomyelitis of the remaining mid/hindfoot. Recommend BKA given location of the infection. Discussed with patient and showed patient images of prosthetic devices. Will sign off. Thank you for consult.

## 2018-02-28 NOTE — MED STUDENT NOTES
Progress Note    Patient: Torin Hidalgo MRN: [id number]  SSN: [id number]    YOB: 1980  Age: 45 y.o. Sex: male      Admit Date: 2/22/2018    LOS: 5 days       Subjective:     Torin Hidalgo is a 45 y.o. male who is here due to osteomyelitis of right foot. Today he states that he is feeling well, denies fevers or chills, and he states that he is not having much pain in his foot. Eating well, normal BM and urination. He denies abdominal pain, nausea, vomiting, or stool changes. PMH:   Hypertension  Diabetes Mellitus  Anemia    Objective:     Physical Exam:   Vitals:    02/27/18 1939 02/27/18 2338 02/28/18 0326 02/28/18 0730   BP: 135/84 122/74 130/81 124/88   Pulse: 64 67 62 66   Resp: 20 20 20 18   Temp: 98.4 °F (36.9 °C) 98.2 °F (36.8 °C) 98.4 °F (36.9 °C) 98.6 °F (37 °C)   SpO2: 97% 96% 96% 99%   Weight:   147 lb 14.9 oz (67.1 kg)    Height:   5' 2\" (1.575 m)       GENERAL: alert, cooperative, no distress, appears stated age  LUNG: clear to auscultation bilaterally  HEART: regular rate and rhythm, S1, S2 normal, no murmur, click, rub or gallop  ABDOMEN: soft, non-tender. Bowel sounds normal. No masses,  no organomegaly  EXTREMITIES: warmth without erythema of right lower leg and foot, non-tender to palpation of right foot, no active open site of infection or bleeding. Intake and Output:  Current Shift:    Last three shifts: 02/26 1901 - 02/28 0700  In: 5618.3 [P.O.:1200;  I.V.:4418.3]  Out: 3660 [Urine:3660]      Lab/Data Review:  CMP:   Lab Results   Component Value Date/Time     02/28/2018 03:45 AM    K 4.2 02/28/2018 03:45 AM     (H) 02/28/2018 03:45 AM    CO2 25 02/28/2018 03:45 AM    AGAP 8 02/28/2018 03:45 AM    GLU 82 02/28/2018 03:45 AM    BUN 14 02/28/2018 03:45 AM    CREA 0.93 02/28/2018 03:45 AM    GFRAA >60 02/28/2018 03:45 AM    GFRNA >60 02/28/2018 03:45 AM    CA 8.0 (L) 02/28/2018 03:45 AM     CBC:   Lab Results   Component Value Date/Time    WBC 9.6 02/28/2018 03:45 AM    HGB 8.3 (L) 02/28/2018 03:45 AM    HCT 26.0 (L) 02/28/2018 03:45 AM     (H) 02/28/2018 03:45 AM     Recent Glucose Results:   Lab Results   Component Value Date/Time    GLU 82 02/28/2018 03:45 AM      Blood culture showed positive MRSA growth. Assessment:   1) Osteomyelitis of foot  2) Bacteremia: MRSA positive   3) Anemia  4) Hypertension  5) Diabetes Mellitus    Plan:   1) Osteomyelitis of foot: NM bone scan of foot yesterday with results pending, podiatry following for eval for surgery  2) Bacteremia: continue vancomycin, monitor for septic signs  3) Anemia: likely due to chronic disease, Hbg rising, monitor for drop below 6.0 to transfuse  4) Hypertension: continue medication as directed  5) Diabetes mellitus: continue medication as directed    Signed By: Chris Tam     February 28, 2018      *ATTENTION:  This note has been created by a medical student for educational purposes only. Please do not refer to the content of this note for clinical decision-making, billing, or other purposes. Please see attending physicians note to obtain clinical information on this patient. *

## 2018-02-28 NOTE — PROGRESS NOTES
Problem: Falls - Risk of  Goal: *Absence of Falls  Document Kurtis Fall Risk and appropriate interventions in the flowsheet.    Outcome: Progressing Towards Goal  Fall Risk Interventions:  Mobility Interventions: Patient to call before getting OOB         Medication Interventions: Evaluate medications/consider consulting pharmacy    Elimination Interventions: Call light in reach

## 2018-03-01 VITALS
HEART RATE: 60 BPM | SYSTOLIC BLOOD PRESSURE: 122 MMHG | TEMPERATURE: 98.4 F | DIASTOLIC BLOOD PRESSURE: 73 MMHG | RESPIRATION RATE: 18 BRPM | BODY MASS INDEX: 26.53 KG/M2 | HEIGHT: 62 IN | WEIGHT: 144.18 LBS | OXYGEN SATURATION: 97 %

## 2018-03-01 LAB
ANION GAP SERPL CALC-SCNC: 7 MMOL/L (ref 3–18)
BACTERIA SPEC CULT: NORMAL
BASOPHILS # BLD: 0 K/UL (ref 0–0.06)
BASOPHILS NFR BLD: 0 % (ref 0–2)
BUN SERPL-MCNC: 12 MG/DL (ref 7–18)
BUN/CREAT SERPL: 14 (ref 12–20)
CALCIUM SERPL-MCNC: 7.6 MG/DL (ref 8.5–10.1)
CHLORIDE SERPL-SCNC: 106 MMOL/L (ref 100–108)
CO2 SERPL-SCNC: 27 MMOL/L (ref 21–32)
CREAT SERPL-MCNC: 0.85 MG/DL (ref 0.6–1.3)
DIFFERENTIAL METHOD BLD: ABNORMAL
EOSINOPHIL # BLD: 0.3 K/UL (ref 0–0.4)
EOSINOPHIL NFR BLD: 3 % (ref 0–5)
ERYTHROCYTE [DISTWIDTH] IN BLOOD BY AUTOMATED COUNT: 14.6 % (ref 11.6–14.5)
GLUCOSE BLD STRIP.AUTO-MCNC: 129 MG/DL (ref 70–110)
GLUCOSE BLD STRIP.AUTO-MCNC: 98 MG/DL (ref 70–110)
GLUCOSE SERPL-MCNC: 88 MG/DL (ref 74–99)
HCT VFR BLD AUTO: 26.8 % (ref 36–48)
HGB BLD-MCNC: 8.8 G/DL (ref 13–16)
LYMPHOCYTES # BLD: 1.8 K/UL (ref 0.9–3.6)
LYMPHOCYTES NFR BLD: 17 % (ref 21–52)
MCH RBC QN AUTO: 27.7 PG (ref 24–34)
MCHC RBC AUTO-ENTMCNC: 32.8 G/DL (ref 31–37)
MCV RBC AUTO: 84.3 FL (ref 74–97)
MONOCYTES # BLD: 0.8 K/UL (ref 0.05–1.2)
MONOCYTES NFR BLD: 8 % (ref 3–10)
NEUTS SEG # BLD: 7.6 K/UL (ref 1.8–8)
NEUTS SEG NFR BLD: 72 % (ref 40–73)
PLATELET # BLD AUTO: 484 K/UL (ref 135–420)
PMV BLD AUTO: 10.2 FL (ref 9.2–11.8)
POTASSIUM SERPL-SCNC: 3.9 MMOL/L (ref 3.5–5.5)
RBC # BLD AUTO: 3.18 M/UL (ref 4.7–5.5)
SERVICE CMNT-IMP: NORMAL
SODIUM SERPL-SCNC: 140 MMOL/L (ref 136–145)
WBC # BLD AUTO: 10.5 K/UL (ref 4.6–13.2)

## 2018-03-01 PROCEDURE — C1751 CATH, INF, PER/CENT/MIDLINE: HCPCS

## 2018-03-01 PROCEDURE — 74011636637 HC RX REV CODE- 636/637: Performed by: INTERNAL MEDICINE

## 2018-03-01 PROCEDURE — 85025 COMPLETE CBC W/AUTO DIFF WBC: CPT | Performed by: NURSE PRACTITIONER

## 2018-03-01 PROCEDURE — 77030018719 HC DRSG PTCH ANTIMIC J&J -A

## 2018-03-01 PROCEDURE — 77030018786 HC NDL GD F/USND BARD -B

## 2018-03-01 PROCEDURE — 76937 US GUIDE VASCULAR ACCESS: CPT | Performed by: HOSPITALIST

## 2018-03-01 PROCEDURE — 36415 COLL VENOUS BLD VENIPUNCTURE: CPT | Performed by: NURSE PRACTITIONER

## 2018-03-01 PROCEDURE — 82962 GLUCOSE BLOOD TEST: CPT

## 2018-03-01 PROCEDURE — 36569 INSJ PICC 5 YR+ W/O IMAGING: CPT | Performed by: HOSPITALIST

## 2018-03-01 PROCEDURE — 80048 BASIC METABOLIC PNL TOTAL CA: CPT | Performed by: NURSE PRACTITIONER

## 2018-03-01 PROCEDURE — 74011250636 HC RX REV CODE- 250/636: Performed by: HOSPITALIST

## 2018-03-01 PROCEDURE — 02HV33Z INSERTION OF INFUSION DEVICE INTO SUPERIOR VENA CAVA, PERCUTANEOUS APPROACH: ICD-10-PCS | Performed by: INTERNAL MEDICINE

## 2018-03-01 PROCEDURE — 74011250637 HC RX REV CODE- 250/637: Performed by: HOSPITALIST

## 2018-03-01 PROCEDURE — 77030020253 HC SOL INJ D545NS .05 DEX .45 SAL

## 2018-03-01 PROCEDURE — 74011000258 HC RX REV CODE- 258: Performed by: NURSE PRACTITIONER

## 2018-03-01 RX ORDER — OXYCODONE AND ACETAMINOPHEN 5; 325 MG/1; MG/1
1 TABLET ORAL
Qty: 10 TAB | Refills: 0 | Status: SHIPPED | OUTPATIENT
Start: 2018-03-01 | End: 2018-03-13

## 2018-03-01 RX ORDER — SODIUM CHLORIDE 0.9 % (FLUSH) 0.9 %
10 SYRINGE (ML) INJECTION EVERY 24 HOURS
Status: DISCONTINUED | OUTPATIENT
Start: 2018-03-01 | End: 2018-03-01 | Stop reason: HOSPADM

## 2018-03-01 RX ORDER — INSULIN GLARGINE 100 [IU]/ML
8 INJECTION, SOLUTION SUBCUTANEOUS DAILY
Status: DISCONTINUED | OUTPATIENT
Start: 2018-03-02 | End: 2018-03-01 | Stop reason: HOSPADM

## 2018-03-01 RX ORDER — SODIUM CHLORIDE 0.9 % (FLUSH) 0.9 %
20 SYRINGE (ML) INJECTION EVERY 24 HOURS
Status: DISCONTINUED | OUTPATIENT
Start: 2018-03-01 | End: 2018-03-01 | Stop reason: HOSPADM

## 2018-03-01 RX ORDER — SODIUM CHLORIDE 0.9 % (FLUSH) 0.9 %
10-30 SYRINGE (ML) INJECTION AS NEEDED
Status: DISCONTINUED | OUTPATIENT
Start: 2018-03-01 | End: 2018-03-01 | Stop reason: HOSPADM

## 2018-03-01 RX ORDER — SODIUM CHLORIDE 0.9 % (FLUSH) 0.9 %
10 SYRINGE (ML) INJECTION AS NEEDED
Status: DISCONTINUED | OUTPATIENT
Start: 2018-03-01 | End: 2018-03-01 | Stop reason: HOSPADM

## 2018-03-01 RX ORDER — SODIUM CHLORIDE 0.9 % (FLUSH) 0.9 %
10-40 SYRINGE (ML) INJECTION EVERY 8 HOURS
Status: DISCONTINUED | OUTPATIENT
Start: 2018-03-01 | End: 2018-03-01 | Stop reason: HOSPADM

## 2018-03-01 RX ORDER — BACITRACIN 500 UNIT/G
1 PACKET (EA) TOPICAL AS NEEDED
Status: DISCONTINUED | OUTPATIENT
Start: 2018-03-01 | End: 2018-03-01 | Stop reason: HOSPADM

## 2018-03-01 RX ADMIN — HEPARIN SODIUM 5000 UNITS: 5000 INJECTION, SOLUTION INTRAVENOUS; SUBCUTANEOUS at 09:21

## 2018-03-01 RX ADMIN — LOSARTAN POTASSIUM 50 MG: 50 TABLET ORAL at 09:21

## 2018-03-01 RX ADMIN — DEXTROSE MONOHYDRATE AND SODIUM CHLORIDE 125 ML/HR: 5; .45 INJECTION, SOLUTION INTRAVENOUS at 00:18

## 2018-03-01 RX ADMIN — DEXTROSE MONOHYDRATE AND SODIUM CHLORIDE 125 ML/HR: 5; .45 INJECTION, SOLUTION INTRAVENOUS at 08:30

## 2018-03-01 RX ADMIN — HEPARIN SODIUM 5000 UNITS: 5000 INJECTION, SOLUTION INTRAVENOUS; SUBCUTANEOUS at 00:09

## 2018-03-01 RX ADMIN — ASPIRIN 81 MG 81 MG: 81 TABLET ORAL at 09:21

## 2018-03-01 RX ADMIN — AMLODIPINE BESYLATE 5 MG: 5 TABLET ORAL at 09:21

## 2018-03-01 RX ADMIN — INSULIN GLARGINE 10 UNITS: 100 INJECTION, SOLUTION SUBCUTANEOUS at 09:21

## 2018-03-01 NOTE — PROGRESS NOTES
I was not able to assess the patient at this time and will perform a follow up visit in a few days. Sima Bell M.Div.   [place] [address]  [phone]

## 2018-03-01 NOTE — PROGRESS NOTES
Infectious Disease Follow-up     Admit Date: 2/22/2018    Current abx Prior abx   Daptomycin , Ceftriaxone 2/26 -  3  Cefepime 2/23 - 6 Vancomycin 2/23 - 6       ASSESSMENT - > REC:      Osteomyelitis, R calcaneus/cuboid  - with draining sinus tract   - prior cultures grew MRSA  - MRI 2/24:sinus tract extending to calcaneocuboid articulation w/ contiguous and extensive marrow signal abnormality c/w osteomyelitis. Findings suggesting infected calcaneocuboid articulation were seen. - was scheduled for BKA today but now refusing -> continue Daptomycin 6 mg/kg q 24 h and Ceftriaxone 2 gm q 24  x 8 weeks (patient aware this may not be successful in treating the osteomyelitis and may still need amputation after treatment)  -> PICC   BSI MRSA  - 1 of 2 blcx + 2/22  - from OM  - rpt blcx 2/26 NGTD x 2  - TTE 2/27: no vegetations -> abx change as above  -> OK for PICC     Recurrent foot infections  - s/p R 1st toe amp 2012, rx 4th/5th MT OM 2015  - s/p 11/1/2017 R foot excision of bone cortex, 5th MT w/ irrigation and debridement of R foot down to the bone. Pathology: acute osteomyelitis. Cultures grew MRA and Streptococcus mitis/oralis     DM     H/o noncompliance        MICROBIOLOGY:   2/22               blcx MRSA 1 of 2                       LINES AND CATHETERS:   piv    Active Hospital Problems    Diagnosis Date Noted    Cellulitis of foot, right 02/23/2018       Subjective: Interval notes reviewed. PICC placed in right arm. Reports only minimal pain. Afebrile. Probable discharge today.       Current Facility-Administered Medications   Medication Dose Route Frequency    [START ON 3/2/2018] insulin glargine (LANTUS) injection 8 Units  8 Units SubCUTAneous DAILY    bacitracin 500 unit/gram packet 1 Packet  1 Packet Topical PRN    sodium chloride (NS) flush 10-30 mL  10-30 mL InterCATHeter PRN    sodium chloride (NS) flush 10 mL  10 mL InterCATHeter Q24H    sodium chloride (NS) flush 10 mL  10 mL InterCATHeter PRN    sodium chloride (NS) flush 10-40 mL  10-40 mL InterCATHeter Q8H    sodium chloride (NS) flush 20 mL  20 mL InterCATHeter Q24H    dextrose 5 % - 0.45% NaCl infusion  125 mL/hr IntraVENous CONTINUOUS    DAPTOmycin (CUBICIN) 403 mg in 0.9% sodium chloride 50 mL IVPB  6 mg/kg IntraVENous Q24H    cefTRIAXone (ROCEPHIN) 2 g in sterile water (preservative free) 20 mL IV syringe  2 g IntraVENous Q24H    0.9% sodium chloride infusion 250 mL  250 mL IntraVENous PRN    morphine injection 2 mg  2 mg IntraVENous Q4H PRN    amLODIPine (NORVASC) tablet 5 mg  5 mg Oral DAILY    aspirin chewable tablet 81 mg  81 mg Oral DAILY    losartan (COZAAR) tablet 50 mg  50 mg Oral DAILY    acetaminophen (TYLENOL) tablet 650 mg  650 mg Oral Q6H PRN    heparin (porcine) injection 5,000 Units  5,000 Units SubCUTAneous Q8H    insulin lispro (HUMALOG) injection   SubCUTAneous AC&HS    glucose chewable tablet 16 g  4 Tab Oral PRN    glucagon (GLUCAGEN) injection 1 mg  1 mg IntraMUSCular PRN    dextrose (D50W) injection syrg 12.5-25 g  25-50 mL IntraVENous PRN         Objective:     Visit Vitals    /73    Pulse 60    Temp 98.4 °F (36.9 °C)    Resp 18    Ht 5' 2\" (1.575 m)    Wt 65.4 kg (144 lb 2.9 oz)    SpO2 97%    BMI 26.37 kg/m2       Temp (24hrs), Av.6 °F (37 °C), Min:98.2 °F (36.8 °C), Max:99.1 °F (37.3 °C)      General: Well developed, well nourished 45 y.o.  male in no acute distress. HEENT: no scleral icterus, no oral lesions  Neck: supple, symmetrical, trachea midline   Cardio:  regular rate and rhythm, S1, S2 normal, no murmur, click, rub or gallop  Lungs: clear to auscultation, no wheezes or rales and unlabored breathing  Abdomen: soft, non-tender. Bowel sounds normal. No masses, no organomegaly.   Extremities:  no redness or tenderness in the calves or thighs, no edema, right foot missing 1st toe, has healed surgical incisions on lateral aspect with small crusted wound in dorsolateral aspect of hindfoot, currently not draining    Labs: Results:   Chemistry Recent Labs      03/01/18   0351  02/28/18   0345  02/27/18   0350   GLU  88  82  82   NA  140  142  141   K  3.9  4.2  4.1   CL  106  109*  111*   CO2  27  25  22   BUN  12  14  15   CREA  0.85  0.93  0.90   CA  7.6*  8.0*  7.8*   AGAP  7  8  8   BUCR  14  15  17      CBC w/Diff Recent Labs      03/01/18   0351  02/28/18   0345   WBC  10.5  9.6   RBC  3.18*  3.06*   HGB  8.8*  8.3*   HCT  26.8*  26.0*   PLT  484*  447*   GRANS  72  69   LYMPH  17*  19*   EOS  3  4            Microbiology Results  No results for input(s): SDES, CULT in the last 72 hours.     Jazzmine Dow MD  March 1, 2018  [facility] AT THE [facility]  [phone]

## 2018-03-01 NOTE — DISCHARGE INSTRUCTIONS
Patient armband removed and shredded. DISCHARGE SUMMARY from Nurse    PATIENT INSTRUCTIONS:    After general anesthesia or intravenous sedation, for 24 hours or while taking prescription Narcotics:  · Limit your activities  · Do not drive and operate hazardous machinery  · Do not make important personal or business decisions  · Do  not drink alcoholic beverages  · If you have not urinated within 8 hours after discharge, please contact your surgeon on call. Report the following to your surgeon:  · Excessive pain, swelling, redness or odor of or around the surgical area  · Temperature over 100.5  · Nausea and vomiting lasting longer than 4 hours or if unable to take medications  · Any signs of decreased circulation or nerve impairment to extremity: change in color, persistent  numbness, tingling, coldness or increase pain  · Any questions    What to do at Home:  Recommended activity: Activity as tolerated. If you experience any of the following symptoms, fever, increased pain in right leg,  please follow up with your primary care doctor. *  Please give a list of your current medications to your Primary Care Provider. *  Please update this list whenever your medications are discontinued, doses are      changed, or new medications (including over-the-counter products) are added. *  Please carry medication information at all times in case of emergency situations. These are general instructions for a healthy lifestyle:    No smoking/ No tobacco products/ Avoid exposure to second hand smoke  Surgeon General's Warning:  Quitting smoking now greatly reduces serious risk to your health.     Obesity, smoking, and sedentary lifestyle greatly increases your risk for illness    A healthy diet, regular physical exercise & weight monitoring are important for maintaining a healthy lifestyle    You may be retaining fluid if you have a history of heart failure or if you experience any of the following symptoms: Weight gain of 3 pounds or more overnight or 5 pounds in a week, increased swelling in our hands or feet or shortness of breath while lying flat in bed. Please call your doctor as soon as you notice any of these symptoms; do not wait until your next office visit. Recognize signs and symptoms of STROKE:    F-face looks uneven    A-arms unable to move or move unevenly    S-speech slurred or non-existent    T-time-call 911 as soon as signs and symptoms begin-DO NOT go       Back to bed or wait to see if you get better-TIME IS BRAIN. Warning Signs of HEART ATTACK     Call 911 if you have these symptoms:   Chest discomfort. Most heart attacks involve discomfort in the center of the chest that lasts more than a few minutes, or that goes away and comes back. It can feel like uncomfortable pressure, squeezing, fullness, or pain.  Discomfort in other areas of the upper body. Symptoms can include pain or discomfort in one or both arms, the back, neck, jaw, or stomach.  Shortness of breath with or without chest discomfort.  Other signs may include breaking out in a cold sweat, nausea, or lightheadedness. Don't wait more than five minutes to call 911 - MINUTES MATTER! Fast action can save your life. Calling 911 is almost always the fastest way to get lifesaving treatment. Emergency Medical Services staff can begin treatment when they arrive -- up to an hour sooner than if someone gets to the hospital by car. The discharge information has been reviewed with the patient. The patient verbalized understanding. Discharge medications reviewed with the patient and appropriate educational materials and side effects teaching were provided.   ___________________________________________________________________________________________________________________________________

## 2018-03-01 NOTE — ROUTINE PROCESS
Right PICC inserted utilizing 3CG for SVC tip verification. Released for use, Becky Lakhani RN notified. 2ml 1% lidocaine injected SC at insertion site.

## 2018-03-01 NOTE — ROUTINE PROCESS
Assumed care of patient - alert and oriented - Martiniquais speaking but understands some [place].  phone at bedside and used as needed. Patient denies any pain at this time     0915 - AM meds given -     1000 - patient with orders to d/c home today - will need PICC for outpatient antibiotics - order placed per MD.    1215 - sitting up for lunch  -  No complaints    1400 - PICC team at bedside    1600 - patient discharged via [facility] with family.   Reviewed discharge instructions, follow-up at infusion center with  phone - patient verbalized understanding

## 2018-03-01 NOTE — PROGRESS NOTES
Chart reviewed. Noted orders for discharge. Pt will need to go to out-pt infusion center for daily IV ABX. After many attempts to set up infusion center, spoke with [facility] & provided her with referral, states pt can go tomorrow to [facility] infusion center @ 1300. Faxed over orders & requested clinicals. Met with pt & made him aware of above, verbalized understanding. Pt's nurse made aware of above. Radhika HassanRN,ext [phone].

## 2018-03-01 NOTE — PROGRESS NOTES
Vascular Surgery    Pt still refusing surgery    Will be available if he changes his mind    Collette Renee MD, FACS

## 2018-03-01 NOTE — PROGRESS NOTES
2000-no signs of distress. meds given. Patient slept all night. Unremarkable night. Bedside shift change report given to REBECA Covarrubias (oncoming nurse) by Chente Mederos (offgoing nurse). Report included the following information SBAR.

## 2018-03-01 NOTE — PROGRESS NOTES
Bedside and Written shift change report given to Julaine Brunner (oncoming nurse) by Patton Essex (offgoing nurse). Report included the following information SBAR, Kardex and MAR.

## 2018-03-02 ENCOUNTER — HOSPITAL ENCOUNTER (OUTPATIENT)
Dept: INFUSION THERAPY | Age: 38
Discharge: HOME | End: 2018-03-02
Payer: SELF-PAY

## 2018-03-02 VITALS
DIASTOLIC BLOOD PRESSURE: 57 MMHG | HEART RATE: 59 BPM | TEMPERATURE: 98.2 F | SYSTOLIC BLOOD PRESSURE: 87 MMHG | RESPIRATION RATE: 18 BRPM

## 2018-03-02 LAB — CK SERPL-CCNC: 39 U/L (ref 39–308)

## 2018-03-02 PROCEDURE — 74011250636 HC RX REV CODE- 250/636: Performed by: INTERNAL MEDICINE

## 2018-03-02 PROCEDURE — 74011000250 HC RX REV CODE- 250: Performed by: HOSPITALIST

## 2018-03-02 PROCEDURE — 82550 ASSAY OF CK (CPK): CPT | Performed by: HOSPITALIST

## 2018-03-02 PROCEDURE — 96375 TX/PRO/DX INJ NEW DRUG ADDON: CPT

## 2018-03-02 PROCEDURE — 96372 THER/PROPH/DIAG INJ SC/IM: CPT

## 2018-03-02 PROCEDURE — 74011250636 HC RX REV CODE- 250/636

## 2018-03-02 PROCEDURE — 74011250636 HC RX REV CODE- 250/636: Performed by: HOSPITALIST

## 2018-03-02 RX ORDER — HEPARIN 100 UNIT/ML
500 SYRINGE INTRAVENOUS AS NEEDED
Status: DISCONTINUED | OUTPATIENT
Start: 2018-03-02 | End: 2018-03-06 | Stop reason: HOSPADM

## 2018-03-02 RX ORDER — SODIUM CHLORIDE 0.9 % (FLUSH) 0.9 %
10-40 SYRINGE (ML) INJECTION AS NEEDED
Status: DISCONTINUED | OUTPATIENT
Start: 2018-03-02 | End: 2018-03-06 | Stop reason: HOSPADM

## 2018-03-02 RX ADMIN — Medication 30 ML: at 13:20

## 2018-03-02 RX ADMIN — Medication 500 UNITS: at 13:20

## 2018-03-02 RX ADMIN — DAPTOMYCIN 390 MG: 500 INJECTION, POWDER, LYOPHILIZED, FOR SOLUTION INTRAVENOUS at 13:33

## 2018-03-02 RX ADMIN — WATER 2 G: 1 INJECTION INTRAMUSCULAR; INTRAVENOUS; SUBCUTANEOUS at 13:29

## 2018-03-02 NOTE — PROGRESS NOTES
[facility] Progress Note    Date: 2018    Name: Yari Lyle    MRN: [id number]         : 1980    Mr Daniel Benavides arrived via wheelchair to [address] [place] [address] for Antibiotic infusion. Mr. Daniel Benavides was assessed and education was provided. Mr. Brian Marcus vitals were reviewed. Visit Vitals    BP (!) 87/57 (BP 1 Location: Right arm, BP Patient Position: Sitting)    Pulse (!) 59    Temp 98.2 °F (36.8 °C)    Resp 18       Lab results were obtained and reviewed. Recent Results (from the past 12 hour(s))   CK    Collection Time: 18 12:55 PM   Result Value Ref Range    CK 39 39 - 308 U/L           []  Vancomycin     []  Invanz     [x]  Cubicin 390 mg     [x]  Rocephin 2gm IVP      Mr. Daniel Benavides speaks Telugu  Fluently and has some difficulty with [place]. Mr. Daniel Benavides tolerated infusion, and had no complaints at this time. Patient armband removed and shredded. Mr. Daniel Benavides was discharged from [place] [address] in stable condition at 1355. He is to return on 3/3/18 at [address] [place] [address] for his next appointment.     Chela Marsh RN  2018  4:40 PM

## 2018-03-02 NOTE — DISCHARGE SUMMARY
[facility]    [facility] Summary    Patient: Myla Martinez MRN: [id number]  CSN: [id number]    YOB: 1980  Age: 45 y.o. Sex: male    DOA: 2/22/2018 LOS:  LOS: 6 days   Discharge Date: 3/1/2018     Admission Diagnoses: Cellulitis of foot, right  CELLULITIS    Discharge Diagnoses:    Problem List as of 3/1/2018  Date Reviewed: 2/28/2018          Codes Class Noted - Resolved    Cellulitis of foot, right ICD-10-CM: L03.115  ICD-9-CM: 682.7  2/23/2018 - Present        Anemia ICD-10-CM: D64.9  ICD-9-CM: 285.9  9/12/2017 - Present        SAVANNA (acute kidney injury) ([facility] ICD-10-CM: N17.9  ICD-9-CM: 584.9  9/12/2017 - Present        DM (diabetes mellitus) ([facility] (Chronic) ICD-10-CM: E11.9  ICD-9-CM: 250.00  9/12/2017 - Present        Iron deficiency anemia ICD-10-CM: D50.9  ICD-9-CM: 280.9  5/21/2015 - Present        Hypertension ICD-10-CM: I10  ICD-9-CM: 401.9  5/19/2015 - Present        Acute renal injury ([facility] ICD-10-CM: N17.9  ICD-9-CM: 584.9  5/18/2015 - Present        Diabetic foot ulcer ([facility] ICD-10-CM: E11.621, L97.509  ICD-9-CM: 250.80, 707.15  5/16/2015 - Present        Cellulitis ICD-10-CM: L03.90  ICD-9-CM: 682.9  11/21/2014 - Present        Cellulitis and abscess ICD-10-CM: L03.90, L02.91  ICD-9-CM: 682.9  11/21/2014 - Present        Abscess of right thigh ICD-10-CM: L02.415  ICD-9-CM: 682.6  7/15/2014 - Present        Sepsis ([facility] ICD-10-CM: A41.9  ICD-9-CM: 038.9, 995.91  7/14/2014 - Present        Abscess of bursa, left knee ICD-10-CM: M71.062  ICD-9-CM: 727.89  6/19/2013 - Present        Cellulitis of left knee ICD-10-CM: L03.116  ICD-9-CM: 682.6  6/15/2013 - Present        DM (diabetes mellitus), secondary uncontrolled ([facility] ICD-10-CM: E13.65  ICD-9-CM: 249.01  6/15/2013 - Present              Discharge Condition: Stable    Discharge To:  Home with Outpatient Infusion Center antibiotics     Consults: Hospitalist, Infectious Disease and Podiatry    Hospital Course: Ta[age] Burch y.o. male who is a Welsh speaking -- who presented to the ER with c/o Right leg swelling and Right foot pain when he walks as well as dainage from Right foot. Pt mentioned the swelling and drainage started 2-3 days prior to ED presentation and the pain had been increasing. Patient reported that he had right foot surgery a few months ago but was not able to specify why. Review of records everywhere indicated h/o osteomyelitis of ankle, diabetes, gas gangrene, hypertension and anemia. In the ER, xray of Right ankle c/w Right calcaneus, cuboid, and base of the fifth metatarsal osteomyelitis . Patient was started on cefepime and vanc and admitted for ongoing management. .Blood cx c/w MRSA. Podiatry following. MRI Right foot Lateral mid foot soft tissue wound with sinus tract extending to the calcaneocuboid articulation. There is contiguous and extensive marrow signal abnormality compatible with osteomyelitis involving the majority of the cuboid, calcaneus, inferior talar head and neck, navicular, as well as the fourth and fifth metatarsal shafts proximally. On postcontrast imaging, no evidence of necrotic or nonviable bone demonstrated. 2. Large ankle joint effusion/synovitis with complex appearing and multilocular phlegmon infiltrating through the lateral aspect of Kager's fat pad as well as the sinus tarsi. Suspect infected calcaneocuboid articulation effusion given proximity to adjacent lateral skin wound. NM bone scan results pending. ID consulted. Discussed patient with Dr. Mitzi Beauchamp this morning and decision to proceed with Vascular consult for right BKA deemed appropriate. Vascular Surgery consulted. Dr. Sameer Rubalcava discussed Right BKA with patient and he was agreeable. Patient then went for completion of NM and upon returning- NP Alice used JANZZ phone,  #[phone], to further discuss Right BKA. During conversation via  phone patient refused to proceed with amputation.  Patient requested IV antibiotics and it was explained that IV treatment is not guaranteed and patient may ultimately require amputation. Patient verbalized understanding and stated he would make arrangements to return to [place] country for any further treatment. PICC placed and patient set up for outpatient infusion center to receive Daptomycin 6 mg/kg per day and 2 g Rocephin daily. Patient to follow up with ID weekly for CBC, BMP, ESR, CRP. Patient is appropriate for discharge home. Physical Exam:  General appearance: alert, cooperative, no distress, appears stated age  Lungs: clear to auscultation throughout   Heart: regular rate and rhythm, S1, S2 normal, no murmur, click, rub or gallop  Abdomen: soft, non tender, non distended. Active bowel sounds   Extremities: extremities normal, atraumatic, no cyanosis. RLE swollen   Skin: Skin color, texture, turgor normal. No rashes or lesions  Neurologic: moves all 4 extremities   PSY: appropriately behaved    Significant Diagnostic Studies:   Recent Results (from the past 24 hour(s))   CBC WITH AUTOMATED DIFF    Collection Time: 03/01/18  3:51 AM   Result Value Ref Range    WBC 10.5 4.6 - 13.2 K/uL    RBC 3.18 (L) 4.70 - 5.50 M/uL    HGB 8.8 (L) 13.0 - 16.0 g/dL    HCT 26.8 (L) 36.0 - 48.0 %    MCV 84.3 74.0 - 97.0 FL    MCH 27.7 24.0 - 34.0 PG    MCHC 32.8 31.0 - 37.0 g/dL    RDW 14.6 (H) 11.6 - 14.5 %    PLATELET 429 (H) 958 - 420 K/uL    MPV 10.2 9.2 - 11.8 FL    NEUTROPHILS 72 40 - 73 %    LYMPHOCYTES 17 (L) 21 - 52 %    MONOCYTES 8 3 - 10 %    EOSINOPHILS 3 0 - 5 %    BASOPHILS 0 0 - 2 %    ABS. NEUTROPHILS 7.6 1.8 - 8.0 K/UL    ABS. LYMPHOCYTES 1.8 0.9 - 3.6 K/UL    ABS. MONOCYTES 0.8 0.05 - 1.2 K/UL    ABS. EOSINOPHILS 0.3 0.0 - 0.4 K/UL    ABS.  BASOPHILS 0.0 0.0 - 0.06 K/UL    DF AUTOMATED     METABOLIC PANEL, BASIC    Collection Time: 03/01/18  3:51 AM   Result Value Ref Range    Sodium 140 136 - 145 mmol/L    Potassium 3.9 3.5 - 5.5 mmol/L    Chloride 106 100 - 108 mmol/L CO2 27 21 - 32 mmol/L    Anion gap 7 3.0 - 18 mmol/L    Glucose 88 74 - 99 mg/dL    BUN 12 7.0 - 18 MG/DL    Creatinine 0.85 0.6 - 1.3 MG/DL    BUN/Creatinine ratio 14 12 - 20      GFR est AA >60 >60 ml/min/1.73m2    GFR est non-AA >60 >60 ml/min/1.73m2    Calcium 7.6 (L) 8.5 - 10.1 MG/DL   GLUCOSE, POC    Collection Time: 03/01/18  8:27 AM   Result Value Ref Range    Glucose (POC) 98 70 - 110 mg/dL   GLUCOSE, POC    Collection Time: 03/01/18 11:49 AM   Result Value Ref Range    Glucose (POC) 129 (H) 70 - 110 mg/dL         Discharge Medications:    Discharge Medication List as of 3/1/2018  3:12 PM      CONTINUE these medications which have NOT CHANGED    Details   pravastatin (PRAVACHOL) 10 mg tablet Take 1 Tab by mouth nightly. , Print, Disp-30 Tab, R-0      amLODIPine (NORVASC) 5 mg tablet Take 1 Tab by mouth daily. , Print, Disp-30 Tab, R-5      HYDROcodone-acetaminophen (NORCO) 5-325 mg per tablet Take 1 Tab by mouth every four (4) hours as needed for Pain. Max Daily Amount: 6 Tabs. Indications: do not fill unless keflex and bactrim are filled, Print, Disp-15 Tab, R-0      aspirin 81 mg chewable tablet Take 1 Tab by mouth daily. , Print, Disp-30 Tab, R-0      losartan (COZAAR) 50 mg tablet Take 1 Tab by mouth daily. , Print, Disp-30 Tab, R-0      metFORMIN (GLUCOPHAGE) 500 mg tablet Take 1 Tab by mouth two (2) times daily (with meals). , Print, Disp-30 Tab, R-0      insulin NPH/insulin regular (HUMULIN 70/30) 100 unit/mL (70-30) injection 12 Units by SubCUTAneous route Before breakfast and dinner., Print, Disp-3 Vial, R-1      Insulin Syringe-Needle U-100 (INSULIN SYRINGE) 1 mL 28 x 1/2\" syrg 1 Package by Does Not Apply route two (2) times a day., Print, Disp-120 Syringe, R-1                 Activity: Activity as tolerated    Diet: Diabetic Diet    Wound Care: None needed    Follow-up: Infusion center every day for the next 8 weeks     Discharge time: 50 minutes   Ebony Steven NP  3/1/2018, 8:42 PM

## 2018-03-04 ENCOUNTER — HOSPITAL ENCOUNTER (OUTPATIENT)
Dept: INFUSION THERAPY | Age: 38
Discharge: HOME | End: 2018-03-04
Payer: SELF-PAY

## 2018-03-04 VITALS
SYSTOLIC BLOOD PRESSURE: 96 MMHG | OXYGEN SATURATION: 96 % | HEART RATE: 77 BPM | TEMPERATURE: 98 F | DIASTOLIC BLOOD PRESSURE: 59 MMHG | RESPIRATION RATE: 18 BRPM

## 2018-03-04 PROCEDURE — 96374 THER/PROPH/DIAG INJ IV PUSH: CPT

## 2018-03-04 PROCEDURE — 74011250636 HC RX REV CODE- 250/636: Performed by: HOSPITALIST

## 2018-03-04 PROCEDURE — 74011000272 HC RX REV CODE- 272: Performed by: HOSPITALIST

## 2018-03-04 PROCEDURE — 74011250636 HC RX REV CODE- 250/636: Performed by: INTERNAL MEDICINE

## 2018-03-04 PROCEDURE — 74011250636 HC RX REV CODE- 250/636

## 2018-03-04 PROCEDURE — 74011000250 HC RX REV CODE- 250: Performed by: HOSPITALIST

## 2018-03-04 PROCEDURE — 96375 TX/PRO/DX INJ NEW DRUG ADDON: CPT

## 2018-03-04 RX ORDER — HEPARIN SODIUM (PORCINE) LOCK FLUSH IV SOLN 100 UNIT/ML 100 UNIT/ML
500 SOLUTION INTRAVENOUS AS NEEDED
Status: ACTIVE | OUTPATIENT
Start: 2018-03-04 | End: 2018-03-05

## 2018-03-04 RX ORDER — SODIUM CHLORIDE 0.9 % (FLUSH) 0.9 %
10-40 SYRINGE (ML) INJECTION AS NEEDED
Status: DISCONTINUED | OUTPATIENT
Start: 2018-03-04 | End: 2018-03-08 | Stop reason: HOSPADM

## 2018-03-04 RX ADMIN — WATER 2 G: 1 INJECTION INTRAMUSCULAR; INTRAVENOUS; SUBCUTANEOUS at 10:19

## 2018-03-04 RX ADMIN — DAPTOMYCIN 390 MG: 500 INJECTION, POWDER, LYOPHILIZED, FOR SOLUTION INTRAVENOUS at 10:16

## 2018-03-04 RX ADMIN — HEPARIN SODIUM (PORCINE) LOCK FLUSH IV SOLN 100 UNIT/ML 500 UNITS: 100 SOLUTION at 10:25

## 2018-03-04 RX ADMIN — Medication 40 ML: at 10:24

## 2018-03-04 RX ADMIN — HEPARIN SODIUM (PORCINE) LOCK FLUSH IV SOLN 100 UNIT/ML 500 UNITS: 100 SOLUTION at 10:24

## 2018-03-04 NOTE — PROGRESS NOTES
[facility] Progress Note    Date: 2018    Name: Mohini Menchaca    MRN: [id number]         : 1980    Mr Yelena Smith arrived via wheelchair to [address] [place] [address] for Antibiotic infusion. Mr. Yleena Smith was assessed and education was provided. Mr. Jerrica Bajwa vitals were reviewed. Visit Vitals    BP 96/59 (BP 1 Location: Left arm, BP Patient Position: Sitting)    Pulse 77    Temp 98 °F (36.7 °C)    Resp 18    SpO2 96%       Lab results were obtained and reviewed. No results found for this or any previous visit (from the past 12 hour(s)). []  Vancomycin     []  Invanz     [x]  Cubicin 390 mg     [x]  Rocephin 2gm IVP      Mr. Yelena Smith speaks Wolof  Fluently and has some difficulty with [place]. Mr. Yelena Smith tolerated infusion, and had no complaints at this time. Patient armband removed and shredded. Mr. Yelena Smith was discharged from [place] [address] in stable condition at 1025. He is to return on 3/5/18 at 1000 for his next appointment.     Veronica Brar RN  2018

## 2018-03-05 ENCOUNTER — HOSPITAL ENCOUNTER (OUTPATIENT)
Dept: INFUSION THERAPY | Age: 38
Discharge: HOME | End: 2018-03-05
Payer: SELF-PAY

## 2018-03-05 VITALS
DIASTOLIC BLOOD PRESSURE: 52 MMHG | HEART RATE: 81 BPM | OXYGEN SATURATION: 100 % | SYSTOLIC BLOOD PRESSURE: 92 MMHG | TEMPERATURE: 98 F | RESPIRATION RATE: 18 BRPM

## 2018-03-05 LAB
ANION GAP BLD CALC-SCNC: 14 MMOL/L (ref 10–20)
BASO+EOS+MONOS # BLD AUTO: 0.2 K/UL (ref 0–2.3)
BASO+EOS+MONOS # BLD AUTO: 2 % (ref 0.1–17)
BUN BLD-MCNC: 33 MG/DL (ref 7–18)
CA-I BLD-MCNC: 1.17 MMOL/L (ref 1.12–1.32)
CHLORIDE BLD-SCNC: 111 MMOL/L (ref 100–108)
CK SERPL-CCNC: 35 U/L (ref 39–308)
CO2 BLD-SCNC: 21 MMOL/L (ref 19–24)
CREAT UR-MCNC: 1.5 MG/DL (ref 0.6–1.3)
DIFFERENTIAL METHOD BLD: ABNORMAL
ERYTHROCYTE [DISTWIDTH] IN BLOOD BY AUTOMATED COUNT: 14.2 % (ref 11.5–14.5)
ERYTHROCYTE [SEDIMENTATION RATE] IN BLOOD: >140 MM/HR (ref 0–15)
GLUCOSE BLD STRIP.AUTO-MCNC: 260 MG/DL (ref 74–106)
HCT VFR BLD AUTO: 27.4 % (ref 36–48)
HCT VFR BLD CALC: 27 % (ref 36–49)
HGB BLD-MCNC: 8.7 G/DL (ref 12–16)
HGB BLD-MCNC: 9.2 G/DL (ref 12–16)
LYMPHOCYTES # BLD: 2.8 K/UL (ref 1.1–5.9)
LYMPHOCYTES NFR BLD: 20 % (ref 14–44)
MCH RBC QN AUTO: 27.6 PG (ref 25–35)
MCHC RBC AUTO-ENTMCNC: 31.8 G/DL (ref 31–37)
MCV RBC AUTO: 87 FL (ref 78–102)
NEUTS SEG # BLD: 11 K/UL (ref 1.8–9.5)
NEUTS SEG NFR BLD: 79 % (ref 40–70)
POTASSIUM BLD-SCNC: 4.3 MMOL/L (ref 3.5–5.5)
RBC # BLD AUTO: 3.15 M/UL (ref 4.1–5.1)
SODIUM BLD-SCNC: 140 MMOL/L (ref 136–145)
WBC # BLD AUTO: 14 K/UL (ref 4.5–13)

## 2018-03-05 PROCEDURE — 80047 BASIC METABLC PNL IONIZED CA: CPT

## 2018-03-05 PROCEDURE — 74011250636 HC RX REV CODE- 250/636

## 2018-03-05 PROCEDURE — 96375 TX/PRO/DX INJ NEW DRUG ADDON: CPT

## 2018-03-05 PROCEDURE — 74011250636 HC RX REV CODE- 250/636: Performed by: HOSPITALIST

## 2018-03-05 PROCEDURE — 85652 RBC SED RATE AUTOMATED: CPT | Performed by: HOSPITALIST

## 2018-03-05 PROCEDURE — 96374 THER/PROPH/DIAG INJ IV PUSH: CPT

## 2018-03-05 PROCEDURE — 85025 COMPLETE CBC W/AUTO DIFF WBC: CPT | Performed by: HOSPITALIST

## 2018-03-05 PROCEDURE — 74011000272 HC RX REV CODE- 272: Performed by: HOSPITALIST

## 2018-03-05 PROCEDURE — 36415 COLL VENOUS BLD VENIPUNCTURE: CPT | Performed by: HOSPITALIST

## 2018-03-05 PROCEDURE — 74011000250 HC RX REV CODE- 250: Performed by: HOSPITALIST

## 2018-03-05 PROCEDURE — 82550 ASSAY OF CK (CPK): CPT | Performed by: HOSPITALIST

## 2018-03-05 RX ORDER — HEPARIN 100 UNIT/ML
500 SYRINGE INTRAVENOUS ONCE
Status: ACTIVE | OUTPATIENT
Start: 2018-03-05 | End: 2018-03-06

## 2018-03-05 RX ORDER — SODIUM CHLORIDE 0.9 % (FLUSH) 0.9 %
10-40 SYRINGE (ML) INJECTION AS NEEDED
Status: DISCONTINUED | OUTPATIENT
Start: 2018-03-05 | End: 2018-03-09 | Stop reason: HOSPADM

## 2018-03-05 RX ADMIN — Medication 20 ML: at 15:09

## 2018-03-05 RX ADMIN — CEFTRIAXONE SODIUM 2 G: 2 INJECTION, POWDER, FOR SOLUTION INTRAMUSCULAR; INTRAVENOUS at 15:30

## 2018-03-05 RX ADMIN — DAPTOMYCIN 390 MG: 500 INJECTION, POWDER, LYOPHILIZED, FOR SOLUTION INTRAVENOUS at 15:23

## 2018-03-05 RX ADMIN — Medication 10 ML: at 15:29

## 2018-03-05 RX ADMIN — Medication 20 ML: at 15:35

## 2018-03-05 NOTE — PROGRESS NOTES
[facility] Progress Note    Date: 2018    Name: Gregg Damon    MRN: [id number]         : 1980    Mr. Daniela Hightower was assessed and education was provided. Mr. Jim Sanchez vitals were reviewed. Visit Vitals    BP 92/52 (BP 1 Location: Left arm, BP Patient Position: Sitting)    Pulse 81    Temp 98 °F (36.7 °C)    Resp 18    SpO2 100%      Good blood return obtained from red lumen of PICC line at right upper arm. Labs drawn, followed by NS 20 ml flush. Lab results were obtained and reviewed. Recent Results (from the past 12 hour(s))   CBC WITH 3 PART DIFF    Collection Time: 18  3:07 PM   Result Value Ref Range    WBC 14.0 (H) 4.5 - 13.0 K/uL    RBC 3.15 (L) 4.10 - 5.10 M/uL    HGB 8.7 (L) 12.0 - 16.0 g/dL    HCT 27.4 (L) 36 - 48 %    MCV 87.0 78 - 102 FL    MCH 27.6 25.0 - 35.0 PG    MCHC 31.8 31 - 37 g/dL    RDW 14.2 11.5 - 14.5 %    NEUTROPHILS 79 (H) 40 - 70 %    MIXED CELLS 2 0.1 - 17 %    LYMPHOCYTES 20 14 - 44 %    ABS. NEUTROPHILS 11.0 (H) 1.8 - 9.5 K/UL    ABS. MIXED CELLS 0.2 0.0 - 2.3 K/uL    ABS. LYMPHOCYTES 2.8 1.1 - 5.9 K/UL    DF AUTOMATED     POC CHEM8    Collection Time: 18  3:10 PM   Result Value Ref Range    CO2, POC 21 19 - 24 MMOL/L    Glucose,  (H) 74 - 106 MG/DL    BUN, POC 33 (H) 7 - 18 MG/DL    Creatinine, POC 1.5 (H) 0.6 - 1.3 MG/DL    GFRAA, POC >60 >60 ml/min/1.73m2    GFRNA, POC 52 (L) >60 ml/min/1.73m2    Sodium,  136 - 145 MMOL/L    Potassium, POC 4.3 3.5 - 5.5 MMOL/L    Calcium, ionized (POC) 1.17 1.12 - 1.32 MMOL/L    Chloride,  (H) 100 - 108 MMOL/L    Anion gap, POC 14 10 - 20      Hematocrit, POC 27 (L) 36 - 49 %    Hemoglobin, POC 9.2 (L) 12 - 16 G/DL     Good blood return obtained from purple lumen of PICC line, followed by 10 ml NS flush.       []  Vancomycin     []  Invanz     [x]  Cubicin 390 mg/7.8 ml via red lumen     [x]  Rocephin 2 grams/20 ml via purple lumen     Each lumen flushed with 10 ml NS after antibiotics administered, Curos caps applied to each lumen. Mr. Natalie Heimlich tolerated infusion, and had no complaints at this time. Patient armband removed and shredded. Mr. Natalie Heimlich was discharged from [place] [address] in stable condition at [phone]. He is to return on 3/6/2018 at 1400 for his next appointment for antibiotic infusions.     Minnie Holman RN  March 5, 2018  4:22 PM

## 2018-03-06 ENCOUNTER — HOSPITAL ENCOUNTER (INPATIENT)
Age: 38
LOS: 7 days | Discharge: HOME HEALTH | DRG: 617 | End: 2018-03-13
Attending: EMERGENCY MEDICINE | Admitting: HOSPITALIST
Payer: SELF-PAY

## 2018-03-06 ENCOUNTER — HOSPITAL ENCOUNTER (OUTPATIENT)
Dept: INFUSION THERAPY | Age: 38
Discharge: HOME | End: 2018-03-06
Payer: SELF-PAY

## 2018-03-06 VITALS
RESPIRATION RATE: 18 BRPM | TEMPERATURE: 99 F | HEART RATE: 86 BPM | DIASTOLIC BLOOD PRESSURE: 78 MMHG | OXYGEN SATURATION: 100 % | SYSTOLIC BLOOD PRESSURE: 123 MMHG

## 2018-03-06 DIAGNOSIS — E13.8 OTHER SPECIFIED DIABETES MELLITUS WITH COMPLICATION, WITH LONG-TERM CURRENT USE OF INSULIN: ICD-10-CM

## 2018-03-06 DIAGNOSIS — Z79.4 OTHER SPECIFIED DIABETES MELLITUS WITH COMPLICATION, WITH LONG-TERM CURRENT USE OF INSULIN: ICD-10-CM

## 2018-03-06 DIAGNOSIS — L03.115 CELLULITIS OF FOOT, RIGHT: Primary | ICD-10-CM

## 2018-03-06 PROBLEM — M86.9 OSTEOMYELITIS (HCC): Status: ACTIVE | Noted: 2018-03-06

## 2018-03-06 LAB
ANION GAP SERPL CALC-SCNC: 7 MMOL/L (ref 3–18)
BASOPHILS # BLD: 0.1 K/UL (ref 0–0.06)
BASOPHILS NFR BLD: 0 % (ref 0–2)
BUN SERPL-MCNC: 33 MG/DL (ref 7–18)
BUN/CREAT SERPL: 24 (ref 12–20)
CALCIUM SERPL-MCNC: 8.3 MG/DL (ref 8.5–10.1)
CHLORIDE SERPL-SCNC: 108 MMOL/L (ref 100–108)
CO2 SERPL-SCNC: 23 MMOL/L (ref 21–32)
CREAT SERPL-MCNC: 1.37 MG/DL (ref 0.6–1.3)
DIFFERENTIAL METHOD BLD: ABNORMAL
EOSINOPHIL # BLD: 0.2 K/UL (ref 0–0.4)
EOSINOPHIL NFR BLD: 2 % (ref 0–5)
ERYTHROCYTE [DISTWIDTH] IN BLOOD BY AUTOMATED COUNT: 15 % (ref 11.6–14.5)
ERYTHROCYTE [SEDIMENTATION RATE] IN BLOOD: >140 MM/HR (ref 0–15)
EST. AVERAGE GLUCOSE BLD GHB EST-MCNC: 169 MG/DL
GLUCOSE BLD STRIP.AUTO-MCNC: 127 MG/DL (ref 70–110)
GLUCOSE SERPL-MCNC: 96 MG/DL (ref 74–99)
HBA1C MFR BLD: 7.5 % (ref 4.2–5.6)
HCT VFR BLD AUTO: 26 % (ref 36–48)
HGB BLD-MCNC: 8.5 G/DL (ref 13–16)
LYMPHOCYTES # BLD: 1.9 K/UL (ref 0.9–3.6)
LYMPHOCYTES NFR BLD: 13 % (ref 21–52)
MCH RBC QN AUTO: 27.9 PG (ref 24–34)
MCHC RBC AUTO-ENTMCNC: 32.7 G/DL (ref 31–37)
MCV RBC AUTO: 85.2 FL (ref 74–97)
MONOCYTES # BLD: 0.6 K/UL (ref 0.05–1.2)
MONOCYTES NFR BLD: 4 % (ref 3–10)
NEUTS SEG # BLD: 12.3 K/UL (ref 1.8–8)
NEUTS SEG NFR BLD: 81 % (ref 40–73)
PLATELET # BLD AUTO: 404 K/UL (ref 135–420)
PLATELET # BLD AUTO: 441 K/UL (ref 135–420)
PMV BLD AUTO: 10.1 FL (ref 9.2–11.8)
POTASSIUM SERPL-SCNC: 4 MMOL/L (ref 3.5–5.5)
RBC # BLD AUTO: 3.05 M/UL (ref 4.7–5.5)
SODIUM SERPL-SCNC: 138 MMOL/L (ref 136–145)
WBC # BLD AUTO: 15.2 K/UL (ref 4.6–13.2)

## 2018-03-06 PROCEDURE — 74011250636 HC RX REV CODE- 250/636: Performed by: EMERGENCY MEDICINE

## 2018-03-06 PROCEDURE — 80048 BASIC METABOLIC PNL TOTAL CA: CPT | Performed by: EMERGENCY MEDICINE

## 2018-03-06 PROCEDURE — 99285 EMERGENCY DEPT VISIT HI MDM: CPT

## 2018-03-06 PROCEDURE — 96374 THER/PROPH/DIAG INJ IV PUSH: CPT

## 2018-03-06 PROCEDURE — 74011250636 HC RX REV CODE- 250/636: Performed by: HOSPITALIST

## 2018-03-06 PROCEDURE — 74011250636 HC RX REV CODE- 250/636

## 2018-03-06 PROCEDURE — 93971 EXTREMITY STUDY: CPT

## 2018-03-06 PROCEDURE — 0Y6H0Z1 DETACHMENT AT RIGHT LOWER LEG, HIGH, OPEN APPROACH: ICD-10-PCS | Performed by: SURGERY

## 2018-03-06 PROCEDURE — 82962 GLUCOSE BLOOD TEST: CPT

## 2018-03-06 PROCEDURE — 83036 HEMOGLOBIN GLYCOSYLATED A1C: CPT | Performed by: HOSPITALIST

## 2018-03-06 PROCEDURE — 74011000272 HC RX REV CODE- 272: Performed by: HOSPITALIST

## 2018-03-06 PROCEDURE — 65270000029 HC RM PRIVATE

## 2018-03-06 PROCEDURE — 85049 AUTOMATED PLATELET COUNT: CPT | Performed by: INTERNAL MEDICINE

## 2018-03-06 PROCEDURE — 74011250637 HC RX REV CODE- 250/637: Performed by: HOSPITALIST

## 2018-03-06 PROCEDURE — 85652 RBC SED RATE AUTOMATED: CPT | Performed by: EMERGENCY MEDICINE

## 2018-03-06 PROCEDURE — 96375 TX/PRO/DX INJ NEW DRUG ADDON: CPT

## 2018-03-06 PROCEDURE — 85025 COMPLETE CBC W/AUTO DIFF WBC: CPT | Performed by: EMERGENCY MEDICINE

## 2018-03-06 RX ORDER — OXYCODONE AND ACETAMINOPHEN 5; 325 MG/1; MG/1
1 TABLET ORAL
Status: DISCONTINUED | OUTPATIENT
Start: 2018-03-06 | End: 2018-03-09

## 2018-03-06 RX ORDER — DEXTROSE 50 % IN WATER (D50W) INTRAVENOUS SYRINGE
25-50 AS NEEDED
Status: DISCONTINUED | OUTPATIENT
Start: 2018-03-06 | End: 2018-03-13 | Stop reason: HOSPADM

## 2018-03-06 RX ORDER — MAGNESIUM SULFATE 100 %
4 CRYSTALS MISCELLANEOUS AS NEEDED
Status: DISCONTINUED | OUTPATIENT
Start: 2018-03-06 | End: 2018-03-13 | Stop reason: HOSPADM

## 2018-03-06 RX ORDER — MORPHINE SULFATE 4 MG/ML
4 INJECTION INTRAVENOUS
Status: ACTIVE | OUTPATIENT
Start: 2018-03-06 | End: 2018-03-07

## 2018-03-06 RX ORDER — INSULIN LISPRO 100 [IU]/ML
INJECTION, SOLUTION INTRAVENOUS; SUBCUTANEOUS
Status: DISCONTINUED | OUTPATIENT
Start: 2018-03-06 | End: 2018-03-13 | Stop reason: HOSPADM

## 2018-03-06 RX ORDER — HEPARIN SODIUM 5000 [USP'U]/ML
5000 INJECTION, SOLUTION INTRAVENOUS; SUBCUTANEOUS EVERY 8 HOURS
Status: DISCONTINUED | OUTPATIENT
Start: 2018-03-06 | End: 2018-03-13 | Stop reason: HOSPADM

## 2018-03-06 RX ORDER — MORPHINE SULFATE 4 MG/ML
4 INJECTION INTRAVENOUS
Status: COMPLETED | OUTPATIENT
Start: 2018-03-06 | End: 2018-03-06

## 2018-03-06 RX ORDER — HEPARIN 100 UNIT/ML
SYRINGE INTRAVENOUS
Status: DISPENSED
Start: 2018-03-06 | End: 2018-03-07

## 2018-03-06 RX ORDER — DOCUSATE SODIUM 100 MG/1
100 CAPSULE, LIQUID FILLED ORAL 2 TIMES DAILY
Status: DISCONTINUED | OUTPATIENT
Start: 2018-03-06 | End: 2018-03-13 | Stop reason: HOSPADM

## 2018-03-06 RX ORDER — HYDROMORPHONE HYDROCHLORIDE 2 MG/ML
1 INJECTION, SOLUTION INTRAMUSCULAR; INTRAVENOUS; SUBCUTANEOUS
Status: DISCONTINUED | OUTPATIENT
Start: 2018-03-06 | End: 2018-03-09

## 2018-03-06 RX ADMIN — DAPTOMYCIN 390 MG: 500 INJECTION, POWDER, LYOPHILIZED, FOR SOLUTION INTRAVENOUS at 14:33

## 2018-03-06 RX ADMIN — HEPARIN SODIUM 5000 UNITS: 5000 INJECTION, SOLUTION INTRAVENOUS; SUBCUTANEOUS at 19:56

## 2018-03-06 RX ADMIN — MORPHINE SULFATE 4 MG: 4 INJECTION INTRAVENOUS at 16:00

## 2018-03-06 RX ADMIN — HYDROMORPHONE HYDROCHLORIDE 1 MG: 2 INJECTION INTRAMUSCULAR; INTRAVENOUS; SUBCUTANEOUS at 19:56

## 2018-03-06 RX ADMIN — DOCUSATE SODIUM 100 MG: 100 CAPSULE, LIQUID FILLED ORAL at 19:56

## 2018-03-06 NOTE — PROGRESS NOTES
\[facility]\"" Progress Note    Date: 2018    Name: Larry Mo    MRN: [id number]         : 1980    Mr Cherry Haywood arrived via wheelchair to [facility] [address] for Antibiotic infusion. Mr. Cherry Haywood was assessed and education was provided. Mr. Lennie Amaro vitals were reviewed. Visit Vitals    /78 (BP 1 Location: Right arm, BP Patient Position: Sitting)    Pulse 86    Temp 98.3 °F (36.8 °C)    Resp 18    SpO2 100%       Lab results were obtained and reviewed. No results found for this or any previous visit (from the past 12 hour(s)). []  Vancomycin     []  Invanz     [x]  Cubicin 390 mg     [x]  Rocephin 2gm IVP      Mr. Cherry Haywood speaks Icelandic  Fluently and has some difficulty with [place].  used to assess Mr. Cherry Haywood he c/o pain to R foot 10/10 and is having chills. Right leg is swollen, 2+ non pitting edema painful to touch. When asked how did he arrive today he reports that he drove himself here. He has been taking Tylenol 500 mg x4 tabs 3-4 times a day. After antibiotics infused 911 called to take patient to ER. He is agreeable with the plan. Mr. Cherry Haywood tolerated infusion, and had no complaints at this time. Patient armband removed and shredded. Mr. Cherry Haywood was discharged from [place] [address] in stable condition at 1505. He is to return on 3/7/18 at 1400 for his next appointment.     Jhonny Cabot, RN  2018  4:40 PM

## 2018-03-06 NOTE — ED TRIAGE NOTES
Presents in severe pain to right lower extremity/foot. Foot with existing wound currenlty being treated with IV antibiotics at the infusion center. PICC R arm.

## 2018-03-06 NOTE — H&P
Internal Medicine History and Physical          Subjective     HPI: Carlin Macdonald is a 45 y.o. male with a PMHx of chronic MRSA osteomyelitis Rt ankle, recent MRSA bacteremia, DM-II, HTN who presented to the ED from [facility] center for uncontrolled pain and worsening swelling/redness of RLE. The patient has a long history. Patient reported that he had right foot surgery a few months ago but was not able to specify why. Review of records everywhere indicated h/o osteomyelitis of ankle, diabetes, gas gangrene, hypertension and anemia. Recent MRI Right foot Lateral mid foot soft tissue wound with sinus tract extending to the calcaneocuboid articulation. There is contiguous and extensive marrow signal abnormality compatible with osteomyelitis involving the majority of the cuboid, calcaneus, inferior talar head and neck, navicular, as well as the fourth and fifth metatarsal shafts proximally. On postcontrast imaging, no evidence of necrotic or nonviable bone demonstrated. 2. Large ankle joint effusion/synovitis with complex appearing and multilocular phlegmon infiltrating through the lateral aspect of Kager's fat pad as well as the sinus tarsi. Suspect infected calcaneocuboid articulation effusion given proximity to adjacent lateral skin wound. Recommendation on previous admission was for amputation, including R BKA, but patient refused and elected to try try IVAB and pain control with understanding that IV treatment was not guaranteed. He was discharged from hospital in beginning of March and returned to ED due to reasons above. Vascular surgery was contacted in the ED and will attempt to schedule patient for OR. I spoke to the patient through  phone for HPI and he admits to excruciating pain, increased swelling and redness. He received IV infusions today of daptomycin and rocephin.  Duplex negative for DVT in hospital    PMHx:  Past Medical History:   Diagnosis Date    Cellulitis     rt. foot    Diabetes ([facility]     Osteomyelitis ([facility]     rt toe    Other ill-defined conditions(799.89)        PSurgHx:  Past Surgical History:   Procedure Laterality Date    HX ORTHOPAEDIC      rt.toe       SocialHx:  Social History     Social History    Marital status: SINGLE     Spouse name: N/A    Number of children: N/A    Years of education: N/A     Occupational History    Not on file. Social History Main Topics    Smoking status: Heavy Tobacco Smoker     Packs/day: 0.50    Smokeless tobacco: Never Used    Alcohol use No    Drug use: Yes     Special: Marijuana      Comment: 17    Sexual activity: Not on file     Other Topics Concern    Not on file     Social History Narrative       FamilyHx:  History reviewed. No pertinent family history. Prior to Admission Medications   Prescriptions Last Dose Informant Patient Reported? Taking? Insulin Syringe-Needle U-100 (INSULIN SYRINGE) 1 mL 28 x 1/2\" syrg   No No   Si Package by Does Not Apply route two (2) times a day. amLODIPine (NORVASC) 5 mg tablet   No No   Sig: Take 1 Tab by mouth daily. aspirin 81 mg chewable tablet   No No   Sig: Take 1 Tab by mouth daily. insulin NPH/insulin regular (HUMULIN 70/30) 100 unit/mL (70-30) injection   No No   Si Units by SubCUTAneous route Before breakfast and dinner. losartan (COZAAR) 50 mg tablet   No No   Sig: Take 1 Tab by mouth daily. metFORMIN (GLUCOPHAGE) 500 mg tablet   No No   Sig: Take 1 Tab by mouth two (2) times daily (with meals). oxyCODONE-acetaminophen (PERCOCET) 5-325 mg per tablet   No No   Sig: Take 1 Tab by mouth every six (6) hours as needed for Pain. Max Daily Amount: 4 Tabs. pravastatin (PRAVACHOL) 10 mg tablet   No No   Sig: Take 1 Tab by mouth nightly.       Facility-Administered Medications: None       Review of Systems:  CONST: Fever, weight loss, fatigue or chills  HEENT: Recent changes in vision, vertigo, epistaxis, dysphagia and hoarseness  CV: Chest pain, palpitations, HTN, edema and varicosities  RESP: Cough, shortness of breath, wheezing, hemoptysis, snoring and reactive airway disease  GI: Nausea, vomiting, abdominal pain, change in bowel habits, hematochezia, melena, and GERD   : Hematuria, dysuria, frequency, urgency, nocturia and stress urinary incontinence   MS: Weakness, joint pain and arthritis  ENDO: Diabetes, thyroid disease, polyuria, polydipsia, polyphagia, poor wound healing, heat intolerance, cold intolerance  LYMPH/HEME: Anemia, bruising and history of blood transfusions  INTEG: Dermatitis, abnormal moles  NEURO: Dizziness, headache, fainting, seizures and stroke  PSYCH: Anxiety and depression      Objective      Visit Vitals    /84    Pulse 91    Temp 98.7 °F (37.1 °C)    SpO2 99%       Physical Exam:  General Appearance: NAD, conversant  HENT: normocephalic/atraumatic, moist mucus membranes  Lungs: CTA with normal respiratory effort  Cardiovascular: RRR, no m/r/g, capillary refill < 2 sec, B/L DP/PT pulses +3/4  Abdomen: soft, non-tender, normal bowel sounds  Extremities: no cyanosis, + 2 pitting edema RLE w/ redness around ankle, PICC line RUE  Neuro: moves all extremities, no focal deficits  Psych: appropriate affect, alert and oriented to person, place and time    Laboratory Studies:  BMP:   Lab Results   Component Value Date/Time     03/06/2018 04:32 PM    K 4.0 03/06/2018 04:32 PM     03/06/2018 04:32 PM    CO2 23 03/06/2018 04:32 PM    AGAP 7 03/06/2018 04:32 PM    GLU 96 03/06/2018 04:32 PM    BUN 33 (H) 03/06/2018 04:32 PM    CREA 1.37 (H) 03/06/2018 04:32 PM    GFRAA >60 03/06/2018 04:32 PM    GFRNA 58 (L) 03/06/2018 04:32 PM     CBC:   Lab Results   Component Value Date/Time    WBC 15.2 (H) 03/06/2018 04:32 PM    HGB 8.5 (L) 03/06/2018 04:32 PM    HCT 26.0 (L) 03/06/2018 04:32 PM     03/06/2018 04:32 PM       Imaging Reviewed:  No results found.         Assessment/Plan     Active Hospital Problems    Diagnosis Date Noted  Osteomyelitis ([facility] 03/06/2018    Cellulitis of foot, right 02/23/2018    SAVANNA (acute kidney injury) ([facility] 09/12/2017    Iron deficiency anemia 05/21/2015    Hypertension 05/19/2015    DM (diabetes mellitus), secondary uncontrolled ([facility] 06/15/2013     - Cont IVAB w/ dapto/rocephin  - Isolation  - IVFs  - Pain control  - Bedrest  - NPO after midnight  - Vascular surgery consulted  - Consult ID  - SSI and accuchecks  - Trend BMP and CBC  - Cont acceptable home medications for chronic conditions   - DVT protocol    I have personally reviewed all pertinent labs, films and EKGs that have officially resulted. I reviewed available electronic documentation outlining the initial presentation as well as the emergency room physician's encounter.     Bessy Davis DO  Internal Medicine, Hospitalist  Pager: [phone] [facility]

## 2018-03-06 NOTE — PROCEDURES
[facility]/[address]  *** FINAL REPORT ***    Name: Estela Andrade  MRN: [id number]    Inpatient  : 1980  HIS Order #: [id number]  [id number] [facility] Visit #: [id number]  Date: 06 Mar 2018    TYPE OF TEST: Peripheral Venous Testing    REASON FOR TEST  Pain in limb    Right Leg:-  Deep venous thrombosis:           No  Superficial venous thrombosis:    No  Deep venous insufficiency:        Not examined  Superficial venous insufficiency: Not examined      INTERPRETATION/FINDINGS  Duplex images were obtained using 2-D gray scale, color flow, and  spectral Doppler analysis. Right leg :  1. Deep vein(s) visualized include the common femoral, deep femoral,  proximal femoral, mid femoral, distal femoral, popliteal(above knee),  popliteal(fossa), popliteal(below knee), posterior tibial and peroneal   veins. 2. Technically difficult exam secondary to patient unable to tolerate  compressions. Veins are patent with color and doppler, cannot exclude  non-occlusive deep vein thrombosis in the right lower extremity. 3. No evidence of deep vein thrombosis in the contralateral common  femoral vein. 4. Superficial vein(s) visualized include the great saphenous and  small saphenous veins. 5. No evidence of superficial thrombosis detected. ADDITIONAL COMMENTS  Results given to Dr. Anabelle Bridges I have personally reviewed the data relevant to the interpretation of  this  study. TECHNOLOGIST: Collette Nunez RDMS, RVT  Signed: 2018 05:07 PM    PHYSICIAN: Lynne Valdes.  Sangeeta Vang MD  Signed: 2018 10:07 PM

## 2018-03-06 NOTE — IP AVS SNAPSHOT
Jhony Chaidez 
 
 
 [address] [place] [address] 
[phone] Patient: Sudheer Roman MRN: [id number] YNI:[id number] A check selvin indicates which time of day the medication should be taken. My Medications CHANGE how you take these medications Instructions Each Dose to Equal  
 Morning Noon Evening Bedtime  
 oxyCODONE-acetaminophen 5-325 mg per tablet Commonly known as:  PERCOCET What changed:   
- how much to take - when to take this 
- reasons to take this Your last dose was: Your next dose is: Take 2 Tabs by mouth every four (4) hours as needed. Max Daily Amount: 12 Tabs. 2 Tab CONTINUE taking these medications Instructions Each Dose to Equal  
 Morning Noon Evening Bedtime  
 amLODIPine 5 mg tablet Commonly known as:  Milagro Caballero Your last dose was: Your next dose is: Take 1 Tab by mouth daily. 5 mg  
    
   
   
   
  
 aspirin 81 mg chewable tablet Your last dose was: Your next dose is: Take 1 Tab by mouth daily. 81 mg  
    
   
   
   
  
 insulin NPH/insulin regular 100 unit/mL (70-30) injection Commonly known as:  HumuLIN 70/30 U-100 Insulin Your last dose was: Your next dose is:    
   
   
 12 Units by SubCUTAneous route Before breakfast and dinner. 12 Units Insulin Syringe-Needle U-100 1 mL 28 gauge x 1/2\" Syrg Commonly known as:  INSULIN SYRINGE Your last dose was: Your next dose is:    
   
   
 1 Package by Does Not Apply route two (2) times a day. 1 Package  
    
   
   
   
  
 losartan 50 mg tablet Commonly known as:  COZAAR Your last dose was: Your next dose is: Take 1 Tab by mouth daily. 50 mg  
    
   
   
   
  
 metFORMIN 500 mg tablet Commonly known as:  GLUCOPHAGE Your last dose was: Your next dose is: Take 1 Tab by mouth two (2) times daily (with meals). 500 mg  
    
   
   
   
  
 pravastatin 10 mg tablet Commonly known as:  PRAVACHOL Your last dose was: Your next dose is: Take 1 Tab by mouth nightly. 10 mg Where to Get Your Medications Information on where to get these meds will be given to you by the nurse or doctor. ! Ask your nurse or doctor about these medications  
  oxyCODONE-acetaminophen 5-325 mg per tablet

## 2018-03-06 NOTE — IP AVS SNAPSHOT
[address] [address] 
[phone] Patient: Torin Hidalgo MRN: [id number] About your hospitalization You were admitted on:  March 6, 2018 You last received care in the:  [address] NEURO [facility] You were discharged on:  March 13, 2018 Why you were hospitalized Your primary diagnosis was:  Osteomyelitis (Hcc) Your diagnoses also included:  Iron Deficiency Anemia, Hypertension, Dm (Diabetes Mellitus), Secondary Uncontrolled (Hcc), Cellulitis Of Foot, Right, Dale (Acute Kidney Injury) (Hcc) Follow-up Information Follow up With Details Comments Contact Info Salomon Navarrete PA-C Schedule an appointment as soon as possible for a visit in 1 week  [address] [address] [address] [place] [address] [address] [phone] 
[phone] Mia Agrawal MD On 3/19/2018 at 1:30 pm [address] [id number] 
[phone] Your Scheduled Appointments Monday March 19, 2018  2:00 PM EDT TRANSITIONAL CARE MANAGEMENT with Mia Agrawal MD  
[facility] ([facility]) [id number] [phone] Discharge Orders None A check selvin indicates which time of day the medication should be taken. My Medications START taking these medications Instructions Each Dose to Equal  
 Morning Noon Evening Bedtime  
 naloxone 2 mg/actuation Spry Your last dose was: Your next dose is:    
   
   
 Use 1 spray intranasally into 1 nostril. Use a new Narcan nasal spray for subsequent doses and administer into alternating nostrils. May repeat every 2 to 3 minutes as needed. Indications: OPIATE-INDUCED RESPIRATORY DEPRESSION  
     
   
   
   
  
  
CHANGE how you take these medications Instructions Each Dose to Equal  
 Morning Noon Evening Bedtime * oxyCODONE-acetaminophen 5-325 mg per tablet Commonly known as:  PERCOCET What changed:   
- how much to take - when to take this 
- reasons to take this Your last dose was: Your next dose is: Take 2 Tabs by mouth every four (4) hours as needed. Max Daily Amount: 12 Tabs. 2 Tab * oxyCODONE-acetaminophen  mg per tablet Commonly known as:  PERCOCET What changed: You were already taking a medication with the same name, and this prescription was added. Make sure you understand how and when to take each. Your last dose was: Your next dose is: Take 0.5-1 Tabs by mouth every four (4) hours as needed for Pain. Max Daily Amount: 6 Tabs. 0.5-1 Tab * Notice: This list has 2 medication(s) that are the same as other medications prescribed for you. Read the directions carefully, and ask your doctor or other care provider to review them with you. CONTINUE taking these medications Instructions Each Dose to Equal  
 Morning Noon Evening Bedtime  
 amLODIPine 5 mg tablet Commonly known as:  Maurisio Nearing Your last dose was: Your next dose is: Take 1 Tab by mouth daily. 5 mg  
    
   
   
   
  
 aspirin 81 mg chewable tablet Your last dose was: Your next dose is: Take 1 Tab by mouth daily. 81 mg  
    
   
   
   
  
 insulin NPH/insulin regular 100 unit/mL (70-30) injection Commonly known as:  HumuLIN 70/30 U-100 Insulin Your last dose was: Your next dose is:    
   
   
 12 Units by SubCUTAneous route Before breakfast and dinner. 12 Units Insulin Syringe-Needle U-100 1 mL 28 gauge x 1/2\" Syrg Commonly known as:  INSULIN SYRINGE Your last dose was: Your next dose is:    
   
   
 1 Package by Does Not Apply route two (2) times a day. 1 Package losartan 50 mg tablet Commonly known as:  COZAAR Your last dose was: Your next dose is: Take 1 Tab by mouth daily. 50 mg  
    
   
   
   
  
 metFORMIN 500 mg tablet Commonly known as:  GLUCOPHAGE Your last dose was: Your next dose is: Take 1 Tab by mouth two (2) times daily (with meals). 500 mg  
    
   
   
   
  
 pravastatin 10 mg tablet Commonly known as:  PRAVACHOL Your last dose was: Your next dose is: Take 1 Tab by mouth nightly. 10 mg Where to Get Your Medications Information on where to get these meds will be given to you by the nurse or doctor. ! Ask your nurse or doctor about these medications  
  naloxone 2 mg/actuation Spry  
 oxyCODONE-acetaminophen  mg per tablet  
 oxyCODONE-acetaminophen 5-325 mg per tablet Discharge Instructions DISCHARGE SUMMARY from Nurse PATIENT INSTRUCTIONS: 
 
After general anesthesia or intravenous sedation, for 24 hours or while taking prescription Narcotics: · Limit your activities · Do not drive and operate hazardous machinery · Do not make important personal or business decisions · Do  not drink alcoholic beverages · If you have not urinated within 8 hours after discharge, please contact your surgeon on call. Report the following to your surgeon: 
· Excessive pain, swelling, redness or odor of or around the surgical area · Temperature over 100.5 · Nausea and vomiting lasting longer than 4 hours or if unable to take medications · Any signs of decreased circulation or nerve impairment to extremity: change in color, persistent  numbness, tingling, coldness or increase pain · Any questions · Activity: Activity as tolerated and PT/OT per Home Health 
  
Diet: Diabetic Diet 
  
Wound Care: Keep wound clean and dry and Reinforce dressing PRN.  Follow up in 1 month with Vascular for staple removal.  
 
Follow-up:  
Dr. Fredi Madrigal 3/19/2018 at 1:30 pm 
Vascular Surgery in 1 month for staple removal- patient to set up appointment- [phone] What to do at Home: If you experience any of the following symptoms as listed in the discharge instructions as \"When to Call for Help\", please follow up with your primary care physician and/or call 911. *  Please give a list of your current medications to your Primary Care Provider. *  Please update this list whenever your medications are discontinued, doses are 
    changed, or new medications (including over-the-counter products) are added. *  Please carry medication information at all times in case of emergency situations. These are general instructions for a healthy lifestyle: No smoking/ No tobacco products/ Avoid exposure to second hand smoke Surgeon General's Warning:  Quitting smoking now greatly reduces serious risk to your health. Obesity, smoking, and sedentary lifestyle greatly increases your risk for illness A healthy diet, regular physical exercise & weight monitoring are important for maintaining a healthy lifestyle You may be retaining fluid if you have a history of heart failure or if you experience any of the following symptoms:  Weight gain of 3 pounds or more overnight or 5 pounds in a week, increased swelling in our hands or feet or shortness of breath while lying flat in bed. Please call your doctor as soon as you notice any of these symptoms; do not wait until your next office visit. Recognize signs and symptoms of STROKE: 
 
F-face looks uneven A-arms unable to move or move unevenly S-speech slurred or non-existent T-time-call 911 as soon as signs and symptoms begin-DO NOT go Back to bed or wait to see if you get better-TIME IS BRAIN. Warning Signs of HEART ATTACK Call 911 if you have these symptoms: ? Chest discomfort. Most heart attacks involve discomfort in the center of the chest that lasts more than a few minutes, or that goes away and comes back. It can feel like uncomfortable pressure, squeezing, fullness, or pain. ? Discomfort in other areas of the upper body. Symptoms can include pain or discomfort in one or both arms, the back, neck, jaw, or stomach. ? Shortness of breath with or without chest discomfort. ? Other signs may include breaking out in a cold sweat, nausea, or lightheadedness. Don't wait more than five minutes to call [address]! Fast action can save your life. Calling 911 is almost always the fastest way to get lifesaving treatment. Emergency Medical Services staff can begin treatment when they arrive  up to an hour sooner than if someone gets to the hospital by car. The discharge information has been reviewed with the patient. The patient verbalized understanding. Discharge medications reviewed with the patient and appropriate educational materials and side effects teaching were provided. ___________________________________________________________________________________________________________________________________ Patient armband removed and shredded [facility] Announcement We are excited to announce that we are making your provider's discharge notes available to you in ConferenceEdge. You will see these notes when they are completed and signed by the physician that discharged you from your recent hospital stay. If you have any questions or concerns about any information you see in [email or web address], please call the Health Information Department where you were seen or reach out to your Primary Care Provider for more information about your plan of care. Introducing [facility]! Larissa Elizabeth introduces ConferenceEdge patient portal. Now you can access parts of your medical record, email your doctor's office, and request medication refills online. 1. In your internet browser, go to [email or web address]. [employer] 2. Click on the First Time User? Click Here link in the Sign In box. You will see the New Member Sign Up page. 3. Enter your Ofuz Access Code exactly as it appears below. You will not need to use this code after youve completed the sign-up process. If you do not sign up before the expiration date, you must request a new code. · Ofuz Access Code: [email or web address] Expires: 5/27/2018  7:55 AM 
 
4. Enter the last four digits of your Social Security Number (xxxx) and Date of Birth (mm/dd/yyyy) as indicated and click Submit. You will be taken to the next sign-up page. 5. Create a Cretia's Creationst ID. This will be your Ofuz login ID and cannot be changed, so think of one that is secure and easy to remember. 6. Create a Ofuz password. You can change your password at any time. 7. Enter your Password Reset Question and Answer. This can be used at a later time if you forget your password. 8. Enter your e-mail address. You will receive e-mail notification when new information is available in [address] 9. Click Sign Up. You can now view and download portions of your medical record. 10. Click the Download Summary menu link to download a portable copy of your medical information. If you have questions, please visit the Frequently Asked Questions section of the Ofuz website. Remember, Ofuz is NOT to be used for urgent needs. For medical emergencies, dial 911. Now available from your iPhone and Android! Unresulted Labs-Please follow up with your PCP about these lab tests Order Current Status CULTURE, BLOOD Preliminary result Providers Seen During Your Hospitalization Provider Specialty Primary office phone Katelynn Connell MD Emergency Medicine [phone] Guzman Hughes DO Internal Medicine [phone] Olesya Tompkins MD Internal Medicine [phone] Your Primary Care Physician (PCP) Primary Care Physician Office Phone Office Fax [address] [facility], [address] [place] [address] Road [phone] You are allergic to the following No active allergies Recent Documentation Height Weight BMI Smoking Status 1.575 m 65.3 kg 26.34 kg/m2 Heavy Tobacco Smoker Emergency Contacts Name Discharge Info Relation Home Work Mobile [facility] DISCHARGE CAREGIVER [3] Friend [5] [phone] Patient Belongings The following personal items are in your possession at time of discharge: 
  Dental Appliances: None  Visual Aid: None      Home Medications: None   Jewelry: None  Clothing:  (everything kept in pt room)    Other Valuables: Cell Phone, Crutches (1 crutch) Discharge Instructions Attachments/References OSTEOMYELITIS (ENGLISH) BKA (BELOW THE KNEE AMPUTATION): POST-OP (ENGLISH) MRSA (ENGLISH) MEFS - OXYCODONE/ACETAMINOPHEN (PERCOCET, ROXICET) - (BY MOUTH) (ENGLISH) Patient Handouts Osteomyelitis: Care Instructions Your Care Instructions Osteomyelitis (say \"vj-xbfm-fr-zg-cf-YA-tus\") is a bone infection. It is caused by bacteria. The bacteria can infect the bone where it has been injured, or they can be carried through the blood from another area in the body. Osteomyelitis can be a short- or long-term problem. It is treated with antibiotics. You may get the antibiotics as pills or through a needle in a vein (IV). You will probably get treatment in the hospital at first. The type of treatment depends on the type of bacteria causing the infection, the bones affected, and how bad the infection is. Sometimes people need surgery to drain pus from bone or to fix damaged bone. Short-term osteomyelitis that is treated right away usually can be cured. But the long-term form sometimes comes back after treatment.  You can help your chances of stopping the infection by taking your medicines as directed. Follow-up care is a key part of your treatment and safety. Be sure to make and go to all appointments, and call your doctor if you are having problems. It's also a good idea to know your test results and keep a list of the medicines you take. How can you care for yourself at home? · Take your antibiotics as directed. Do not stop taking them just because you feel better. You need to take the full course of antibiotics. · Take pain medicines exactly as directed. ¨ If the doctor gave you a prescription medicine for pain, take it as prescribed. ¨ If you are not taking a prescription pain medicine, ask your doctor if you can take an over-the-counter medicine. · Do mild exercise and stretching if your doctor says it is okay. This can help keep your bones and muscles healthy. Avoid strenuous work or exercise until your doctor says you can do it. · Consider physical therapy if your doctor suggests it. Physical therapy may help you have a normal range of movement. · Do not smoke. Smoking can slow healing of the infection. If you need help quitting, talk to your doctor about stop-smoking programs and medicines. These can increase your chances of quitting for good. When should you call for help? Call 911 anytime you think you may need emergency care. For example, call if: 
? · You have severe bone pain. ?Call your doctor now or seek immediate medical care if: 
? · You continue to have bone pain. ? · You have signs of infection, such as: 
¨ Increased pain, swelling, warmth, or redness. ¨ Red streaks leading from a wound. ¨ Pus draining from a wound. ¨ A fever. ? Watch closely for changes in your health, and be sure to contact your doctor if: 
? · You do not get better as expected. Where can you learn more? Go to http://cammy-gordy.info/. Enter N608 in the search box to learn more about \"Osteomyelitis: Care Instructions. \" Current as of: March 20, 2017 Content Version: 11.4 © 0188-3853 Healthwise, [employer]. Care instructions adapted under license by [employer] (which disclaims liability or warranty for this information). If you have questions about a medical condition or this instruction, always ask your healthcare professional. [address] any warranty or liability for your use of this information. Below-the-Knee Leg Amputation: What to Expect at [facility] Your Recovery A below-the-knee amputation is surgery to remove your leg below the knee. Your doctor removed the leg while keeping as much healthy bone, skin, blood vessel, and nerve tissue as possible. After a below-the-knee amputation, you will probably have bandages, a rigid dressing, or a cast over the remaining part of your leg (remaining limb). The leg will be swollen for at least 4 weeks after your surgery. If you have a rigid dressing or cast, your doctor will set up regular visits to change the dressing or cast and check the healing. If you have elastic bandages, your doctor will tell you how to change them. You may have pain in your remaining limb. You also may think you have feeling or pain where your leg was. This is called phantom pain. It is common and may come and go for a year or longer. Your doctor can give you medicine for both types of pain. You may have already started a rehabilitation program (rehab). You will continue this under the guidance of your doctor or physical therapist. Jes Pnio will need to do a lot of work to recondition your muscles and relearn activities, balance, and coordination. The rehab can last as long as a year. You may have been fitted with a temporary artificial leg while you were still in the hospital. If this is the case, your doctor will teach you how to care for it. If you are getting an artificial leg, you may need to get used to it before you return to work and your other activities.  You will probably not wear it all the time, so you will need to learn how to use a wheelchair, crutches, or other device. You will have to make changes in your home. Your workplace may be able to make allowances for you. Having your leg amputated is traumatic. Learning to live with new limitations can be hard and frustrating. You may feel depressed or grieve for your previous lifestyle. It is important to understand these feelings. Talking with your family, friends, and health professionals about your frustrations is an important part of your recovery. You may also find that it helps to talk with a person who has had an amputation. Remember that even though losing a limb is difficult, it does not change who you are or prevent you from enjoying life. You will have to adapt and learn new ways to do things, but you will still be able to work and take part in sports and activities. And you can still learn, love, play, and live life to its fullest. 
Many organizations can help you adjust to your new life. For example, you can find information at amputee-coalition.org. This care sheet gives you a general idea about how long it will take for you to recover. But each person recovers at a different pace. Follow the steps below to get better as quickly as possible. How can you care for yourself at home? Activity ? · Be active. Talk to your doctor about what you can do. If you are active and use your remaining limb, it will heal faster. ? · You may shower when your doctor okays it. Wash the remaining limb with soap and water, and pat it dry. You may need help doing this at first.  
? · You may need to adapt your car to your situation before you drive. ? · You will probably be able to return to work and your usual routine when your remaining limb heals. This can be as soon as 4 to 8 weeks after surgery, but it may take longer. Diet ? · You can eat your normal diet.  If your stomach is upset, try bland, low-fat foods like plain rice, broiled chicken, toast, and yogurt. ? · You may notice that your bowel movements are not regular right after your surgery. This is common. Try to avoid constipation and straining with bowel movements. Take a fiber supplement every day. If you have not had a bowel movement after a couple of days, ask your doctor about taking a mild laxative. Medicines ? · Your doctor will tell you if and when you can restart your medicines. He or she will also give you instructionsabout taking any new medicines. ? · If you take blood thinners, such as warfarin (Coumadin), clopidogrel (Plavix), or aspirin, be sure to talk to yourdoctor. He or she will tell you if and when to start taking those medicines again. Make sure that you understandexactly what your doctor wants you to do. ? · Be safe with medicines. Take pain medicines exactly as directed. ¨ If the doctor gave you a prescription medicine for pain, take it as prescribed. ¨ If you are not taking a prescription pain medicine, ask your doctor if you can take an over-the-counter medicine. ? · If you think your pain medicine is making you sick to your stomach: 
¨ Take your medicine after meals (unless your doctor has told you not to). ¨ Ask your doctor for a different pain medicine. ? · If your doctor prescribed antibiotics, take them as directed. Do not stop taking them just because you feel better. You need to take the full course of antibiotics. ? Remaining limb care ? · You may have bandages, a rigid dressing, or a cast on your remaining limb. Your doctor will tell you how to take care of it. Depending on your dressing and the doctor's instructions: 
¨ Check your remaining limb daily for irritation, skin breaks, and redness. Tell your doctor about any problems you see. ¨ Wash your remaining limb with mild soap and warm water every night. Pat it dry. ? · If you have a temporary artificial leg, remove it before you go to sleep. Exercise ? · Rehabilitation is a series of exercises you do after your surgery. This helps you learn to use your remaining limb and artificial leg. You will work with your doctor and physical therapist to plan this exercise program. To get the best results, you need to do the exercises correctly and as often and as long as your doctor tells you. Your rehab program will give you a number of exercises to do. Always do them as your therapist tells you. Other instructions ? · Preventing contractures is very important. A contracture occurs when a joint becomes stuck in one position. If this happens, it may be hard or impossible to straighten your remaining limb and use an artificial leg. ¨ Make sure you put equal weight on both hips when you sit. Use firm chairs, and sit up straight. ¨ Keep your remaining limb flat with your knees straight and your legs together while you are lying on your back. ¨ Lie on your stomach as much as possible to stretch your hip joint. ¨ Do not sit for more than an hour or two. Stand, or lie on your stomach now and then. ¨ Do not put pillows under your hips or knees or between your thighs. ¨ Do not rest your remaining limb on crutch handles or a wheelchair. Follow-up care is a key part of your treatment and safety. Be sure to make and go to all appointments, and call your doctor if you are having problems. It's also a good idea to know your test results and keep a list of the medicines you take. When should you call for help? Call 911 anytime you think you may need emergency care. For example, call if: 
? · You passed out (lost consciousness). ? · You have chest pain, are short of breath, or you cough up blood. ?Call your doctor now or seek immediate medical care if: 
? · You have pain that does not get better after you take pain medicine. ? · You are sick to your stomach or cannot drink fluids. ? · You have loose stitches, or your incision comes open. ? · You have signs of a blood clot in your leg (called a deep vein thrombosis), such as: 
¨ Pain in your calf, back of the knee, thigh, or groin. ¨ Redness or swelling in your leg. ? · You have signs of infection, such as: 
¨ Increased pain, swelling, warmth, or redness. ¨ Red streaks leading from the incision. ¨ Pus draining from the incision. ¨ A fever. ? · You bleed through your bandage. ? Watch closely for any changes in your health, and be sure to contact your doctor if you have any problems. Where can you learn more? Go to http://cammy-gordy.info/. Enter O305 in the search box to learn more about \"Below-the-Knee Leg Amputation: What to Expect at Home. \" Current as of: March 21, 2017 Content Version: 11.4 © 9070-2140 [employer]. Care instructions adapted under license by [employer] (which disclaims liability or warranty for this information). If you have questions about a medical condition or this instruction, always ask your healthcare professional. [place] [address] any warranty or liability for your use of this information. MRSA: Care Instructions Your Care Instructions MRSA stands for methicillin-resistant Staphylococcus aureus. It is a type of bacteria that can cause a staph infection. But it cannot be killed by the antibiotic methicillin and some other antibiotics. This sometimes makes it harder to treat. The bacteria are widespread on skin and in the nose. MRSA can cause infections of the skin, heart, blood, and bones. The bacteria can spread quickly in the body and cause serious problems. MRSA can also be spread from person to person. Depending on how serious your infection is, the doctor may drain your wound and you may get antibiotics through a small tube placed in a vein (IV). Your doctor may also give you an antibiotic ointment to use on sores or in your nose. Follow-up care is a key part of your treatment and safety. Be sure to make and go to all appointments, and call your doctor if you are having problems. It's also a good idea to know your test results and keep a list of the medicines you take. How can you care for yourself at home? · Take your antibiotics as directed. Do not stop taking them just because you feel better. You need to take the full course of antibiotics. · Keep any cuts or other wounds covered while they heal. 
· Wash your hands often, especially after you touch elastic bandages or other dressings over a wound. This can keep the bacteria from spreading. Wrap bandages in a plastic bag before you throw them away. · Do not share towels, washcloths, razors, clothing, or other items that touched your wound or bandage. Wash your sheets, towels, and clothes with warm water and detergent. Dry them in a hot dryer, if possible. · Keep shared areas clean by wiping down surfaces (such as countertops, doorknobs, and light switches) with a disinfectant. When should you call for help? Call your doctor now or seek immediate medical care if: 
? · You have worse symptoms of infection, such as: 
¨ Increased pain, swelling, warmth, or redness. ¨ Red streaks leading from the area. ¨ Pus draining from the area. ¨ A fever. ? Watch closely for changes in your health, and be sure to contact your doctor if: 
? · You do not get better as expected. Where can you learn more? Go to http://cammy-gordy.info/. Enter Y941 in the search box to learn more about \"MRSA: Care Instructions. \" Current as of: March 3, 2017 Content Version: 11.4 © 9420-6980 [employer]. Care instructions adapted under license by [employer] (which disclaims liability or warranty for this information).  If you have questions about a medical condition or this instruction, always ask your healthcare professional. Brock Lovett, Incorporated disclaims any warranty or liability for your use of this information. Oxycodone/Acetaminophen (Percocet, Roxicet) - (By mouth) Why this medicine is used:  
Treats pain. This medicine contains a narcotic pain reliever. Contact a nurse or doctor right away if you have: 
· Extreme weakness, shallow breathing, slow heartbeat · Sweating or cold, clammy skin · Skin blisters, rash, or peeling Common side effects: 
· Constipation · Nausea, vomiting · Tiredness ©  [facility] Information is for End User's use only and may not be sold, redistributed or otherwise used for commercial purposes. Please provide this summary of care documentation to your next provider. Signatures-by signing, you are acknowledging that this After Visit Summary has been reviewed with you and you have received a copy. Patient Signature:  ____________________________________________________________ Date:  ____________________________________________________________  
  
Samantha Hilliard Provider Signature:  ____________________________________________________________ Date:  ____________________________________________________________  
  
  
   
  
[address] [facility] [address] 
[facility] [id number] 
[phone] Patient: Mena Byrd MRN: [id number] TW Sobre ovalles hospitalización Le admitieron el:  2018 [place]valles tratamiento más reciente fue el:  [facility] 2 NEURO MED Le dieron de jordi el:  2018 Por qué le ingresaron [place] diagnosis primaria es:  Osteomyelitis (Hcc) Ovalles diagnosis también incluye:  Iron Deficiency Anemia, Hypertension, Dm (Diabetes Mellitus), Secondary Uncontrolled (Hcc), Cellulitis Of Foot, Right, Dale (Acute Kidney Injury) (Hcc) Follow-up Information Follow up With Details Comments Contact Info  Lala Marquez PA-C Schedule an appointment as soon as possible for a visit in 1 week  Odette Momin  [address] [place] [address] [address] 
[phone] Porfirio Unger MD On 3/19/2018 at 1:30 pm [address] [place] [address] [address] 
[phone] Your Scheduled Appointments Monday March 19, 2018  2:00 PM EDT TRANSITIONAL CARE MANAGEMENT with Porfirio Unegr MD  
[employer] ([address] [place] [address]) [address] [place] [address] [address]  
[phone] Discharge Orders SnapUp A check selvin indicates which time of day the medication should be taken. My Medications EMPIECE a jose luis [employer] Network Instructions Each Dose to Equal  
 Morning Noon Evening Bedtime  
 naloxone 2 mg/actuation Spry Your last dose was: Your next dose is:    
   
   
 Use 1 spray intranasally into 1 nostril. Use a new Narcan nasal spray for subsequent doses and administer into alternating nostrils. May repeat every 2 to 3 minutes as needed. Indications: OPIATE-INDUCED RESPIRATORY DEPRESSION  
     
   
   
   
  
  
CAMBIE la forma de jose luis estos medicamentos Instructions Each Dose to Equal  
 Morning Noon Evening Bedtime * oxyCODONE-acetaminophen 5-325 mg per tablet También conocido alicia:  PERCOCET Lo que cambió:   
- dosis a jose luis 
- cuándo lo debe jose luis 
- razones para tomarlo Your last dose was: Your next dose is: Take 2 Tabs by mouth every four (4) hours as needed. Max Daily Amount: 12 Tabs. 2 Tab * oxyCODONE-acetaminophen  mg per tablet También conocido alicia:  PERCOCET Lo que cambió:  Usted ya tomaba otro medicamento con el mismo nombre y se agregó esta receta. Asegúrese de que entiende cómo y cuándo jose luis cada rudy. Your last dose was: Your next dose is: Take 0.5-1 Tabs by mouth every four (4) hours as needed for Pain.  Max Daily Amount: 6 Tabs. 0.5-1 Tab * Aviso:  Esta lista contiene medicamentos que son iguales a otros medicamentos recetados para usted. Linda las instrucciones con cuidado y pida a beard personal médico que revise la lista de medicamentos y las instrucciones correspondientes con usted. SIGA tomando estos medicamentos Instructions Each Dose to Equal  
 Morning Noon Evening Bedtime  
 amLODIPine 5 mg tablet También conocido alicia:  Meseret Prow Your last dose was: Your next dose is: Take 1 Tab by mouth daily. 5 mg  
    
   
   
   
  
 aspirin 81 mg chewable tablet Your last dose was: Your next dose is: Take 1 Tab by mouth daily. 81 mg  
    
   
   
   
  
 insulin NPH/insulin regular 100 unit/mL (70-30) injection También conocido alicia:  HumuLIN 70/30 U-100 Insulin Your last dose was: Your next dose is:    
   
   
 12 Units by SubCUTAneous route Before breakfast and dinner. 12 Units Insulin Syringe-Needle U-100 1 mL 28 gauge x 1/2\" Syrg También conocido alicia:  INSULIN SYRINGE Your last dose was: Your next dose is:    
   
   
 1 Package by Does Not Apply route two (2) times a day. 1 Package  
    
   
   
   
  
 losartan 50 mg tablet También conocido alicia:  COZAAR Your last dose was: Your next dose is: Take 1 Tab by mouth daily. 50 mg  
    
   
   
   
  
 metFORMIN 500 mg tablet También conocido alicia:  GLUCOPHAGE Your last dose was: Your next dose is: Take 1 Tab by mouth two (2) times daily (with meals). 500 mg  
    
   
   
   
  
 pravastatin 10 mg tablet También conocido alicia:  PRAVACHOL Your last dose was: Your next dose is: Take 1 Tab by mouth nightly. 10 mg  
    
   
   
   
  
  
  
Dónde puede recoger alfonso medicamentos Information on where to get these meds will be given to you by the nurse or doctor. ! Pregunte a beard médico o enfermero/a sobre estos medicamentos  
  naloxone 2 mg/actuation Spry  
 oxyCODONE-acetaminophen  mg per tablet  
 oxyCODONE-acetaminophen 5-325 mg per tablet Instrucciones a laurel de jordi DISCHARGE SUMMARY from Nurse PATIENT INSTRUCTIONS: 
 
After general anesthesia or intravenous sedation, for 24 hours or while taking prescription Narcotics: · Limit your activities · Do not drive and operate hazardous machinery · Do not make important personal or business decisions · Do  not drink alcoholic beverages · If you have not urinated within 8 hours after discharge, please contact your surgeon on call. Report the following to your surgeon: 
· Excessive pain, swelling, redness or odor of or around the surgical area · Temperature over 100.5 · Nausea and vomiting lasting longer than 4 hours or if unable to take medications · Any signs of decreased circulation or nerve impairment to extremity: change in color, persistent  numbness, tingling, coldness or increase pain · Any questions · Activity: Activity as tolerated and PT/OT per Home Health 
  
Diet: Diabetic Diet 
  
Wound Care: Keep wound clean and dry and Reinforce dressing PRN. Follow up in 1 month with Vascular for staple removal.  
 
Follow-up:  
Dr. Lucrteia Hanks 3/19/2018 at 1:30 pm 
Vascular Surgery in 1 month for staple removal- patient to set up appointment- [phone] What to do at Home: If you experience any of the following symptoms as listed in the discharge instructions as \"When to Call for Help\", please follow up with your primary care physician and/or call 911. *  Please give a list of your current medications to your Primary Care Provider.  
 
*  Please update this list whenever your medications are discontinued, doses are 
    changed, or new medications (including over-the-counter products) are added. 
 
*  Please carry medication information at all times in case of emergency situations. These are general instructions for a healthy lifestyle: No smoking/ No tobacco products/ Avoid exposure to second hand smoke Surgeon General's Warning:  Quitting smoking now greatly reduces serious risk to your health. Obesity, smoking, and sedentary lifestyle greatly increases your risk for illness A healthy diet, regular physical exercise & weight monitoring are important for maintaining a healthy lifestyle You may be retaining fluid if you have a history of heart failure or if you experience any of the following symptoms:  Weight gain of 3 pounds or more overnight or 5 pounds in a week, increased swelling in our hands or feet or shortness of breath while lying flat in bed. Please call your doctor as soon as you notice any of these symptoms; do not wait until your next office visit. Recognize signs and symptoms of STROKE: 
 
F-face looks uneven A-arms unable to move or move unevenly S-speech slurred or non-existent T-time-call 911 as soon as signs and symptoms begin-DO NOT go Back to bed or wait to see if you get better-TIME IS BRAIN. Warning Signs of HEART ATTACK Call 911 if you have these symptoms: 
? Chest discomfort. Most heart attacks involve discomfort in the center of the chest that lasts more than a few minutes, or that goes away and comes back. It can feel like uncomfortable pressure, squeezing, fullness, or pain. ? Discomfort in other areas of the upper body. Symptoms can include pain or discomfort in one or both arms, the back, neck, jaw, or stomach. ? Shortness of breath with or without chest discomfort. ? Other signs may include breaking out in a cold sweat, nausea, or lightheadedness. Don't wait more than five minutes to call [address]! Fast action can save your life.  Calling 911 is almost always the fastest way to get lifesaving treatment. Emergency Medical Services staff can begin treatment when they arrive  up to an hour sooner than if someone gets to the hospital by car. The discharge information has been reviewed with the patient. The patient verbalized understanding. Discharge medications reviewed with the patient and appropriate educational materials and side effects teaching were provided. ___________________________________________________________________________________________________________________________________ Patient armband removed and shredded 2canhart Announcement We are excited to announce that we are making your provider's discharge notes available to you in Ag[employer]. You will see these notes when they are completed and signed by the physician that discharged you from your recent hospital stay. If you have any questions or concerns about any information you see in AgBiome, please call the Health Information Department where you were seen or reach out to your Primary Care Provider for more information about your plan of care. Introducing [facility]! Ann Marie Schneider introduces AgBiome patient portal. Now you can access parts of your medical record, email your doctor's office, and request medication refills online. 1. In your internet browser, go to [email or web address] [email or web address] [employer] 2. Click on the First Time User? Click Here link in the Sign In box. You will see the New Member Sign Up page. 3. Enter your AgBiome Access Code exactly as it appears below. You will not need to use this code after youve completed the sign-up process. If you do not sign up before the expiration date, you must request a new code. · AgBi[employer] Access Code: [email or web address] Expires: 5/27/2018  7:55 AM 
 
4. Enter the last four digits of your Social Security Number (xxxx) and Date of Birth (mm/dd/yyyy) as indicated and click Submit. You will be taken to the next sign-up page. 5. Create a [employer] ID. This will be your [employer] login ID and cannot be changed, so think of one that is secure and easy to remember. 6. Create a [employer] password. You can change your password at any time. 7. Enter your Password Reset Question and Answer. This can be used at a later time if you forget your password. 8. Enter your e-mail address. You will receive e-mail notification when new information is available in [address] 9. Click Sign Up. You can now view and download portions of your medical record. 10. Click the Download Summary menu link to download a portable copy of your medical information. If you have questions, please visit the Frequently Asked Questions section of the [employer] website. Remember, [employer] is NOT to be used for urgent needs. For medical emergencies, dial 911. Now available from your iPhone and Android! Unresulted Labs-Please follow up with your PCP about these lab tests Order Current Status CULTURE, BLOOD Preliminary result Providers Seen During Your Hospitalization Personal Médico Especialidad Teléfono principal de la oficina Shayan Cole MD Emergency Medicine [phone] Earlie Snellen, DO Internal Medicine [phone] Jada Dc MD Internal Medicine [phone] [place] médico de atención primaria (PCP ) Primary Care Physician Office Phone Office Fax [address] [facility], [address] [place] [address] Road [phone] Tiene alergias a lo siguiente No tiene alergias Documentación recientes Height Weight BMI (IMC) Estatus de tabaquísmo 1.575 m 65.3 kg 26.34 kg/m2 Heavy Tobacco Smoker Emergency Contacts Name Discharge Info Relation Home Work Mobile [facility] DISTRICT DISCHARGE CAREGIVER [3] Friend [5] [phone] Patient Belongings The following personal items are in your possession at time of discharge: Dental Appliances: None  Visual Aid: None      Home Medications: None   Jewelry: None  Clothing:  (everything kept in pt room)    Other Valuables: Cell Phone, Crutches (1 crutch) Discharge Instructions Attachments/References OSTEOMYELITIS (ENGLISH) BKA (BELOW THE KNEE AMPUTATION): POST-OP (ENGLISH) MRSA (ENGLISH) MEFS - OXYCODONE/ACETAMINOPHEN (PERCOCET, ROXICET) - (BY MOUTH) (ENGLISH) Patient Handouts Osteomielitis: Instrucciones de cuidado - [ Osteomyelitis: Care Instructions ] Instrucciones de cuidado La osteomielitis es la infección de un hueso. Es causada por bacterias. Las bacterias pueden infectar un hueso en el que hay evelio lesión o pueden ser transportadas por la marimar desde otra parte del cuerpo. La osteomielitis puede ser un problema a corto o daksha plazo. Se trata con antibióticos. Podría recibir los antibióticos en forma oral (de pastillas) o intravenosa (IV). Es probable que yarelis reciba tratamiento en el hospital. El tipo de tratamiento depende de las bacterias que provoquen la infección, los huesos afectados y la gravedad de la infección. A veces, las personas necesitan cirugía para drenar pus del hueso o reparar el hueso lesionado. La osteomielitis a corto plazo que se trata de inmediato generalmente se puede curar. Demar, a veces, la osteomielitis a daksha plazo reaparece después del tratamiento. Leo los medicamentos según las indicaciones puede mejorar las probabilidades de detener la infección. La atención de seguimiento es evelio parte clave de beard tratamiento y seguridad. Asegúrese de hacer y acudir a todas las citas, y llame a beard médico si está teniendo problemas. También es evelio buena idea saber los resultados de los exámenes y mantener evelio lista de los medicamentos que candido. Cómo puede cuidarse en el hogar? · [facility] antibióticos según las indicaciones. No deje de tomarlos por el hecho de sentirse mejor.  Debe leo todos los antibióticos [employer] terminarlos. · [employer] analgésicos (medicamentos para el dolor) exactamente según las indicaciones. ¨ Si el médico le recetó un analgésico, tómelo según las indicaciones. ¨ Si no está tomando un analgésico recetado, pregúntele a beard médico si puede jose luis un medicamento de [employer]. · Keshia Beam y estiramiento suaves si beard médico lo aprueba. [place] puede ayudar a [employer] y los músculos sanos. Evite hacer actividades o ejercicio vigorosos [employer] beard médico le diga que puede [place]. · Considere hacer fisioterapia si beard médico se lo sugiere. La fisioterapia podría ayudarlo a tener evelio amplitud de movimiento normal. 
· No fume. Fumar puede retardar la curación de la infección. Si necesita ayuda para dejar de fumar, hable con beard médico sobre programas y medicamentos para dejar de fumar. Estos pueden aumentar alfonso probabilidades de dejar el hábito para siempre. Cuándo debe pedir ayuda? Llame al 911 en cualquier momento que considere que necesita atención de [place]. Por ejemplo, llame si: 
? · Tiene dolor intenso en el hueso. ? Llame a beard médico ahora mismo o busque atención médica inmediata si: 
? · Sigue teniendo [employer]. ? · [address], tales alicia: ¨ Aumento del dolor, la hinchazón, el enrojecimiento o la temperatura. ¨ Vetas rojizas que salen de evelio herida. ¨ Pus que sale de evelio herida. Edilma Zach. ?Preste especial atención a los cambios en beard marlena y asegúrese de comunicarse con beard médico si: 
? · No mejora alicia se esperaba. Dónde puede encontrar más información en inglés? Jayna Ji a http://cammy-gordy.info/. Erik  [id number] en la búsqueda para aprender más acerca de \"Osteomielitis: Instrucciones de cuidado - [ Osteomyelitis: Care Instructions ]. \" 
Revisado: 20 marzo, 2017 Versión del contenido: 11.4 © 5651-9756 Healthwise, Incorporated.  Las instrucciones de cuidado fueron adaptadas bajo licencia por [employer] (which disclaims liability or warranty for this information). Si usted tiene [place] afección médica o sobre estas instrucciones, siempre pregunte a ovalles profesional de marlena. [facility], Incorporated niega toda garantía o responsabilidad por ovalles uso de esta información. Amputación de la pierna por debajo de la rodilla: Anabelle Tomas en el hogar - [ Below-the-Knee Leg Amputation: What to Expect at [facility] ] Ovalles recuperación Evelio amputación de la pierna por debajo de la rodilla es evelio cirugía que se hace para cortar la pierna por debajo de la rodilla. [place]valles médico cortó la pierna y [employer] mayor cantidad posible de [place], piel, vasos sanguíneos y tejido nervioso sanos. Después de la amputación de la pierna por debajo de la rodilla, probablemente tenga vendas, un vendaje rígido o un [employer]ola parte que [place] de ovalles pierna (muñón o miembro residual). Tendrá la pierna hinchada patria al menos 4 semanas después de la [facility]. Si tiene colocado un vendaje rígido o un yeso, ovalles médico establecerá visitas regulares para cambiárselo y vigilar que la herida sane. Si tiene vendas elásticas, ovalles médico le dirá cómo cambiarlas. Podría sentir dolor en el muñón. También podría pensar que tiene sensibilidad o dolor en la parte de la pierna que ya no tiene. [place] se llama dolor meagan. Es común y podría aparecer y desaparecer patria un año o [place]. [place] médico puede administrarle medicamentos para los dos tipos de dolor. Es posible que ya haya empezado un programa de rehabilitación. Continuará el tratamiento bajo la orientación de ovalles médico o ovalles fisioterapeuta. Tendrá que trabajar mucho para [employer] músculos y para volver a aprender a hacer actividades, mantener el equilibrio y coordinar los movimientos. La rehabilitación puede durar [employer].  
Podrían haberle colocado evelio pierna artificial temporal mientras estaba en el hospital. Si leilani es el [employer], beard médico le enseñará a cuidarla. Si le van a colocar evelio pierna artificial, es posible que tenga que acostumbrarse a suha antes de volver a trabajar y hacer otras actividades. Es probable que no la use todo el [place], de modo que deberá aprender a usar evelio silla de maria ines, [place] u otros aparatos. Deberá hacer cambios en beard hogar. Beard lugar de trabajo podría ofrecerle algunas concesiones. Es traumático que le amputen la pierna. Puede ser Thyra Mellow y frustrante aprender a vivir con nuevas limitaciones. Podría sentir depresión o que perdió el estilo de kishore que llevaba. Es importante entender estos sentimientos. Hablar con alfonso familiares, amigos y profesionales de la mralena acerca de beard frustración es evelio parte importante de beard recuperación. También podría resultarle de ayuda hablar con evelio persona que haya tenido evelio amputación. Recuerde que aunque perder un miembro es difícil, esto no cambia quién es usted ni le impide disfrutar la kishore. Tendrá que adaptarse y aprender nuevas formas de hacer las cosas, daren aún podrá trabajar y hacer deportes y [place]. Además, todavía puede aprender, amar, jugar y vivir la kishore a pleno. Muchas organizaciones pueden ayudarle a adaptarse a beard nuevo estilo de kishore. Por ejemplo, usted puede encontrar información en amputee-coalition.org. 
Esta hoja de cuidados le da evelio idea general del tiempo que le llevará recuperarse. Sin embargo, cada persona se recupera a un ritmo diferente. Siga los pasos que se mencionan a continuación para recuperarse lo más rápido posible. Cómo puede cuidarse en el hogar? Actividad ? · Joelle Banegas. Hable con beard médico sobre lo que [employer]. Si usted hace [place] y [address] muñón, leilani sanará más rápido. ? · Puede ducharse cuando beard médico lo apruebe. Lave el muñón con Starlene Messing y agua y séquelo con toques suaves. Al principio, podría necesitar ayuda para hacer esto. ? · Usted podría tener que adaptar beard automóvil a alfonso circunstancias antes de conducir. ? · Probablemente pueda regresar a beard trabajo y rutina habitual cuando sane beard muñón. [place] puede ocurrir tan pronto alicia entre 4 y 8 semanas después de la [place] jose, [place] podría tardar [place]. Alimentación ? · Puede continuar con beard dieta habitual. Si tiene [employer], coma alimentos suaves bajos en grasa, alicia arroz sin sazonar, karthik a la ronnie, pan tito y yogur. ? · Podría notar que no evacua el intestino con regularidad jp después de la Saint jose. [place] es común. Trate de evitar el estreñimiento y de no hacer esfuerzos cuando evacua el intestino. [place] un suplemento de [employer]. Si no ha evacuado el intestino después de un par de días, pregúntele a beard médico si puede jose luis un laxante suave. Medicamentos ? · Beard médico le dirá si puede volver a jose luis alfonso medicamentos y cuándo puede volver a hacerlo. También le dará indicaciones sobre cualquier medicamento nuevo que deba jose luis usted. ? · Si candido medicamentos que previenen la formación de coágulos de [place], alicia warfarina (Coumadin), clopidogrel (Plavix) o aspirina, asegúrese de hablar con beard médico. Él o suha le dirá si debe volver a jose luis estos medicamentos y en qué momento. Asegúrese de que entiende exactamente lo que el médico quiere que twyla. ? · Sea evan con los medicamentos. [place] los analgésicos (medicamentos para el dolor) exactamente alicia le fueron indicados. ¨ Si el médico le recetó un analgésico, tómelo según las indicaciones. ¨ Si no está tomando un analgésico recetado, pregúntele a beard médico si puede jose luis un medicamento de [employer]. ? · Si le parece que el analgésico le está produciendo revoltura del estómago: [address] comidas (a menos que beard médico le haya indicado lo contrario). ¨ Pídale al médico un analgésico diferente. ? · Si beard médico le recetó antibióticos, tómelos según las indicaciones. No deje de tomarlos por el hecho de sentirse mejor. Debe jose luis todos los antibióticos hasta terminarlos. ?Maria Guadalupe Chaves ? · Usted podría tener vendas, un vendaje rígido o un yeso sobre beard [place] Coffer. [employer] dirá cómo debe cuidar de él. Según el tipo de vendaje que tenga y las instrucciones de beard médico: ¨ Revise a diario el muñón para verificar que no haya irritación, rajaduras en la piel ni enrojecimiento. Dígale a beard médico acerca de cualquier problema que kait. ¨ Lávese el muñón todas las noches con agua tibia y Ribeira Angela. Séquelo con toquecitos suaves. ? · Si tiene evelio pierna artificial temporal, quítesela antes de irse a dormir. Ejercicio ? · La rehabilitación es evelio serie de ejercicios que hace usted después de la [facility]. Estos ejercicios le ayudarán a aprender a usar beard Mary Ann Coffer y beard pierna artificial. Wenda Potash con beard médico y un fisioterapeuta para planificar leilani programa de [address] Para obtener los [place], es necesario que twyla los ejercicios correctamente, con la frecuencia y patria el tiempo que beard médico le indique. Beard programa de rehabilitación le dará varios ejercicios para hacer. Hágalos siempre alicia se lo indique beard terapeuta. Otras instrucciones ? · [address] contracturas. Evelio contractura ocurre cuando evelio articulación se traba en evelio posición. Si esto sucede, podría ser difícil o imposible estirar el muñón y usar evelio pierna artificial. 
¨ Asegúrese de poner la misma cantidad de peso en ambas caderas cuando se siente. Use allie firmes y siéntese derecho. ¨ Cuando se acueste boca arriba, mantenga el muñón apoyado, con las rodillas rectas y las piernas juntas. ¨ Acuéstese boca abajo tanto alicia sea posible para estirar la articulación de beard cadera. ¨ No permanezca sentado más de [address] [facility]. Párese, o acuéstese boca abajo de vez en cuando. ¨ No coloque almohadas bajo beard cadera o rodillas ni entre alfonso muslos. ¨ No apoye el muñón en los mangos de las muletas ni en la silla de maria ines. La atención de seguimiento es evelio parte clave de beard tratamiento y seguridad. Asegúrese de hacer y acudir a todas las citas, y llame a beard médico si está teniendo problemas. También es evelio buena idea saber los resultados de los exámenes y mantener evelio lista de los medicamentos que candido. Cuándo debe pedir ayuda? Llame al 911 en cualquier momento que considere que necesita atención de [place]. Por ejemplo, llame si: 
? · Se desmayó (perdió el conocimiento). ? · Tiene dolor repentino en el pecho y falta de [place], o tose marimar. ? · Tiene graves dificultades para respirar. ? Llame a beard médico ahora mismo o busque atención médica inmediata si: 
? · Tiene puntos de sutura flojos o se abre la incisión. ? · Le sangra la incisión del muñón y leilani sangrado aumenta repentinamente o no se detiene cuando baerd médico dice que debería. ? · [address], tales alicia: ¨ Mayor dolor, hinchazón, enrojecimiento o aumento de la temperatura. ¨ Vetas rojizas que salen de la incisión. ¨ Pus que sale de la incisión. Buffy Oquendo. ? · Siente el estómago revuelto o no puede retener líquidos en el estómago. ? · Tiene dolor que no mejora después de jose luis analgésicos. ?Preste especial atención a cualquier cambio en beard marlena y asegúrese de comunicarse con beard médico si: 
? · No evacua el intestino después de jose luis un laxante. Dónde puede encontrar más información en inglés? Akosua Valencia a http://cammy-gordy.info/. Taylor Swain M661 en la búsqueda para aprender más acerca de \"Amputación de la pierna por debajo de la rodjim: Candy Snyder en el [facility] - [ [place] Leg Amputation: What to Expect at Home ]. \" 
Revisado: 21 marzo, 2017 Versión del contenido: 11.4 © 0555-8880 Healthwise, Incorporated.  Las instrucciones de cuidado fueron adaptadas bajo licencia por [employer] (which disclaims liability or warranty for this information). Si usted tiene [place] afección médica o sobre estas instrucciones, siempre pregunte a beard profesional de marlena. [facility] niega toda garantía o responsabilidad por beard uso de esta información. MRSA: Instrucciones de cuidado - [ MRSA: Care Instructions ] Instrucciones de cuidado MRSA es la sigla en inglés de Staphylococcus aureus resistente a la meticilina. Es evelio clase de bacteria que puede causar evelio infección por estafilococo. Demar no puede ser [place] con el antibiótico meticilina ni con algunos otros antibióticos. [place] hace que a veces beard tratamiento sea más difícil. Las bacterias se encuentran dispersas por la piel y la nariz. El MRSA puede causar infecciones en la piel, el corazón, la marimar y [place]. Esta bacteria puede diseminarse rápidamente por el cuerpo y causar problemas graves. También se puede transmitir el MRSA de Katelyn Kenning persona a otra. Según la gravedad de beard infección, el médico podría drenar la herida y usted podría recibir antibióticos a través de un tubo pequeño colocado en evelio vena (IV). El médico podría también recetarle evelio pomada con antibiótico para aplicar sobre las llagas o en la Kathleen Ken. La atención de seguimiento es evelio parte clave de beard tratamiento y seguridad. Asegúrese de hacer y acudir a todas las citas, y llame a beard médico si está teniendo problemas. También es evelio buena idea saber los resultados de los exámenes y mantener evelio lista de los medicamentos que candido. Cómo puede cuidarse en el hogar? · [employer] antibióticos megan alicia le indicaron. No deje de tomarlos por el hecho de sentirse mejor. Debe jose luis todos los antibióticos hasta terminarlos. · [place] cualquier fawad o herida hasta que sanen.  
· Lávese las bernarda con frecuencia, en especial, después de tocar las vendas elásticas u otros apósitos colocados sobre evelio herida. [place] puede impedir que las bacterias se propaguen. Envuelva las vendas en evelio bolsa plástica antes de desecharlas. · No comparta las toallas, las [place], las navajas (cuchillas) de afeitar, la ropa ni ningún otro elemento que haya estado en contacto con la herida o las vendas. Lave las sábanas, las toallas y la ropa con agua tibia y detergente. Séquelos en evelio secadora de aire caliente, si es posible. · Mantenga limpias las áreas compartidas, limpiando las superficies ([place] encimeras, las manijas y los interruptores de alberta) con un desinfectante. Cuándo debe pedir ayuda? Llame a beard médico ahora mismo o busque atención médica inmediata si: 
? · Tiene señales de evelio infección en la piel que avanza con rapidez alicia: ¨ Dolor, aumento de la temperatura o hinchazón en la piel. ¨ Vetas rojizas cerca de evelio herida en la piel. ¨ Pus que supura de evelio herida en la piel. ¨ Fiebre que no se debe a la gripe ni a otra enfermedad. ?Preste especial atención a los cambios en beard marlena y asegúrese de comunicarse con beard médico si: 
? · No mejora alicia se esperaba. Dónde puede encontrar más información en inglés? Meseret Quach a http://cammy-gordy.info/. Servando Moraes F434 en la búsqueda para aprender más acerca de \"MRSA: Instrucciones de cuidado - [ MRSA: Care Instructions ]. \" 
Revisado: 3 marzo, 2017 Versión del contenido: 11.4 © 9145-7838 Healthwise, Incorporated. Las instrucciones de cuidado fueron adaptadas bajo licencia por [facility] [employer] (which disclaims liability or warranty for this information). Si usted tiene [place] afección médica o sobre estas instrucciones, siempre pregunte a beard profesional de marlena. [employer] niega toda garantía o responsabilidad por beard uso de esta información. Oxicodona/Acetaminofén (Percocet, Roxicet) - (Por vía oral) Para qué se utiliza leilani medicamento: Trata el dolor. Pr[place]ence medicamento contiene un narcótico para aliviar el dolor. Comuníquese de inmediato con un médico o enfermera si usted tiene: · Debilidad extrema, respiración superficial, latidos cardíacos lentos · Sudoración o piel fría y pegajosa · Ampollas, sarpullido o descamación de la piel Efectos secundarios comunes: · Estreñimiento · Náuseas, vómitos · Cansancio © 2017 [facility] Information is for End User's use only and may not be sold, redistributed or otherwise used for commercial purposes. Please provide this summary of care documentation to your next provider. Signatures-by signing, you are acknowledging that this After Visit Summary has been reviewed with you and you have received a copy. Patient Signature:  ____________________________________________________________ Date:  ____________________________________________________________  
  
Alayna Cotto Provider Signature:  ____________________________________________________________ Date:  ____________________________________________________________

## 2018-03-06 NOTE — ED PROVIDER NOTES
EMERGENCY DEPARTMENT HISTORY AND PHYSICAL EXAM    3:37 PM      Date: 3/6/2018  Patient Name: Edward Howard    History of Presenting Illness     No chief complaint on file. History Provided By: Patient via telephone translation services (Singaporean speaking pt)    Chief Complaint: Foot pain   Duration: 3 Days  Timing:  acute on chronic, worsening  Location: R foot  Quality: Sharp  Severity: 10 out of 10  Modifying Factors: worsened by palpation  Associated Symptoms: RLE swelling, chills      Additional History (Context): Edward Howard is a 45 y.o. male with DM, cellulitis, and ostemyelitis of R foot  who presents with acute on chronic R foot swelling and pain x3 days. Pt states he ran out of pain meds 2 days ago. Pt has a chronic wound with recent diagnosis of osteomyelitis and hx of toe amputation on that foot. He was told he needed amputation of R foot, refused initial advice, wanted to treat with Abx, and is now reconsidering it since he cannot live with this pain.          PCP: iMke Swift PA-C    Current Facility-Administered Medications   Medication Dose Route Frequency Provider Last Rate Last Dose    oxyCODONE-acetaminophen (PERCOCET) 5-325 mg per tablet 1 Tab  1 Tab Oral Q4H PRN Achilles Opal, DO        HYDROmorphone (DILAUDID) injection 1 mg  1 mg IntraVENous Q3H PRN Achilles Opal, DO   1 mg at 03/06/18 1956    docusate sodium (COLACE) capsule 100 mg  100 mg Oral BID Achilles Opal, DO   100 mg at 03/06/18 1956    heparin (porcine) injection 5,000 Units  5,000 Units SubCUTAneous Q8H Achilles [place], DO   5,000 Units at 03/07/18 0515    insulin lispro (HUMALOG) injection   SubCUTAneous TIDAC Achilles [place], DO        glucose chewable tablet 16 g  4 Tab Oral PRN Achilles Opal, DO        glucagon (GLUCAGEN) injection 1 mg  1 mg IntraMUSCular PRN Achilles Opal, DO        dextrose (D50W) injection syrg 12.5-25 g  25-50 mL IntraVENous PRN Achilles Opal, DO  DAPTOmycin (CUBICIN) 390 mg in 0.9% sodium chloride 50 mL IVPB RF formulation  390 mg IntraVENous Q24H Deidre Plunkett, DO        cefTRIAXone (ROCEPHIN) 2 g in sterile water (preservative free) 20 mL IV syringe  2 g IntraVENous Q24H Deidre Whited, DO         Facility-Administered Medications Ordered in Other Encounters   Medication Dose Route Frequency Provider Last Rate Last Dose    DAPTOmycin (CUBICIN) 390 mg in water, bacteriostatic (WATER) 7.8 mL IV syringe RF formulation  390 mg IntraVENous ONCE Kenya Lindo MD        cefTRIAXone (ROCEPHIN) 2 g in sterile water (preservative free) 20 mL IV syringe  2 g IntraVENous ONCE Kenya Lindo MD        [START ON 3/8/2018] cefTRIAXone (ROCEPHIN) 2 g in sterile water (preservative free) 20 mL IV syringe  2 g IntraVENous ONCE Kenya Lindo MD        [START ON 3/8/2018] DAPTOmycin (CUBICIN) 390 mg in water, bacteriostatic (WATER) 7.8 mL IV syringe RF formulation  390 mg IntraVENous ONCE Kenya Lindo MD        sodium chloride (NS) flush 10-40 mL  10-40 mL InterCATHeter PRN Gigi Luque MD   20 mL at 03/05/18 1535    [START ON 3/9/2018] cefTRIAXone (ROCEPHIN) 2 g in sterile water (preservative free) 20 mL IV syringe  2 g IntraVENous ONCE Kenya Lindo MD        [START ON 3/9/2018] DAPTOmycin (CUBICIN) 390 mg in water, bacteriostatic (WATER) 7.8 mL IV syringe RF formulation  390 mg IntraVENous ONCE Kenya Lindo MD        cefTRIAXone (ROCEPHIN) 2 g in sterile water (preservative free) 20 mL IV syringe  2 g IntraVENous Q24H Kenya Lindo MD   Stopped at 03/05/18 1535    sodium chloride (NS) flush 10-40 mL  10-40 mL IntraVENous PRN Gigi Luque MD   40 mL at 03/04/18 1024    sodium chloride (NS) flush 10-40 mL  10-40 mL IntraVENous PRN Gigi Luque MD           Past History     Past Medical History:  Past Medical History:   Diagnosis Date    Cellulitis     rt. foot    Diabetes ([address]     Osteomyelitis ([facility]     rt toe    Other ill-defined conditions(799.89)        Past Surgical History:  Past Surgical History:   Procedure Laterality Date    HX ORTHOPAEDIC      rt.toe       Family History:  History reviewed. No pertinent family history. Social History:  Social History   Substance Use Topics    Smoking status: Heavy Tobacco Smoker     Packs/day: 0.50    Smokeless tobacco: Never Used    Alcohol use No       Allergies:  No Known Allergies      Review of Systems       Review of Systems   Constitutional: Positive for chills. Negative for activity change, fatigue and fever. HENT: Negative for congestion and rhinorrhea. Eyes: Negative for visual disturbance. Respiratory: Negative for shortness of breath. Cardiovascular: Positive for leg swelling (RLE). Negative for chest pain and palpitations. Gastrointestinal: Negative for abdominal pain, diarrhea, nausea and vomiting. Genitourinary: Negative for dysuria and hematuria. Musculoskeletal: Negative for back pain. Positive for R foot pain and swelling     Skin: Negative for rash. Neurological: Negative for dizziness, weakness and light-headedness. Psychiatric/Behavioral: Negative for agitation. All other systems reviewed and are negative. Physical Exam     Visit Vitals    /68 (BP 1 Location: Left arm, BP Patient Position: At rest)    Pulse 76    Temp 97.9 °F (36.6 °C)    Resp 18    SpO2 100%         Physical Exam   Constitutional: He appears well-developed and well-nourished. HENT:   Head: Normocephalic and atraumatic. Eyes: Conjunctivae are normal. Pupils are equal, round, and reactive to light. Neck: Normal range of motion. Neck supple. Cardiovascular: Normal rate and regular rhythm. Pulses:       Dorsalis pedis pulses are 2+ on the right side, and 2+ on the left side. Pulmonary/Chest: Effort normal and breath sounds normal.   Abdominal: Soft. Bowel sounds are normal.   Musculoskeletal: Normal range of motion.    No R foot crepitus Lymphadenopathy:     He has no cervical adenopathy. Neurological: He is alert. Skin: Skin is warm. R leg swelling, warmth in foot, significant ttp  No discharge  Significant edema in R foot up to R knee  Amputated great toe R foot   Psychiatric: He has a normal mood and affect. Diagnostic Study Results     Labs -  Recent Results (from the past 24 hour(s))   PLATELET COUNT    Collection Time: 03/06/18  2:45 PM   Result Value Ref Range    PLATELET 592 (H) 830 - 420 K/uL   CBC WITH AUTOMATED DIFF    Collection Time: 03/06/18  4:32 PM   Result Value Ref Range    WBC 15.2 (H) 4.6 - 13.2 K/uL    RBC 3.05 (L) 4.70 - 5.50 M/uL    HGB 8.5 (L) 13.0 - 16.0 g/dL    HCT 26.0 (L) 36.0 - 48.0 %    MCV 85.2 74.0 - 97.0 FL    MCH 27.9 24.0 - 34.0 PG    MCHC 32.7 31.0 - 37.0 g/dL    RDW 15.0 (H) 11.6 - 14.5 %    PLATELET 134 924 - 945 K/uL    MPV 10.1 9.2 - 11.8 FL    NEUTROPHILS 81 (H) 40 - 73 %    LYMPHOCYTES 13 (L) 21 - 52 %    MONOCYTES 4 3 - 10 %    EOSINOPHILS 2 0 - 5 %    BASOPHILS 0 0 - 2 %    ABS. NEUTROPHILS 12.3 (H) 1.8 - 8.0 K/UL    ABS. LYMPHOCYTES 1.9 0.9 - 3.6 K/UL    ABS. MONOCYTES 0.6 0.05 - 1.2 K/UL    ABS. EOSINOPHILS 0.2 0.0 - 0.4 K/UL    ABS.  BASOPHILS 0.1 (H) 0.0 - 0.06 K/UL    DF AUTOMATED     METABOLIC PANEL, BASIC    Collection Time: 03/06/18  4:32 PM   Result Value Ref Range    Sodium 138 136 - 145 mmol/L    Potassium 4.0 3.5 - 5.5 mmol/L    Chloride 108 100 - 108 mmol/L    CO2 23 21 - 32 mmol/L    Anion gap 7 3.0 - 18 mmol/L    Glucose 96 74 - 99 mg/dL    BUN 33 (H) 7.0 - 18 MG/DL    Creatinine 1.37 (H) 0.6 - 1.3 MG/DL    BUN/Creatinine ratio 24 (H) 12 - 20      GFR est AA >60 >60 ml/min/1.73m2    GFR est non-AA 58 (L) >60 ml/min/1.73m2    Calcium 8.3 (L) 8.5 - 10.1 MG/DL   HEMOGLOBIN A1C WITH EAG    Collection Time: 03/06/18  4:32 PM   Result Value Ref Range    Hemoglobin A1c 7.5 (H) 4.2 - 5.6 %    Est. average glucose 169 mg/dL   SED RATE (ESR)    Collection Time: 03/06/18  4:57 PM Result Value Ref Range    Sed rate, automated >140 0 - 15 mm/hr   GLUCOSE, POC    Collection Time: 03/06/18 10:15 PM   Result Value Ref Range    Glucose (POC) 127 (H) 70 - 110 mg/dL       Recent Results (from the past 12 hour(s))   GLUCOSE, POC    Collection Time: 03/06/18 10:15 PM   Result Value Ref Range    Glucose (POC) 127 (H) 70 - 110 mg/dL       Radiologic Studies -   DUPLEX LOWER EXT VENOUS RIGHT   As read by RAD:    INTERPRETATION/FINDINGS  Duplex images were obtained using 2-D gray scale, color flow, and  spectral Doppler analysis. Right leg :  1. Deep vein(s) visualized include the common femoral, deep femoral,  proximal femoral, mid femoral, distal femoral, popliteal(above knee),  popliteal(fossa), popliteal(below knee), posterior tibial and peroneal   veins. 2. No evidence of deep venous thrombosis detected in the veins  visualized. 3. No evidence of deep vein thrombosis in the contralateral common  femoral vein. 4. Superficial vein(s) visualized include the great saphenous and  small saphenous veins. 5. No evidence of superficial thrombosis detected           Medical Decision Making   I am the first provider for this patient. I reviewed the vital signs, available nursing notes, past medical history, past surgical history, family history and social history. Vital Signs-Reviewed the patient's vital signs. Pulse Oximetry Analysis -  100% on room air (Interpretation) nonhypoxic    Cardiac Monitor:  Rate: 91    Records Reviewed: Nursing Notes (Time of Review: 3:37 PM)    ED Course: Progress Notes, Reevaluation, and Consults:    16:55 Consult:  Discussed care with  (Vascular Surgery). Standard discussion; including history of patients chief complaint, available diagnostic results, and treatment course.  Lidia Collins states he understands pt wants amputation; he will try to schedule the pt into his current schedule or put him as an add-on for an emergent surgery but the admission needs to be with the hospitalist for his complicated management involving multi-organ involvement. Provider Notes (Medical Decision Making):     Pt has significant R leg pain needing pain management, worsening of swelling, chills, elevated white count at 15.2. Discussion with Dr. Regino Barnett needing amputation. I will call hospitalist for admission. For Hospitalized Patients:    1. Hospitalization Decision Time:  The decision to hospitalize the patient was made by Dr. Regino Barnett and Dr. Nnamdi Jackman at 17:00 on 3/6/2018    2. Aspirin: Aspirin was not given because the patient did not present with a stroke at the time of their Emergency Department evaluation    Diagnosis     Clinical Impression:   1. Cellulitis of foot, right    2. Other specified diabetes mellitus with complication, with long-term current use of [facility]        Disposition: Admit        Current Discharge Medication List      CONTINUE these medications which have NOT CHANGED    Details   oxyCODONE-acetaminophen (PERCOCET) 5-325 mg per tablet Take 1 Tab by mouth every six (6) hours as needed for Pain. Max Daily Amount: 4 Tabs. Qty: 10 Tab, Refills: 0    Associated Diagnoses: Cellulitis of foot, right      amLODIPine (NORVASC) 5 mg tablet Take 1 Tab by mouth daily. Qty: 30 Tab, Refills: 5      aspirin 81 mg chewable tablet Take 1 Tab by mouth daily. Qty: 30 Tab, Refills: 0      losartan (COZAAR) 50 mg tablet Take 1 Tab by mouth daily. Qty: 30 Tab, Refills: 0      metFORMIN (GLUCOPHAGE) 500 mg tablet Take 1 Tab by mouth two (2) times daily (with meals). Qty: 30 Tab, Refills: 0      pravastatin (PRAVACHOL) 10 mg tablet Take 1 Tab by mouth nightly. Qty: 30 Tab, Refills: 0      insulin NPH/insulin regular (HUMULIN 70/30) 100 unit/mL (70-30) injection 12 Units by SubCUTAneous route Before breakfast and dinner.   Qty: 3 Vial, Refills: 1      Insulin Syringe-Needle U-100 (INSULIN SYRINGE) 1 mL 28 x 1/2\" syrg 1 Package by Does Not Apply route two (2) times a day.  Qty: 120 Syringe, Refills: 1           _______________________________    Attestations:  Scribe Attestation     Otilia Tubbs acting as a scribe for and in the presence of Alise Martines MD      March 07, 2018 at 5:53 AM       Provider Attestation:      I personally performed the services described in the documentation, reviewed the documentation, as recorded by the scribe in my presence, and it accurately and completely records my words and actions.  March 07, 2018 at 5:53 AM - Alise Martines MD    _______________________________

## 2018-03-07 ENCOUNTER — ANESTHESIA EVENT (OUTPATIENT)
Dept: SURGERY | Age: 38
DRG: 617 | End: 2018-03-07
Payer: SELF-PAY

## 2018-03-07 LAB
GLUCOSE BLD STRIP.AUTO-MCNC: 106 MG/DL (ref 70–110)
GLUCOSE BLD STRIP.AUTO-MCNC: 110 MG/DL (ref 70–110)
GLUCOSE BLD STRIP.AUTO-MCNC: 89 MG/DL (ref 70–110)
GLUCOSE BLD STRIP.AUTO-MCNC: 89 MG/DL (ref 70–110)

## 2018-03-07 PROCEDURE — 82962 GLUCOSE BLOOD TEST: CPT

## 2018-03-07 PROCEDURE — 87040 BLOOD CULTURE FOR BACTERIA: CPT | Performed by: INTERNAL MEDICINE

## 2018-03-07 PROCEDURE — 74011250637 HC RX REV CODE- 250/637: Performed by: HOSPITALIST

## 2018-03-07 PROCEDURE — 74011000258 HC RX REV CODE- 258: Performed by: HOSPITALIST

## 2018-03-07 PROCEDURE — 74011250636 HC RX REV CODE- 250/636: Performed by: HOSPITALIST

## 2018-03-07 PROCEDURE — 36415 COLL VENOUS BLD VENIPUNCTURE: CPT | Performed by: INTERNAL MEDICINE

## 2018-03-07 PROCEDURE — 74011000250 HC RX REV CODE- 250: Performed by: HOSPITALIST

## 2018-03-07 PROCEDURE — 65270000029 HC RM PRIVATE

## 2018-03-07 RX ADMIN — HYDROMORPHONE HYDROCHLORIDE 1 MG: 2 INJECTION INTRAMUSCULAR; INTRAVENOUS; SUBCUTANEOUS at 16:09

## 2018-03-07 RX ADMIN — DAPTOMYCIN 390 MG: 500 INJECTION, POWDER, LYOPHILIZED, FOR SOLUTION INTRAVENOUS at 14:54

## 2018-03-07 RX ADMIN — HYDROMORPHONE HYDROCHLORIDE 1 MG: 2 INJECTION INTRAMUSCULAR; INTRAVENOUS; SUBCUTANEOUS at 10:41

## 2018-03-07 RX ADMIN — HYDROMORPHONE HYDROCHLORIDE 1 MG: 2 INJECTION INTRAMUSCULAR; INTRAVENOUS; SUBCUTANEOUS at 20:41

## 2018-03-07 RX ADMIN — HEPARIN SODIUM 5000 UNITS: 5000 INJECTION, SOLUTION INTRAVENOUS; SUBCUTANEOUS at 20:42

## 2018-03-07 RX ADMIN — DOCUSATE SODIUM 100 MG: 100 CAPSULE, LIQUID FILLED ORAL at 18:38

## 2018-03-07 RX ADMIN — HEPARIN SODIUM 5000 UNITS: 5000 INJECTION, SOLUTION INTRAVENOUS; SUBCUTANEOUS at 12:46

## 2018-03-07 RX ADMIN — DOCUSATE SODIUM 100 MG: 100 CAPSULE, LIQUID FILLED ORAL at 10:41

## 2018-03-07 RX ADMIN — HEPARIN SODIUM 5000 UNITS: 5000 INJECTION, SOLUTION INTRAVENOUS; SUBCUTANEOUS at 05:15

## 2018-03-07 RX ADMIN — WATER 2 G: 1 INJECTION INTRAMUSCULAR; INTRAVENOUS; SUBCUTANEOUS at 14:54

## 2018-03-07 NOTE — PROGRESS NOTES
Assumed care of pt from [place] , RN pt resting in bed with no signs of pain or distress. Blue phone at bedside. Frequently used items and call bell within reach. Pt NPO. Bed locked in lowest position. 1955 Bedside and Verbal shift change report given to Albertina Sutton RN (oncoming nurse) by Jozef Gould RN (offgoing nurse). Report included the following information SBAR, Kardex, Intake/Output, MAR and Recent Results.

## 2018-03-07 NOTE — PROGRESS NOTES
[facility]   Discharge Planning/ Assessment     Reasons for Intervention:     Chart reviewed. Met with pt., verified all demographics.  has NO ins. : [address], friends, with whom he lives with. States he is legal.   is from [place]. Independent with ADL's prior to admit. Has been helped numerous times for indigent medications. He was recently discharged, March 3, 2018, from [facility]  and has been receiving IV antibiotics at the outpatient infusion center. He is uninsured and his discharge pan is to return home with home care and continued IV therapy at the infusion center as indicated. Prior admission it was recommended that the patient have a BKA. He had declined surgery and elected to attempt to save his foot with the use if antibiotics.  Vascular surgery has been consulted.     High Risk Criteria   [x] Yes                []No   Physician Referral  [] Yes                [x]No        Date     Nursing Referral  [] Yes                [x]No        Date     Patient/Family Request  [] Yes                [x]No        Date         Resources:     Medicare  [] Yes                [x]No   Medicaid  [] Yes                [x]No   No Resources  [x] Yes                []No   Private Insurance  [] Yes                [x]No    Name/Phone Number     Other  [] Yes                [x]No        (i.e. Workman's Comp)         Prior Services:     Prior Services  [x] Yes                []No   Home Health  [x] Yes                []No   [facility]  [] Yes                [x]No        Number of [address] Program  [] Yes                [x]No        Meals on Wheels  [] Yes                [x]No   Office on Aging  [] Yes                [x]No   Transportation Services  [] Yes                [x]No   [address]  [] Yes                [x]No        Nursing Home Name [address]  [] Yes [x]No        [address] Name     Other Out patient infusion center        Information Source:                  Information obtained from  [x] Patient  [] Parent   [] Guardian  [] Child  [] Spouse   [] Significant Other/Partner   [] Friend      [] EMS    [] Nursing Home Chart          [] Other:   Chart Review  [x] Yes                []No      Family/Support System:     Patient lives with  [] Alone    [] Spouse   [] Significant Other  [] Children  [] Caretaker   [] Parent  [] Sibling     [x] Other  friends                           Other Support System:     Is the patient responsible for care of others  [] Yes                [x]No   Information of person caring for patient on  discharge     Managers financial affairs independently  [x] Yes                []No   If no, explain:        Status Prior to Admission:     Mental Status  [x] Awake  [x] Alert  [x] Oriented  [x] Quiet/Calm [] Lethargic/Sedated   [] Disoriented  [] Restless/Anxious  [] Combative   Personal Care  [] Dependent  [x] [address]  [] Requires Assistance   Meal Preparation Ability  [x] Independent   [] Standby Assistance   [] Minimal Assistance   [] Moderate Assistance  [] Maximum Assistance     [] Total Assistance   Chores  [x] Independent with Chores   [] [address] [facility] Resident   [] Requires Assistance   Bowel/Bladder  [x] Continent  [] Catheter  [] Incontinent  [] Ostomy Self-Care    [] Urine Diversion Self-Care  [] Maximum Assistance     [] Total Assistance   Number of Persons needed for assistance     DME at home  [] [address] [facility], Benna Alamin  [] [facility] [address], Straight   [] Commode    [] Bathroom/Grab Bars  [] Hospital Bed  [] Nebulizer  [] Oxygen           [] Raised Toilet Seat  [] Shower Chair  [] Side Rails for Bed   [] Tub Transfer Bench   [] Crystal Fowler  [] [address]      [] Other:   Vendor        Treatment Presently Receiving:     Current Treatments  [] Chemotherapy  [] Dialysis  [x] Insulin  [x] IVAB [x] IVF   [] O2  [] PCA   [] PT [] RT   [] Tube Feedings   [] Wound Care      Psychosocial Evaluation:     Verbalized Knowledge of Disease Process  [] Patient                       []Family   Coping with Disease Process  [] Patient                       []Family   Requires Further Counseling Coping with Disease Process  [] Patient                       []Family      Identified Projected Needs:     Home Health Aid  [] Yes                [x]No   Transportation  [] Yes                [x]No   Education  [] Yes                [x]No        Specific Education      Financial Counseling  [] Yes                [x]No   Inability to Care for Self/Will Require 24 hour care  [] Yes                [x]No   Pain Management  [] Yes                [x]No   Home Infusion Therapy  [] Yes                [x]No   Out patient infusion center  [x] Yes                []No   DME  [] Yes                [x]No   Long Term Care Placement  [] Yes                [x]No   Rehab  [] Yes                [x]No   Physical Therapy  [] Yes                [x]No   Needs Anticipated At This Time  [x] Yes                []No      Intra-Hospital Referral:  [address] [place] [address]  [x] Yes                []No     [] Yes                [x]No   Patient Representative  [] Yes                [x]No   Staff for Teaching Needs  [] Yes                [x]No   Specialty Teaching Needs      Diabetic Educator  [] Yes                [x]No   Referral for Diabetic Educator Needed  [] Yes                [x]No  If Yes, place order for Nutritionist or Diabetic Consult      Tentative Discharge Plan:     Home with No Services  [] Yes                [x]No   Home with Home Health Follow-up  [x] Yes                []No        If Yes, specify type [address] [place] [address]  [] Yes                [x]No        If Yes, specify type     Meals on Wheels  [] Yes                [x]No   Office of Aging  [] Yes                [x]No   NHP  [] Yes                [x]No   Return to the Nursing Home  [] Yes [x]No   Rehab Therapy  [] Yes                [x]No   Acute Rehab  [] Yes                [x]No   Subacute Rehab  [] Yes                [x]No   Private Care  [] Yes                [x]No   Out patient infusion center  [x] Yes                []No   Transportation  [] Yes                [x]No   Chore Service  [] Yes                [x]No   Inpatient Hospice  [] Yes                [x]No   OP RT  [] Yes                [x] No   OP Hemo  [] Yes                [x] No   OP PT  [] Yes                [x]No   Support Group  [] Yes                [x]No   Reach to Recovery  [] Yes                [x]No   OP Oncology Clinic  [] Yes                [x]No   Clinic Appointment  [] Yes                [x]No   DME  [] Yes                [x]No   Comments     Name of D/C Planner or  Given to Patient or Family Lynda Vela RN   Phone Number [phone] [facility]   Date March 7, 2018   Time 744 am    If you are discharged home, whom do you designate to participate in your discharge plan and receive any information needed?      Enter name of Wojciech Cisneros and/or Rachelle Torres         Phone # of [facility] [phone]        Address of [place] [address]        Updated March 7, 2018         Patient refused to designate any           individual

## 2018-03-07 NOTE — CONSULTS
[facility]  [address] [address]  [address], [place]  [address] [place] [address], [address] [place] [address]  Dr. Savita Vigil, Dr. Ardia Oppenheim Dr. Rey Garden  [phone] FAX# [phone]    Consult    Patient: Mona Allan MRN: [id number]  SSN: [id number]    YOB: 1980  Age: 45 y.o. Sex: male      Subjective:      Mona Allan is a Bulgarian speaking ambulatory 45 y.o. male h/o DM and s/p R great toe amputation who is being seen for RLE foot/ankle OM. Admitted 3/6/18 for RLE foot cellulitis/pain. Recently discharged from [facility] with extensive RLE ankle/foot OM, BKA was recommended at this time but patient refused. Returns with worsening RLE pain/swelling. Denies true RLE rest pain, SOB, chest pain, n/v/d, rigors, diaphoresis. Currently on Rocephin and Daptomycin. Patient seen with no family at bedside,  phone used. Past Medical History:   Diagnosis Date    Cellulitis     rt. foot    Diabetes ([address]     Osteomyelitis ([address]     rt toe    Other ill-defined conditions(799.89)      Past Surgical History:   Procedure Laterality Date    HX ORTHOPAEDIC      rt.toe      History reviewed. No pertinent family history.   Social History   Substance Use Topics    Smoking status: Heavy Tobacco Smoker     Packs/day: 0.50    Smokeless tobacco: Never Used    Alcohol use No      Current Facility-Administered Medications   Medication Dose Route Frequency Provider Last Rate Last Dose    oxyCODONE-acetaminophen (PERCOCET) 5-325 mg per tablet 1 Tab  1 Tab Oral Q4H PRN Don Pale, DO        HYDROmorphone (DILAUDID) injection 1 mg  1 mg IntraVENous Q3H PRN Don Pale, DO   1 mg at 03/06/18 1956    docusate sodium (COLACE) capsule 100 mg  100 mg Oral BID Don Pale, DO   100 mg at 03/06/18 1956    heparin (porcine) injection 5,000 Units  5,000 Units SubCUTAneous Q8H Shelly Girardin, DO   5,000 Units at 03/07/18 0515    insulin lispro (HUMALOG) injection   SubCUTAneous TIDAC Dotchase Arpit, DO        glucose chewable tablet 16 g  4 Tab Oral PRN Dotty Arpit, DO        glucagon (GLUCAGEN) injection 1 mg  1 mg IntraMUSCular PRN Dotty Arpit, DO        dextrose (D50W) injection syrg 12.5-25 g  25-50 mL IntraVENous PRN Dotty Arpit, DO        DAPTOmycin (CUBICIN) 390 mg in 0.9% sodium chloride 50 mL IVPB RF formulation  390 mg IntraVENous Q24H Dotty Arpit, DO        cefTRIAXone (ROCEPHIN) 2 g in sterile water (preservative free) 20 mL IV syringe  2 g IntraVENous Q24H Dotty Arpit, DO         Facility-Administered Medications Ordered in Other Encounters   Medication Dose Route Frequency Provider Last Rate Last Dose    DAPTOmycin (CUBICIN) 390 mg in water, bacteriostatic (WATER) 7.8 mL IV syringe RF formulation  390 mg IntraVENous ONCE Carmencita Wray MD        cefTRIAXone (ROCEPHIN) 2 g in sterile water (preservative free) 20 mL IV syringe  2 g IntraVENous ONCE Carmencita Wray MD        [START ON 3/8/2018] cefTRIAXone (ROCEPHIN) 2 g in sterile water (preservative free) 20 mL IV syringe  2 g IntraVENous ONCE Carmencita Wray MD        [START ON 3/8/2018] DAPTOmycin (CUBICIN) 390 mg in water, bacteriostatic (WATER) 7.8 mL IV syringe RF formulation  390 mg IntraVENous ONCE Carmencita Wray MD        sodium chloride (NS) flush 10-40 mL  10-40 mL InterCATHeter PRN Harry Booth MD   20 mL at 03/05/18 1535    [START ON 3/9/2018] cefTRIAXone (ROCEPHIN) 2 g in sterile water (preservative free) 20 mL IV syringe  2 g IntraVENous ONCE Carmencita Wray MD        [START ON 3/9/2018] DAPTOmycin (CUBICIN) 390 mg in water, bacteriostatic (WATER) 7.8 mL IV syringe RF formulation  390 mg IntraVENous ONCE Carmencita Wray MD        cefTRIAXone (ROCEPHIN) 2 g in sterile water (preservative free) 20 mL IV syringe  2 g IntraVENous Q24H Carmencita Wray MD Stopped at 03/05/18 1535    sodium chloride (NS) flush 10-40 mL  10-40 mL IntraVENous PRN Sapna Bowman MD   40 mL at 03/04/18 1024    sodium chloride (NS) flush 10-40 mL  10-40 mL IntraVENous PRN Sapna Bowman MD            No Known Allergies    Review of Systems:  GENERAL: Denies fever, rigors, diaphoresis  HEENT: Denies vision changes, hearing changes, epistaxis  PULM: Denies cough, SOB, hemoptysis, pleuritic chest pain. CV: Denies claudication, chest pain, palpitations  GI: Denies abdominal pain, n/v/d, melena, hematemesis, hematochezia  : Denies dysuria, hematuria, trouble voiding  MSK: RLE foot pain. Denies joint tenderness, neck pain back pain  NEURO: Denies sensory/motor changes, headache, dizziness    Objective:     Vitals:    03/06/18 1830 03/06/18 1858 03/06/18 2215 03/07/18 0645   BP: 136/71 134/80 120/68 121/64   Pulse: 85 79 76 94   Resp: 22 20 18 16   Temp:  98.4 °F (36.9 °C) 97.9 °F (36.6 °C) 98 °F (36.7 °C)   SpO2: 100% 98% 100% 98%        Physical Exam:  GENERAL: A&Ox3, cooperative, does not speak english, no distress, appears stated age  EYE: PERRL, EOMIs, mucous membranes moist, non-icteric  NECK: supple, symmetric. No JVD or carotid bruits  LUNG: clear to auscultation bilaterally  HEART: regular rate and rhythm  ABDOMEN: soft, non-tender, non-distended  EXTREMITIES: RLE foot nonpitting edema. S/p RLE great toe amputation. Small 0.5x0.5cm ulceration lateral heel. No erythema or purulent drainage seen. Palpable 2+ BLE DP pulses. NTTP BLE. Palpable 2+ BUE radial pulses. NEUROLOGIC:  strength 5/5. Motor 5/5. Face symmetric.  Tongue midline    Labs    BMP:   Lab Results   Component Value Date/Time     03/06/2018 04:32 PM    K 4.0 03/06/2018 04:32 PM     03/06/2018 04:32 PM    CO2 23 03/06/2018 04:32 PM    AGAP 7 03/06/2018 04:32 PM    GLU 96 03/06/2018 04:32 PM    BUN 33 (H) 03/06/2018 04:32 PM    CREA 1.37 (H) 03/06/2018 04:32 PM    GFRAA >60 03/06/2018 04:32 PM    GFRNA 58 (L) 03/06/2018 04:32 PM      RLE VENOUS DUPLEX 3/6/18: . Technically difficult exam secondary to patient unable to tolerate compressions. Veins are patent with color and doppler, cannot exclude non-occlusive deep vein thrombosis in the right lower extremity. 3. No evidence of deep vein thrombosis in the contralateral common femoral vein. 5. No evidence of superficial thrombosis detected. CBC 3/6/18: WBC 15.2, HGB 8.5, HCT 26.0,     CULTURE BLOOD 2/26/18: No growth in 6 days    NM BONE SCAN 2/27/18:  intense tracer accumulation in the right hindfoot on all 3 phases most intense involving the talus, navicular and calcaneal bones . On Indium scan there is corresponding abnormal uptake in this same location of the right hindfoot. ECHO 2/27/18: Left ventricle: Systolic function was at the lower limits of normal. Ejection   fraction was estimated in the range of 50 % to 55 %. There was mild diffuse hypokinesis. Mitral valve: No obvious mass, vegetation or thrombus noted. Aortic valve: No obvious mass, vegetation or thrombus noted. Inferior vena cava, hepatic veins: The inferior vena cava was dilated.     MRI RT FOOT 2/26/18: . Lateral mid foot soft tissue wound with sinus tract extending to the  calcaneocuboid articulation. There is contiguous and extensive marrow signal  abnormality compatible with osteomyelitis involving the majority of the cuboid,  calcaneus, inferior talar head and neck, navicular, as well as the fourth and  fifth metatarsal shafts proximally. On postcontrast imaging, no evidence of  necrotic or nonviable bone demonstrated.     2. Large ankle joint effusion/synovitis with complex appearing and multilocular  phlegmon infiltrating through the lateral aspect of Kager's fat pad as well as  the sinus tarsi. Suspect infected calcaneocuboid articulation effusion given  proximity to adjacent lateral skin wound.     3. Truncated appearance to the peroneus brevis and longus tendons.     4. Diffusely abnormal intramuscular edema and enhancement, likely in keeping  with a combination of myositis from underlying infection as well as sequela of  subacute denervation. Assessment:     Hospital Problems  Date Reviewed: 2/28/2018          Codes Class Noted POA    * (Principal)Osteomyelitis [facility]) ICD-10-CM: M86.9  ICD-9-CM: 730.20  3/6/2018 Unknown        Cellulitis of foot, right ICD-10-CM: L03.115  ICD-9-CM: 682.7  2/23/2018 Yes        SAVANNA (acute kidney injury) ([facility] ICD-10-CM: N17.9  ICD-9-CM: 584.9  9/12/2017 Yes        Iron deficiency anemia ICD-10-CM: D50.9  ICD-9-CM: 280.9  5/21/2015 Yes        Hypertension ICD-10-CM: I10  ICD-9-CM: 401.9  5/19/2015 Yes        DM (diabetes mellitus), secondary uncontrolled ([facility] ICD-10-CM: E13.65  ICD-9-CM: 249.01  6/15/2013 Yes            R Lateral midfoot soft tissue wound with sinus tract extending to the calcaneocuboid articulation.    R cuboid, calcaneus, inferior talar head and neck, navicular, 4th and 5th metatarsal shafts proximally OM  Plan:     Elevate RLE on 2 pillows  Continue Abx per hospitalist  Plan for RLE BKA vs. Guillotine amputation tomorrow 3/8/18 - consent obtained per patient  Discussed with and patient understands risk/benefits to procedure including bleeding, infection, pain, allergic reaction  Would like to proceed at this time  Can advance diet per vascular standpoint  NPO after midnight  PT/INR in AM    Signed By: Janine Whalen     March 7, 2018

## 2018-03-07 NOTE — PROGRESS NOTES
Internal Medicine Progress Note    Patient's Name: Ese Vigil  Admit Date: 3/6/2018  Length of Stay: 1      Assessment/Plan     Active Hospital Problems    Diagnosis Date Noted    Osteomyelitis ([facility] 03/06/2018    Cellulitis of foot, right 02/23/2018    SAVANNA (acute kidney injury) ([facility] 09/12/2017    Iron deficiency anemia 05/21/2015    Hypertension 05/19/2015    DM (diabetes mellitus), secondary uncontrolled ([facility] 06/15/2013     - Cont IVAB w/ dapto/rocephin  - 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 I have personally checked for all pending labs that are awaiting final results.     Subjective     Pt s/e @ bedside  No major events overnight  Pt limited in conversation due to language barrier, but speaks enough English to hold conversation  Admits to pain being better, worst when any weight put on it or hanging off bed  Denies CP or SOB    Objective     Visit Vitals    /64 (BP 1 Location: Left arm, BP Patient Position: At rest)    Pulse 94    Temp 98 °F (36.7 °C)    Resp 16    SpO2 98%       Physical Exam:  General Appearance: NAD, conversant  Lungs: CTA with normal respiratory effort  CV: RRR, no m/r/g  Abdomen: soft, non-tender, normal bowel sounds  Extremities: no cyanosis, + 2 pitting edema RLE w/ redness around ankle, PICC line RUE  Neuro: No focal deficits, motor/sensory intact    Lab/Data Reviewed:  BMP:   Lab Results   Component Value Date/Time     03/06/2018 04:32 PM    K 4.0 03/06/2018 04:32 PM     03/06/2018 04:32 PM    CO2 23 03/06/2018 04:32 PM    AGAP 7 03/06/2018 04:32 PM    GLU 96 03/06/2018 04:32 PM    BUN 33 (H) 03/06/2018 04:32 PM    CREA 1.37 (H) 03/06/2018 04:32 PM    GFRAA >60 03/06/2018 04:32 PM    GFRNA 58 (L) 03/06/2018 04:32 PM     CBC:   Lab Results   Component Value Date/Time    WBC 15.2 (H) 03/06/2018 04:32 PM    HGB 8.5 (L) 03/06/2018 04:32 PM    HCT 26.0 (L) 03/06/2018 04:32 PM     03/06/2018 04:32 PM       Imaging Reviewed:  No results found.     Medications Reviewed:  Current Facility-Administered Medications   Medication Dose Route Frequency    oxyCODONE-acetaminophen (PERCOCET) 5-325 mg per tablet 1 Tab  1 Tab Oral Q4H PRN    HYDROmorphone (DILAUDID) injection 1 mg  1 mg IntraVENous Q3H PRN    docusate sodium (COLACE) capsule 100 mg  100 mg Oral BID    heparin (porcine) injection 5,000 Units  5,000 Units SubCUTAneous Q8H    insulin lispro (HUMALOG) injection   SubCUTAneous TIDAC    glucose chewable tablet 16 g  4 Tab Oral PRN    glucagon (GLUCAGEN) injection 1 mg  1 mg IntraMUSCular PRN    dextrose (D50W) injection syrg 12.5-25 g  25-50 mL IntraVENous PRN    DAPTOmycin (CUBICIN) 390 mg in 0.9% sodium chloride 50 mL IVPB RF formulation  390 mg IntraVENous Q24H    cefTRIAXone (ROCEPHIN) 2 g in sterile water (preservative free) 20 mL IV syringe  2 g IntraVENous Q24H     Facility-Administered Medications Ordered in Other Encounters   Medication Dose Route Frequency    DAPTOmycin (CUBICIN) 390 mg in water, bacteriostatic (WATER) 7.8 mL IV syringe RF formulation  390 mg IntraVENous ONCE    cefTRIAXone (ROCEPHIN) 2 g in sterile water (preservative free) 20 mL IV syringe  2 g IntraVENous ONCE    [START ON 3/8/2018] cefTRIAXone (ROCEPHIN) 2 g in sterile water (preservative free) 20 mL IV syringe  2 g IntraVENous ONCE    [START ON 3/8/2018] DAPTOmycin (CUBICIN) 390 mg in water, bacteriostatic (WATER) 7.8 mL IV syringe RF formulation  390 mg IntraVENous ONCE    sodium chloride (NS) flush 10-40 mL  10-40 mL InterCATHeter PRN    [START ON 3/9/2018] cefTRIAXone (ROCEPHIN) 2 g in sterile water (preservative free) 20 mL IV syringe  2 g IntraVENous ONCE    [START ON 3/9/2018] DAPTOmycin (CUBICIN) 390 mg in water, bacteriostatic (WATER) 7.8 mL IV syringe RF formulation  390 mg IntraVENous ONCE    cefTRIAXone (ROCEPHIN) 2 g in sterile water (preservative free) 20 mL IV syringe  2 g IntraVENous Q24H    sodium chloride (NS) flush 10-40 mL  10-40 mL IntraVENous PRN    sodium chloride (NS) flush 10-40 mL  10-40 mL IntraVENous PRN           Virginia Kidney, DO  Internal Medicine, Hospitalist  Pager: [phone]  [address] [place] [address] Drive Group

## 2018-03-07 NOTE — PROGRESS NOTES
Patient has designated his friends to participate in his/her discharge plan and to receive any needed information.      Name:   Doug Mitchell and/or Damaris Hoang    [phone]   [place] [address] [facility]   March 7, 2018          Address:  Phone number:

## 2018-03-07 NOTE — ANESTHESIA PREPROCEDURE EVALUATION
Anesthetic History   No history of anesthetic complications            Review of Systems / Medical History  Patient summary reviewed and pertinent labs reviewed    Pulmonary          Smoker         Neuro/Psych              Cardiovascular    Hypertension: well controlled          PAD    Exercise tolerance: >4 METS     GI/Hepatic/Renal  Within defined limits              Endo/Other    Diabetes: well controlled, type 2         Other Findings   Comments: Documentation of current medication  Current medications obtained, documented and obtained? YES      Risk Factors for Postoperative nausea/vomiting:       History of postoperative nausea/vomiting? NO       Female? NO       Motion sickness? NO       Intended opioid administration for postoperative analgesia? YES      Smoking Abstinence:  Current Smoker? YES  Elective Surgery? YES  Seen preoperatively by anesthesiologist or proxy prior to day of surgery? YES  Pt abstained from smoking 24 hours prior to anesthesia?  NO    Preventive care/screening for High Blood Pressure:  Aged 18 years and older: YES  Screened for high blood pressure: YES  Patients with high blood pressure referred to primary care provider   for BP management: YES                 Physical Exam    Airway  Mallampati: III  TM Distance: 4 - 6 cm  Neck ROM: decreased range of motion   Mouth opening: Normal     Cardiovascular    Rhythm: regular  Rate: normal         Dental    Dentition: Poor dentition and Caps/crowns     Pulmonary  Breath sounds clear to auscultation               Abdominal  GI exam deferred       Other Findings            Anesthetic Plan    ASA: 3  Anesthesia type: general          Induction: Intravenous  Anesthetic plan and risks discussed with: Patient

## 2018-03-07 NOTE — PROGRESS NOTES
Problem: Falls - Risk of  Goal: *Absence of Falls  Document Kurtis Fall Risk and appropriate interventions in the flowsheet.    Outcome: Progressing Towards Goal  Fall Risk Interventions:  Mobility Interventions: Communicate number of staff needed for ambulation/transfer         Medication Interventions: Evaluate medications/consider consulting pharmacy, Patient to call before getting OOB, Teach patient to arise slowly    Elimination Interventions: Patient to call for help with toileting needs, Call light in reach, Urinal in reach, Toileting schedule/hourly rounds

## 2018-03-07 NOTE — DIABETES MGMT
NUTRITIONAL ASSESSMENT GLYCEMIC CONTROL/ PLAN OF CARE     Radha Howard           45 y.o.           3/6/2018                 1. Cellulitis of foot, right    2. Other specified diabetes mellitus with complication, with long-term current use of insulin ([facility])         INTERVENTIONS/PLAN:   1. Will send Pernell TID to promote wound healing. 2.  Will add HS snack  3. Monitor po intake, labs and weights. ASSESSMENT:   Nutritional Status:  Pt is 122% ideal weight based on last weight of 67.1 kg on 2-28-18 (no current weight available). BMI = 26.4 kg/m2 (overwegiht classification). Historical weights are wdely varied. Pt with good appetite and appears to be tolerating diet. Pt with hypoalbuminemia as evidenced by alb of 1.2. Nutrition Diagnoses:   Increased protein and nutrient needs related to wound healing as evidenced by chronic LE wounds and pending BKA. Altered nutrition related labs due to diabetes as evidenced by A1C of 7.5%     Diabetes Management:   Pt known from previous admissions and has received diabetes education several times in the past.  Pt is self paying and has challenges with affording diabetes supplies. He was provided with a generic glucometer at prior admission 9/13/17 however he stated at last admission 2/23/18 that he no longer had it and did not know what happened to it. On 2/28/18 he was provided with new Verient Matrix glucometer, 50 test strips and 100 lancets. He states he purchases his insulin at [facility] and has been taking it BID as directed. Of note, at a prior admission when pt had a high A1C he revealed he was taking only his PM dose of 70/30 so as to make his insulin last longer. He stated that he now is taking his 70/30 insulin BID. His usual BG readings have been a round \"123\".       Recent blood glucose:  3/7/18:  89, 89  3/6/18:  127     Within target range (non-ICU: <140; ICU<180): [x] Yes   []  No    Current Insulin regimen:   Corrective lispro normal insulin sensitivity, TID  Home medication/insulin regimen:   Humalin 70/30 12 units BID  HbA1c: 7.5% - ave BG has been ~ 165 mg/dL over past 3 months. Adequate glycemic control PTA:  [x] Yes, much improved from previous admissions  [] No       SUBJECTIVE/OBJECTIVE:   Information obtained from: chart review, pt, pt known from previous admissions. Pt admitted with  Chronic right foot osteomyelitis, MRSA bloodstream infection. PMHx includes T2DM, recurrent foot infections, status post right first toe amputation in 2012, resection of fourth and fifth metatarsal osteomyelitis 2015, right foot excision of bone cortex 11/01/2017 with fifth  metatarsal I and D. Diet: consistent CHO - pt consumed  100% lunch meal.    No data found.     Medications: [x]                Reviewed     Most Recent POC Glucose:   Recent Labs      03/06/18   1632   GLU  96         Labs:   Lab Results   Component Value Date/Time    Hemoglobin A1c 7.5 (H) 03/06/2018 04:32 PM     Lab Results   Component Value Date/Time    Hemoglobin A1c 7.5 (H) 03/06/2018 04:32 PM    Hemoglobin A1c 7.7 (H) 02/22/2018 11:50 PM    Hemoglobin A1c 8.1 (H) 09/12/2017 07:45 PM     Lab Results   Component Value Date/Time    Sodium 138 03/06/2018 04:32 PM    Potassium 4.0 03/06/2018 04:32 PM    Chloride 108 03/06/2018 04:32 PM    CO2 23 03/06/2018 04:32 PM    Anion gap 7 03/06/2018 04:32 PM    Glucose 96 03/06/2018 04:32 PM    BUN 33 (H) 03/06/2018 04:32 PM    Creatinine 1.37 (H) 03/06/2018 04:32 PM    Calcium 8.3 (L) 03/06/2018 04:32 PM    Magnesium 1.7 (L) 06/13/2013 07:30 AM    Phosphorus 5.0 (H) 06/21/2016 11:00 AM    Albumin 1.2 (L) 02/26/2018 04:50 AM       Anthropometrics: IBW : 53.5 kg (118 lb),  ,    Wt Readings from Last 1 Encounters:   03/01/18 65.4 kg (144 lb 2.9 oz)      Ht Readings from Last 1 Encounters:   03/07/18 5' 2\" (1.575 m)     Last Weight Metrics:  Weight Loss Metrics 3/1/2018 9/14/2017 5/1/2016 3/15/2016 8/2/2015 5/19/2015 5/9/2015   Today's Wt 144 lb 2.9 oz 118 lb 9.7 oz 176 lb 11.2 oz 165 lb 154 lb 152 lb 170 lb   BMI 26.37 kg/m2 23.16 kg/m2 34.51 kg/m2 28.31 kg/m2 26.42 kg/m2 26.08 kg/m2 33.2 kg/m2         Estimated Nutrition Needs:  2013 Kcals/day, Protein (g): 81 g Fluid (ml): 2000 ml  Based on:   [x]          Actual BW    []          ABW   []            Adjusted BW           Nutrition Interventions:  Pernell TID  HS snack  Goal:   Blood glucose will be within target range of  mg/dL by 3/10/18. Pt will continue to consume >75% meals by 3/12/18. Pt will consume adequate protein to promote wound healing.         Nutrition Monitoring and Evaluation      [x]     Monitor po intake on meal rounds  [x]     Continue inpatient monitoring and intervention  []     Other:      Nutrition Risk:  []   High     [x]  Moderate    []  Minimal/Uncompromised    Kem Davis RD, CDE   Office:  [address] [place] [address] Pager:  [phone]

## 2018-03-07 NOTE — CONSULTS
Prelim ID Consult (see Dictation [id number])    Patient: Torin Hidalgo CSN: [id number]     YOB: 1980  Age: 45 y.o. Sex: male        Current abx Prior abx   Daptomycin/ceftriaxone 2/26-9 Cefepime 2/23 - 6 Vancomycin 2/23 - 6     Assessment ->Rec:     Osteomyelitis, R calcaneus/cuboid   -recent [facility] adm with MRSA BSI with draining sinus tract, h/o MRSA,2/24 MRI c/w sinus tract to calcaneocuboid w/OM, refused BKA discharged with picc on daily dapto/ceftriaxone x 8 weeks   -referred to ER yesterday from infusion center with sub f/c increased R foot pain 10/10  -CRP 29.3, ESR >140 ->per vascular notes understand for BKA vs guillotine amp R foot 3/8  ->continue current abx   ->will plan to f/u on cultures if any from OR  ->weekly CK on dapto last 35 on 3/5   BSI MRSA- 1 of 2 blcx + 2/22, repeat 2/26 NG  - from OM  - TTE 2/27: no vegetations ->will plan to review abx stop date prior to discharge- if no residual OM then abx duration will depend on date of LPC (2-4 weeks after)   Leukocytosis - higher than at discharge 3/1- 15k today   -? From foot ->with h/o fever and chills will repeat bctx's to confirm abacteremic on current therapy    Recurrent foot infections  - s/p R 1st toe amp 2012, rx 4th/5th MT OM 2015  - s/p 11/1/2017 R foot excision of bone cortex, 5th MT w/I+D R foot to the bone.  Pathology: acute osteomyelitis.  Cultures grew MRSA and Streptococcus mitis/oralis    DM A1C 7.5 ->BS control per IM    H/o noncompliance      MICROBIOLOGY:   2/22 bctx x 2 MRSA vanco ROGELIO <=0.5  2/26 bctx x 2 NG                        LINES AND CATHETERS:   PICC 3/1    Thanks -- Dr. Lloyd Shah, Dr. Guzman Edwards, Dr. Richi Levin, and I will follow with you    JCSchwab, MD  [address] [place] [address] [facility]  March 7, 2018  [phone]

## 2018-03-07 NOTE — PROGRESS NOTES
1932: Assumed pt care. Received pt resting in bed, pt is alert and oriented x 4. Right leg and foot has 4+ edema. Utilized  phone in speaking to the patient. Pt on contact isolation. Call bell within reach. 2107: received a call from [place], pharmMD, he is asking if pt has received rocephin at the infusion center, advised to ask pt. Asked pt if he received daptomycin and rocephin at the infusion center, pt confirmed that he received daptomycin and rocephin at the infusion center. Called Erick pharmMD and told him pt stated he received rocephin at the infusion center. 3-7-18    7372: Bedside and Verbal shift change report given to Mireille Amador (oncoming nurse) by Didier Nunez   (offgoing nurse). Report included the following information SBAR, Kardex, Intake/Output, MAR and Recent Results.

## 2018-03-07 NOTE — CONSULTS
[address]  MR#: [id number]  : 1980  ACCOUNT #: [de-identified]   DATE OF SERVICE: 2018    ATTENDING PHYSICIAN REQUESTING CONSULTATION:  Dr. Linda Ceja:  Justin, room #0461. REASON FOR CONSULTATION:  The patient readmitted on treatment for MRSA bloodstream infection with chronic right foot osteomyelitis. IMPRESSION:  1. Osteomyelitis of the right calcaneus/cuboid. The patient was admitted recently with a draining sinus tract of the right foot, history of MRSA, and  MRI consistent with sinus tract at calcaneocuboid with osteomyelitis. The patient refused BKA at that time and was discharged with PICC line on daily daptomycin plus ceftriaxone for 8 weeks. He was referred to the emergency room yesterday from [facility] for subjective fevers, chills, and increased right foot pain, 10/10 severity. His sed rate is more than 140. His CRP was elevated at 29.3. He is now apparently willing to consider amputation. 2.  Recent bloodstream invasion with MRSA, 1 of 2 blood cultures from , with repeat blood cultures x2 no growth. The source that Dr. Earlene Samson suspected was from osteomyelitis. Transthoracic echocardiogram  showed no vegetations. The patient's planned 8 weeks of therapy for osteomyelitis would have also treated for this. If the patient has amputation, then antibiotic stop date will need to be revisited. 3.  Recurrent foot infections, status post right first toe amputation in , resection of fourth and fifth metatarsal osteomyelitis , right foot excision of bone cortex 2017 with fifth  metatarsal I and D with positive path for MRSA and Strep mitis/oralis. 4.  Diabetes mellitus with hemoglobin A1c of 7.5.  5.  History of nonadherence. RECOMMENDATIONS:  1. Continue current antibiotics.   2.  Per vascular notes, understand plan for BKA versus guillotine amputation of the right foot tomorrow. 3.  We will plan to follow up on any cultures from surgery, if any. 4.  Continue weekly CK on daptomycin therapy. Last CK was 35 on 03/05. 5. If fever, repeat blood culture x2.  6.  Will need to review antibiotic stop date prior to discharge. If no residual osteomyelitis, then antibiotic duration will be driven by date of last positive blood culture. 7.  Blood sugar control per internal medicine. Thank you for this consultation. Dr. Mo Chacon, Dr. Alena Mckinnon, Dr. Baljinder Gan and I will follow with you. HISTORY OF PRESENT ILLNESS:  The patient is a very pleasant 72-year-old  male known to us for history of recurrent diabetic foot infections. He most recently was seen by my partner, Dr. Mo Chacon, last week after he had been admitted to [facility] with a draining right foot sinus and was found to have MRSA blood stream invasion in 1 of 2 blood cultures from 02/22. He had repeat blood cultures which were no growth. A PICC line was inserted after he refused recommended below-the-knee amputation for MRI evidence of sinus tract to calcaneocuboid with osteomyelitis present. Dr. Mo Chacon arranged for outpatient IV antibiotic therapy with daptomycin plus ceftriaxone with plan then of 8 weeks of therapy with the patient informed that this may well not resolve the infection. Indeed, the patient was at infusion center yesterday and reported subjective chills and pain 10/10 with right foot erythema and swelling. The patient was referred to the emergency room. PVL was done, which showed no DVT. The patient was admitted and continued on IV daptomycin plus ceftriaxone. He has been seen by vascular, who now indicates that he is receptive to amputation and they are considering below-the-knee amputation versus guillotine amputation tomorrow. We were asked to see the patient to continue on his antibiotic therapy. The patient denies adverse drug reaction, rash, itching.   He denies cough or shortness of breath, abdominal pain, diarrhea, but does complain of pain in his right foot. PAST MEDICAL HISTORY:  Medical illnesses:  Diabetes mellitus, right foot osteomyelitis, cellulitis, and MRSA bloodstream invasion. Surgeries:  Multiple to right foot. PICC line was inserted prior to his discharge in late February. ALLERGIES:  NO KNOWN DRUG ALLERGIES. MEDICATIONS:  Ceftriaxone, daptomycin, Colace, heparin. FAMILY HISTORY:  Noncontributory. SOCIAL HISTORY:  The patient     REVIEW OF SYSTEMS:  Pertinent positives and negatives in the history of present illness or outlined below. HEENT:  Negative headache. Negative difficulty swallowing. PULMONARY:  Negative for shortness of breath or cough. CARDIOVASCULAR:  Negative for chest pain. GASTROINTESTINAL:  The patient denies nausea, vomiting or diarrhea. GENITOURINARY:  Not queried. NEUROMUSCULOSKELETAL:  See HPI. PHYSICAL EXAMINATION:  GENERAL:  He is a pleasant, well-developed, well-nourished  male lying quietly in bed in no acute distress. VITAL SIGNS:  Temperature max 98.7, temperature 98, blood pressure 121/69, pulse 94, respiratory rate 16, O2 sats 98%. HEENT:  Sclerae anicteric, conjunctivae pink. Oropharynx without thrush. He has some carious teeth present in the oropharynx. There is no thrush. NECK:  Supple without lymphadenopathy or thyroidomegaly. CHEST:  Clear to auscultation and percussion. CARDIOVASCULAR:  Regular rate and rhythm without rub, murmur or gallop. ABDOMEN:  Soft and nontender, without hepatosplenomegaly, masses, CVA or suprapubic tenderness. EXTREMITIES:  The patient has a PICC line in place without tenderness or erythema. His right foot has soft tissue swelling, dull erythema. No current drainage. Evidence of multiple prior surgeries. No obvious crepitance. LABORATORY DATA:  PVL negative for DVT. Echocardiogram 02/27 negative for reported vegetation.   Sodium 138, potassium 4, chloride 108, bicarbonate 23, glucose 96, BUN 33, creatinine 1.4. AST 21, ALT 21, alkaline phosphatase 171, bilirubin 0.2, total protein 6.4, albumin 1.2. CK was 35 on 03/05. CRP was 29.3. ESR greater than 140. Hemoglobin A1c 7.5. White count 15,200, hematocrit 26, platelet count 023, differential 81 polys, 13 lymphs, 4 monos. 02/26 blood cultures x2 no growth.  02/22 blood culture 1/2 MRSA, vancomycin ROGELIO less than or equal to 0.5. Urinalysis 02/23, 0-3 white cells. ADDITIONAL DISCUSSION:  The patient has mild leukocytosis, suspect on the basis of foot. As above with recent MRSA bloodstream invasion, will ask to be called with temperature greater than 101 and would anticipate repeating blood cultures at that time.       Jose Tomas / Coreen Tilley  D: 03/07/2018 14:33     T: 03/07/2018 15:34  JOB #: [id number]

## 2018-03-07 NOTE — PROGRESS NOTES
Problem: Falls - Risk of  Goal: *Absence of Falls  Document Kurtis Fall Risk and appropriate interventions in the flowsheet.    Outcome: Progressing Towards Goal  Fall Risk Interventions:  Mobility Interventions: Communicate number of staff needed for ambulation/transfer         Medication Interventions: Evaluate medications/consider consulting pharmacy    Elimination Interventions: Patient to call for help with toileting needs

## 2018-03-08 ENCOUNTER — HOSPITAL ENCOUNTER (OUTPATIENT)
Dept: INFUSION THERAPY | Age: 38
Discharge: HOME | End: 2018-03-08
Payer: SELF-PAY

## 2018-03-08 ENCOUNTER — ANESTHESIA (OUTPATIENT)
Dept: SURGERY | Age: 38
DRG: 617 | End: 2018-03-08
Payer: SELF-PAY

## 2018-03-08 LAB
ABO + RH BLD: NORMAL
ALBUMIN SERPL-MCNC: 1.4 G/DL (ref 3.4–5)
ALBUMIN/GLOB SERPL: 0.2 {RATIO} (ref 0.8–1.7)
ALP SERPL-CCNC: 140 U/L (ref 45–117)
ALT SERPL-CCNC: 21 U/L (ref 16–61)
ANION GAP SERPL CALC-SCNC: 8 MMOL/L (ref 3–18)
AST SERPL-CCNC: 24 U/L (ref 15–37)
BASOPHILS # BLD: 0.1 K/UL (ref 0–0.06)
BASOPHILS NFR BLD: 1 % (ref 0–2)
BILIRUB SERPL-MCNC: 0.4 MG/DL (ref 0.2–1)
BLOOD GROUP ANTIBODIES SERPL: NORMAL
BUN SERPL-MCNC: 29 MG/DL (ref 7–18)
BUN/CREAT SERPL: 27 (ref 12–20)
CALCIUM SERPL-MCNC: 8.3 MG/DL (ref 8.5–10.1)
CHLORIDE SERPL-SCNC: 106 MMOL/L (ref 100–108)
CO2 SERPL-SCNC: 24 MMOL/L (ref 21–32)
CREAT SERPL-MCNC: 1.08 MG/DL (ref 0.6–1.3)
DIFFERENTIAL METHOD BLD: ABNORMAL
EOSINOPHIL # BLD: 0.3 K/UL (ref 0–0.4)
EOSINOPHIL NFR BLD: 4 % (ref 0–5)
ERYTHROCYTE [DISTWIDTH] IN BLOOD BY AUTOMATED COUNT: 14.9 % (ref 11.6–14.5)
GLOBULIN SER CALC-MCNC: 6 G/DL (ref 2–4)
GLUCOSE BLD STRIP.AUTO-MCNC: 100 MG/DL (ref 70–110)
GLUCOSE BLD STRIP.AUTO-MCNC: 111 MG/DL (ref 70–110)
GLUCOSE BLD STRIP.AUTO-MCNC: 170 MG/DL (ref 70–110)
GLUCOSE BLD STRIP.AUTO-MCNC: 80 MG/DL (ref 70–110)
GLUCOSE BLD STRIP.AUTO-MCNC: 91 MG/DL (ref 70–110)
GLUCOSE SERPL-MCNC: 93 MG/DL (ref 74–99)
HCT VFR BLD AUTO: 25.7 % (ref 36–48)
HGB BLD-MCNC: 8.1 G/DL (ref 13–16)
INR PPP: 1.2 (ref 0.8–1.2)
LYMPHOCYTES # BLD: 1.9 K/UL (ref 0.9–3.6)
LYMPHOCYTES NFR BLD: 25 % (ref 21–52)
MCH RBC QN AUTO: 27.2 PG (ref 24–34)
MCHC RBC AUTO-ENTMCNC: 31.5 G/DL (ref 31–37)
MCV RBC AUTO: 86.2 FL (ref 74–97)
MONOCYTES # BLD: 0.4 K/UL (ref 0.05–1.2)
MONOCYTES NFR BLD: 6 % (ref 3–10)
NEUTS SEG # BLD: 4.9 K/UL (ref 1.8–8)
NEUTS SEG NFR BLD: 64 % (ref 40–73)
PLATELET # BLD AUTO: 428 K/UL (ref 135–420)
PMV BLD AUTO: 10.5 FL (ref 9.2–11.8)
POTASSIUM SERPL-SCNC: 4.6 MMOL/L (ref 3.5–5.5)
PROT SERPL-MCNC: 7.4 G/DL (ref 6.4–8.2)
PROTHROMBIN TIME: 14.5 SEC (ref 11.5–15.2)
RBC # BLD AUTO: 2.98 M/UL (ref 4.7–5.5)
SODIUM SERPL-SCNC: 138 MMOL/L (ref 136–145)
SPECIMEN EXP DATE BLD: NORMAL
WBC # BLD AUTO: 7.5 K/UL (ref 4.6–13.2)

## 2018-03-08 PROCEDURE — 77030031139 HC SUT VCRL2 J&J -A: Performed by: SURGERY

## 2018-03-08 PROCEDURE — 74011000272 HC RX REV CODE- 272: Performed by: SURGERY

## 2018-03-08 PROCEDURE — 65270000029 HC RM PRIVATE

## 2018-03-08 PROCEDURE — 88311 DECALCIFY TISSUE: CPT | Performed by: SURGERY

## 2018-03-08 PROCEDURE — 85610 PROTHROMBIN TIME: CPT | Performed by: PHYSICIAN ASSISTANT

## 2018-03-08 PROCEDURE — 82962 GLUCOSE BLOOD TEST: CPT

## 2018-03-08 PROCEDURE — 74011250637 HC RX REV CODE- 250/637: Performed by: HOSPITALIST

## 2018-03-08 PROCEDURE — 74011250636 HC RX REV CODE- 250/636

## 2018-03-08 PROCEDURE — 77030020847 HC STATLOK BARD -A

## 2018-03-08 PROCEDURE — 77030002996 HC SUT SLK J&J -A: Performed by: SURGERY

## 2018-03-08 PROCEDURE — 77030003029 HC SUT VCRL J&J -B: Performed by: SURGERY

## 2018-03-08 PROCEDURE — 80053 COMPREHEN METABOLIC PANEL: CPT | Performed by: PHYSICIAN ASSISTANT

## 2018-03-08 PROCEDURE — 77030011265 HC ELECTRD BLD HEX COVD -A: Performed by: SURGERY

## 2018-03-08 PROCEDURE — 77030006822 HC BLD SAW SAG BRSM -B: Performed by: SURGERY

## 2018-03-08 PROCEDURE — 85025 COMPLETE CBC W/AUTO DIFF WBC: CPT | Performed by: PHYSICIAN ASSISTANT

## 2018-03-08 PROCEDURE — 76010000149 HC OR TIME 1 TO 1.5 HR: Performed by: SURGERY

## 2018-03-08 PROCEDURE — 74011000258 HC RX REV CODE- 258: Performed by: HOSPITALIST

## 2018-03-08 PROCEDURE — 74011000250 HC RX REV CODE- 250

## 2018-03-08 PROCEDURE — 77030018719 HC DRSG PTCH ANTIMIC J&J -A

## 2018-03-08 PROCEDURE — 77030013079 HC BLNKT BAIR HGGR 3M -A: Performed by: ANESTHESIOLOGY

## 2018-03-08 PROCEDURE — 74011250636 HC RX REV CODE- 250/636: Performed by: HOSPITALIST

## 2018-03-08 PROCEDURE — 86901 BLOOD TYPING SEROLOGIC RH(D): CPT

## 2018-03-08 PROCEDURE — 77030008462 HC STPLR SKN PROX J&J -A: Performed by: SURGERY

## 2018-03-08 PROCEDURE — 74011250637 HC RX REV CODE- 250/637: Performed by: NURSE ANESTHETIST, CERTIFIED REGISTERED

## 2018-03-08 PROCEDURE — 74011250636 HC RX REV CODE- 250/636: Performed by: NURSE ANESTHETIST, CERTIFIED REGISTERED

## 2018-03-08 PROCEDURE — 77030012510 HC MSK AIRWY LMA TELE -B: Performed by: NURSE ANESTHETIST, CERTIFIED REGISTERED

## 2018-03-08 PROCEDURE — 74011250636 HC RX REV CODE- 250/636: Performed by: SURGERY

## 2018-03-08 PROCEDURE — 76210000006 HC OR PH I REC 0.5 TO 1 HR: Performed by: SURGERY

## 2018-03-08 PROCEDURE — 88307 TISSUE EXAM BY PATHOLOGIST: CPT | Performed by: SURGERY

## 2018-03-08 PROCEDURE — 87040 BLOOD CULTURE FOR BACTERIA: CPT | Performed by: INTERNAL MEDICINE

## 2018-03-08 PROCEDURE — 77030018836 HC SOL IRR NACL ICUM -A: Performed by: SURGERY

## 2018-03-08 PROCEDURE — 76060000033 HC ANESTHESIA 1 TO 1.5 HR: Performed by: SURGERY

## 2018-03-08 PROCEDURE — 77030003028 HC SUT VCRL J&J -A: Performed by: SURGERY

## 2018-03-08 PROCEDURE — 77030018883 HC BLD SAW SAG4 STRY -B: Performed by: SURGERY

## 2018-03-08 RX ORDER — SODIUM CHLORIDE, SODIUM LACTATE, POTASSIUM CHLORIDE, CALCIUM CHLORIDE 600; 310; 30; 20 MG/100ML; MG/100ML; MG/100ML; MG/100ML
75 INJECTION, SOLUTION INTRAVENOUS CONTINUOUS
Status: DISCONTINUED | OUTPATIENT
Start: 2018-03-08 | End: 2018-03-08 | Stop reason: HOSPADM

## 2018-03-08 RX ORDER — OXYCODONE AND ACETAMINOPHEN 5; 325 MG/1; MG/1
1 TABLET ORAL AS NEEDED
Status: DISCONTINUED | OUTPATIENT
Start: 2018-03-08 | End: 2018-03-08 | Stop reason: HOSPADM

## 2018-03-08 RX ORDER — PROPOFOL 10 MG/ML
INJECTION, EMULSION INTRAVENOUS AS NEEDED
Status: DISCONTINUED | OUTPATIENT
Start: 2018-03-08 | End: 2018-03-08 | Stop reason: HOSPADM

## 2018-03-08 RX ORDER — LIDOCAINE HYDROCHLORIDE 20 MG/ML
INJECTION, SOLUTION EPIDURAL; INFILTRATION; INTRACAUDAL; PERINEURAL AS NEEDED
Status: DISCONTINUED | OUTPATIENT
Start: 2018-03-08 | End: 2018-03-08 | Stop reason: HOSPADM

## 2018-03-08 RX ORDER — SODIUM CHLORIDE, SODIUM LACTATE, POTASSIUM CHLORIDE, CALCIUM CHLORIDE 600; 310; 30; 20 MG/100ML; MG/100ML; MG/100ML; MG/100ML
25 INJECTION, SOLUTION INTRAVENOUS CONTINUOUS
Status: DISCONTINUED | OUTPATIENT
Start: 2018-03-08 | End: 2018-03-08 | Stop reason: HOSPADM

## 2018-03-08 RX ORDER — FAMOTIDINE 20 MG/1
20 TABLET, FILM COATED ORAL ONCE
Status: COMPLETED | OUTPATIENT
Start: 2018-03-08 | End: 2018-03-08

## 2018-03-08 RX ORDER — HYDROMORPHONE HYDROCHLORIDE 2 MG/ML
1 INJECTION, SOLUTION INTRAMUSCULAR; INTRAVENOUS; SUBCUTANEOUS ONCE
Status: COMPLETED | OUTPATIENT
Start: 2018-03-08 | End: 2018-03-08

## 2018-03-08 RX ORDER — FENTANYL CITRATE 50 UG/ML
50 INJECTION, SOLUTION INTRAMUSCULAR; INTRAVENOUS
Status: DISCONTINUED | OUTPATIENT
Start: 2018-03-08 | End: 2018-03-08 | Stop reason: HOSPADM

## 2018-03-08 RX ORDER — FENTANYL CITRATE 50 UG/ML
25 INJECTION, SOLUTION INTRAMUSCULAR; INTRAVENOUS AS NEEDED
Status: DISCONTINUED | OUTPATIENT
Start: 2018-03-08 | End: 2018-03-08 | Stop reason: HOSPADM

## 2018-03-08 RX ORDER — EPHEDRINE SULFATE 50 MG/ML
INJECTION, SOLUTION INTRAVENOUS AS NEEDED
Status: DISCONTINUED | OUTPATIENT
Start: 2018-03-08 | End: 2018-03-08 | Stop reason: HOSPADM

## 2018-03-08 RX ORDER — ONDANSETRON 2 MG/ML
INJECTION INTRAMUSCULAR; INTRAVENOUS AS NEEDED
Status: DISCONTINUED | OUTPATIENT
Start: 2018-03-08 | End: 2018-03-08 | Stop reason: HOSPADM

## 2018-03-08 RX ORDER — MIDAZOLAM HYDROCHLORIDE 1 MG/ML
INJECTION, SOLUTION INTRAMUSCULAR; INTRAVENOUS AS NEEDED
Status: DISCONTINUED | OUTPATIENT
Start: 2018-03-08 | End: 2018-03-08 | Stop reason: HOSPADM

## 2018-03-08 RX ORDER — FENTANYL CITRATE 50 UG/ML
INJECTION, SOLUTION INTRAMUSCULAR; INTRAVENOUS AS NEEDED
Status: DISCONTINUED | OUTPATIENT
Start: 2018-03-08 | End: 2018-03-08 | Stop reason: HOSPADM

## 2018-03-08 RX ADMIN — HEPARIN SODIUM 5000 UNITS: 5000 INJECTION, SOLUTION INTRAVENOUS; SUBCUTANEOUS at 21:18

## 2018-03-08 RX ADMIN — OXYCODONE HYDROCHLORIDE AND ACETAMINOPHEN 1 TABLET: 5; 325 TABLET ORAL at 17:45

## 2018-03-08 RX ADMIN — FENTANYL CITRATE 50 MCG: 50 INJECTION, SOLUTION INTRAMUSCULAR; INTRAVENOUS at 17:38

## 2018-03-08 RX ADMIN — FAMOTIDINE 20 MG: 20 TABLET, FILM COATED ORAL at 14:08

## 2018-03-08 RX ADMIN — HYDROMORPHONE HYDROCHLORIDE 1 MG: 2 INJECTION INTRAMUSCULAR; INTRAVENOUS; SUBCUTANEOUS at 04:58

## 2018-03-08 RX ADMIN — MIDAZOLAM HYDROCHLORIDE 2 MG: 1 INJECTION, SOLUTION INTRAMUSCULAR; INTRAVENOUS at 15:52

## 2018-03-08 RX ADMIN — PROPOFOL 170 MG: 10 INJECTION, EMULSION INTRAVENOUS at 16:01

## 2018-03-08 RX ADMIN — HYDROMORPHONE HYDROCHLORIDE 1 MG: 2 INJECTION INTRAMUSCULAR; INTRAVENOUS; SUBCUTANEOUS at 18:48

## 2018-03-08 RX ADMIN — HYDROMORPHONE HYDROCHLORIDE 1 MG: 2 INJECTION INTRAMUSCULAR; INTRAVENOUS; SUBCUTANEOUS at 21:14

## 2018-03-08 RX ADMIN — FENTANYL CITRATE 25 MCG: 50 INJECTION, SOLUTION INTRAMUSCULAR; INTRAVENOUS at 17:29

## 2018-03-08 RX ADMIN — HYDROMORPHONE HYDROCHLORIDE 1 MG: 2 INJECTION INTRAMUSCULAR; INTRAVENOUS; SUBCUTANEOUS at 09:15

## 2018-03-08 RX ADMIN — ONDANSETRON 4 MG: 2 INJECTION INTRAMUSCULAR; INTRAVENOUS at 16:07

## 2018-03-08 RX ADMIN — HYDROMORPHONE HYDROCHLORIDE 1 MG: 2 INJECTION INTRAMUSCULAR; INTRAVENOUS; SUBCUTANEOUS at 23:20

## 2018-03-08 RX ADMIN — EPHEDRINE SULFATE 10 MG: 50 INJECTION, SOLUTION INTRAVENOUS at 16:41

## 2018-03-08 RX ADMIN — FENTANYL CITRATE 50 MCG: 50 INJECTION, SOLUTION INTRAMUSCULAR; INTRAVENOUS at 17:15

## 2018-03-08 RX ADMIN — DAPTOMYCIN 390 MG: 500 INJECTION, POWDER, LYOPHILIZED, FOR SOLUTION INTRAVENOUS at 14:08

## 2018-03-08 RX ADMIN — FENTANYL CITRATE 50 MCG: 50 INJECTION, SOLUTION INTRAMUSCULAR; INTRAVENOUS at 16:00

## 2018-03-08 RX ADMIN — DOCUSATE SODIUM 100 MG: 100 CAPSULE, LIQUID FILLED ORAL at 09:15

## 2018-03-08 RX ADMIN — LIDOCAINE HYDROCHLORIDE 40 MG: 20 INJECTION, SOLUTION EPIDURAL; INFILTRATION; INTRACAUDAL; PERINEURAL at 16:01

## 2018-03-08 RX ADMIN — SODIUM CHLORIDE, SODIUM LACTATE, POTASSIUM CHLORIDE, AND CALCIUM CHLORIDE: 600; 310; 30; 20 INJECTION, SOLUTION INTRAVENOUS at 15:56

## 2018-03-08 NOTE — ROUTINE PROCESS
TRANSFER - IN REPORT:    Verbal report received from Agnieszka Melendrez RN on Mena Byrd  In pt room, 2107, for routine progression of care      Report consisted of patients Situation, Background, Assessment and   Recommendations(SBAR). Information from the following report(s) SBAR was reviewed with the receiving nurse. Opportunity for questions and clarification was provided. Assessment completed upon patients arrival to unit and care assumed.      Informed unit RN of the following:   *she administered PO pepcid and started ABX infusing prior to transfer    Will complete rest of pre op process when pt arrives to pre op holding area, which will include:  *IV fluids  *Type and Screen  *Blood glucose check and treatment if needed  *wipe pt with CHG wipes  *enter VS into flowsheet

## 2018-03-08 NOTE — PERIOP NOTES
Called Dr. Xavier Cisneros to clarify desired room for pt post op. Dr. Xavier Cisneros states pt can go back to his room on 2100. Order entered and nursing supervisor notified.

## 2018-03-08 NOTE — PROGRESS NOTES
Internal Medicine Progress Note    Patient's Name: Juventino Lester  Admit Date: 3/6/2018  Length of Stay: 2      Assessment/Plan     Active Hospital Problems    Diagnosis Date Noted    Osteomyelitis ([facility] 03/06/2018    Cellulitis of foot, right 02/23/2018    SAVANNA (acute kidney injury) ([facility] 09/12/2017    Iron deficiency anemia 05/21/2015    Hypertension 05/19/2015    DM (diabetes mellitus), secondary uncontrolled ([facility] 06/15/2013     - Cont IVAB w/ dapto/rocephin  - Appreciate ID  - Isolation due to hx of MRSA  - Pain control  - Bedrest  - Plans for vasc surg today  - SSI and accuchecks  - Cont acceptable home medications for chronic conditions   - DVT protocol    I have personally reviewed all pertinent labs and films that have officially resulted over the last 24 hours. I have personally checked for all pending labs that are awaiting final results.     Subjective     Pt s/e @ bedside  No major events overnight  No changes in exam   Pain controlled  Denies CP or SOB    Objective     Visit Vitals    /64 (BP 1 Location: Left arm, BP Patient Position: At rest)    Pulse 60    Temp 98 °F (36.7 °C)    Resp 14    Ht 5' 2\" (1.575 m)    SpO2 98%       Physical Exam:  General Appearance: NAD, conversant  Lungs: CTA with normal respiratory effort  CV: RRR, no m/r/g  Abdomen: soft, non-tender, normal bowel sounds  Extremities: no cyanosis, + 1 pitting edema RLE, PICC line RUE  Neuro: No focal deficits, motor/sensory intact    Lab/Data Reviewed:  BMP:   Lab Results   Component Value Date/Time     03/08/2018 05:16 AM    K 4.6 03/08/2018 05:16 AM     03/08/2018 05:16 AM    CO2 24 03/08/2018 05:16 AM    AGAP 8 03/08/2018 05:16 AM    GLU 93 03/08/2018 05:16 AM    BUN 29 (H) 03/08/2018 05:16 AM    CREA 1.08 03/08/2018 05:16 AM    GFRAA >60 03/08/2018 05:16 AM    GFRNA >60 03/08/2018 05:16 AM     CBC:   Lab Results   Component Value Date/Time    WBC 7.5 03/08/2018 05:16 AM    HGB 8.1 (L) 03/08/2018 05:16 AM    HCT 25.7 (L) 03/08/2018 05:16 AM     (H) 03/08/2018 05:16 AM       Imaging Reviewed:  No results found.     Medications Reviewed:  Current Facility-Administered Medications   Medication Dose Route Frequency    alteplase (CATHFLO) injection 2 mg  2 mg InterCATHeter ONCE    oxyCODONE-acetaminophen (PERCOCET) 5-325 mg per tablet 1 Tab  1 Tab Oral Q4H PRN    HYDROmorphone (DILAUDID) injection 1 mg  1 mg IntraVENous Q3H PRN    docusate sodium (COLACE) capsule 100 mg  100 mg Oral BID    heparin (porcine) injection 5,000 Units  5,000 Units SubCUTAneous Q8H    insulin lispro (HUMALOG) injection   SubCUTAneous TIDAC    glucose chewable tablet 16 g  4 Tab Oral PRN    glucagon (GLUCAGEN) injection 1 mg  1 mg IntraMUSCular PRN    dextrose (D50W) injection syrg 12.5-25 g  25-50 mL IntraVENous PRN    DAPTOmycin (CUBICIN) 390 mg in 0.9% sodium chloride 50 mL IVPB RF formulation  390 mg IntraVENous Q24H    cefTRIAXone (ROCEPHIN) 2 g in sterile water (preservative free) 20 mL IV syringe  2 g IntraVENous Q24H     Facility-Administered Medications Ordered in Other Encounters   Medication Dose Route Frequency    [START ON 3/10/2018] cefTRIAXone (ROCEPHIN) 2 g in sterile water (preservative free) 20 mL IV syringe  2 g IntraVENous ONCE    [START ON 3/10/2018] DAPTOmycin (CUBICIN) 390 mg in water, bacteriostatic (WATER) 7.8 mL IV syringe RF formulation  390 mg IntraVENous ONCE    [START ON 3/11/2018] cefTRIAXone (ROCEPHIN) 2 g in sterile water (preservative free) 20 mL IV syringe  2 g IntraVENous ONCE    [START ON 3/11/2018] DAPTOmycin (CUBICIN) 390 mg in water, bacteriostatic (WATER) 7.8 mL IV syringe RF formulation  390 mg IntraVENous ONCE    cefTRIAXone (ROCEPHIN) 2 g in sterile water (preservative free) 20 mL IV syringe  2 g IntraVENous ONCE    DAPTOmycin (CUBICIN) 390 mg in water, bacteriostatic (WATER) 7.8 mL IV syringe RF formulation  390 mg IntraVENous ONCE    sodium chloride (NS) flush 10-40 mL 10-40 mL InterCATHeter PRN    [START ON 3/9/2018] cefTRIAXone (ROCEPHIN) 2 g in sterile water (preservative free) 20 mL IV syringe  2 g IntraVENous ONCE    [START ON 3/9/2018] DAPTOmycin (CUBICIN) 390 mg in water, bacteriostatic (WATER) 7.8 mL IV syringe RF formulation  390 mg IntraVENous ONCE    cefTRIAXone (ROCEPHIN) 2 g in sterile water (preservative free) 20 mL IV syringe  2 g IntraVENous Q24H    sodium chloride (NS) flush 10-40 mL  10-40 mL IntraVENous PRN           Magi Salinas,   Internal Medicine, Hospitalist  Pager: [phone] [facility]

## 2018-03-08 NOTE — PERIOP NOTES
TRANSFER - OUT REPORT:    Verbal report given to REBECA Crump on Sudheer Slice  being transferred to [facility] (unit) for routine post - op       Report consisted of patients Situation, Background, Assessment and   Recommendations(SBAR). Information from the following report(s) SBAR, Kardex and MAR was reviewed with the receiving nurse. Lines:   Peripheral IV 03/08/18 Right;Mid Forearm (Active)   Site Assessment Clean, dry, & intact 3/8/2018  5:12 PM   Phlebitis Assessment 0 3/8/2018  5:12 PM   Infiltration Assessment 0 3/8/2018  5:12 PM   Dressing Status Clean, dry, & intact 3/8/2018  5:12 PM   Dressing Type Transparent;Tape 3/8/2018  5:12 PM   Hub Color/Line Status Pink; Infusing 3/8/2018  5:12 PM   Action Taken Open ports on tubing capped 3/8/2018  2:50 PM   Alcohol Cap Used Yes 3/8/2018  2:50 PM        Opportunity for questions and clarification was provided.       Patient transported with:   Registered Nurse

## 2018-03-08 NOTE — PROGRESS NOTES
conducted an initial consultation and Spiritual Assessment for Ana Laura Escalona, who is a 45 y. o.,male. Patients Primary Language is: [place]. According to the patients EMR Buddhist Affiliation is: No Yazdanism. The reason the Patient came to the hospital is:   Patient Active Problem List    Diagnosis Date Noted    Osteomyelitis ([facility] 03/06/2018    Cellulitis of foot, right 02/23/2018    Anemia 09/12/2017    SAVANNA (acute kidney injury) ([address] 09/12/2017    DM (diabetes mellitus) ([address] 09/12/2017    Iron deficiency anemia 05/21/2015    Hypertension 05/19/2015    Acute renal injury ([address] 05/18/2015    Diabetic foot ulcer ([address] 05/16/2015    Cellulitis 11/21/2014    Cellulitis and abscess 11/21/2014    Abscess of right thigh 07/15/2014    Sepsis ([address] 07/14/2014    Abscess of bursa, left knee 06/19/2013    Cellulitis of left knee 06/15/2013    DM (diabetes mellitus), secondary uncontrolled ([address] 06/15/2013        The  provided the following Interventions:  Initiated a relationship of care and support. Explored issues of javier, belief, spirituality and Scientology/ritual needs while hospitalized. Listened empathically. Provided information about Spiritual Care Services. Offered prayer and assurance of continued prayers on patient's behalf. Chart reviewed. The following outcomes were achieved:  Patient shared limited information about both their medical narrative and spiritual journey/beliefs. Patient processed feeling about current hospitalization. Patient expressed gratitude for 's visit. Assessment:  Patient does not have any Scientology/cultural needs that will affect patients preferences in health care. There are no further spiritual or Scientology issues which require intervention at this time. Plan:  Chaplains will continue to follow and will provide pastoral care on an as needed/requested basis.    recommends bedside caregivers page  on duty if patient shows signs of acute spiritual or emotional distress. Estefani Bangura M.Div.   [place] [id number]  [phone]

## 2018-03-08 NOTE — PERIOP NOTES
Report given to Víctor Martinez RN. Pt needs LR to be connected and running after daptomycin infusion finishes, only a small amount left in tubing at the time. Pt otherwise ready to go to OR.

## 2018-03-08 NOTE — PROGRESS NOTES
1955: Assumed patient care. [facility] (offgoing nurse). Report included SBAR, Kardex, and MAR. Patient resting in bed, in no signs of distress. Call light and possessions wishin reach     [phone]: Changed PICC dressing    0725: Bedside and Verbal shift change report given to Evie Oconnor RN (oncoming nurse) by Yecenia Small RN (offgoing nurse). Report included the following information SBAR, Kardex and MAR.

## 2018-03-08 NOTE — ROUTINE PROCESS
Called to bedside to evaluate non-functioning PICc. Dressing intact but no movement on either flush attempt or blood return. D/W Dr. Tresa Lemus and D/C PICC, start new PIV for OR today and re-evaluate need for PICC as progress indicates.

## 2018-03-08 NOTE — PROGRESS NOTES
Assumed care of patient. Received report from [facility]. Patient currently off unit in OR.     @5897 Received patient from PACU.     @3465 medicated patient for pain. See MAR.     @7227 Bedside shift change report given to Tana Owen RN (oncoming nurse) by Pablo Barnard RN (offgoing nurse). Report included the following information SBAR and Kardex.

## 2018-03-08 NOTE — H&P (VIEW-ONLY)
[facility]  [facility] [address] [place]  [address] [place] [address], [address] [place] [address]  Dr. Nikki Martel, Dr. Elmo Leslie  [phone] FAX# [phone]    Consult    Patient: Maira Garcia MRN: [id number]  SSN: [id number]    YOB: 1980  Age: 45 y.o. Sex: male      Subjective:      Maira Garcia is a Amharic speaking ambulatory 45 y.o. male h/o DM and s/p R great toe amputation who is being seen for RLE foot/ankle OM. Admitted 3/6/18 for RLE foot cellulitis/pain. Recently discharged from [facility] with extensive RLE ankle/foot OM, BKA was recommended at this time but patient refused. Returns with worsening RLE pain/swelling. Denies true RLE rest pain, SOB, chest pain, n/v/d, rigors, diaphoresis. Currently on Rocephin and Daptomycin. Patient seen with no family at bedside,  phone used. Past Medical History:   Diagnosis Date    Cellulitis     rt. foot    Diabetes ([address]     Osteomyelitis ([address]     rt toe    Other ill-defined conditions(799.89)      Past Surgical History:   Procedure Laterality Date    HX ORTHOPAEDIC      rt.toe      History reviewed. No pertinent family history.   Social History   Substance Use Topics    Smoking status: Heavy Tobacco Smoker     Packs/day: 0.50    Smokeless tobacco: Never Used    Alcohol use No      Current Facility-Administered Medications   Medication Dose Route Frequency Provider Last Rate Last Dose    oxyCODONE-acetaminophen (PERCOCET) 5-325 mg per tablet 1 Tab  1 Tab Oral Q4H PRN Ariana Gemma, DO        HYDROmorphone (DILAUDID) injection 1 mg  1 mg IntraVENous Q3H PRN Ariana Gemma, DO   1 mg at 03/06/18 1956    docusate sodium (COLACE) capsule 100 mg  100 mg Oral BID Ariana Gemma, DO   100 mg at 03/06/18 1956    heparin (porcine) injection 5,000 Units  5,000 Units SubCUTAneous Q8H Arslan Eves, DO   5,000 Units at 03/07/18 0515    insulin lispro (HUMALOG) injection   SubCUTAneous TIDAC Arslan Eves, DO        glucose chewable tablet 16 g  4 Tab Oral PRN Arslan Eves, DO        glucagon (GLUCAGEN) injection 1 mg  1 mg IntraMUSCular PRN Arslan Eves, DO        dextrose (D50W) injection syrg 12.5-25 g  25-50 mL IntraVENous PRN Arslan Eves, DO        DAPTOmycin (CUBICIN) 390 mg in 0.9% sodium chloride 50 mL IVPB RF formulation  390 mg IntraVENous Q24H Arslan Eves, DO        cefTRIAXone (ROCEPHIN) 2 g in sterile water (preservative free) 20 mL IV syringe  2 g IntraVENous Q24H Arslan Eves, DO         Facility-Administered Medications Ordered in Other Encounters   Medication Dose Route Frequency Provider Last Rate Last Dose    DAPTOmycin (CUBICIN) 390 mg in water, bacteriostatic (WATER) 7.8 mL IV syringe RF formulation  390 mg IntraVENous ONCE Lima MD Gavin        cefTRIAXone (ROCEPHIN) 2 g in sterile water (preservative free) 20 mL IV syringe  2 g IntraVENous ONCE Lima MD Gavin        [START ON 3/8/2018] cefTRIAXone (ROCEPHIN) 2 g in sterile water (preservative free) 20 mL IV syringe  2 g IntraVENous ONCE Lima MD Gavin        [START ON 3/8/2018] DAPTOmycin (CUBICIN) 390 mg in water, bacteriostatic (WATER) 7.8 mL IV syringe RF formulation  390 mg IntraVENous ONCE Lima MD Gavin        sodium chloride (NS) flush 10-40 mL  10-40 mL InterCATHeter PRN Yissel Pederson MD   20 mL at 03/05/18 1535    [START ON 3/9/2018] cefTRIAXone (ROCEPHIN) 2 g in sterile water (preservative free) 20 mL IV syringe  2 g IntraVENous ONCE Lima MD Gavin        [START ON 3/9/2018] DAPTOmycin (CUBICIN) 390 mg in water, bacteriostatic (WATER) 7.8 mL IV syringe RF formulation  390 mg IntraVENous ONCE Julius Alonso MD        cefTRIAXone (ROCEPHIN) 2 g in sterile water (preservative free) 20 mL IV syringe  2 g IntraVENous Q24H Julius Alonso MD Stopped at 03/05/18 1535    sodium chloride (NS) flush 10-40 mL  10-40 mL IntraVENous PRN Gigi Luque MD   40 mL at 03/04/18 1024    sodium chloride (NS) flush 10-40 mL  10-40 mL IntraVENous PRN Gigi Luque MD            No Known Allergies    Review of Systems:  GENERAL: Denies fever, rigors, diaphoresis  HEENT: Denies vision changes, hearing changes, epistaxis  PULM: Denies cough, SOB, hemoptysis, pleuritic chest pain. CV: Denies claudication, chest pain, palpitations  GI: Denies abdominal pain, n/v/d, melena, hematemesis, hematochezia  : Denies dysuria, hematuria, trouble voiding  MSK: RLE foot pain. Denies joint tenderness, neck pain back pain  NEURO: Denies sensory/motor changes, headache, dizziness    Objective:     Vitals:    03/06/18 1830 03/06/18 1858 03/06/18 2215 03/07/18 0645   BP: 136/71 134/80 120/68 121/64   Pulse: 85 79 76 94   Resp: 22 20 18 16   Temp:  98.4 °F (36.9 °C) 97.9 °F (36.6 °C) 98 °F (36.7 °C)   SpO2: 100% 98% 100% 98%        Physical Exam:  GENERAL: A&Ox3, cooperative, does not speak english, no distress, appears stated age  EYE: PERRL, EOMIs, mucous membranes moist, non-icteric  NECK: supple, symmetric. No JVD or carotid bruits  LUNG: clear to auscultation bilaterally  HEART: regular rate and rhythm  ABDOMEN: soft, non-tender, non-distended  EXTREMITIES: RLE foot nonpitting edema. S/p RLE great toe amputation. Small 0.5x0.5cm ulceration lateral heel. No erythema or purulent drainage seen. Palpable 2+ BLE DP pulses. NTTP BLE. Palpable 2+ BUE radial pulses. NEUROLOGIC:  strength 5/5. Motor 5/5. Face symmetric.  Tongue midline    Labs    BMP:   Lab Results   Component Value Date/Time     03/06/2018 04:32 PM    K 4.0 03/06/2018 04:32 PM     03/06/2018 04:32 PM    CO2 23 03/06/2018 04:32 PM    AGAP 7 03/06/2018 04:32 PM    GLU 96 03/06/2018 04:32 PM    BUN 33 (H) 03/06/2018 04:32 PM    CREA 1.37 (H) 03/06/2018 04:32 PM    GFRAA >60 03/06/2018 04:32 PM    GFRNA 58 (L) 03/06/2018 04:32 PM      RLE VENOUS DUPLEX 3/6/18: . Technically difficult exam secondary to patient unable to tolerate compressions. Veins are patent with color and doppler, cannot exclude non-occlusive deep vein thrombosis in the right lower extremity. 3. No evidence of deep vein thrombosis in the contralateral common femoral vein. 5. No evidence of superficial thrombosis detected. CBC 3/6/18: WBC 15.2, HGB 8.5, HCT 26.0,     CULTURE BLOOD 2/26/18: No growth in 6 days    NM BONE SCAN 2/27/18:  intense tracer accumulation in the right hindfoot on all 3 phases most intense involving the talus, navicular and calcaneal bones . On Indium scan there is corresponding abnormal uptake in this same location of the right hindfoot. ECHO 2/27/18: Left ventricle: Systolic function was at the lower limits of normal. Ejection   fraction was estimated in the range of 50 % to 55 %. There was mild diffuse hypokinesis. Mitral valve: No obvious mass, vegetation or thrombus noted. Aortic valve: No obvious mass, vegetation or thrombus noted. Inferior vena cava, hepatic veins: The inferior vena cava was dilated.     MRI RT FOOT 2/26/18: . Lateral mid foot soft tissue wound with sinus tract extending to the  calcaneocuboid articulation. There is contiguous and extensive marrow signal  abnormality compatible with osteomyelitis involving the majority of the cuboid,  calcaneus, inferior talar head and neck, navicular, as well as the fourth and  fifth metatarsal shafts proximally. On postcontrast imaging, no evidence of  necrotic or nonviable bone demonstrated.     2. Large ankle joint effusion/synovitis with complex appearing and multilocular  phlegmon infiltrating through the lateral aspect of Kager's fat pad as well as  the sinus tarsi. Suspect infected calcaneocuboid articulation effusion given  proximity to adjacent lateral skin wound.     3. Truncated appearance to the peroneus brevis and longus tendons.     4. Diffusely abnormal intramuscular edema and enhancement, likely in keeping  with a combination of myositis from underlying infection as well as sequela of  subacute denervation. Assessment:     Hospital Problems  Date Reviewed: 2/28/2018          Codes Class Noted POA    * (Principal)Osteomyelitis [facility]) ICD-10-CM: M86.9  ICD-9-CM: 730.20  3/6/2018 Unknown        Cellulitis of foot, right ICD-10-CM: L03.115  ICD-9-CM: 682.7  2/23/2018 Yes        SAVANNA (acute kidney injury) ([facility] [address] ICD-10-CM: N17.9  ICD-9-CM: 584.9  9/12/2017 Yes        Iron deficiency anemia ICD-10-CM: D50.9  ICD-9-CM: 280.9  5/21/2015 Yes        Hypertension ICD-10-CM: I10  ICD-9-CM: 401.9  5/19/2015 Yes        DM (diabetes mellitus), secondary uncontrolled ([facility] ICD-10-CM: E13.65  ICD-9-CM: 249.01  6/15/2013 Yes            R Lateral midfoot soft tissue wound with sinus tract extending to the calcaneocuboid articulation.    R cuboid, calcaneus, inferior talar head and neck, navicular, 4th and 5th metatarsal shafts proximally OM  Plan:     Elevate RLE on 2 pillows  Continue Abx per hospitalist  Plan for RLE BKA vs. Guillotine amputation tomorrow 3/8/18 - consent obtained per patient  Discussed with and patient understands risk/benefits to procedure including bleeding, infection, pain, allergic reaction  Would like to proceed at this time  Can advance diet per vascular standpoint  NPO after midnight  PT/INR in AM    Signed By: Ventura Araiza     March 7, 2018

## 2018-03-08 NOTE — PROGRESS NOTES

## 2018-03-08 NOTE — INTERVAL H&P NOTE
H&P Update:  Jone Tiwari was seen and examined. History and physical has been reviewed. The patient has been examined.  There have been no significant clinical changes since the completion of the originally dated History and Physical.    Signed By: Kye Valadez MD     March 8, 2018 3:11 PM

## 2018-03-08 NOTE — PERIOP NOTES
1712 Pt received to PACU and connected to monitor. Vital signs stable. Nurse at bedside. Will continue to monitor. 1800 Additional educational information given via Georgian via Gene Bhatti RN.

## 2018-03-08 NOTE — BRIEF OP NOTE
BRIEF OPERATIVE NOTE    Date of Procedure: 3/8/2018   Preoperative Diagnosis: osteomyelitis  Postoperative Diagnosis: osteomyelitis    Procedure(s):  right below knee amputation  Surgeon(s) and Role:     * Margaret Henry MD - Primary         Assistant Staff: None      Surgical Staff:  Circ-1: Clary Swenson RN  Scrub Tech-1: Toñito Saleem  Surg Asst-1: Everardo Ragsdale  Event Time In   Incision Start 1615   Incision Close 1702     Anesthesia: General   Estimated Blood Loss: 100 cc  Specimens:   ID Type Source Tests Collected by Time Destination   1 : Right leg and foot below knee Other Leg, Right  Margaret Henry MD 3/8/2018 1636 Pathology      Findings: as above  Complications: none  Implants: * No implants in log Isabell Callejas MD, FACS

## 2018-03-08 NOTE — ROUTINE PROCESS
Bedside and Verbal shift change report given to Theron PUCKETT RN (oncoming nurse) by Lanny Hewitt RN   (offgoing nurse). Report included the following information SBAR, Kardex, Intake/Output and MAR.

## 2018-03-08 NOTE — ANESTHESIA POSTPROCEDURE EVALUATION
Post-Anesthesia Evaluation and Assessment    Patient: Candelario Abbott MRN: [id number]  SSN: [id number]    YOB: 1980  Age: 45 y.o. Sex: male      Data from PACU flowsheet    Cardiovascular Function/Vital Signs  Visit Vitals    /86    Pulse 78    Temp 36.7 °C (98 °F)    Resp 14    Ht 5' 2\" (1.575 m)    Wt 65.3 kg (144 lb)    SpO2 100%    BMI 26.34 kg/m2       Patient is status post general anesthesia for Procedure(s):  right below knee amputation. Nausea/Vomiting: controlled    Postoperative hydration reviewed and adequate. Pain:  Pain Scale 1: Visual (03/08/18 1744)  Pain Intensity 1: 7 (03/08/18 1744)   Managed      Mental Status and Level of Consciousness: Alert and oriented     Pulmonary Status:   O2 Device: Oxygen mask (03/08/18 1716)   Adequate oxygenation and airway patent    Complications related to anesthesia: None    Post-anesthesia assessment completed.  No concerns    Signed By: Justin Pascal MD     March 8, 2018

## 2018-03-08 NOTE — PROGRESS NOTES
ID pt off floor to OR today. Will plan to try to see again tomorrow Friday 3/9. Call at [phone] if question for us prior.

## 2018-03-09 ENCOUNTER — HOSPITAL ENCOUNTER (OUTPATIENT)
Dept: INFUSION THERAPY | Age: 38
End: 2018-03-09
Payer: SELF-PAY

## 2018-03-09 LAB
ANION GAP SERPL CALC-SCNC: 8 MMOL/L (ref 3–18)
BASOPHILS # BLD: 0 K/UL (ref 0–0.06)
BASOPHILS NFR BLD: 0 % (ref 0–2)
BUN SERPL-MCNC: 30 MG/DL (ref 7–18)
BUN/CREAT SERPL: 24 (ref 12–20)
CALCIUM SERPL-MCNC: 8 MG/DL (ref 8.5–10.1)
CHLORIDE SERPL-SCNC: 105 MMOL/L (ref 100–108)
CO2 SERPL-SCNC: 26 MMOL/L (ref 21–32)
CREAT SERPL-MCNC: 1.24 MG/DL (ref 0.6–1.3)
DIFFERENTIAL METHOD BLD: ABNORMAL
EOSINOPHIL # BLD: 0.1 K/UL (ref 0–0.4)
EOSINOPHIL NFR BLD: 1 % (ref 0–5)
ERYTHROCYTE [DISTWIDTH] IN BLOOD BY AUTOMATED COUNT: 14.7 % (ref 11.6–14.5)
GLUCOSE BLD STRIP.AUTO-MCNC: 144 MG/DL (ref 70–110)
GLUCOSE BLD STRIP.AUTO-MCNC: 174 MG/DL (ref 70–110)
GLUCOSE BLD STRIP.AUTO-MCNC: 258 MG/DL (ref 70–110)
GLUCOSE BLD STRIP.AUTO-MCNC: 97 MG/DL (ref 70–110)
GLUCOSE SERPL-MCNC: 170 MG/DL (ref 74–99)
HCT VFR BLD AUTO: 24.6 % (ref 36–48)
HGB BLD-MCNC: 7.8 G/DL (ref 13–16)
LYMPHOCYTES # BLD: 1.1 K/UL (ref 0.9–3.6)
LYMPHOCYTES NFR BLD: 11 % (ref 21–52)
MCH RBC QN AUTO: 27.6 PG (ref 24–34)
MCHC RBC AUTO-ENTMCNC: 31.7 G/DL (ref 31–37)
MCV RBC AUTO: 86.9 FL (ref 74–97)
MONOCYTES # BLD: 0.5 K/UL (ref 0.05–1.2)
MONOCYTES NFR BLD: 5 % (ref 3–10)
NEUTS SEG # BLD: 8.9 K/UL (ref 1.8–8)
NEUTS SEG NFR BLD: 83 % (ref 40–73)
PLATELET # BLD AUTO: 358 K/UL (ref 135–420)
PMV BLD AUTO: 10 FL (ref 9.2–11.8)
POTASSIUM SERPL-SCNC: 4.7 MMOL/L (ref 3.5–5.5)
RBC # BLD AUTO: 2.83 M/UL (ref 4.7–5.5)
SODIUM SERPL-SCNC: 139 MMOL/L (ref 136–145)
WBC # BLD AUTO: 10.7 K/UL (ref 4.6–13.2)

## 2018-03-09 PROCEDURE — 80048 BASIC METABOLIC PNL TOTAL CA: CPT | Performed by: HOSPITALIST

## 2018-03-09 PROCEDURE — 74011636637 HC RX REV CODE- 636/637: Performed by: HOSPITALIST

## 2018-03-09 PROCEDURE — 74011000258 HC RX REV CODE- 258: Performed by: HOSPITALIST

## 2018-03-09 PROCEDURE — 36415 COLL VENOUS BLD VENIPUNCTURE: CPT | Performed by: HOSPITALIST

## 2018-03-09 PROCEDURE — 74011000250 HC RX REV CODE- 250: Performed by: HOSPITALIST

## 2018-03-09 PROCEDURE — 77030020256 HC SOL INJ NACL 0.9%  500ML

## 2018-03-09 PROCEDURE — 74011250636 HC RX REV CODE- 250/636: Performed by: SURGERY

## 2018-03-09 PROCEDURE — 85025 COMPLETE CBC W/AUTO DIFF WBC: CPT | Performed by: HOSPITALIST

## 2018-03-09 PROCEDURE — 65270000029 HC RM PRIVATE

## 2018-03-09 PROCEDURE — 74011250637 HC RX REV CODE- 250/637: Performed by: HOSPITALIST

## 2018-03-09 PROCEDURE — 82962 GLUCOSE BLOOD TEST: CPT

## 2018-03-09 PROCEDURE — 74011250636 HC RX REV CODE- 250/636: Performed by: HOSPITALIST

## 2018-03-09 RX ORDER — HYDROMORPHONE HYDROCHLORIDE 2 MG/ML
1-2 INJECTION, SOLUTION INTRAMUSCULAR; INTRAVENOUS; SUBCUTANEOUS
Status: DISCONTINUED | OUTPATIENT
Start: 2018-03-09 | End: 2018-03-13

## 2018-03-09 RX ORDER — OXYCODONE AND ACETAMINOPHEN 5; 325 MG/1; MG/1
2 TABLET ORAL
Status: DISCONTINUED | OUTPATIENT
Start: 2018-03-09 | End: 2018-03-13

## 2018-03-09 RX ORDER — GABAPENTIN 300 MG/1
300 CAPSULE ORAL 3 TIMES DAILY
Status: DISCONTINUED | OUTPATIENT
Start: 2018-03-09 | End: 2018-03-13 | Stop reason: HOSPADM

## 2018-03-09 RX ADMIN — INSULIN LISPRO 2 UNITS: 100 INJECTION, SOLUTION INTRAVENOUS; SUBCUTANEOUS at 13:43

## 2018-03-09 RX ADMIN — HYDROMORPHONE HYDROCHLORIDE 1 MG: 2 INJECTION INTRAMUSCULAR; INTRAVENOUS; SUBCUTANEOUS at 03:07

## 2018-03-09 RX ADMIN — DAPTOMYCIN 390 MG: 500 INJECTION, POWDER, LYOPHILIZED, FOR SOLUTION INTRAVENOUS at 14:15

## 2018-03-09 RX ADMIN — OXYCODONE HYDROCHLORIDE AND ACETAMINOPHEN 2 TABLET: 5; 325 TABLET ORAL at 22:09

## 2018-03-09 RX ADMIN — DOCUSATE SODIUM 100 MG: 100 CAPSULE, LIQUID FILLED ORAL at 08:58

## 2018-03-09 RX ADMIN — OXYCODONE HYDROCHLORIDE AND ACETAMINOPHEN 2 TABLET: 5; 325 TABLET ORAL at 14:14

## 2018-03-09 RX ADMIN — OXYCODONE HYDROCHLORIDE AND ACETAMINOPHEN 1 TABLET: 5; 325 TABLET ORAL at 01:50

## 2018-03-09 RX ADMIN — HYDROMORPHONE HYDROCHLORIDE 1 MG: 2 INJECTION INTRAMUSCULAR; INTRAVENOUS; SUBCUTANEOUS at 06:18

## 2018-03-09 RX ADMIN — GABAPENTIN 300 MG: 300 CAPSULE ORAL at 16:23

## 2018-03-09 RX ADMIN — HEPARIN SODIUM 5000 UNITS: 5000 INJECTION, SOLUTION INTRAVENOUS; SUBCUTANEOUS at 13:44

## 2018-03-09 RX ADMIN — DOCUSATE SODIUM 100 MG: 100 CAPSULE, LIQUID FILLED ORAL at 18:48

## 2018-03-09 RX ADMIN — WATER 2 G: 1 INJECTION INTRAMUSCULAR; INTRAVENOUS; SUBCUTANEOUS at 13:46

## 2018-03-09 RX ADMIN — OXYCODONE HYDROCHLORIDE AND ACETAMINOPHEN 2 TABLET: 5; 325 TABLET ORAL at 08:57

## 2018-03-09 RX ADMIN — HYDROMORPHONE HYDROCHLORIDE 1 MG: 2 INJECTION INTRAMUSCULAR; INTRAVENOUS; SUBCUTANEOUS at 23:39

## 2018-03-09 RX ADMIN — GABAPENTIN 300 MG: 300 CAPSULE ORAL at 21:12

## 2018-03-09 RX ADMIN — HEPARIN SODIUM 5000 UNITS: 5000 INJECTION, SOLUTION INTRAVENOUS; SUBCUTANEOUS at 21:12

## 2018-03-09 RX ADMIN — HYDROMORPHONE HYDROCHLORIDE 2 MG: 2 INJECTION INTRAMUSCULAR; INTRAVENOUS; SUBCUTANEOUS at 10:53

## 2018-03-09 RX ADMIN — HYDROMORPHONE HYDROCHLORIDE 2 MG: 2 INJECTION INTRAMUSCULAR; INTRAVENOUS; SUBCUTANEOUS at 16:35

## 2018-03-09 RX ADMIN — INSULIN LISPRO 6 UNITS: 100 INJECTION, SOLUTION INTRAVENOUS; SUBCUTANEOUS at 18:47

## 2018-03-09 RX ADMIN — HEPARIN SODIUM 5000 UNITS: 5000 INJECTION, SOLUTION INTRAVENOUS; SUBCUTANEOUS at 04:34

## 2018-03-09 RX ADMIN — GABAPENTIN 300 MG: 300 CAPSULE ORAL at 08:58

## 2018-03-09 NOTE — PROGRESS NOTES
ARU/IPR REFERRAL CONTACT NOTE  [address] [place] [address] for Physical Rehabilitation    RE: Matilde Gutierrez    Referral received to review this patient's case for admission to [address] [place] [address] for Physical Rehabilitation. Current status reviewed.  Pt s/p right bka due to osteomyelitis. PT ordered- patient refused treatment x2,  No OT order. Will need two therapies (PT and OT eval/treat) for consideration for IPR.  Will continue to follow. Thank you for this referral.  Should you have any questions please do not hesitate to call.      Sincerely,  [facility]  [phone]

## 2018-03-09 NOTE — PROGRESS NOTES
Vascular Surgery Progress Note    Admit Date: 3/6/2018  POD 1 Day Post-Op    Procedure/Surgery:  Procedure(s):  right below knee amputation      Subjective:     C/o pain, R BKA. , no acute events. Objective:     Visit Vitals    /67 (BP 1 Location: Left arm, BP Patient Position: Lying left side)    Pulse 70    Temp 98.7 °F (37.1 °C)    Resp 18    Ht 5' 2\" (1.575 m)    Wt 65.3 kg (144 lb)    SpO2 97%    BMI 26.34 kg/m2       Temp (24hrs), Av °F (36.7 °C), Min:97.3 °F (36.3 °C), Max:98.7 °F (37.1 °C)      Physical Exam:  GEN: A&Ox3, NAD  HEENT:PERRL EOMI, non icteric, moist membranes. NECK: no JVD,supple  LUNG: clear b/l and unlabored breathing  HEART: regular   ABD: soft, NT,  EXT: R BKA dressing / ace wrap in place, elevated on 2 pillows. PULSES: palpable L pedal pulses. Kim Giovani NEURO: no focal lateralizing deficits noted. Motor 5/5.      Labs:   Recent Results (from the past 24 hour(s))   GLUCOSE, POC    Collection Time: 18 11:30 AM   Result Value Ref Range    Glucose (POC) 91 70 - 110 mg/dL   GLUCOSE, POC    Collection Time: 18  2:48 PM   Result Value Ref Range    Glucose (POC) 80 70 - 110 mg/dL   TYPE & SCREEN    Collection Time: 18  2:49 PM   Result Value Ref Range    Crossmatch Expiration 2018     ABO/Rh(D) B POSITIVE     Antibody screen NEG    GLUCOSE, POC    Collection Time: 18  5:31 PM   Result Value Ref Range    Glucose (POC) 111 (H) 70 - 110 mg/dL   GLUCOSE, POC    Collection Time: 18 10:26 PM   Result Value Ref Range    Glucose (POC) 170 (H) 70 - 110 mg/dL   CBC WITH AUTOMATED DIFF    Collection Time: 18  4:16 AM   Result Value Ref Range    WBC 10.7 4.6 - 13.2 K/uL    RBC 2.83 (L) 4.70 - 5.50 M/uL    HGB 7.8 (L) 13.0 - 16.0 g/dL    HCT 24.6 (L) 36.0 - 48.0 %    MCV 86.9 74.0 - 97.0 FL    MCH 27.6 24.0 - 34.0 PG    MCHC 31.7 31.0 - 37.0 g/dL    RDW 14.7 (H) 11.6 - 14.5 %    PLATELET 735 963 - 169 K/uL    MPV 10.0 9.2 - 11.8 FL    NEUTROPHILS 83 (H) 40 - 73 %    LYMPHOCYTES 11 (L) 21 - 52 %    MONOCYTES 5 3 - 10 %    EOSINOPHILS 1 0 - 5 %    BASOPHILS 0 0 - 2 %    ABS. NEUTROPHILS 8.9 (H) 1.8 - 8.0 K/UL    ABS. LYMPHOCYTES 1.1 0.9 - 3.6 K/UL    ABS. MONOCYTES 0.5 0.05 - 1.2 K/UL    ABS. EOSINOPHILS 0.1 0.0 - 0.4 K/UL    ABS. BASOPHILS 0.0 0.0 - 0.06 K/UL    DF AUTOMATED     METABOLIC PANEL, BASIC    Collection Time: 03/09/18  4:16 AM   Result Value Ref Range    Sodium 139 136 - 145 mmol/L    Potassium 4.7 3.5 - 5.5 mmol/L    Chloride 105 100 - 108 mmol/L    CO2 26 21 - 32 mmol/L    Anion gap 8 3.0 - 18 mmol/L    Glucose 170 (H) 74 - 99 mg/dL    BUN 30 (H) 7.0 - 18 MG/DL    Creatinine 1.24 0.6 - 1.3 MG/DL    BUN/Creatinine ratio 24 (H) 12 - 20      GFR est AA >60 >60 ml/min/1.73m2    GFR est non-AA >60 >60 ml/min/1.73m2    Calcium 8.0 (L) 8.5 - 10.1 MG/DL   GLUCOSE, POC    Collection Time: 03/09/18  7:34 AM   Result Value Ref Range    Glucose (POC) 144 (H) 70 - 110 mg/dL         Assessment:     Principal Problem:    [facility] (3/6/2018)    Active Problems:    DM (diabetes mellitus), secondary uncontrolled ([facility] (6/15/2013)      Hypertension (5/19/2015)      Iron deficiency anemia (5/21/2015)      SAVANNA (acute kidney injury) ([facility] (9/12/2017)      Cellulitis of foot, right (2/23/2018)    POD#1 R BKA for foot OM    Plan/Recommendations/Medical Decision Making:   Continue elevation in bed,  prosthetics for stump protector, PT OOB walker, case management for placement. Increase pain meds. Anthony Zuniga.  Korin Negron, [address] [facility]  [facility] - [phone]  March 9, 2018  9:43 AM

## 2018-03-09 NOTE — PROGRESS NOTES
Infectious Disease Follow-up     Admit Date: 3/6/2018    N.B. We will plan to be available over weekend and to f/u Monday, 3/12    ->please call Dr. Dano Guevara (on-call) at [phone] if any problem or question for ID prior. Current abx Prior abx   Daptomycin/ceftriaxone 2/26-11 first neg 2/26-11 Cefepime 2/23 - 6 Vancomycin 2/23 - 6     Assessment ->Rec:     Osteomyelitis, R calcaneus/cuboid   -recent [facility] adm with MRSA BSI with draining sinus tract, h/o MRSA,2/24 MRI c/w sinus tract to calcaneocuboid w/OM, refused BKA discharged with picc on daily dapto/ceftriaxone x 8 weeks   -referred to ER 3/6 from infusion center with sub f/c increased R foot pain 10/10  -CRP 29.3, ESR >140  -NEW SP R BKA 3/8 ->post-op mgmt per vascular- anticipate surgical cure of OM here   ->weekly CK on dapto last 35 on 3/5- will order for 3/12 with other routine labs    BSI MRSA- 1 of 2 blcx + 2/22, repeat 2/26 NG  - from OM  - TTE 2/27: no vegetations  -picc out 3/8 ->in light of above, should be able to significantly decrease planned duration of therapy   ->if no further bctx's and does well post-op, likely stop abx 14-28 days after 2/26 negative bctx's   ->monitor bctx's IP    Leukocytosis - higher than at discharge 3/1- 15k 3/7  -? From foot ->monitor cultures and trend on current abx - normal today    Recurrent foot infections  - s/p R 1st toe amp 2012, rx 4th/5th MT OM 2015  - s/p 11/1/2017 R foot excision of bone cortex, 5th MT w/I+D R foot to the bone.  Pathology: acute osteomyelitis.  Cultures grew MRSA and Streptococcus mitis/oralis    DM A1C 7.5 ->BS control per IM    H/o noncompliance      MICROBIOLOGY:   2/22 bctx x 2 MRSA vanco ROGELIO <=0.5  2/26 bctx x 2 NG      3/7 bctx ngtd  3/8 bctx ngtd                    LINES AND CATHETERS:       Active Hospital Problems    Diagnosis Date Noted    [facility] 03/06/2018    Cellulitis of foot, right 02/23/2018    SAVANNA (acute kidney injury) ([facility] 09/12/2017    Iron deficiency anemia 2015    Hypertension 2015    DM (diabetes mellitus), secondary uncontrolled ([facility] 06/15/2013         Subjective: Interval notes reviewed. Pt off floor for bka when tried to see yesterday. Anticipate surgical cure of OM so abx stop date likely to depend on MRSA BSI requirements if does well post op, picc dysfunctional and removed, pt subdued no adr no fevers post-op pain being addressed, d/w him monitoring new bctx's abx stop date tbd but anticipate shorter than planned at time of last discharge. Dr. Tyrone Zurita knows him is on call this weekend and will follow him up Monday.      Current Facility-Administered Medications   Medication Dose Route Frequency    oxyCODONE-acetaminophen (PERCOCET) 5-325 mg per tablet 2 Tab  2 Tab Oral Q4H PRN    gabapentin (NEURONTIN) capsule 300 mg  300 mg Oral TID    HYDROmorphone (DILAUDID) injection 1-2 mg  1-2 mg IntraVENous Q3H PRN    docusate sodium (COLACE) capsule 100 mg  100 mg Oral BID    heparin (porcine) injection 5,000 Units  5,000 Units SubCUTAneous Q8H    insulin lispro (HUMALOG) injection   SubCUTAneous TIDAC    glucose chewable tablet 16 g  4 Tab Oral PRN    glucagon (GLUCAGEN) injection 1 mg  1 mg IntraMUSCular PRN    dextrose (D50W) injection syrg 12.5-25 g  25-50 mL IntraVENous PRN    DAPTOmycin (CUBICIN) 390 mg in 0.9% sodium chloride 50 mL IVPB RF formulation  390 mg IntraVENous Q24H    cefTRIAXone (ROCEPHIN) 2 g in sterile water (preservative free) 20 mL IV syringe  2 g IntraVENous Q24H     Facility-Administered Medications Ordered in Other Encounters   Medication Dose Route Frequency    sodium chloride (NS) flush 10-40 mL  10-40 mL IntraVENous PRN         Objective:     Visit Vitals    /66 (BP 1 Location: Left arm, BP Patient Position: At rest)    Pulse 73    Temp 98.3 °F (36.8 °C)    Resp 15    Ht 5' 2\" (1.575 m)    Wt 65.3 kg (144 lb)    SpO2 98%    BMI 26.34 kg/m2       Temp (24hrs), Av °F (36.7 °C), Min:97.3 °F (36.3 °C), Max:98.7 °F (37.1 °C)    GEN: pleasant young  male lying in bed NAD  HEENT: anicteric  CHEST: no rales rhonchi or wheeze  CVS:S1'S2' no murmurs   ABD: soft non-tender (+) BS non-distended  EXT: R BKA stump in shell not disturbed, RUE picc out no edema PIV in place  SKIN: tattoos no rash   NEURO awake alert appropriate     Labs: Results:   Chemistry Recent Labs      03/09/18   0416  03/08/18   0516   GLU  170*  93   NA  139  138   K  4.7  4.6   CL  105  106   CO2  26  24   BUN  30*  29*   CREA  1.24  1.08   CA  8.0*  8.3*   AGAP  8  8   BUCR  24*  27*   AP   --   140*   TP   --   7.4   ALB   --   1.4*   GLOB   --   6.0*   AGRAT   --   0.2*      CBC w/Diff Recent Labs      03/09/18   0416  03/08/18   0516   WBC  10.7  7.5   RBC  2.83*  2.98*   HGB  7.8*  8.1*   HCT  24.6*  25.7*   PLT  358  428*   GRANS  83*  64   LYMPH  11*  25   EOS  1  4            Microbiology Results  Recent Labs      03/08/18   0000  03/07/18   1735   CULT  NO GROWTH 1 DAY  NO GROWTH 2 DAYS       SAMANTHA Levine MD  March 9, 2018  [address] [facility]  [phone]

## 2018-03-09 NOTE — PROGRESS NOTES
Internal Medicine Progress Note    Patient's Name: Louis Armstrong  Admit Date: 3/6/2018  Length of Stay: 3      Assessment/Plan     Active Hospital Problems    Diagnosis Date Noted    Osteomyelitis ([facility] 03/06/2018    Cellulitis of foot, right 02/23/2018    SAVANNA (acute kidney injury) ([facility] 09/12/2017    Iron deficiency anemia 05/21/2015    Hypertension 05/19/2015    DM (diabetes mellitus), secondary uncontrolled ([facility] 06/15/2013     - Cont IVAB w/ dapto/rocephin  - Appreciate ID  - Isolation due to hx of MRSA  - Increase pain meds, add gabapentin  - Bedrest  - CBC stable  - PT/OT  - SSI and accuchecks  - Cont acceptable home medications for chronic conditions   - DVT protocol    I have personally reviewed all pertinent labs and films that have officially resulted over the last 24 hours. I have personally checked for all pending labs that are awaiting final results.     Subjective     Pt s/e @ bedside  No major events overnight  S/p amputation  C/o uncontrolled pain  Denies CP or SOB    Objective     Visit Vitals    /67 (BP 1 Location: Left arm, BP Patient Position: Lying left side)    Pulse 70    Temp 98.7 °F (37.1 °C)    Resp 18    Ht 5' 2\" (1.575 m)    Wt 65.3 kg (144 lb)    SpO2 97%    BMI 26.34 kg/m2       Physical Exam:  General Appearance: NAD, conversant  Lungs: CTA with normal respiratory effort  CV: RRR, no m/r/g  Abdomen: soft, non-tender, normal bowel sounds  Extremities: no cyanosis, RLE BKA in dressing C/D/I  Neuro: No focal deficits, motor/sensory intact    Lab/Data Reviewed:  BMP:   Lab Results   Component Value Date/Time     03/09/2018 04:16 AM    K 4.7 03/09/2018 04:16 AM     03/09/2018 04:16 AM    CO2 26 03/09/2018 04:16 AM    AGAP 8 03/09/2018 04:16 AM     (H) 03/09/2018 04:16 AM    BUN 30 (H) 03/09/2018 04:16 AM    CREA 1.24 03/09/2018 04:16 AM    GFRAA >60 03/09/2018 04:16 AM    GFRNA >60 03/09/2018 04:16 AM     CBC:   Lab Results   Component Value Date/Time    WBC 10.7 03/09/2018 04:16 AM    HGB 7.8 (L) 03/09/2018 04:16 AM    HCT 24.6 (L) 03/09/2018 04:16 AM     03/09/2018 04:16 AM       Imaging Reviewed:  No results found.     Medications Reviewed:  Current Facility-Administered Medications   Medication Dose Route Frequency    oxyCODONE-acetaminophen (PERCOCET) 5-325 mg per tablet 2 Tab  2 Tab Oral Q4H PRN    gabapentin (NEURONTIN) capsule 300 mg  300 mg Oral TID    HYDROmorphone (DILAUDID) injection 1-2 mg  1-2 mg IntraVENous Q3H PRN    docusate sodium (COLACE) capsule 100 mg  100 mg Oral BID    heparin (porcine) injection 5,000 Units  5,000 Units SubCUTAneous Q8H    insulin lispro (HUMALOG) injection   SubCUTAneous TIDAC    glucose chewable tablet 16 g  4 Tab Oral PRN    glucagon (GLUCAGEN) injection 1 mg  1 mg IntraMUSCular PRN    dextrose (D50W) injection syrg 12.5-25 g  25-50 mL IntraVENous PRN    DAPTOmycin (CUBICIN) 390 mg in 0.9% sodium chloride 50 mL IVPB RF formulation  390 mg IntraVENous Q24H    cefTRIAXone (ROCEPHIN) 2 g in sterile water (preservative free) 20 mL IV syringe  2 g IntraVENous Q24H     Facility-Administered Medications Ordered in Other Encounters   Medication Dose Route Frequency    sodium chloride (NS) flush 10-40 mL  10-40 mL IntraVENous PRN           Erick Hoffmann DO  Internal Medicine, Hospitalist  Pager: [phone] [facility]

## 2018-03-09 NOTE — PROGRESS NOTES
1928: Assumed patient care. Received report from REBECA Crump (offgoing nurse). Report included SBAR, Kardex, and MAR. Patient resting in bed, in no signs of distress. Stating he has pain despite pain medication. Call light and possessions within reach, bed in lowest setting    2003: Paged Dr. Verónica Clarke regarding patient's complaint of pain despite pain medication    2006: Spoke with Dr. Verónica Clarke, doctor stated that the patient's latest dose of medication has been administered too early to qualify for a safe increase in pain medication. Will continue to monitor patient's pain. 2040: Spoke with patient and relayed information relayed by Dr. Verónica Clarke, patient got very upset and stated the pain is a 10/10 \"excructiating, it's my leg that you cut off, you treat me like an animal. This pain is excruciating, you just cut my leg off\". Asked if Dilaudid or percocet provided any relief, patient stated \"it provided a little relief for a very little time, it's not reasonable for you to expect the pain level to be the same after a surgery\". Paged Dr. Verónica Clarke. 2050: Spoke with Dr. Verónica Clarke, doctor ordered a one time dose of 1 mg dilaudid now. Repeat back provided    0130: Patient requested pain medication, patient is not able to receive dilaudid at this time as it is not due time. Brought percocet as alternative and explained I am not able to give dilaudid yet but percocet works in a similar manner and should help with the pain until his next dose of dilaudid is available. Patient got highly agitated stating \"this isn't going to work, pills don't work, if I had known this was going to be like this, I would not have gotten the operation\" Explained to patient I can only give what's due. Patient agreed to take pain medication at 0150. Stated I will monitor pain, if the pain is not relieved I will deliver Dilaudid when I am able to. Patient verbalized understanding    26:  Bedside and Verbal shift change report given to Ally Linn (oncoming nurse) by Johana Mancini RN (offgoing nurse). Report included the following information SBAR, Kardex and MAR.

## 2018-03-09 NOTE — PROGRESS NOTES
Attempted to see for evaluation patient reported just getting stump protector on and pain is 8/10 nursing present to give medication for pain . Patient reported he could not do therapy today but tomorrow. Will check back later today. 1607 attempted second time patient refused.   Will see tomorrow

## 2018-03-09 NOTE — PROGRESS NOTES
Problem: Falls - Risk of  Goal: *Absence of Falls  Document Kurtis Fall Risk and appropriate interventions in the flowsheet.    Outcome: Progressing Towards Goal  Fall Risk Interventions:  Mobility Interventions: Communicate number of staff needed for ambulation/transfer         Medication Interventions: Evaluate medications/consider consulting pharmacy    Elimination Interventions: Call light in reach, Patient to call for help with toileting needs, Toileting schedule/hourly rounds, Urinal in reach

## 2018-03-09 NOTE — PROGRESS NOTES
Assumed patient care. Received patient awake, alert, oriented X4. Patient denies any pain at this time. Dressing on Right BKA dry, intact, kept elevated on a pillow. Bed is locked and in lowest position and call bell is within reach. Not in acute distress.

## 2018-03-09 NOTE — DISCHARGE SUMMARY
[id number]  MR#: [id number]  : 1980  ACCOUNT #: [de-identified]   ADMIT DATE: 2018  DISCHARGE DATE:     PREOPERATIVE DIAGNOSIS:  Right lower extremity osteomyelitis. POSTOPERATIVE DIAGNOSIS:  Right lower extremity osteomyelitis. PROCEDURE:  Right below knee amputation. SURGEON:  Lorin Schaefer MD    ASSISTANT:  none    ANESTHESIA:  General.    INDICATIONS:  This is a 69-year-old gentleman with nonreconstructable right foot osteomyelitis. The patient is scheduled for below-knee amputation. FINDINGS:  The patient underwent a right below knee amputation. COMPLICATIONS:  None. ESTIMATED BLOOD LOSS:  100 mL. SPECIMENS:  Right lower leg. IMPLANTS:  none    PROCEDURE:  After informed consent was obtained, the patient brought to the operating room and placed in the supine position where general anesthetic was administered. Right lower extremity was prepped and draped in the usual sterile fashion. Timeout was performed. A standard posterior flap incision was made below the knee, 14 cm distal to the tibial tuberosity. The incision carried through the skin and subcutaneous tissue. The tibia was divided. Pneumatic saw. The anterior edge was angled and softened with the rasp. The fibula was divided 1 cm proximal to this. Hemostasis was accomplished with multiple stick ties. The wound was then thoroughly irrigated. The lower leg specimen was sent off for histological identification. The fascia was reapproximated with 2-0 Vicryls in an interrupted fashion. The skin was staple closed. Plain dressings were applied. The patient tolerated the procedure well with no complications.       MD NICKI Stubbs/MURALI  D: 2018 17:10     T: 2018 05:26  JOB #: [id number]

## 2018-03-10 ENCOUNTER — HOSPITAL ENCOUNTER (OUTPATIENT)
Dept: INFUSION THERAPY | Age: 38
End: 2018-03-10
Payer: SELF-PAY

## 2018-03-10 LAB
ANION GAP SERPL CALC-SCNC: 7 MMOL/L (ref 3–18)
BASOPHILS # BLD: 0 K/UL (ref 0–0.06)
BASOPHILS NFR BLD: 0 % (ref 0–2)
BUN SERPL-MCNC: 51 MG/DL (ref 7–18)
BUN/CREAT SERPL: 32 (ref 12–20)
CALCIUM SERPL-MCNC: 8.1 MG/DL (ref 8.5–10.1)
CHLORIDE SERPL-SCNC: 103 MMOL/L (ref 100–108)
CO2 SERPL-SCNC: 27 MMOL/L (ref 21–32)
CREAT SERPL-MCNC: 1.61 MG/DL (ref 0.6–1.3)
DIFFERENTIAL METHOD BLD: ABNORMAL
EOSINOPHIL # BLD: 0.1 K/UL (ref 0–0.4)
EOSINOPHIL NFR BLD: 1 % (ref 0–5)
ERYTHROCYTE [DISTWIDTH] IN BLOOD BY AUTOMATED COUNT: 14.6 % (ref 11.6–14.5)
GLUCOSE BLD STRIP.AUTO-MCNC: 136 MG/DL (ref 70–110)
GLUCOSE BLD STRIP.AUTO-MCNC: 182 MG/DL (ref 70–110)
GLUCOSE BLD STRIP.AUTO-MCNC: 195 MG/DL (ref 70–110)
GLUCOSE BLD STRIP.AUTO-MCNC: 259 MG/DL (ref 70–110)
GLUCOSE SERPL-MCNC: 206 MG/DL (ref 74–99)
HCT VFR BLD AUTO: 24 % (ref 36–48)
HGB BLD-MCNC: 7.6 G/DL (ref 13–16)
LYMPHOCYTES # BLD: 2.1 K/UL (ref 0.9–3.6)
LYMPHOCYTES NFR BLD: 20 % (ref 21–52)
MCH RBC QN AUTO: 27.4 PG (ref 24–34)
MCHC RBC AUTO-ENTMCNC: 31.7 G/DL (ref 31–37)
MCV RBC AUTO: 86.6 FL (ref 74–97)
MONOCYTES # BLD: 0.6 K/UL (ref 0.05–1.2)
MONOCYTES NFR BLD: 6 % (ref 3–10)
NEUTS SEG # BLD: 7.4 K/UL (ref 1.8–8)
NEUTS SEG NFR BLD: 73 % (ref 40–73)
PLATELET # BLD AUTO: 367 K/UL (ref 135–420)
PMV BLD AUTO: 10.6 FL (ref 9.2–11.8)
POTASSIUM SERPL-SCNC: 4.8 MMOL/L (ref 3.5–5.5)
RBC # BLD AUTO: 2.77 M/UL (ref 4.7–5.5)
SODIUM SERPL-SCNC: 137 MMOL/L (ref 136–145)
WBC # BLD AUTO: 10.2 K/UL (ref 4.6–13.2)

## 2018-03-10 PROCEDURE — 74011250637 HC RX REV CODE- 250/637: Performed by: HOSPITALIST

## 2018-03-10 PROCEDURE — 74011000258 HC RX REV CODE- 258: Performed by: HOSPITALIST

## 2018-03-10 PROCEDURE — 80048 BASIC METABOLIC PNL TOTAL CA: CPT | Performed by: HOSPITALIST

## 2018-03-10 PROCEDURE — 74011250636 HC RX REV CODE- 250/636: Performed by: HOSPITALIST

## 2018-03-10 PROCEDURE — 74011000250 HC RX REV CODE- 250: Performed by: HOSPITALIST

## 2018-03-10 PROCEDURE — 36415 COLL VENOUS BLD VENIPUNCTURE: CPT | Performed by: HOSPITALIST

## 2018-03-10 PROCEDURE — 85025 COMPLETE CBC W/AUTO DIFF WBC: CPT | Performed by: HOSPITALIST

## 2018-03-10 PROCEDURE — 65270000029 HC RM PRIVATE

## 2018-03-10 PROCEDURE — 74011250636 HC RX REV CODE- 250/636: Performed by: SURGERY

## 2018-03-10 PROCEDURE — 82962 GLUCOSE BLOOD TEST: CPT

## 2018-03-10 PROCEDURE — 74011636637 HC RX REV CODE- 636/637: Performed by: HOSPITALIST

## 2018-03-10 RX ORDER — INSULIN GLARGINE 100 [IU]/ML
5 INJECTION, SOLUTION SUBCUTANEOUS DAILY
Status: DISCONTINUED | OUTPATIENT
Start: 2018-03-10 | End: 2018-03-10

## 2018-03-10 RX ORDER — INSULIN GLARGINE 100 [IU]/ML
8 INJECTION, SOLUTION SUBCUTANEOUS DAILY
Status: DISCONTINUED | OUTPATIENT
Start: 2018-03-11 | End: 2018-03-13

## 2018-03-10 RX ADMIN — HEPARIN SODIUM 5000 UNITS: 5000 INJECTION, SOLUTION INTRAVENOUS; SUBCUTANEOUS at 06:00

## 2018-03-10 RX ADMIN — HYDROMORPHONE HYDROCHLORIDE 2 MG: 2 INJECTION INTRAMUSCULAR; INTRAVENOUS; SUBCUTANEOUS at 14:00

## 2018-03-10 RX ADMIN — INSULIN LISPRO 6 UNITS: 100 INJECTION, SOLUTION INTRAVENOUS; SUBCUTANEOUS at 09:41

## 2018-03-10 RX ADMIN — DAPTOMYCIN 390 MG: 500 INJECTION, POWDER, LYOPHILIZED, FOR SOLUTION INTRAVENOUS at 15:57

## 2018-03-10 RX ADMIN — HYDROMORPHONE HYDROCHLORIDE 2 MG: 2 INJECTION INTRAMUSCULAR; INTRAVENOUS; SUBCUTANEOUS at 06:22

## 2018-03-10 RX ADMIN — HYDROMORPHONE HYDROCHLORIDE 2 MG: 2 INJECTION INTRAMUSCULAR; INTRAVENOUS; SUBCUTANEOUS at 18:10

## 2018-03-10 RX ADMIN — GABAPENTIN 300 MG: 300 CAPSULE ORAL at 15:57

## 2018-03-10 RX ADMIN — WATER 2 G: 1 INJECTION INTRAMUSCULAR; INTRAVENOUS; SUBCUTANEOUS at 14:02

## 2018-03-10 RX ADMIN — GABAPENTIN 300 MG: 300 CAPSULE ORAL at 23:50

## 2018-03-10 RX ADMIN — DOCUSATE SODIUM 100 MG: 100 CAPSULE, LIQUID FILLED ORAL at 17:47

## 2018-03-10 RX ADMIN — HEPARIN SODIUM 5000 UNITS: 5000 INJECTION, SOLUTION INTRAVENOUS; SUBCUTANEOUS at 20:08

## 2018-03-10 RX ADMIN — HEPARIN SODIUM 5000 UNITS: 5000 INJECTION, SOLUTION INTRAVENOUS; SUBCUTANEOUS at 12:54

## 2018-03-10 RX ADMIN — HYDROMORPHONE HYDROCHLORIDE 2 MG: 2 INJECTION INTRAMUSCULAR; INTRAVENOUS; SUBCUTANEOUS at 23:50

## 2018-03-10 RX ADMIN — HYDROMORPHONE HYDROCHLORIDE 2 MG: 2 INJECTION INTRAMUSCULAR; INTRAVENOUS; SUBCUTANEOUS at 10:08

## 2018-03-10 RX ADMIN — INSULIN GLARGINE 5 UNITS: 100 INJECTION, SOLUTION SUBCUTANEOUS at 12:53

## 2018-03-10 RX ADMIN — INSULIN LISPRO 2 UNITS: 100 INJECTION, SOLUTION INTRAVENOUS; SUBCUTANEOUS at 17:48

## 2018-03-10 RX ADMIN — DOCUSATE SODIUM 100 MG: 100 CAPSULE, LIQUID FILLED ORAL at 09:41

## 2018-03-10 RX ADMIN — GABAPENTIN 300 MG: 300 CAPSULE ORAL at 09:41

## 2018-03-10 NOTE — PROGRESS NOTES
Assumed care of patient. Patient is AOx4, resting in bed,in stable condition. Patient denies having any pain or discomfort.

## 2018-03-10 NOTE — PROGRESS NOTES
Internal Medicine Progress Note    Patient's Name: Jolie Perez  Admit Date: 3/6/2018  Length of Stay: 4      Assessment/Plan     Active Hospital Problems    Diagnosis Date Noted    Osteomyelitis ([facility] 03/06/2018    Cellulitis of foot, right 02/23/2018    SAVANNA (acute kidney injury) ([facility] 09/12/2017    Iron deficiency anemia 05/21/2015    Hypertension 05/19/2015    DM (diabetes mellitus), secondary uncontrolled ([facility] 06/15/2013     - Cont IVAB w/ dapto/rocephin  - Recommend 14-28days from 02/26 negative bctx due to MRSA bacteremia  - Appreciate ID  - Isolation due to hx of MRSA  - Cont current pain regimen  - Hgb stable  - PT/OT  - SSI and accuchecks, lantus added  - Cont acceptable home medications for chronic conditions   - DVT protocol    I have personally reviewed all pertinent labs and films that have officially resulted over the last 24 hours. I have personally checked for all pending labs that are awaiting final results. Subjective     Pt s/e @ bedside  No major events overnight  Pain much better controlled  Denies CP or SOB    Objective     Visit Vitals    /57 (BP 1 Location: Left arm, BP Patient Position: Supine; At rest)    Pulse 78    Temp 98.1 °F (36.7 °C)    Resp 20    Ht 5' 2\" (1.575 m)    Wt 65.3 kg (144 lb)    SpO2 98%    BMI 26.34 kg/m2       Physical Exam:  General Appearance: NAD, conversant  Lungs: CTA with normal respiratory effort  CV: RRR, no m/r/g  Abdomen: soft, non-tender, normal bowel sounds  Extremities: no cyanosis, RLE BKA  Neuro: No focal deficits, motor/sensory intact    Lab/Data Reviewed:  BMP:   Lab Results   Component Value Date/Time     03/10/2018 04:35 AM    K 4.8 03/10/2018 04:35 AM     03/10/2018 04:35 AM    CO2 27 03/10/2018 04:35 AM    AGAP 7 03/10/2018 04:35 AM     (H) 03/10/2018 04:35 AM    BUN 51 (H) 03/10/2018 04:35 AM    CREA 1.61 (H) 03/10/2018 04:35 AM    GFRAA 59 (L) 03/10/2018 04:35 AM    GFRNA 48 (L) 03/10/2018 04:35 AM CBC:   Lab Results   Component Value Date/Time    WBC 10.2 03/10/2018 04:35 AM    HGB 7.6 (L) 03/10/2018 04:35 AM    HCT 24.0 (L) 03/10/2018 04:35 AM     03/10/2018 04:35 AM       Imaging Reviewed:  No results found.     Medications Reviewed:  Current Facility-Administered Medications   Medication Dose Route Frequency    insulin glargine (LANTUS) injection 5 Units  5 Units SubCUTAneous DAILY    oxyCODONE-acetaminophen (PERCOCET) 5-325 mg per tablet 2 Tab  2 Tab Oral Q4H PRN    gabapentin (NEURONTIN) capsule 300 mg  300 mg Oral TID    HYDROmorphone (DILAUDID) injection 1-2 mg  1-2 mg IntraVENous Q3H PRN    docusate sodium (COLACE) capsule 100 mg  100 mg Oral BID    heparin (porcine) injection 5,000 Units  5,000 Units SubCUTAneous Q8H    insulin lispro (HUMALOG) injection   SubCUTAneous TIDAC    glucose chewable tablet 16 g  4 Tab Oral PRN    glucagon (GLUCAGEN) injection 1 mg  1 mg IntraMUSCular PRN    dextrose (D50W) injection syrg 12.5-25 g  25-50 mL IntraVENous PRN    DAPTOmycin (CUBICIN) 390 mg in 0.9% sodium chloride 50 mL IVPB RF formulation  390 mg IntraVENous Q24H    cefTRIAXone (ROCEPHIN) 2 g in sterile water (preservative free) 20 mL IV syringe  2 g IntraVENous Q24H     Facility-Administered Medications Ordered in Other Encounters   Medication Dose Route Frequency    sodium chloride (NS) flush 10-40 mL  10-40 mL IntraVENous PRN           Dalia Earl DO  Internal Medicine, Hospitalist  Pager: [phone]

## 2018-03-10 NOTE — PROGRESS NOTES
2040 - [facility] Pt in bed, alert and oriented x 4. Not in any form of distress. Denies pain. Frequent use items and call bell within reach. Verbalized understanding to call for assistance. Bed locked in lowest position. Family at bedside. 0161 - Bedside shift change report given to Billy Lofton RN (oncoming nurse) by Angel Metzger RN (offgoing nurse). Report included the following information SBAR, Kardex and MAR.

## 2018-03-10 NOTE — PROGRESS NOTES
Vascular Surgery Progress Note    Admit Date: 3/6/2018  POD 2 Days Post-Op    Procedure/Surgery:  Procedure(s):  right below knee amputation      Subjective:   Improved pain R BKA, no acute events. Objective:     Visit Vitals    /57 (BP 1 Location: Left arm, BP Patient Position: Supine; At rest)    Pulse 78    Temp 98.1 °F (36.7 °C)    Resp 20    Ht 5' 2\" (1.575 m)    Wt 65.3 kg (144 lb)    SpO2 98%    BMI 26.34 kg/m2       Temp (24hrs), Av.2 °F (36.8 °C), Min:98.1 °F (36.7 °C), Max:98.3 °F (36.8 °C)        Physical Exam:  GEN: A&Ox3, NAD, comfortable. R BKA dressing / Ace wrap intact w/o drainage. Has ampu-shield device in place. Labs:   Recent Results (from the past 24 hour(s))   GLUCOSE, POC    Collection Time: 18  4:47 PM   Result Value Ref Range    Glucose (POC) 258 (H) 70 - 110 mg/dL   GLUCOSE, POC    Collection Time: 18  9:59 PM   Result Value Ref Range    Glucose (POC) 97 70 - 110 mg/dL   CBC WITH AUTOMATED DIFF    Collection Time: 03/10/18  4:35 AM   Result Value Ref Range    WBC 10.2 4.6 - 13.2 K/uL    RBC 2.77 (L) 4.70 - 5.50 M/uL    HGB 7.6 (L) 13.0 - 16.0 g/dL    HCT 24.0 (L) 36.0 - 48.0 %    MCV 86.6 74.0 - 97.0 FL    MCH 27.4 24.0 - 34.0 PG    MCHC 31.7 31.0 - 37.0 g/dL    RDW 14.6 (H) 11.6 - 14.5 %    PLATELET 001 481 - 618 K/uL    MPV 10.6 9.2 - 11.8 FL    NEUTROPHILS 73 40 - 73 %    LYMPHOCYTES 20 (L) 21 - 52 %    MONOCYTES 6 3 - 10 %    EOSINOPHILS 1 0 - 5 %    BASOPHILS 0 0 - 2 %    ABS. NEUTROPHILS 7.4 1.8 - 8.0 K/UL    ABS. LYMPHOCYTES 2.1 0.9 - 3.6 K/UL    ABS. MONOCYTES 0.6 0.05 - 1.2 K/UL    ABS. EOSINOPHILS 0.1 0.0 - 0.4 K/UL    ABS.  BASOPHILS 0.0 0.0 - 0.06 K/UL    DF AUTOMATED     METABOLIC PANEL, BASIC    Collection Time: 03/10/18  4:35 AM   Result Value Ref Range    Sodium 137 136 - 145 mmol/L    Potassium 4.8 3.5 - 5.5 mmol/L    Chloride 103 100 - 108 mmol/L    CO2 27 21 - 32 mmol/L    Anion gap 7 3.0 - 18 mmol/L    Glucose 206 (H) 74 - 99 mg/dL BUN 51 (H) 7.0 - 18 MG/DL    Creatinine 1.61 (H) 0.6 - 1.3 MG/DL    BUN/Creatinine ratio 32 (H) 12 - 20      GFR est AA 59 (L) >60 ml/min/1.73m2    GFR est non-AA 48 (L) >60 ml/min/1.73m2    Calcium 8.1 (L) 8.5 - 10.1 MG/DL   GLUCOSE, POC    Collection Time: 03/10/18  8:15 AM   Result Value Ref Range    Glucose (POC) 259 (H) 70 - 110 mg/dL   GLUCOSE, POC    Collection Time: 03/10/18 12:22 PM   Result Value Ref Range    Glucose (POC) 136 (H) 70 - 110 mg/dL       Assessment:     Principal Problem:    Osteomyelitis ([facility] (3/6/2018)    Active Problems:    DM (diabetes mellitus), secondary uncontrolled ([facility] (6/15/2013)      Hypertension (5/19/2015)      Iron deficiency anemia (5/21/2015)      SAVANNA (acute kidney injury) ([facility] (9/12/2017)      Cellulitis of foot, right (2/23/2018)        Plan/Recommendations/Medical Decision Making:   Recommend discontinue antibiotics POD#3 if wound looks good tomorrow - as the infected area was removed. Will change dressing tomorrow  Planned rehab placement pending. Ada Andersen.  Isabelle Saleem, [address] [facility]  Cell - [phone]  March 10, 2018  12:28 PM

## 2018-03-10 NOTE — PROGRESS NOTES
PHYSICAL THERAPY NOTE    8:30am PT Order noted. Chart reviewed. Patient currently with active bedrest order. Will hold off PT Eval until patient off bedrest.  Will re-attempt PT as schedule permits. 15:32pm Patient continue to be on bedrest.  Did let nurse Son Candelaria know will hold off PT Eval until patient off bedrest.  Will re-attempt PT as schedule permits. Chanda Bolton.  Tomy Kevin

## 2018-03-10 NOTE — PROGRESS NOTES
Problem: Falls - Risk of  Goal: *Absence of Falls  Document Kurtis Fall Risk and appropriate interventions in the flowsheet.    Outcome: Progressing Towards Goal  Fall Risk Interventions:  Mobility Interventions: Communicate number of staff needed for ambulation/transfer         Medication Interventions: Assess postural VS orthostatic hypotension, Teach patient to arise slowly    Elimination Interventions: Call light in reach, Patient to call for help with toileting needs

## 2018-03-10 NOTE — PROGRESS NOTES
Report received from Angelo Olivera RN,including sbar,mar,kardex. 2040 Bedside shift change report given to [address] (oncoming nurse) by Caitlin Mosley (offgoing nurse). Report included the following information SBAR, Kardex and MAR.

## 2018-03-11 ENCOUNTER — HOSPITAL ENCOUNTER (OUTPATIENT)
Dept: INFUSION THERAPY | Age: 38
End: 2018-03-11
Payer: SELF-PAY

## 2018-03-11 LAB
ANION GAP SERPL CALC-SCNC: 6 MMOL/L (ref 3–18)
BASOPHILS # BLD: 0 K/UL (ref 0–0.06)
BASOPHILS NFR BLD: 0 % (ref 0–2)
BUN SERPL-MCNC: 45 MG/DL (ref 7–18)
BUN/CREAT SERPL: 39 (ref 12–20)
CALCIUM SERPL-MCNC: 8 MG/DL (ref 8.5–10.1)
CHLORIDE SERPL-SCNC: 104 MMOL/L (ref 100–108)
CO2 SERPL-SCNC: 28 MMOL/L (ref 21–32)
CREAT SERPL-MCNC: 1.15 MG/DL (ref 0.6–1.3)
DIFFERENTIAL METHOD BLD: ABNORMAL
EOSINOPHIL # BLD: 0.3 K/UL (ref 0–0.4)
EOSINOPHIL NFR BLD: 3 % (ref 0–5)
ERYTHROCYTE [DISTWIDTH] IN BLOOD BY AUTOMATED COUNT: 14.7 % (ref 11.6–14.5)
GLUCOSE BLD STRIP.AUTO-MCNC: 134 MG/DL (ref 70–110)
GLUCOSE BLD STRIP.AUTO-MCNC: 137 MG/DL (ref 70–110)
GLUCOSE BLD STRIP.AUTO-MCNC: 149 MG/DL (ref 70–110)
GLUCOSE BLD STRIP.AUTO-MCNC: 213 MG/DL (ref 70–110)
GLUCOSE SERPL-MCNC: 140 MG/DL (ref 74–99)
HCT VFR BLD AUTO: 22.4 % (ref 36–48)
HGB BLD-MCNC: 7 G/DL (ref 13–16)
IRON SATN MFR SERPL: 17 %
IRON SERPL-MCNC: 22 UG/DL (ref 50–175)
LYMPHOCYTES # BLD: 2.7 K/UL (ref 0.9–3.6)
LYMPHOCYTES NFR BLD: 27 % (ref 21–52)
MCH RBC QN AUTO: 27.1 PG (ref 24–34)
MCHC RBC AUTO-ENTMCNC: 31.3 G/DL (ref 31–37)
MCV RBC AUTO: 86.8 FL (ref 74–97)
MONOCYTES # BLD: 0.6 K/UL (ref 0.05–1.2)
MONOCYTES NFR BLD: 7 % (ref 3–10)
NEUTS SEG # BLD: 6.2 K/UL (ref 1.8–8)
NEUTS SEG NFR BLD: 63 % (ref 40–73)
PLATELET # BLD AUTO: 318 K/UL (ref 135–420)
PMV BLD AUTO: 10.2 FL (ref 9.2–11.8)
POTASSIUM SERPL-SCNC: 4.3 MMOL/L (ref 3.5–5.5)
RBC # BLD AUTO: 2.58 M/UL (ref 4.7–5.5)
SODIUM SERPL-SCNC: 138 MMOL/L (ref 136–145)
TIBC SERPL-MCNC: 130 UG/DL (ref 250–450)
WBC # BLD AUTO: 9.8 K/UL (ref 4.6–13.2)

## 2018-03-11 PROCEDURE — 97116 GAIT TRAINING THERAPY: CPT

## 2018-03-11 PROCEDURE — 74011000258 HC RX REV CODE- 258: Performed by: HOSPITALIST

## 2018-03-11 PROCEDURE — 82962 GLUCOSE BLOOD TEST: CPT

## 2018-03-11 PROCEDURE — 74011250636 HC RX REV CODE- 250/636: Performed by: HOSPITALIST

## 2018-03-11 PROCEDURE — 74011250636 HC RX REV CODE- 250/636: Performed by: SURGERY

## 2018-03-11 PROCEDURE — 97161 PT EVAL LOW COMPLEX 20 MIN: CPT

## 2018-03-11 PROCEDURE — 74011636637 HC RX REV CODE- 636/637: Performed by: HOSPITALIST

## 2018-03-11 PROCEDURE — 80048 BASIC METABOLIC PNL TOTAL CA: CPT | Performed by: HOSPITALIST

## 2018-03-11 PROCEDURE — 74011000250 HC RX REV CODE- 250: Performed by: HOSPITALIST

## 2018-03-11 PROCEDURE — 85025 COMPLETE CBC W/AUTO DIFF WBC: CPT | Performed by: HOSPITALIST

## 2018-03-11 PROCEDURE — 65270000029 HC RM PRIVATE

## 2018-03-11 PROCEDURE — 83540 ASSAY OF IRON: CPT | Performed by: HOSPITALIST

## 2018-03-11 PROCEDURE — 74011250637 HC RX REV CODE- 250/637: Performed by: HOSPITALIST

## 2018-03-11 PROCEDURE — 36415 COLL VENOUS BLD VENIPUNCTURE: CPT | Performed by: HOSPITALIST

## 2018-03-11 RX ADMIN — HEPARIN SODIUM 5000 UNITS: 5000 INJECTION, SOLUTION INTRAVENOUS; SUBCUTANEOUS at 04:26

## 2018-03-11 RX ADMIN — HYDROMORPHONE HYDROCHLORIDE 2 MG: 2 INJECTION INTRAMUSCULAR; INTRAVENOUS; SUBCUTANEOUS at 20:55

## 2018-03-11 RX ADMIN — INSULIN LISPRO 4 UNITS: 100 INJECTION, SOLUTION INTRAVENOUS; SUBCUTANEOUS at 13:53

## 2018-03-11 RX ADMIN — DAPTOMYCIN 390 MG: 500 INJECTION, POWDER, LYOPHILIZED, FOR SOLUTION INTRAVENOUS at 13:59

## 2018-03-11 RX ADMIN — OXYCODONE HYDROCHLORIDE AND ACETAMINOPHEN 2 TABLET: 5; 325 TABLET ORAL at 18:46

## 2018-03-11 RX ADMIN — HEPARIN SODIUM 5000 UNITS: 5000 INJECTION, SOLUTION INTRAVENOUS; SUBCUTANEOUS at 20:55

## 2018-03-11 RX ADMIN — WATER 2 G: 1 INJECTION INTRAMUSCULAR; INTRAVENOUS; SUBCUTANEOUS at 13:57

## 2018-03-11 RX ADMIN — DOCUSATE SODIUM 100 MG: 100 CAPSULE, LIQUID FILLED ORAL at 09:24

## 2018-03-11 RX ADMIN — GABAPENTIN 300 MG: 300 CAPSULE ORAL at 09:24

## 2018-03-11 RX ADMIN — HYDROMORPHONE HYDROCHLORIDE 2 MG: 2 INJECTION INTRAMUSCULAR; INTRAVENOUS; SUBCUTANEOUS at 15:51

## 2018-03-11 RX ADMIN — HEPARIN SODIUM 5000 UNITS: 5000 INJECTION, SOLUTION INTRAVENOUS; SUBCUTANEOUS at 13:54

## 2018-03-11 RX ADMIN — DOCUSATE SODIUM 100 MG: 100 CAPSULE, LIQUID FILLED ORAL at 18:46

## 2018-03-11 RX ADMIN — HYDROMORPHONE HYDROCHLORIDE 2 MG: 2 INJECTION INTRAMUSCULAR; INTRAVENOUS; SUBCUTANEOUS at 04:27

## 2018-03-11 RX ADMIN — INSULIN GLARGINE 8 UNITS: 100 INJECTION, SOLUTION SUBCUTANEOUS at 09:22

## 2018-03-11 RX ADMIN — GABAPENTIN 300 MG: 300 CAPSULE ORAL at 22:45

## 2018-03-11 RX ADMIN — GABAPENTIN 300 MG: 300 CAPSULE ORAL at 15:51

## 2018-03-11 RX ADMIN — HYDROMORPHONE HYDROCHLORIDE 2 MG: 2 INJECTION INTRAMUSCULAR; INTRAVENOUS; SUBCUTANEOUS at 09:21

## 2018-03-11 NOTE — PROGRESS NOTES
Assumed patient care. Received patient asleep. No s/s of pain/ discomfort noted. Dressing on right BKA dry, intact. Bed is locked and in lowest position and call bell is within reach. Not in acute distress.

## 2018-03-11 NOTE — PROGRESS NOTES
Problem: Falls - Risk of  Goal: *Absence of Falls  Document Kurtis Fall Risk and appropriate interventions in the flowsheet.    Outcome: Progressing Towards Goal  Fall Risk Interventions:  Mobility Interventions: Communicate number of staff needed for ambulation/transfer, Patient to call before getting OOB         Medication Interventions: Patient to call before getting OOB    Elimination Interventions: Call light in reach

## 2018-03-11 NOTE — PROGRESS NOTES
Problem: Mobility Impaired (Adult and Pediatric)  Goal: *Acute Goals and Plan of Care (Insert Text)  Physical Therapy Goals  Initiated 3/11/2018 and to be accomplished within 7 day(s)  1. Patient will move from supine to sit and sit to supine , scoot up and down and roll side to side in bed with modified independence. 2.  Patient will transfer from bed to chair and chair to bed with modified independence using the least restrictive device. 3.  Patient will perform sit to stand with modified independence. 4.  Patient will ambulate with modified independence for 150 feet with the least restrictive device. 5.  Patient will ascend/descend 1 stairs with no handrail(s) with modified independence. 6.  Patient will be independent with home exercise program.    physical Therapy EVALUATION    Patient: Torin Hidalgo (89 y.o. male)  Date: 3/11/2018  Primary Diagnosis: Osteomyelitis (HCC)  osteomyelitis  Procedure(s) (LRB):  right below knee amputation (Right) 3 Days Post-Op   Precautions:   Fall, NWB (R BKA)    PROBLEM LIST:  Patient presents with the following problems:   Bed Mobility, Transfers, Gait, Strength, Balance, Home Exercise Program and Skin Integrity/Hygiene  ASSESSMENT :   Patient requires between minimal assistance/contact guard assist and supervision/set-up for bed mobility, transfers and ambulation. Pt Cypriot speaking as primary language but understands some english. States he is living with a friend post-discharge and interested in progressing to prosthesis when appropriate. Patient will benefit from skilled intervention to address the above impairments.   Patients rehabilitation potential is considered to be Excellent  Factors which may influence rehabilitation potential include:   []         None noted  []         Mental ability/status  [x]         Medical condition  []         Home/family situation and support systems  []         Safety awareness  []         Pain tolerance/management  [] Other: PLAN :  Recommendations and Planned Interventions:  [x]           Bed Mobility Training             [x]    Neuromuscular Re-Education  [x]           Transfer Training                   []    Orthotic/Prosthetic Training  [x]           Gait Training                          []    Modalities  [x]           Therapeutic Exercises          []    Edema Management/Control  [x]           Therapeutic Activities            [x]    Patient and Family Training/Education  []           Other (comment):    Frequency/Duration: Patient will be followed by physical therapy 1-2 times per day/4-7 days per week to address goals. Discharge Recommendations: Home Health  Further Equipment Recommendations for Discharge: N/A     SUBJECTIVE:   Patient stated Miryam Cheek doing ok.     OBJECTIVE DATA SUMMARY:     Past Medical History:   Diagnosis Date    Cellulitis     rt. foot    Diabetes ([address]     Osteomyelitis ([address]     rt toe    Other ill-defined conditions(799.89)      Past Surgical History:   Procedure Laterality Date    HX ORTHOPAEDIC      rt.toe     Barriers to Learning/Limitations: None  Compensate with: visual, verbal, tactile, kinesthetic cues/model    G CODES:Mobility  Current  CK= 40-59%   Goal  CH= 0%. The severity rating is based on the Other functional assessment      Eval Complexity: History: LOW Complexity : Zero comorbidities / personal factors that will impact the outcome / POCExam:LOW Complexity : 1-2 Standardized tests and measures addressing body structure, function, activity limitation and / or participation in recreation  Presentation: LOW Complexity : Stable, uncomplicated  Clinical Decision Making:Low Complexity .  Overall Complexity:LOW     Prior Level of Function/Home Situation: independent  Home Situation  Home Environment: Private residence  # Steps to Enter: 1  One/Two Story Residence: One story (with friend)  Living Alone: No  Support Systems: Friends \ neighbors  Patient Expects to be Discharged to[de-identified] Private residence (with friend )  Current DME Used/Available at Home: Crutches  Critical Behavior:  Neurologic State: Alert  Orientation Level: Oriented X4  Cognition: Follows commands     Psychosocial  Patient Behaviors: Calm; Cooperative  Needs Expressed: Educational  Purposeful Interaction: Yes  Pt Identified Daily Priority: Clinical issues (comment)  Caritas Process: Nurture loving kindness;Establish trust;Attend basic human needs  Caring Interventions: Therapeutic modalities  Reassure: Prayer;Caring rounds  Therapeutic Modalities: Intentional therapeutic touch  Skin Condition/Temp: Dry;Warm     Skin Integrity: Other (comment) (amputation right BKA)  Skin Integumentary  Skin Color: Appropriate for ethnicity  Skin Condition/Temp: Dry;Warm  Skin Integrity: Other (comment) (amputation right BKA)  Turgor: Non-tenting  Hair Growth: Present  Varicosities: Absent     Strength:    Strength: Within functional limits  Manual Muscle Testing (LE)         R     L    Hip Flexion:   4/5  5/5    Knee EXT:   3/5  5/5  Knee FLEX:   3/5  5/5      Ankle DF:   n/a/5 5/5  _________________________________________________     Range Of Motion:  AROM: Within functional limits     PROM: Within functional limits  Functional Mobility:  Bed Mobility:  Rolling: Supervision  Supine to Sit: Supervision  Sit to Supine: Supervision     Transfers:  Sit to Stand: Contact guard assistance  Stand to Sit: Contact guard assistance  Stand Pivot Transfers: Contact guard assistance     Balance:   Sitting: Intact  Standing: Impaired; With support  Standing - Static: Fair  Standing - Dynamic : Fair  Ambulation/Gait Training:  Distance (ft): 80 Feet (ft)  Assistive Device: Walker, rolling  Ambulation - Level of Assistance: Contact guard assistance     Gait Description (WDL): Exceptions to WDL  Gait Abnormalities:  (hop to)    Pain:  Pre treatment pain level: 2/10  Post treatment pain level:2/10  Pain Scale 1: Numeric (0 - 10)  Pain Intensity 1: 0  Pain Location 1: Leg  Pain Orientation 1: Right  Pain Description 1: Throbbing  Pain Intervention(s) 1: Medication (see MAR)  Activity Tolerance:   good  Please refer to the flowsheet for vital signs taken during this treatment. After treatment:   []         Patient left in no apparent distress sitting up in chair  [x]         Patient left in no apparent distress in bed  [x]         Call bell left within reach  [x]         Nursing notified  []         Caregiver present  []         Bed alarm activated    COMMUNICATION/EDUCATION:   [x]         Fall prevention education was provided and the patient/caregiver indicated understanding. [x]         Patient/family have participated as able in goal setting and plan of care. []         Patient/family agree to work toward stated goals and plan of care. []         Patient understands intent and goals of therapy, but is neutral about his/her participation. []         Patient is unable to participate in goal setting and plan of care. Patient educated on the role of physical therapy during the acute stay  and the importance of mobility. VU.       Thank you for this referral.  Larissa Cotter, PT   Time Calculation: 22 mins

## 2018-03-11 NOTE — ROUTINE PROCESS
Bedside and Verbal shift change report given to [employer] (oncoming nurse) by julissa holbrook rn (offgoing nurse). Report given with SBAR, Kardex, Intake/Output and Recent Results.

## 2018-03-11 NOTE — PROGRESS NOTES
Internal Medicine Progress Note    Patient's Name: Дмитрий Dutta  Admit Date: 3/6/2018  Length of Stay: 5      Assessment/Plan     Active Hospital Problems    Diagnosis Date Noted    Osteomyelitis ([facility] 03/06/2018    Cellulitis of foot, right 02/23/2018    SAVANNA (acute kidney injury) ([facility] 09/12/2017    Iron deficiency anemia 05/21/2015    Hypertension 05/19/2015    DM (diabetes mellitus), secondary uncontrolled ([facility] 06/15/2013     - Cont IVAB w/ dapto/rocephin  - Recommend 14-28days from 02/26 negative bctx due to MRSA bacteremia  - Appreciate ID  - Isolation due to hx of MRSA  - Cont current pain regimen  - Hgb trending down, now at 7  - May need unit of blood if drops below 7  - Check iron panel  - PT/OT  - SSI and accuchecks, lantus added  - Cont acceptable home medications for chronic conditions   - DVT protocol    I have personally reviewed all pertinent labs and films that have officially resulted over the last 24 hours. I have personally checked for all pending labs that are awaiting final results.     Subjective     Pt s/e @ bedside  No major events overnight  Pain tolerable with current regimen  Denies CP or SOB    Objective     Visit Vitals    /65 (BP 1 Location: Left arm, BP Patient Position: At rest)    Pulse 68    Temp 98.6 °F (37 °C)    Resp 20    Ht 5' 2\" (1.575 m)    Wt 65.3 kg (144 lb)    SpO2 96%    BMI 26.34 kg/m2       Physical Exam:  General Appearance: NAD, conversant  Lungs: CTA with normal respiratory effort  CV: RRR, no m/r/g  Abdomen: soft, non-tender, normal bowel sounds  Extremities: no cyanosis, RLE BKA prostheses in place  Neuro: No focal deficits, motor/sensory intact    Lab/Data Reviewed:  BMP:   Lab Results   Component Value Date/Time     03/11/2018 07:55 AM    K 4.3 03/11/2018 07:55 AM     03/11/2018 07:55 AM    CO2 28 03/11/2018 07:55 AM    AGAP 6 03/11/2018 07:55 AM     (H) 03/11/2018 07:55 AM    BUN 45 (H) 03/11/2018 07:55 AM    CREA 1.15 03/11/2018 07:55 AM    GFRAA >60 03/11/2018 07:55 AM    GFRNA >60 03/11/2018 07:55 AM     CBC:   Lab Results   Component Value Date/Time    WBC 9.8 03/11/2018 07:55 AM    HGB 7.0 (L) 03/11/2018 07:55 AM    HCT 22.4 (L) 03/11/2018 07:55 AM     03/11/2018 07:55 AM       Imaging Reviewed:  No results found.     Medications Reviewed:  Current Facility-Administered Medications   Medication Dose Route Frequency    insulin glargine (LANTUS) injection 8 Units  8 Units SubCUTAneous DAILY    oxyCODONE-acetaminophen (PERCOCET) 5-325 mg per tablet 2 Tab  2 Tab Oral Q4H PRN    gabapentin (NEURONTIN) capsule 300 mg  300 mg Oral TID    HYDROmorphone (DILAUDID) injection 1-2 mg  1-2 mg IntraVENous Q3H PRN    docusate sodium (COLACE) capsule 100 mg  100 mg Oral BID    heparin (porcine) injection 5,000 Units  5,000 Units SubCUTAneous Q8H    insulin lispro (HUMALOG) injection   SubCUTAneous TIDAC    glucose chewable tablet 16 g  4 Tab Oral PRN    glucagon (GLUCAGEN) injection 1 mg  1 mg IntraMUSCular PRN    dextrose (D50W) injection syrg 12.5-25 g  25-50 mL IntraVENous PRN    DAPTOmycin (CUBICIN) 390 mg in 0.9% sodium chloride 50 mL IVPB RF formulation  390 mg IntraVENous Q24H    cefTRIAXone (ROCEPHIN) 2 g in sterile water (preservative free) 20 mL IV syringe  2 g IntraVENous Q24H     Facility-Administered Medications Ordered in Other Encounters   Medication Dose Route Frequency    sodium chloride (NS) flush 10-40 mL  10-40 mL IntraVENous PRN           Maynor Alex DO  Internal Medicine, Hospitalist  Pager: [phone] [facility]

## 2018-03-11 NOTE — PROGRESS NOTES
Received awake,alert,in no acute distress. Call light,phone,urinal in reach. Contact precautions for MRSA. 3/11/18 0630 Uneventful shift.

## 2018-03-11 NOTE — ROUTINE PROCESS
Bedside and Verbal shift change report given to Rosenda RN (oncoming nurse) by Doyle Reid RN   (offgoing nurse). Report included the following information SBAR, Kardex, MAR and Recent Results.

## 2018-03-11 NOTE — PROGRESS NOTES
Vascular Surgery Progress Note    Admit Date: 3/6/2018  POD 3 Days Post-Op    Procedure/Surgery:  Procedure(s):  right below knee amputation      Subjective:   Pain continues to improve. Objective:     Visit Vitals    /63 (BP 1 Location: Left arm, BP Patient Position: At rest)    Pulse 73    Temp 98.3 °F (36.8 °C)    Resp 18    Ht 5' 2\" (1.575 m)    Wt 65.3 kg (144 lb)    SpO2 100%    BMI 26.34 kg/m2       Temp (24hrs), Av.6 °F (37 °C), Min:98.3 °F (36.8 °C), Max:98.8 °F (37.1 °C)    Physical Exam:  GEN: A&Ox3, NAD, comfortable. HEENT:PERRL EOMI, non icteric, moist membranes. NECK: no JVD,  LUNG: clear b/l and unlabored breathing  HEART: regular  ABD: soft, NT,  EXT: R BKA dressing changed, flaps warm /supple, no drainage, fully viable, ROM at knee 45 degrees. NEURO: no focal lateralizing deficits noted. Motor 5/5. Labs:   Recent Results (from the past 24 hour(s))   GLUCOSE, POC    Collection Time: 03/10/18  5:15 PM   Result Value Ref Range    Glucose (POC) 195 (H) 70 - 110 mg/dL   GLUCOSE, POC    Collection Time: 03/10/18 10:59 PM   Result Value Ref Range    Glucose (POC) 182 (H) 70 - 110 mg/dL   CBC WITH AUTOMATED DIFF    Collection Time: 18  7:55 AM   Result Value Ref Range    WBC 9.8 4.6 - 13.2 K/uL    RBC 2.58 (L) 4.70 - 5.50 M/uL    HGB 7.0 (L) 13.0 - 16.0 g/dL    HCT 22.4 (L) 36.0 - 48.0 %    MCV 86.8 74.0 - 97.0 FL    MCH 27.1 24.0 - 34.0 PG    MCHC 31.3 31.0 - 37.0 g/dL    RDW 14.7 (H) 11.6 - 14.5 %    PLATELET 601 754 - 764 K/uL    MPV 10.2 9.2 - 11.8 FL    NEUTROPHILS 63 40 - 73 %    LYMPHOCYTES 27 21 - 52 %    MONOCYTES 7 3 - 10 %    EOSINOPHILS 3 0 - 5 %    BASOPHILS 0 0 - 2 %    ABS. NEUTROPHILS 6.2 1.8 - 8.0 K/UL    ABS. LYMPHOCYTES 2.7 0.9 - 3.6 K/UL    ABS. MONOCYTES 0.6 0.05 - 1.2 K/UL    ABS. EOSINOPHILS 0.3 0.0 - 0.4 K/UL    ABS.  BASOPHILS 0.0 0.0 - 0.06 K/UL    DF AUTOMATED     METABOLIC PANEL, BASIC    Collection Time: 18  7:55 AM   Result Value Ref Range    Sodium 138 136 - 145 mmol/L    Potassium 4.3 3.5 - 5.5 mmol/L    Chloride 104 100 - 108 mmol/L    CO2 28 21 - 32 mmol/L    Anion gap 6 3.0 - 18 mmol/L    Glucose 140 (H) 74 - 99 mg/dL    BUN 45 (H) 7.0 - 18 MG/DL    Creatinine 1.15 0.6 - 1.3 MG/DL    BUN/Creatinine ratio 39 (H) 12 - 20      GFR est AA >60 >60 ml/min/1.73m2    GFR est non-AA >60 >60 ml/min/1.73m2    Calcium 8.0 (L) 8.5 - 10.1 MG/DL   IRON PROFILE    Collection Time: 03/11/18  7:55 AM   Result Value Ref Range    Iron 22 (L) 50 - 175 ug/dL    TIBC 130 (L) 250 - 450 ug/dL    Iron % saturation 17 %   GLUCOSE, POC    Collection Time: 03/11/18  8:44 AM   Result Value Ref Range    Glucose (POC) 149 (H) 70 - 110 mg/dL   GLUCOSE, POC    Collection Time: 03/11/18 12:13 PM   Result Value Ref Range    Glucose (POC) 213 (H) 70 - 110 mg/dL       Assessment:     Principal Problem:    Osteomyelitis (HCC) (3/6/2018)    Active Problems:    DM (diabetes mellitus), secondary uncontrolled ([facility] (6/15/2013)      Hypertension (5/19/2015)      Iron deficiency anemia (5/21/2015)      SAVANNA (acute kidney injury) ([facility] (9/12/2017)      Cellulitis of foot, right (2/23/2018)      POD#3 R BKA - progressing appropriately    Plan/Recommendations/Medical Decision Making:   Continue daily dressing change with gauze/kerlex symmetrical ace wrap  Ampu shield at night when in bed. Ok to PT with walker - pend R knee. [id number] [address] for SNF/ rehab when bed available. Janelle Marie.  Stephan Campo, [address] [facility]  Cell - [phone]  March 11, 2018  4:08 PM

## 2018-03-11 NOTE — PROGRESS NOTES
Problem: Falls - Risk of  Goal: *Absence of Falls  Document Kurtis Fall Risk and appropriate interventions in the flowsheet.    Outcome: Progressing Towards Goal  Fall Risk Interventions:  Mobility Interventions: Communicate number of staff needed for ambulation/transfer, Patient to call before getting OOB         Medication Interventions: Patient to call before getting OOB    Elimination Interventions: Call light in reach, Urinal in reach

## 2018-03-12 ENCOUNTER — APPOINTMENT (OUTPATIENT)
Dept: INFUSION THERAPY | Age: 38
End: 2018-03-12
Payer: SELF-PAY

## 2018-03-12 LAB
ANION GAP SERPL CALC-SCNC: 8 MMOL/L (ref 3–18)
BASOPHILS # BLD: 0.1 K/UL (ref 0–0.06)
BASOPHILS NFR BLD: 1 % (ref 0–2)
BUN SERPL-MCNC: 57 MG/DL (ref 7–18)
BUN/CREAT SERPL: 52 (ref 12–20)
CALCIUM SERPL-MCNC: 8.1 MG/DL (ref 8.5–10.1)
CHLORIDE SERPL-SCNC: 102 MMOL/L (ref 100–108)
CO2 SERPL-SCNC: 28 MMOL/L (ref 21–32)
CREAT SERPL-MCNC: 1.09 MG/DL (ref 0.6–1.3)
DIFFERENTIAL METHOD BLD: ABNORMAL
EOSINOPHIL # BLD: 0.3 K/UL (ref 0–0.4)
EOSINOPHIL NFR BLD: 4 % (ref 0–5)
ERYTHROCYTE [DISTWIDTH] IN BLOOD BY AUTOMATED COUNT: 14.8 % (ref 11.6–14.5)
GLUCOSE BLD STRIP.AUTO-MCNC: 145 MG/DL (ref 70–110)
GLUCOSE BLD STRIP.AUTO-MCNC: 156 MG/DL (ref 70–110)
GLUCOSE BLD STRIP.AUTO-MCNC: 184 MG/DL (ref 70–110)
GLUCOSE BLD STRIP.AUTO-MCNC: 218 MG/DL (ref 70–110)
GLUCOSE SERPL-MCNC: 135 MG/DL (ref 74–99)
HCT VFR BLD AUTO: 23.2 % (ref 36–48)
HGB BLD-MCNC: 7.2 G/DL (ref 13–16)
LYMPHOCYTES # BLD: 2 K/UL (ref 0.9–3.6)
LYMPHOCYTES NFR BLD: 27 % (ref 21–52)
MCH RBC QN AUTO: 27.3 PG (ref 24–34)
MCHC RBC AUTO-ENTMCNC: 31 G/DL (ref 31–37)
MCV RBC AUTO: 87.9 FL (ref 74–97)
MONOCYTES # BLD: 0.6 K/UL (ref 0.05–1.2)
MONOCYTES NFR BLD: 7 % (ref 3–10)
NEUTS SEG # BLD: 4.6 K/UL (ref 1.8–8)
NEUTS SEG NFR BLD: 61 % (ref 40–73)
PLATELET # BLD AUTO: 338 K/UL (ref 135–420)
PMV BLD AUTO: 10.5 FL (ref 9.2–11.8)
POTASSIUM SERPL-SCNC: 4.8 MMOL/L (ref 3.5–5.5)
RBC # BLD AUTO: 2.64 M/UL (ref 4.7–5.5)
SODIUM SERPL-SCNC: 138 MMOL/L (ref 136–145)
WBC # BLD AUTO: 7.5 K/UL (ref 4.6–13.2)

## 2018-03-12 PROCEDURE — 74011636637 HC RX REV CODE- 636/637: Performed by: HOSPITALIST

## 2018-03-12 PROCEDURE — 97116 GAIT TRAINING THERAPY: CPT

## 2018-03-12 PROCEDURE — 97530 THERAPEUTIC ACTIVITIES: CPT

## 2018-03-12 PROCEDURE — 74011000258 HC RX REV CODE- 258: Performed by: HOSPITALIST

## 2018-03-12 PROCEDURE — 74011250636 HC RX REV CODE- 250/636: Performed by: HOSPITALIST

## 2018-03-12 PROCEDURE — 74011250636 HC RX REV CODE- 250/636: Performed by: SURGERY

## 2018-03-12 PROCEDURE — 65270000029 HC RM PRIVATE

## 2018-03-12 PROCEDURE — 85025 COMPLETE CBC W/AUTO DIFF WBC: CPT | Performed by: HOSPITALIST

## 2018-03-12 PROCEDURE — 97535 SELF CARE MNGMENT TRAINING: CPT

## 2018-03-12 PROCEDURE — 74011000250 HC RX REV CODE- 250: Performed by: HOSPITALIST

## 2018-03-12 PROCEDURE — 82962 GLUCOSE BLOOD TEST: CPT

## 2018-03-12 PROCEDURE — 80048 BASIC METABOLIC PNL TOTAL CA: CPT | Performed by: HOSPITALIST

## 2018-03-12 PROCEDURE — 36415 COLL VENOUS BLD VENIPUNCTURE: CPT | Performed by: HOSPITALIST

## 2018-03-12 PROCEDURE — 74011250637 HC RX REV CODE- 250/637: Performed by: HOSPITALIST

## 2018-03-12 PROCEDURE — 97165 OT EVAL LOW COMPLEX 30 MIN: CPT

## 2018-03-12 RX ORDER — OXYCODONE AND ACETAMINOPHEN 5; 325 MG/1; MG/1
2 TABLET ORAL
Qty: 42 TAB | Refills: 0 | Status: ON HOLD | OUTPATIENT
Start: 2018-03-12 | End: 2022-08-26

## 2018-03-12 RX ADMIN — INSULIN LISPRO 2 UNITS: 100 INJECTION, SOLUTION INTRAVENOUS; SUBCUTANEOUS at 12:05

## 2018-03-12 RX ADMIN — GABAPENTIN 300 MG: 300 CAPSULE ORAL at 21:34

## 2018-03-12 RX ADMIN — DOCUSATE SODIUM 100 MG: 100 CAPSULE, LIQUID FILLED ORAL at 17:08

## 2018-03-12 RX ADMIN — GABAPENTIN 300 MG: 300 CAPSULE ORAL at 08:54

## 2018-03-12 RX ADMIN — HYDROMORPHONE HYDROCHLORIDE 2 MG: 2 INJECTION INTRAMUSCULAR; INTRAVENOUS; SUBCUTANEOUS at 14:21

## 2018-03-12 RX ADMIN — HEPARIN SODIUM 5000 UNITS: 5000 INJECTION, SOLUTION INTRAVENOUS; SUBCUTANEOUS at 21:27

## 2018-03-12 RX ADMIN — OXYCODONE HYDROCHLORIDE AND ACETAMINOPHEN 2 TABLET: 5; 325 TABLET ORAL at 12:04

## 2018-03-12 RX ADMIN — HYDROMORPHONE HYDROCHLORIDE 2 MG: 2 INJECTION INTRAMUSCULAR; INTRAVENOUS; SUBCUTANEOUS at 08:54

## 2018-03-12 RX ADMIN — HYDROMORPHONE HYDROCHLORIDE 2 MG: 2 INJECTION INTRAMUSCULAR; INTRAVENOUS; SUBCUTANEOUS at 03:46

## 2018-03-12 RX ADMIN — DOCUSATE SODIUM 100 MG: 100 CAPSULE, LIQUID FILLED ORAL at 08:54

## 2018-03-12 RX ADMIN — HEPARIN SODIUM 5000 UNITS: 5000 INJECTION, SOLUTION INTRAVENOUS; SUBCUTANEOUS at 12:03

## 2018-03-12 RX ADMIN — HEPARIN SODIUM 5000 UNITS: 5000 INJECTION, SOLUTION INTRAVENOUS; SUBCUTANEOUS at 03:50

## 2018-03-12 RX ADMIN — DAPTOMYCIN 390 MG: 500 INJECTION, POWDER, LYOPHILIZED, FOR SOLUTION INTRAVENOUS at 14:21

## 2018-03-12 RX ADMIN — OXYCODONE HYDROCHLORIDE AND ACETAMINOPHEN 2 TABLET: 5; 325 TABLET ORAL at 06:55

## 2018-03-12 RX ADMIN — HYDROMORPHONE HYDROCHLORIDE 2 MG: 2 INJECTION INTRAMUSCULAR; INTRAVENOUS; SUBCUTANEOUS at 21:27

## 2018-03-12 RX ADMIN — INSULIN GLARGINE 8 UNITS: 100 INJECTION, SOLUTION SUBCUTANEOUS at 08:55

## 2018-03-12 RX ADMIN — WATER 2 G: 1 INJECTION INTRAMUSCULAR; INTRAVENOUS; SUBCUTANEOUS at 14:21

## 2018-03-12 RX ADMIN — GABAPENTIN 300 MG: 300 CAPSULE ORAL at 17:08

## 2018-03-12 RX ADMIN — INSULIN LISPRO 2 UNITS: 100 INJECTION, SOLUTION INTRAVENOUS; SUBCUTANEOUS at 17:08

## 2018-03-12 NOTE — PROGRESS NOTES
Spoke with pt using blue phone. He is agreeable to [facility] if they will accept. Dr Giovani Quinones placed OT order. Left message for  Ne epstein at  [facility].

## 2018-03-12 NOTE — PROGRESS NOTES
Problem: Mobility Impaired (Adult and Pediatric)  Goal: *Acute Goals and Plan of Care (Insert Text)  Physical Therapy Goals  Initiated 3/11/2018 and to be accomplished within 7 day(s)  1. Patient will move from supine to sit and sit to supine , scoot up and down and roll side to side in bed with modified independence. 2.  Patient will transfer from bed to chair and chair to bed with modified independence using the least restrictive device. 3.  Patient will perform sit to stand with modified independence. 4.  Patient will ambulate with modified independence for 150 feet with the least restrictive device. 5.  Patient will ascend/descend 1 stairs with no handrail(s) with modified independence. 6.  Patient will be independent with home exercise program.     physical Therapy TREATMENT    Patient: Bossman Ortiz (8 y.o. male)  Date: 3/12/2018  Diagnosis: Osteomyelitis ([address]  osteomyelitis Osteomyelitis (HCC)  Procedure(s) (LRB):  right below knee amputation (Right) 4 Days Post-Op  Precautions: Fall, NWB (R BKA)  Chart, physical therapy assessment, plan of care and goals were reviewed. PLOF: Independent PTA    ASSESSMENT:  Patient presents s/p BKA of Right LE with functional limitation 2/2 pain and poor movement patterns. Patient demonstrates unsafe hand placement with transitional movements and decreased tolerance for ambulation 2/2 pain and reduced endurance today demonstrating increased RR and rep[orts of fatigue following 35 ft ambulation. Patient would benefit from short stay at Acute inpatient rehab to allow for improve movement patterns and improved tolerance to functional mobility as well as home emulation tasks to promote carry-over to home environment. Patient with threshold management needs to transition home and would benefit from continued skilled PT intervention to promote increased safety with home management.       EDUCATION: Patient educated on hand placement and safety techniques with mobility challenge. Progression toward goals:        Improving appropriately and progressing toward goals despite limitation 2/2 pain and movement pattern deficit. PLAN:  Patient continues to benefit from skilled intervention to address the above impairments. Continue treatment per established plan of care. Discharge Recommendations: Acute Inpatient Rehab (ARU) Patient would benefit from short stay ARU to promote improved safety and consistency with movement patterns as well as home emulation tasks to promote carry-over and reduce risk of re-injury. Further Equipment Recommendations for Discharge:  TBD at next level of care     SUBJECTIVE:   Patient stated I would like more treatment, I just don't have a ride.  PT discusses with patient via blue phone the importance of appropriate movement patterning and residual limb management. OBJECTIVE DATA SUMMARY:   Critical Behavior:  Neurologic State: Alert  Orientation Level: Oriented X4  Cognition: Follows commands    Functional Mobility Training:  Bed Mobility:  Rolling: Supervision (visual cueing for technique)  Supine to Sit: Supervision (Verbal/visual cues for pace/safety)  Sit to Supine: Supervision  Scooting: Contact guard assistance (to promote anterior lean and sacral clearance)  Patient moves with haste and requires v/c for reduced pace and improved technique as he is reliant on momentum and B UE pulling to transition. Transfers:  Sit to Stand: Minimum assistance  Stand to Sit: Minimum assistance  Stand Pivot Transfers: Minimum assistance   Bed to Chair: Minimum assistance  Other: Patient performs stand step transfer to RW requiring verbal cueing and min a  x 1 for hand placement and balance during transition.  Patient requires hand placement assistance and controlled lowering to bed 2/2 poor balance with transitional movements and inattention to hand placement safety  Balance:  Sitting: Impaired  Sitting - Static: Fair (occasional)  Sitting - Dynamic: Fair (occasional)  Standing: Impaired; With support  Standing - Static: Fair  Standing - Dynamic : Fair  Ambulation/Gait Training:  Distance (ft): 35 Feet (ft) (limited by fatigue and pain )  Assistive Device: Walker, rolling  Ambulation - Level of Assistance: Minimal assistance (for postural support and gaze correction)  Gait Description (WDL): Exceptions to WDL  Gait Abnormalities: Decreased step clearance; Other (forward trunk lean; step clearance deficit; downward gaze)  Base of Support: Shift to left  Speed/Jo Ann: Pace decreased (<100 feet/min)  Step Length: Right lengthened  Patient ambulates 35 ft before reporting fatigue and increased right LE pain. Patient demonstrates mm fasciculations in unaffected limb and slight hip flexion in residual limb. Patient with [address], downward gaze, and significant forward lean to load B UE requiring verbal/visual cueing to adjust posture and promote safe mgmt of RW. Patient requires min a x 1 for postural and trunk support to ensure proper loading and safe RW utilization. Additional, education provided regarding importance of proper posturing and correct RW utilization. Stairs:  NT 2/2 fatigue and increased pain. Neuro Re-Education:  Self stimulation education with demonstration to improve residual limb comfort and reduce pain.   Therapeutic Exercises:   NA  Vital Signs  Temp: 97.5 °F (36.4 °C)     Pulse (Heart Rate): 70     BP: 141/77     Resp Rate: 16     O2 Sat (%): 100 %  Pain: Right residual limb noting calf tightness  Pre treatment pain level:8/10  Post treatment pain level:7/10  Pain Scale 1: Numeric (0 - 10)  Pain Intensity 1: 8  Pain Location 1: Leg  Pain Orientation 1: Right  Pain Description 1: Aching;Phantom  Pain Intervention(s) 1: Medication (see MAR)  Activity Tolerance:   Fair     After treatment:   Patient left in no apparent distress in bed  Call bell left within reach  Nursing notified  Bed alarm activated    Recommendations for nursing: Encourage OOB mobility with RW    Chelsea Jordan, PT DPT   Time Calculation: 23 mins

## 2018-03-12 NOTE — PROGRESS NOTES
NUTRITION follow-up/Plan of care    RECOMMENDATIONS:   1. Consistent CHO  2. Pernell BID  3. Monitor weight and PO intake  4. RD to follow    GOALS:   1. Ongoing: PO intake meets >75% of protein/calorie needs by 3/19  2. Ongoing: Maintain weight (+/- 1-2 lb) by 3/19    ASSESSMENT:    Weight status is classified as overweight per BMI of 26.3. PO intake adequate. Labs noted. BG range (134-213) over past 24 hours. Nutrition recommendations listed. RD to follow. Nutrition Risk:  []   High []  Moderate [x] Low    SUBJECTIVE/OBJECTIVE:   Pt admitted with  Chronic right foot osteomyelitis, MRSA bloodstream infection. PMHx includes T2DM, recurrent foot infections, status post right first toe amputation in 2012, resection of fourth and fifth metatarsal osteomyelitis 2015, right foot excision of bone cortex 11/01/2017 with fifth metatarsal I and D. Pt is s/p right BKA on (3/8). Pt w/ good appetite and consuming 100% of meals. Pt awaiting placement to SNF. Will monitor. Information Obtained From:   [x] Chart Review  [x] Patient  [] Family/Caregiver  [] Nurse/Physician   [] Patient Rounds/Interdisciplinary Meeting    Diet: Consistent CHO  Patient Vitals for the past 100 hrs:   % Diet Eaten   03/12/18 0946 100 %   03/10/18 1747 100 %   03/10/18 1402 100 %   03/10/18 0943 100 %   03/09/18 1346 100 %   03/09/18 1010 100 %     Medications: [x] Reviewed   Encounter Diagnoses     ICD-10-CM ICD-9-CM   1. Cellulitis of foot, right L03.115 682.7   2.  Other specified diabetes mellitus with complication, with long-term current use of insulin ([facility]) E13.8 250.90    Z79.4 V58.67     Past Medical History:   Diagnosis Date    Cellulitis     rt. foot    Diabetes ([facility]     Osteomyelitis ([facility] [facility]     rt toe    Other ill-defined conditions(799.89)      Labs:  Lab Results   Component Value Date/Time    Sodium 138 03/12/2018 03:45 AM    Potassium 4.8 03/12/2018 03:45 AM    Chloride 102 03/12/2018 03:45 AM    CO2 28 03/12/2018 03:45 AM Anion gap 8 03/12/2018 03:45 AM    Glucose 135 (H) 03/12/2018 03:45 AM    BUN 57 (H) 03/12/2018 03:45 AM    Creatinine 1.09 03/12/2018 03:45 AM    Calcium 8.1 (L) 03/12/2018 03:45 AM    Magnesium 1.7 (L) 06/13/2013 07:30 AM    Phosphorus 5.0 (H) 06/21/2016 11:00 AM    Albumin 1.4 (L) 03/08/2018 05:16 AM     Anthropometrics: BMI (calculated): 26.3 Last 3 Recorded Weights in this Encounter    03/08/18 1439   Weight: 65.3 kg (144 lb)    Ht Readings from Last 1 Encounters:   03/08/18 5' 2\" (1.575 m)     []  Weight Loss  []  Weight Gain  []  Weight Stable   [x]  New wt n/a on record     Estimated Nutrition Needs:   2013 Kcals/day  Protein (g): 81 g    Nutrition Problems Identified:   [] Suboptimal PO intake   [] Food Allergies  [] Difficulty chewing/swallowing/poor dentition  [] Constipation/Diarrhea   [] Nausea/Vomiting   [] None  [x] Other: Wound healing    Plan:   [x] Therapeutic Diet  []  Obtained/adjusted food preferences/tolerances and/or snacks options   [x]  Continue supplements added   [] Occupational therapy following for feeding techniques  [x]  HS snack added   []  Modify diet texture   []  Modify diet for food allergies   []  Educate patient   []  Assist with menu selection   [x]  Monitor PO intake on meal rounds   [x]  Continue inpatient monitoring and intervention   []  Participated in discharge planning/Interdisciplinary rounds/Team meetings   []  Other:     Education Needs:   [] Not appropriate for teaching at this time due to:   [x] Identified and addressed    Nutrition Monitoring and Evaluation:   [x] Continue inpatient monitoring and interventions    [] Other:     Faizan Castro

## 2018-03-12 NOTE — PROGRESS NOTES
[facility]00 [facility] pt care from [address]. Pt in bed, alert and oriented x 4. Not in any form of distress. Frequent use items and call bell within reach. Verbalized understanding to call for assistance. Bed locked in lowest position. No signs of bleeding on the BKA dressing. 0010 - Not in any form of distress. Pt resting in bed.     0800 - Bedside and Verbal shift change report given to Fozia Louise RN (oncoming nurse) by Noreen Guerra RN (offgoing nurse). Report included the following information SBAR, Kardex, Intake/Output and MAR. No signs of bleeding or drainage on the BKA dressing.

## 2018-03-12 NOTE — PROGRESS NOTES
[facility]  [address]  [address], [place]  [address] [place] [address], [address] [place] [address]  Dr. Zamzam Whiting, Dr. Abdulkadir Osman  [phone] FAX# [phone]    Progress Note    Patient: Lorena Luo MRN: [id number]  SSN: [id number]    YOB: 1980  Age: 45 y.o. Sex: male      Admit Date: 3/6/2018    LOS: 6 days     Subjective:     POD#4 R BKA. Tolerating PO. States pain improving. Working with PT/OT. Denies LLE pain, SOB, chest pain. Currently on rocephin and daptomycin. Waiting for Rehab placement. Objective:     Vitals:    03/11/18 0620 03/11/18 1420 03/11/18 2300 03/12/18 0655   BP: 106/65 117/63 99/54 141/77   Pulse: 68 73 65 70   Resp: 20 18 16 16   Temp: 98.6 °F (37 °C) 98.3 °F (36.8 °C) 97.6 °F (36.4 °C) 97.5 °F (36.4 °C)   SpO2: 96% 100% 99% 100%   Weight:       Height:            Intake and Output:  Current Shift:    Last three shifts: 03/10 1901 - 03/12 0700  In: -   Out: 1950 [Urine:1950]    Physical Exam:   GEN: Alert, NAD, comfortable. HEENT:PERRL, EOMI, non icteric, moist membranes. NECK: no JVD  LUNG: clear b/l and unlabored breathing  HEART: regular  ABD: soft, NT  EXT: R BKA: c/d/i with staples. flaps warm /supple, no drainage, fully viable, ROM at knee 45 degrees. Dressing changed  NEURO: no focal lateralizing deficits noted. Motor 5/5.      Lab/Data Review:  BMP:   Lab Results   Component Value Date/Time     03/12/2018 03:45 AM    K 4.8 03/12/2018 03:45 AM     03/12/2018 03:45 AM    CO2 28 03/12/2018 03:45 AM    AGAP 8 03/12/2018 03:45 AM     (H) 03/12/2018 03:45 AM    BUN 57 (H) 03/12/2018 03:45 AM    CREA 1.09 03/12/2018 03:45 AM    GFRAA >60 03/12/2018 03:45 AM    GFRNA >60 03/12/2018 03:45 AM     CBC:   Lab Results   Component Value Date/Time    WBC 7.5 03/12/2018 03:45 AM    HGB 7.2 (L) 03/12/2018 03:45 AM    HCT 23.2 (L) 03/12/2018 03:45 AM     03/12/2018 03:45 AM      CULTURE BLOOD 3/8/18: no growth in 4 days    RLE VENOUS DUPLEX 3/6/18: . Technically difficult exam secondary to patient unable to tolerate compressions. Veins are patent with color and doppler, cannot exclude non-occlusive deep vein thrombosis in the right lower extremity. 3. No evidence of deep vein thrombosis in the contralateral common femoral vein. 5. No evidence of superficial thrombosis detected. CBC 3/6/18: WBC 15.2, HGB 8.5, HCT 26.0,      CULTURE BLOOD 2/26/18: No growth in 6 days     NM BONE SCAN 2/27/18:  intense tracer accumulation in the right hindfoot on all 3 phases most intense involving the talus, navicular and calcaneal bones . On Indium scan there is corresponding abnormal uptake in this same location of the right hindfoot.     ECHO 2/27/18: Left ventricle: Systolic function was at the lower limits of normal. Ejection   fraction was estimated in the range of 50 % to 55 %. There was mild diffuse hypokinesis. Mitral valve: No obvious mass, vegetation or thrombus noted. Aortic valve: No obvious mass, vegetation or thrombus noted. Inferior vena cava, hepatic veins: The inferior vena cava was dilated.     MRI RT FOOT 2/26/18: . Lateral mid foot soft tissue wound with sinus tract extending to the  calcaneocuboid articulation. There is contiguous and extensive marrow signal  abnormality compatible with osteomyelitis involving the majority of the cuboid,  calcaneus, inferior talar head and neck, navicular, as well as the fourth and  fifth metatarsal shafts proximally. On postcontrast imaging, no evidence of  necrotic or nonviable bone demonstrated.      2. Large ankle joint effusion/synovitis with complex appearing and multilocular  phlegmon infiltrating through the lateral aspect of Kager's fat pad as well as  the sinus tarsi. Suspect infected calcaneocuboid articulation effusion given  proximity to adjacent lateral skin wound.      3. Truncated appearance to the peroneus brevis and longus tendons.      4. Diffusely abnormal intramuscular edema and enhancement, likely in keeping  with a combination of myositis from underlying infection as well as sequela of  subacute denervation. Assessment:     Principal Problem:    Osteomyelitis ([facility] [address] (3/6/2018)    Active Problems:    DM (diabetes mellitus), secondary uncontrolled ([address] (6/15/2013)      Hypertension (5/19/2015)      Iron deficiency anemia (5/21/2015)      SAVANNA (acute kidney injury) ([address] (9/12/2017)      Cellulitis of foot, right (2/23/2018)      POD#4 R BKA   R Lateral midfoot soft tissue wound with sinus tract extending to the calcaneocuboid articulation.    R cuboid, calcaneus, inferior talar head and neck, navicular, 4th and 5th metatarsal shafts proximally OM  Plan:     Elevate RLE on 2 pillows  Continue Abx per hospitalist  Keep incision c/d/i- daily dressing change   Ampu-shield per Gideon  Cont PT/OT  Ok to discharge to rehab per vascular standpoint  Will arrange outpt FU in 1 month for staple removal     Signed By: Vanessa Vaughan     March 12, 2018

## 2018-03-12 NOTE — PROGRESS NOTES
1950: Assumed pt care. Received pt resting in bed, pt is alert and oriented x 4. Wound dressing on right leg clean, dry and intact with ampu shield, elevated on 2 pillows. No signs of distress. Call bell within reach. 3-12-18    0720: Bedside and Verbal shift change report given to Kayla Sanchez (oncoming nurse) by Angel Garcia   (offgoing nurse). Report included the following information SBAR, Kardex, Intake/Output, MAR and Recent Results.

## 2018-03-12 NOTE — PROGRESS NOTES
Infectious Disease Follow-up     Admit Date: 3/6/2018    Current abx Prior abx   Daptomycin/ceftriaxone 2/26-14  first neg 2/26-14 Cefepime 2/23 - 6 Vancomycin 2/23 - 6     Assessment ->Rec:     Osteomyelitis, R calcaneus/cuboid   -recent [facility] adm with MRSA BSI with draining sinus tract, h/o MRSA,2/24 MRI c/w sinus tract to calcaneocuboid w/OM, refused BKA discharged with picc on daily dapto/ceftriaxone x 8 weeks   -referred to ER 3/6 from infusion center with sub f/c increased R foot pain 10/10  -CRP 29.3, ESR >140  -NEW SP R BKA 3/8 -> no further abx. D/w Dr. Jonathan Forte   BSI MRSA- 1 of 2 blcx + 2/22, repeat 2/26 NG  - from OM  - TTE 2/27: no vegetations  -picc out 3/8  - has received 14 days since 1st negative blcx -> dc Daptomycin.    -> OK to discharged from ID standpoint    Leukocytosis - higher than at discharge 3/1- 15k 3/7  -? From foot  - resolved    Recurrent foot infections  - s/p R 1st toe amp 2012, rx 4th/5th MT OM 2015  - s/p 11/1/2017 R foot excision of bone cortex, 5th MT w/I+D R foot to the bone.  Pathology: acute osteomyelitis. Cultures grew MRSA and Streptococcus mitis/oralis    DM A1C 7.5 ->BS control per IM    H/o noncompliance      MICROBIOLOGY:   2/22 bctx x 2 MRSA vanco ROGELIO <=0.5  2/26 bctx x 2 NG      3/7 bctx ngtd  3/8 bctx ngtd                    LINES AND CATHETERS:       Active Hospital Problems    Diagnosis Date Noted    Osteomyelitis ([facility] [address] 03/06/2018    Cellulitis of foot, right 02/23/2018    SAVANNA (acute kidney injury) ([facility] [address] 09/12/2017    Iron deficiency anemia 05/21/2015    Hypertension 05/19/2015    DM (diabetes mellitus), secondary uncontrolled ([address] 06/15/2013         Subjective: Interval notes reviewed. Being prepared for discharged. Asking for pain meds.   Reviewed antimicrobial therapy and discussed with Dr. Jonathan Forte he has received 14 days effective rx following first negative blcx - should suffice for bacteremia and post BKA    Current Facility-Administered Medications Medication Dose Route Frequency    insulin glargine (LANTUS) injection 8 Units  8 Units SubCUTAneous DAILY    oxyCODONE-acetaminophen (PERCOCET) 5-325 mg per tablet 2 Tab  2 Tab Oral Q4H PRN    gabapentin (NEURONTIN) capsule 300 mg  300 mg Oral TID    HYDROmorphone (DILAUDID) injection 1-2 mg  1-2 mg IntraVENous Q3H PRN    docusate sodium (COLACE) capsule 100 mg  100 mg Oral BID    heparin (porcine) injection 5,000 Units  5,000 Units SubCUTAneous Q8H    insulin lispro (HUMALOG) injection   SubCUTAneous TIDAC    glucose chewable tablet 16 g  4 Tab Oral PRN    glucagon (GLUCAGEN) injection 1 mg  1 mg IntraMUSCular PRN    dextrose (D50W) injection syrg 12.5-25 g  25-50 mL IntraVENous PRN    DAPTOmycin (CUBICIN) 390 mg in 0.9% sodium chloride 50 mL IVPB RF formulation  390 mg IntraVENous Q24H    cefTRIAXone (ROCEPHIN) 2 g in sterile water (preservative free) 20 mL IV syringe  2 g IntraVENous Q24H     Facility-Administered Medications Ordered in Other Encounters   Medication Dose Route Frequency    sodium chloride (NS) flush 10-40 mL  10-40 mL IntraVENous PRN         Objective:     Visit Vitals    /77 (BP 1 Location: Left arm, BP Patient Position: At rest)    Pulse 70    Temp 97.5 °F (36.4 °C)    Resp 16    Ht 5' 2\" (1.575 m)    Wt 65.3 kg (144 lb)    SpO2 100%    BMI 26.34 kg/m2       Temp (24hrs), Av.8 °F (36.6 °C), Min:97.5 °F (36.4 °C), Max:98.3 °F (36.8 °C)    GEN: pleasant young  male lying in bed NAD  HEENT: anicteric  CHEST: no rales rhonchi or wheeze  CVS:S1'S2' no murmurs   ABD: soft non-tender (+) BS non-distended  EXT: R BKA stump in shell not disturbed, RUE picc out no edema PIV in place  SKIN: tattoos no rash   NEURO awake alert appropriate     Labs: Results:   Chemistry Recent Labs      18   0345  18   0755  03/10/18   0435   GLU  135*  140*  206*   NA  138  138  137   K  4.8  4.3  4.8   CL  102  104  103   CO2  28  28  27   BUN  57*  45*  51*   CREA 1. 09  1.15  1.61*   CA  8.1*  8.0*  8.1*   AGAP  8  6  7   BUCR  52*  39*  32*      CBC w/Diff Recent Labs      03/12/18   0345  03/11/18   0755  03/10/18   0435   WBC  7.5  9.8  10.2   RBC  2.64*  2.58*  2.77*   HGB  7.2*  7.0*  7.6*   HCT  23.2*  22.4*  24.0*   PLT  338  318  367   GRANS  61  63  73   LYMPH  27  27  20*   EOS  4  3  1            Microbiology Results  No results for input(s): SDES, CULT in the last 72 hours.     Jazzmine Dow MD  March 12, 2018  [facility] THE [facility]  [phone]

## 2018-03-12 NOTE — ROUTINE PROCESS
0730-Pt care received. Pt A/O x4. Pt resting in bed. Denies pain. Pt stable. Call light within reach, bed in lowest position and locked. 1530- Bedside and Verbal shift change report given to Ariella Child RN (oncoming nurse) by Lonnie Garcia RN   (offgoing nurse). Report included the following information SBAR, Kardex, Intake/Output and MAR.

## 2018-03-12 NOTE — PROGRESS NOTES
No answer from ARU at [facility] view, case is being reviewed, will likely be in am.

## 2018-03-12 NOTE — PROGRESS NOTES
Problem: Falls - Risk of  Goal: *Absence of Falls  Document Kurtis Fall Risk and appropriate interventions in the flowsheet.    Fall Risk Interventions:  Mobility Interventions: Patient to call before getting OOB         Medication Interventions: Patient to call before getting OOB    Elimination Interventions: Call light in reach

## 2018-03-12 NOTE — DISCHARGE SUMMARY
Internal Medicine Discharge Summary        Patient: Herve Chavez    YOB: 1980    Age:  45 y.o.                       Admit Date: 3/6/2018    Discharge Date: 3/12/2018    LOS:  LOS: 6 days     Discharge To: SNF    Consults: Infectious Disease and Vascular Surgery    Admission Diagnoses: Osteomyelitis ([place] [id number].)  osteomyelitis    Discharge Diagnoses:    Problem List as of 3/12/2018  Date Reviewed: 2/28/2018          Codes Class Noted - Resolved    * (Principal)Osteomyelitis ([place] [address].) ICD-10-CM: M86.9  ICD-9-CM: 730.20  3/6/2018 - Present        Cellulitis of foot, right ICD-10-CM: L03.115  ICD-9-CM: 682.7  2/23/2018 - Present        Anemia ICD-10-CM: D64.9  ICD-9-CM: 285.9  9/12/2017 - Present        SAVANNA (acute kidney injury) ([place] [address].) ICD-10-CM: N17.9  ICD-9-CM: 584.9  9/12/2017 - Present        DM (diabetes mellitus) ([place] [address].) (Chronic) ICD-10-CM: E11.9  ICD-9-CM: 250.00  9/12/2017 - Present        Iron deficiency anemia ICD-10-CM: D50.9  ICD-9-CM: 280.9  5/21/2015 - Present        Hypertension ICD-10-CM: I10  ICD-9-CM: 401.9  5/19/2015 - Present        Acute renal injury ([place] [address].) ICD-10-CM: N17.9  ICD-9-CM: 584.9  5/18/2015 - Present        Diabetic foot ulcer ([place] [address].) ICD-10-CM: E11.621, L97.509  ICD-9-CM: 250.80, 707.15  5/16/2015 - Present        Cellulitis ICD-10-CM: L03.90  ICD-9-CM: 682.9  11/21/2014 - Present        Cellulitis and abscess ICD-10-CM: L03.90, L02.91  ICD-9-CM: 682.9  11/21/2014 - Present        Abscess of right thigh ICD-10-CM: L02.415  ICD-9-CM: 682.6  7/15/2014 - Present        Sepsis ([facility] ICD-10-CM: A41.9  ICD-9-CM: 038.9, 995.91  7/14/2014 - Present        Abscess of bursa, left knee ICD-10-CM: M71.062  ICD-9-CM: 727.89  6/19/2013 - Present        Cellulitis of left knee ICD-10-CM: L03.116  ICD-9-CM: 682.6  6/15/2013 - Present        DM (diabetes mellitus), secondary uncontrolled ([facility] ICD-10-CM: E13.65  ICD-9-CM: 249.01  6/15/2013 - Present              Discharge Condition: Improved    Procedures: Right below knee amputation         HPI: Maira Garcia is a 45 y.o. male with a PMHx of chronic MRSA osteomyelitis Rt ankle, recent MRSA bacteremia, DM-II, HTN who presented to the ED from [facility] center for uncontrolled pain and worsening swelling/redness of RLE. The patient has a long history. Patient reported that he had right foot surgery a few months ago but was not able to specify why. Review of records everywhere indicated h/o osteomyelitis of ankle, diabetes, gas gangrene, hypertension and anemia. Recent MRI Right foot Lateral mid foot soft tissue wound with sinus tract extending to the calcaneocuboid articulation. There is contiguous and extensive marrow signal abnormality compatible with osteomyelitis involving the majority of the cuboid, calcaneus, inferior talar head and neck, navicular, as well as the fourth and fifth metatarsal shafts proximally. On postcontrast imaging, no evidence of necrotic or nonviable bone demonstrated. 2. Large ankle joint effusion/synovitis with complex appearing and multilocular phlegmon infiltrating through the lateral aspect of Kager's fat pad as well as the sinus tarsi. Suspect infected calcaneocuboid articulation effusion given proximity to adjacent lateral skin wound. Recommendation on previous admission was for amputation, including R BKA, but patient refused and elected to try try IVAB and pain control with understanding that IV treatment was not guaranteed. He was discharged from hospital in beginning of March and returned to ED due to reasons above. Vascular surgery was contacted in the ED and will attempt to schedule patient for OR. I spoke to the patient through  phone for HPI and he admits to excruciating pain, increased swelling and redness. He received IV infusions today of daptomycin and rocephin. Duplex negative for DVT in hospital    Hospital Course:  The patient was put on surgery schedule and had a right BKA by vascular surgery and he tolerated the procedure well. His pain was well controlled during admission and he was given PO meds at discharge. Infectious disease was consulted and he was continued on the daptomycin and ceftriaxone during his admission, completing an additional 4 days after the operation, which was considered appropriate by infectious disease and vascular surgery. Vascular surgery recommended elevating the RLE on 2 pillows and keep incision clean, dry and intact. Ampu-shield per  was placed. They recommended follow up in one month for staple removal. The rest of the patient's chronic conditions were managed appropriately during their admission. They were medically stable at the time of discharge.     Visit Vitals    /77 (BP 1 Location: Left arm, BP Patient Position: At rest)    Pulse 70    Temp 97.5 °F (36.4 °C)    Resp 16    Ht 5' 2\" (1.575 m)    Wt 65.3 kg (144 lb)    SpO2 100%    BMI 26.34 kg/m2       Physical Exam at Discharge:  General Appearance: NAD, conversant, Persian-speaking  HENT: normocephalic/atraumatic, moist mucus membranes  Lungs: CTA with normal respiratory effort  CV: RRR, no m/r/g  Abdomen: soft, non-tender, normal bowel sounds  Extremities: no cyanosis, R BKA  Neuro: moves all extremities, no focal deficits  Psych: appropriate affect, alert and oriented to person, place and time    Labs Prior to Discharge:  Labs: Results:       Chemistry Recent Labs      03/12/18   0345  03/11/18   0755  03/10/18   0435   GLU  135*  140*  206*   NA  138  138  137   K  4.8  4.3  4.8   CL  102  104  103   CO2  28  28  27   BUN  57*  45*  51*   CREA  1.09  1.15  1.61*   CA  8.1*  8.0*  8.1*   AGAP  8  6  7   BUCR  52*  39*  32*      CBC w/Diff Recent Labs      03/12/18   0345  03/11/18   0755  03/10/18   0435   WBC  7.5  9.8  10.2   RBC  2.64*  2.58*  2.77*   HGB  7.2*  7.0*  7.6*   HCT  23.2*  22.4*  24.0*   PLT  338  318  367   GRANS  61  63  73   LYMPH  27  27  20*   EOS  4  3  1 Cardiac Enzymes No results for input(s): CPK, CKND1, EDA in the last 72 hours. No lab exists for component: CKRMB, TROIP   Coagulation No results for input(s): PTP, INR, APTT in the last 72 hours. No lab exists for component: INREXT    Lipid Panel Lab Results   Component Value Date/Time    Cholesterol, total 93 02/24/2018 05:40 AM    HDL Cholesterol 23 (L) 02/24/2018 05:40 AM    LDL, calculated 49 02/24/2018 05:40 AM    VLDL, calculated 21 02/24/2018 05:40 AM    Triglyceride 105 02/24/2018 05:40 AM    CHOL/HDL Ratio 4.0 02/24/2018 05:40 AM      BNP No results for input(s): BNPP in the last 72 hours. Liver Enzymes No results for input(s): TP, ALB, TBIL, AP, SGOT, GPT in the last 72 hours. No lab exists for component: DBIL   Thyroid Studies Lab Results   Component Value Date/Time    TSH 0.99 03/14/2012 03:30 AM            Significant Imaging:  Nm Bone Scan 3 Phase    Result Date: 2/28/2018  Three-phase bone scan of the feet Indium WBC scan of the feet: Technique: Standard three-phase bone scan performed of the bilateral feet after injection of 36 mCi MDP and dynamic and static images of several modalities were obtained per protocol. The following day patient's bilateral feet were reimaged after injection of 571 uCi indium-111 WBCs and multiple static images of the feet were acquired Comparative scintigraphy:  None. Anatomic correlation:  Recent MRI and plain films. Findings:  3 phase bone scan of the feet demonstrates intense tracer accumulation in the right hindfoot on all 3 phases most intense involving the talus, navicular and calcaneal bones . On Indium scan there is corresponding abnormal uptake in this same location of the right hindfoot. IMPRESSION: Abnormal three-phase bone scan and indium scan scan compatible with osteomyelitis as described.      Nm Inflam Proc Ltd    Result Date: 2/28/2018  Three-phase bone scan of the feet Indium WBC scan of the feet: Technique: Standard three-phase bone scan performed of the bilateral feet after injection of 36 mCi MDP and dynamic and static images of several modalities were obtained per protocol. The following day patient's bilateral feet were reimaged after injection of 571 uCi indium-111 WBCs and multiple static images of the feet were acquired Comparative scintigraphy:  None. Anatomic correlation:  Recent MRI and plain films. Findings:  3 phase bone scan of the feet demonstrates intense tracer accumulation in the right hindfoot on all 3 phases most intense involving the talus, navicular and calcaneal bones . On Indium scan there is corresponding abnormal uptake in this same location of the right hindfoot. IMPRESSION: Abnormal three-phase bone scan and indium scan scan compatible with osteomyelitis as described. Xr Ankle Rt Min 3 V    Result Date: 2/23/2018  Examination: Right ankle 3 views HISTORY: Right ankle swelling. FINDINGS: Nonweightbearing AP, oblique, and lateral projections of the right ankle are compared with prior right foot radiographs 9/12/2017, as well as prior MRI of the right foot performed the same date. In the interval from prior foot radiographs, progressive erosive changes present now involving the anterior aspect of the calcaneus, cuboid, as well as the base of the fifth metatarsal. No adjacent soft tissue gas formation is demonstrated. Multiple foci of heterotopic ossification are noted across the distal tibiofibular syndesmosis in keeping with prior high ankle sprain. 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 There is partial visualization of prior amputation involving the first metatarsal shaft. Circumferential hindfoot and midfoot soft tissue swelling is noted along with lower extremity edema. No retained radiopaque foreign object or soft tissue gas formation.  Extensive atherosclerotic vascular calcifications are noted. IMPRESSION: 1. Progressive erosive changes involving the anterior aspect of the calcaneus, cuboid, and base of the fifth metatarsal compatible with sequela of osteomyelitis. 2. Circumferential hindfoot and midfoot soft tissue swelling without retained radiopaque foreign object. Mri Foot Rt W  Wo Cont    Result Date: 2/26/2018  Examination: MRI right foot without and with contrast. HISTORY: Right foot pain and swelling, evaluation for potential osteomyelitis versus neuropathic arthropathy requested. TECHNIQUE: An MR examination of the right foot was performed utilizing a local coil. Coronal and sagittal STIR and T1 images as well as axial T1 and T2 fat-suppressed images were obtained before the uneventful intravenous administration of 15 mL MultiHance intravenous gadolinium contrast. Postcontrast imaging was performed utilizing T1 fat-suppressed techniques and axial, coronal, and sagittal planes. COMPARISON: Right ankle radiographs 2/23/2018. FINDINGS: There is a small area of lateral skin ulceration, near to the level of the fifth metatarsal base, with sinus tract extending to the level of the calcaneocuboid joint. At this location, there is confluent T1 marrow signal abnormality present involving the cuboid, the vast of the calcaneus (greatest laterally), talus, as well as the base of the fourth and fifth metatarsals. Underlying osseous erosion/distortion of the cuboid is noted, with additional resorptive change also present involving the anterior margin of the calcaneus, as well as the fourth and fifth metatarsal bases. No evidence of necrotic or nonenhancing bone demonstrated. Following contrast administration, tracking from the lateral soft tissue wound, there is abnormal intramedullary enhancement of the cuboid, calcaneus, talus, majority of the navicular, as well as the fourth and fifth metatarsal shafts.  There is a large ankle joint effusion/synovitis demonstrated with additional multilocular effusion/phlegmon present throughout the lateral aspect of Kager's fat pad, the sinus tarsi, with a separate rim-enhancing collection at the calcaneocuboid joint, tracking from the lateral skin wound. Prior right first metatarsal shaft amputation. Medially, the ankle flexor tendons appear grossly intact. Anteriorly, extensor tendons are intact and normal in appearance. Laterally, the peroneal tendons are normally located. The superior peroneal retinaculum is intact. Peroneal tendons below the level of the fibular tip are indistinct The Achilles tendon is intact. Plantar fascia appears within normal limits. Evaluation of the intrinsic foot musculature demonstrates diffuse abnormal intramuscular edema and enhancement. IMPRESSION: 1. Lateral mid foot soft tissue wound with sinus tract extending to the calcaneocuboid articulation. There is contiguous and extensive marrow signal abnormality compatible with osteomyelitis involving the majority of the cuboid, calcaneus, inferior talar head and neck, navicular, as well as the fourth and fifth metatarsal shafts proximally. On postcontrast imaging, no evidence of necrotic or nonviable bone demonstrated. 2. Large ankle joint effusion/synovitis with complex appearing and multilocular phlegmon infiltrating through the lateral aspect of Kager's fat pad as well as the sinus tarsi. Suspect infected calcaneocuboid articulation effusion given proximity to adjacent lateral skin wound. 3. Truncated appearance to the peroneus brevis and longus tendons. 4. Diffusely abnormal intramuscular edema and enhancement, likely in keeping with a combination of myositis from underlying infection as well as sequela of subacute denervation.            Discharge Medications:     Current Discharge Medication List      CONTINUE these medications which have CHANGED    Details   oxyCODONE-acetaminophen (PERCOCET) 5-325 mg per tablet Take 2 Tabs by mouth every four (4) hours as needed. Max Daily Amount: 12 Tabs. Qty: 42 Tab, Refills: 0    Associated Diagnoses: Cellulitis of foot, right         CONTINUE these medications which have NOT CHANGED    Details   amLODIPine (NORVASC) 5 mg tablet Take 1 Tab by mouth daily. Qty: 30 Tab, Refills: 5      aspirin 81 mg chewable tablet Take 1 Tab by mouth daily. Qty: 30 Tab, Refills: 0      losartan (COZAAR) 50 mg tablet Take 1 Tab by mouth daily. Qty: 30 Tab, Refills: 0      metFORMIN (GLUCOPHAGE) 500 mg tablet Take 1 Tab by mouth two (2) times daily (with meals). Qty: 30 Tab, Refills: 0      pravastatin (PRAVACHOL) 10 mg tablet Take 1 Tab by mouth nightly. Qty: 30 Tab, Refills: 0      insulin NPH/insulin regular (HUMULIN 70/30) 100 unit/mL (70-30) injection 12 Units by SubCUTAneous route Before breakfast and dinner. Qty: 3 Vial, Refills: 1      Insulin Syringe-Needle U-100 (INSULIN SYRINGE) 1 mL 28 x 1/2\" syrg 1 Package by Does Not Apply route two (2) times a day. Qty: 120 Syringe, Refills: 1             Activity: PT/OT Eval and Treat    Diet: Resume previous diet    Wound Care: As directed    Follow-up:   Please follow up with your PCP within 7 days to discuss your recent hospitalization. Patient to arrange.   Vascular surgery in 1 month         Total time spent including time spent on final examination and discharge discussion, discharge documentation and records reviewed and medication reconciliation: > 30 minutes    Virginia Kidney, DO  Internal Medicine, Hospitalist  Pager: [phone]

## 2018-03-12 NOTE — PROGRESS NOTES
attempted to complete a  follow up visit with patient in room 2107 this morning but found the patient unable to communicate at this time as he was resting peacefully in bed. Patient is still under contact isolation.  Chaplains will continue to follow and will provide pastoral care on an as needed/requested basis    Chaplain Jordan Holley   Board Certified [address] [place] [address]   [phone]

## 2018-03-12 NOTE — PROGRESS NOTES
Assumed care of pt from [facility] pt awake in bed A&O x 4 , no distress noted and denies pain. Blue phone at bedside. Frequently used items and call bell within reach. Patient verbalized understanding to use call bell for any needs or assistance. Bed locked in lowest position. 1930 Bedside and Verbal shift change report given to REBECA Lange (oncoming nurse) by Alexandra Reyes RN (offgoing nurse). Report included the following information SBAR, Kardex, Intake/Output, MAR and Recent Results.

## 2018-03-12 NOTE — PROGRESS NOTES
Problem: Falls - Risk of  Goal: *Absence of Falls  Document Kurtis Fall Risk and appropriate interventions in the flowsheet.    Outcome: Progressing Towards Goal  Fall Risk Interventions:  Mobility Interventions: Communicate number of staff needed for ambulation/transfer, PT Consult for mobility concerns         Medication Interventions: Patient to call before getting OOB    Elimination Interventions: Call light in reach, Toileting schedule/hourly rounds, Patient to call for help with toileting needs, Urinal in reach

## 2018-03-12 NOTE — PROGRESS NOTES
Problem: Self Care Deficits Care Plan (Adult)  Goal: *Acute Goals and Plan of Care (Insert Text)  Occupational Therapy Goals  Initiated 3/12/2018 within 7 day(s). 1.  Patient will perform grooming tasks while standing with modified independence. 2.  Patient will perform lower body dressing with supervision utilizing AE, prn.  3.  Patient will perform functional task in standing for 8 minutes with modified independence to increase activity tolerance for ADLs. 4.  Patient will perform toilet transfers with modified independence. 5.  Patient will perform all aspects of toileting with modified independence. 6.  Patient will participate in upper extremity therapeutic exercise/activities with modified independence for 8 minutes to maintain BUE strength for functional transfers & ADLs. 7.  Patient will utilize energy conservation techniques during functional activities with minimal verbal cues. Outcome: Progressing Towards Goal  Occupational Therapy EVALUATION    Patient: Elli Balderrama (68 y.o. male)  Date: 3/12/2018  Primary Diagnosis: Osteomyelitis ([address]  osteomyelitis  Procedure(s) (LRB):  right below knee amputation (Right) 4 Days Post-Op   Precautions:  Fall  PLOF: Pt was independent with basic self care tasks and functional mobility PTA. ASSESSMENT :  Based on the objective data described below, the patient presents with impairments with regard to bed mobility, activity tolerance and independence in ADLs due to R BKA. Translation phone utilized to educate pt on role of OT & plan for session; pt agreeable to therapy. C/o 5/10 pain. Supervision for bed mobility; fair sitting balance during oral care. Functional transfer from EOB to maneuver to bathroom w/ CGA/min A. CGA/min A for toilet transfer with vc's for appropriate BUE positioning. SBA/supervision for pericare & clothing management. CGA/min A for balance while standing at sink during hand hygiene task.  Pt educated on POC; he verbalized understanding. Declined to sit up in chair at this time, maneuvered back to supine, RLE elevated, needs within reach. Recommend IPR upon d/c to maximize independence in ADLs & functional mobility. Reporting 7/10 pain; REBECA Batres aware. Patient will benefit from skilled intervention to address the above impairments. Patients rehabilitation potential is considered to be Good  Factors which may influence rehabilitation potential include:   []             None noted  []             Mental ability/status  [x]             Medical condition  []             Home/family situation and support systems  []             Safety awareness  []             Pain tolerance/management  []             Other:        PLAN :  Recommendations and Planned Interventions:  [x]               Self Care Training                  [x]        Therapeutic Activities  [x]               Functional Mobility Training    []        Cognitive Retraining  [x]               Therapeutic Exercises           [x]        Endurance Activities  [x]               Balance Training                   []        Neuromuscular Re-Education  []               Visual/Perceptual Training     [x]   Home Safety Training  [x]               Patient Education                 [x]        Family Training/Education  []               Other (comment):    Frequency/Duration: Patient will be followed by occupational therapy 3-5 times a week to address goals. Discharge Recommendations: Inpatient Rehab  Further Equipment Recommendations for Discharge: tub transfer bench     SUBJECTIVE:   Patient stated It's a 7 now.  (referring to pain)    OBJECTIVE DATA SUMMARY:     Past Medical History:   Diagnosis Date    Cellulitis     rt. foot    Diabetes ([facility]     Osteomyelitis ([facility]     rt toe    Other ill-defined conditions(799.89)      Past Surgical History:   Procedure Laterality Date    HX ORTHOPAEDIC      rt.toe     Barriers to Learning/Limitations: yes;  language  Compensate with: visual, verbal, tactile, kinesthetic cues/model    GCODES:  Self Care  Current  CK= 40-59%   Goal  CI= 1-19%. The severity rating is based on the Other Functional Assessment, MMT, ROM    Eval Complexity: History: LOW Complexity : Brief history review ; Examination: MEDIUM Complexity : 3-5 performance deficits relating to physical, cognitive , or psychosocial skils that result in activity limitations and / or participation restrictions; Decision Making:LOW Complexity : No comorbidities that affect functional and no verbal or physical assistance needed to complete eval tasks     Prior Level of Function/Home Situation:Pt was independent with basic self care tasks and functional mobility PTA. Home Situation  Home Environment: Private residence  # Steps to Enter: 1  One/Two Story Residence: One story (with friend)  Living Alone: No  Support Systems: Friends \ neighbors  Patient Expects to be Discharged to[employer] residence (with friend )  Current DME Used/Available at Home: Crutches  [x]  Right hand dominant   []  Left hand dominant    Cognitive/Behavioral Status:  Neurologic State: Alert  Orientation Level: Oriented X4  Cognition: Follows commands  Safety/Judgement: Awareness of environment; Fall prevention     Skin: Intact (BUEs)  Edema: None noted (BUEs)    Vision/Perceptual:    Acuity: Within Defined Limits      oordination:  Coordination: Within functional limits (BUEs)  Fine Motor Skills-Upper: Right Intact; Left Intact    Gross Motor Skills-Upper: Right Intact; Left Intact     Balance:  Sitting: Impaired  Sitting - Static: Fair (occasional)  Sitting - Dynamic: Fair (occasional)  Standing: Impaired; With support  Standing - Static: Fair  Standing - Dynamic : Fair     Strength:  Strength:  Within functional limits (BUEs; 5/5)    Tone & Sensation:  Tone: Normal (BUEs)  Sensation: Intact (BUEs)    Range of Motion:  AROM: Within functional limits (BUEs: full shoulder/elbow flex)  PROM: Within functional limits (BUEs)      Functional Mobility and Transfers for ADLs:  Bed Mobility:  Supine to Sit: Supervision (Verbal/visual cues for pace/safety)  Sit to Supine: Supervision     Transfers:  Sit to Stand: Minimum assistance    ADL Assessment:  Feeding: Setup;Modified independent  Oral Facial Hygiene/Grooming: Setup;Supervision  Bathing: Moderate assistance  Upper Body Dressing: Setup;Supervision  Lower Body Dressing: Moderate assistance  Toileting: Setup;Stand by assistance    ADL Intervention:  Grooming  Grooming Assistance: Contact guard assistance;Minimum assistance  Washing Hands: Contact guard assistance;Minimum assistance (for balance)  Cues: Physical assistance;Verbal cues provided    Toileting  Toileting Assistance: Supervision/set up;Stand-by assistance  Bowel Hygiene: Supervision/set-up  Clothing Management: Stand-by assistance  Cues: Verbal cues provided  Adaptive Equipment: Grab bars;Elevated seat    Supervision/set-up for oral care while seated at EOB with fair sitting balance. CGA/min A for toilet transfer with vc's for appropriate BUE positioning. SBA/supervision for pericare & clothing management. CGA/min A for balance while standing at sink during hand hygiene task. Cognitive Retraining  Safety/Judgement: Awareness of environment; Fall prevention    Therapeutic Activity:  Functional transfer from EOB to maneuver to bathroom with CGA/min A for balance to ensure safety during functional mobility in prep for ADLs. Pain:  Pre treatment pain level: 5/10  Post treatment pain level: 7/10  Pain Scale 1: Numeric (0 - 10)  Pain Location 1: Leg  Pain Orientation 1: Right    Activity Tolerance:  Fair  Please refer to the flowsheet for vital signs taken during this treatment.   After treatment:   [] Patient left in no apparent distress sitting up in chair  [x] Patient left in no apparent distress in bed  [x] Call bell left within reach  [x] Nursing notified  [] Caregiver present  [] Bed alarm activated    COMMUNICATION/EDUCATION: Pt educated on role of OT, POC and home safety. He verbalized understanding; needs reinforcement. [x] Home safety education was provided and the patient/caregiver indicated understanding. [x] Patient/family have participated as able in goal setting and plan of care. [x] Patient/family agree to work toward stated goals and plan of care. [] Patient understands intent and goals of therapy, but is neutral about his/her participation. [] Patient is unable to participate in goal setting and plan of care.     Thank you for this referral.    Carl Pang MS OTR/L  Time Calculation: 36 mins

## 2018-03-13 ENCOUNTER — HOME HEALTH ADMISSION (OUTPATIENT)
Dept: HOME HEALTH SERVICES | Facility: HOME HEALTH | Age: 38
End: 2018-03-13

## 2018-03-13 ENCOUNTER — APPOINTMENT (OUTPATIENT)
Dept: INFUSION THERAPY | Age: 38
End: 2018-03-13
Payer: SELF-PAY

## 2018-03-13 VITALS
DIASTOLIC BLOOD PRESSURE: 74 MMHG | WEIGHT: 144 LBS | SYSTOLIC BLOOD PRESSURE: 133 MMHG | HEART RATE: 77 BPM | TEMPERATURE: 97.6 F | OXYGEN SATURATION: 100 % | RESPIRATION RATE: 14 BRPM | BODY MASS INDEX: 26.5 KG/M2 | HEIGHT: 62 IN

## 2018-03-13 LAB
BACTERIA SPEC CULT: NORMAL
GLUCOSE BLD STRIP.AUTO-MCNC: 191 MG/DL (ref 70–110)
GLUCOSE BLD STRIP.AUTO-MCNC: 252 MG/DL (ref 70–110)
SERVICE CMNT-IMP: NORMAL

## 2018-03-13 PROCEDURE — 74011250636 HC RX REV CODE- 250/636: Performed by: SURGERY

## 2018-03-13 PROCEDURE — 74011250636 HC RX REV CODE- 250/636: Performed by: HOSPITALIST

## 2018-03-13 PROCEDURE — 82962 GLUCOSE BLOOD TEST: CPT

## 2018-03-13 PROCEDURE — 97116 GAIT TRAINING THERAPY: CPT

## 2018-03-13 PROCEDURE — 97535 SELF CARE MNGMENT TRAINING: CPT

## 2018-03-13 PROCEDURE — 74011636637 HC RX REV CODE- 636/637: Performed by: HOSPITALIST

## 2018-03-13 PROCEDURE — 74011250637 HC RX REV CODE- 250/637: Performed by: HOSPITALIST

## 2018-03-13 RX ORDER — OXYCODONE AND ACETAMINOPHEN 10; 325 MG/1; MG/1
.5-1 TABLET ORAL
Qty: 36 TAB | Refills: 0 | Status: ON HOLD | OUTPATIENT
Start: 2018-03-13 | End: 2022-08-26

## 2018-03-13 RX ORDER — INSULIN GLARGINE 100 [IU]/ML
10 INJECTION, SOLUTION SUBCUTANEOUS DAILY
Status: DISCONTINUED | OUTPATIENT
Start: 2018-03-14 | End: 2018-03-13 | Stop reason: HOSPADM

## 2018-03-13 RX ORDER — OXYCODONE AND ACETAMINOPHEN 7.5; 325 MG/1; MG/1
2 TABLET ORAL
Status: DISCONTINUED | OUTPATIENT
Start: 2018-03-13 | End: 2018-03-13 | Stop reason: HOSPADM

## 2018-03-13 RX ADMIN — INSULIN GLARGINE 8 UNITS: 100 INJECTION, SOLUTION SUBCUTANEOUS at 09:08

## 2018-03-13 RX ADMIN — HYDROMORPHONE HYDROCHLORIDE 2 MG: 2 INJECTION INTRAMUSCULAR; INTRAVENOUS; SUBCUTANEOUS at 01:40

## 2018-03-13 RX ADMIN — HEPARIN SODIUM 5000 UNITS: 5000 INJECTION, SOLUTION INTRAVENOUS; SUBCUTANEOUS at 05:49

## 2018-03-13 RX ADMIN — HYDROMORPHONE HYDROCHLORIDE 2 MG: 2 INJECTION INTRAMUSCULAR; INTRAVENOUS; SUBCUTANEOUS at 09:23

## 2018-03-13 RX ADMIN — DOCUSATE SODIUM 100 MG: 100 CAPSULE, LIQUID FILLED ORAL at 09:07

## 2018-03-13 RX ADMIN — INSULIN LISPRO 6 UNITS: 100 INJECTION, SOLUTION INTRAVENOUS; SUBCUTANEOUS at 09:07

## 2018-03-13 RX ADMIN — HYDROMORPHONE HYDROCHLORIDE 2 MG: 2 INJECTION INTRAMUSCULAR; INTRAVENOUS; SUBCUTANEOUS at 05:49

## 2018-03-13 RX ADMIN — OXYCODONE HYDROCHLORIDE AND ACETAMINOPHEN 2 TABLET: 5; 325 TABLET ORAL at 12:51

## 2018-03-13 RX ADMIN — GABAPENTIN 300 MG: 300 CAPSULE ORAL at 09:07

## 2018-03-13 RX ADMIN — INSULIN LISPRO 3 UNITS: 100 INJECTION, SOLUTION INTRAVENOUS; SUBCUTANEOUS at 12:39

## 2018-03-13 RX ADMIN — HEPARIN SODIUM 5000 UNITS: 5000 INJECTION, SOLUTION INTRAVENOUS; SUBCUTANEOUS at 12:39

## 2018-03-13 NOTE — ANCILLARY DISCHARGE INSTRUCTIONS
[facility] [address]  Discharge Phone Call       After-Care Discharge Phone Call Questions: no answer     Were you able to get your prescriptions filled? Comment:      [] Yes  []No    Comment if answer is \"No\"   Are you taking your medication(s) as your doctor ordered? Do you understand the purpose of your medications? Comment:    [] Yes  []No    Comment if answer is \"No\"   Are you taking any other medications that are not on the list?  Comment:      [] Yes  []No    Comment if answer is \"Yes\"   Do you have any questions about your medications? Are you aware of potential side effects? Comment:    [] Yes  []No    Comment if answer is \"Yes\"   Did you make your follow-up appointments (if the hospital did not do this before  discharge)? Comment:    [] Yes  []No    Comment if answer is \"No\"   Is there any reason you might not be able to keep your follow-up appointments? Comment:     [] Yes  []No    Comment if answer is \"Yes\"   Do you have any questions about your care plan? Are you aware of what health problems to be alert for? Comment:    [] Yes  []No    Comment if answer is \"Yes\"   Do you have a good understanding of how you should manage your health? Comment:    [] Yes  []No    Comment if answer is \"Yes\"   Do you know which symptoms to watch for that would mean you would need to call your doctor right away? Comment:      [] Yes  []No    Comment if answer is \"No\"   Do you have any questions about the follow up process or any instructions that we have provided? Comment:    [] Yes  []No    Comment if answer is \"Yes\"   Did staff take your preferences into account?         [] Yes  []No    Comment if answer is \"Yes\"

## 2018-03-13 NOTE — PROGRESS NOTES
FOC on the chart for [facility]. .. Referral sent to [facility] via [address] and called to the [facility] for f/u. Included in the CC note and the VMM the pt will dc to Haylee fierro @ [phone], [address] [facility] Care Management Interventions  PCP Verified by CM: Yes  Mode of Transport at Discharge:  Other (see comment)  Transition of Care Consult (CM Consult): [address]: Yes  Plan discussed with Pt/Family/Caregiver: Yes  Freedom of Choice Offered: Yes  Discharge Location  Discharge Placement: Home with home health

## 2018-03-13 NOTE — PROGRESS NOTES
Delivered walker to pts room, using blue phone told him this is the third walker we have provided him, we will not give him another one.told him checked with shaheed herrera, my boss and we will not pay for transport to md appointments, or pay for scripts for pain meds. We are sending out hh and he needs to be home, otherwise they will not come.

## 2018-03-13 NOTE — PROGRESS NOTES
Problem: Falls - Risk of  Goal: *Absence of Falls  Document Kurtis Fall Risk and appropriate interventions in the flowsheet.    Outcome: Progressing Towards Goal  Fall Risk Interventions:  Mobility Interventions: Patient to call before getting OOB         Medication Interventions: Patient to call before getting OOB    Elimination Interventions: Call light in reach

## 2018-03-13 NOTE — PROGRESS NOTES
Problem: Self Care Deficits Care Plan (Adult)  Goal: *Acute Goals and Plan of Care (Insert Text)  Occupational Therapy Goals  Initiated 3/12/2018 within 7 day(s). 1.  Patient will perform grooming tasks while standing with modified independence. 2.  Patient will perform lower body dressing with supervision utilizing AE, prn.  3.  Patient will perform functional task in standing for 8 minutes with modified independence to increase activity tolerance for ADLs. 4.  Patient will perform toilet transfers with modified independence. 5.  Patient will perform all aspects of toileting with modified independence. 6.  Patient will participate in upper extremity therapeutic exercise/activities with modified independence for 8 minutes to maintain BUE strength for functional transfers & ADLs. 7.  Patient will utilize energy conservation techniques during functional activities with minimal verbal cues. Occupational Therapy TREATMENT    Patient: Jone Tiwari (7 y.o. male)  Date: 3/13/2018  Diagnosis: Osteomyelitis ([facility] [address]  osteomyelitis Osteomyelitis ([facility])  Procedure(s) (LRB):  right below knee amputation (Right) 5 Days Post-Op  Precautions: Fall  Chart, occupational therapy assessment, plan of care, and goals were reviewed. PLOF: Pt independent with basic self-care tasks and functional mobility PTA. ASSESSMENT:  Pt presented supine in bed agreeable to skilled OT services this date. Pt independent with bed mobility. Pt performed functional mobility to bathroom utilizing RW. Pt performed self care tasks standing at sink. Modified independent for washing face, combing hair, brushing teeth, and washing hands. Pt returned to bed and sat EOB. Pt left comfortably seated EOB and call bell within reach. EDUCATION Pt educated on EC techniques while performing ADLs.   Progression toward goals:  [x]          Improving appropriately and progressing toward goals  []          Improving slowly and progressing toward goals  [] Not making progress toward goals and plan of care will be adjusted     PLAN:  Patient continues to benefit from skilled intervention to address the above impairments. Continue treatment per established plan of care. Discharge Recommendations:  Home Health  Further Equipment Recommendations for Discharge:  transfer bench     SUBJECTIVE:   Patient stated [facility] do you say this in [place].   Referring to the comb he was holding. OBJECTIVE DATA SUMMARY:     G CODES:  Self Care  Current  CJ= 20-39%. The severity rating is based on the Other functional assessment    Cognitive/Behavioral Status:  Neurologic State: Alert  Orientation Level: Oriented X4  Cognition: Follows commands  Safety/Judgement: Awareness of environment, Fall prevention  Functional Mobility and Transfers for ADLs:   Bed Mobility:     Supine to Sit: Independent  Sit to Supine: Independent      Transfers:  Sit to Stand: Contact guard assistance     Balance:  Sitting: Intact  Sitting - Static: Good (unsupported)  Sitting - Dynamic: Good (unsupported)  Standing: Impaired; With support  Standing - Static: Fair  Standing - Dynamic : Fair  ADL Intervention:       Grooming  Washing Face: Modified independent (standing at sink)  Washing Hands: Modified independent (standing at sink)  Brushing Teeth: Modified independent (standing at sink)  Brushing/Combing Hair: Modified independent (standing at sink)  Adaptive Equipment: Other (comment) (RW for balance and safety)      Pain:  Pre Treatment:5/10  Post Treatment:5/10  Pain Scale 1: Numeric (0 - 10)  Pain Intensity 1: 7  Pain Location 1: Leg  Pain Orientation 1: Right  Pain Description 1: Aching;Phantom  Pain Intervention(s) 1: Medication (see MAR)    Activity Tolerance:    Good  Please refer to the flowsheet for vital signs taken during this treatment.   After treatment:   []  Patient left in no apparent distress sitting up in chair  [x]  Patient left in no apparent distress in bed (seated EOB)  [x] Call bell left within reach  []  Nursing notified  []  Caregiver present  []  Bed alarm activated    LANE Kelley/L  Time Calculation: 19 mins

## 2018-03-13 NOTE — PROGRESS NOTES
Patient received in bed awake. Patient A&Ox4, denies pain and discomfort. No distress noted. Frequently used items within reach. Bed locked in low position. Call bell within reach and patient verbalized understanding of use for assistance and needs. 1702 -- Reviewed discharge instructions, prescriptions, and follow-up appointments with the patient. Patient verbalized understanding. Home phone confirmed with patient for vascular follow-up appointment. Patient discharged from unit with personal belongings via wheelchair. No distress noted at this time.

## 2018-03-13 NOTE — PROGRESS NOTES
Spoke with pt,using blue phone for translation, [facility]. He will have to go home with hh. He is going to a friends home, he will give me address. he will call her to pick him up. He has no transport to f/u with md. Told him we will send hh and get him a walker. Paged dr Erin Ndiaye. Pt wants to speak with him about pain meds, antibxs. Gave him applications for assist with prosthetics.  Plan home to friends with hh.

## 2018-03-13 NOTE — PROGRESS NOTES
Per betsy , [facility] md office to call pt with appointment. notified pts nurse. she will let pt know and confirm phone number.

## 2018-03-13 NOTE — DISCHARGE SUMMARY
[facility] [place]    [place] Summary    Patient: Candelario Abbott MRN: [id number]  CSN: [id number]    YOB: 1980  Age: 45 y.o. Sex: male    DOA: 3/6/2018 LOS:  LOS: 7 days   Discharge Date: 3/13/2018     Admission Diagnoses: Osteomyelitis ([facility]  osteomyelitis    Discharge Diagnoses:    Problem List as of 3/13/2018  Date Reviewed: 2/28/2018          Codes Class Noted - Resolved    * (Principal)Osteomyelitis ([place] [address] ICD-10-CM: M86.9  ICD-9-CM: 730.20  3/6/2018 - Present        Cellulitis of foot, right ICD-10-CM: L03.115  ICD-9-CM: 682.7  2/23/2018 - Present        Anemia ICD-10-CM: D64.9  ICD-9-CM: 285.9  9/12/2017 - Present        SAVANNA (acute kidney injury) ([place] [address].) ICD-10-CM: N17.9  ICD-9-CM: 584.9  9/12/2017 - Present        DM (diabetes mellitus) ([place] [address] (Chronic) ICD-10-CM: E11.9  ICD-9-CM: 250.00  9/12/2017 - Present        Iron deficiency anemia ICD-10-CM: D50.9  ICD-9-CM: 280.9  5/21/2015 - Present        Hypertension ICD-10-CM: I10  ICD-9-CM: 401.9  5/19/2015 - Present        Acute renal injury ([place] [address].) ICD-10-CM: N17.9  ICD-9-CM: 584.9  5/18/2015 - Present        Diabetic foot ulcer ([place] [address] ICD-10-CM: E11.621, L97.509  ICD-9-CM: 250.80, 707.15  5/16/2015 - Present        Cellulitis ICD-10-CM: L03.90  ICD-9-CM: 682.9  11/21/2014 - Present        Cellulitis and abscess ICD-10-CM: L03.90, L02.91  ICD-9-CM: 682.9  11/21/2014 - Present        Abscess of right thigh ICD-10-CM: L02.415  ICD-9-CM: 682.6  7/15/2014 - Present        Sepsis ([facility] ICD-10-CM: A41.9  ICD-9-CM: 038.9, 995.91  7/14/2014 - Present        Abscess of bursa, left knee ICD-10-CM: M71.062  ICD-9-CM: 727.89  6/19/2013 - Present        Cellulitis of left knee ICD-10-CM: L03.116  ICD-9-CM: 682.6  6/15/2013 - Present        DM (diabetes mellitus), secondary uncontrolled ([facility] ICD-10-CM: E13.65  ICD-9-CM: 249.01  6/15/2013 - Present              Discharge Condition: Stable    Discharge To:  Home with [facility]     Consults: Hospitalist, Infectious Disease, Vascular Surgery and Podiatry     Hospital Course: Brodie Canada a 45 y. o. male with a PMHx of chronic MRSA osteomyelitis Rt ankle, recent MRSA bacteremia, DM-II, HTN who presented to the ED from [facility] for uncontrolled pain and worsening swelling/redness of RLE. The patient has a long history. Patient reported that he had right foot surgery a few months ago but was not able to specify why. Review of records indicated h/o osteomyelitis of ankle, diabetes, gas gangrene, hypertension and anemia. Recent MRI Right foot Lateral mid foot soft tissue wound with sinus tract extending to the calcaneocuboid articulation. There is contiguous and extensive marrow signal abnormality compatible with osteomyelitis involving the majority of the cuboid, calcaneus, inferior talar head and neck, navicular, as well as the fourth and fifth metatarsal shafts proximally. On postcontrast imaging, no evidence of necrotic or nonviable bone demonstrated. 2. Large ankle joint effusion/synovitis with complex appearing and multilocular phlegmon infiltrating through the lateral aspect of Kager's fat pad as well as the sinus tarsi. Suspect infected calcaneocuboid articulation effusion given proximity to adjacent lateral skin wound. Recommendation on previous admission was for amputation, including R BKA, but patient refused and elected to try try IVAB and pain control with understanding that IV treatment was not guaranteed. He was discharged from hospital in beginning of March and returned to ED due to reasons above on 3/6/18. Vascular surgery was contacted in the ED and patient was taken OR on 3/8/18 for Right BKA. Patient tolerated procedure well. His pain was well controlled during admission and he was given PO meds at discharge.  Infectious disease was consulted and he was continued on the daptomycin and ceftriaxone during his admission, completing an additional 4 days after the operation, which was considered appropriate by infectious disease and vascular surgery. Vascular surgery recommended elevating the RLE on 2 pillows and keep incision clean, dry and intact. Ampu-shield per  was placed. Patient recommended to follow up in one month for staple removal with Vascular Surgery. Patient was declined from ARU. Patient will discharge home. Discussed discharge with patient via [employer]  #[id number]. Via  patient stated he would not arrive to follow up appointments. Patient encouraged to make arrangements for follow up appointments with friends, family or via public transportation. Patient is appropriate for discharge home with home health services and instructions for follow up listed below. Physical Exam:  General appearance: alert, cooperative, no distress, appears stated age  Lungs: clear to auscultation bilaterally, no wheezes   Heart: regular rate and rhythm, S1, S2 normal, no murmur, click, rub or gallop  Abdomen: soft, non tender, non distended. Normoactive bowel sounds  Extremities: RLE with ampushield in place  Skin: Skin color, texture, turgor normal. No rashes or lesions  Neurologic: moves all 4 extremities   PSY: Mood and affect normal, appropriately behaved    Significant Diagnostic Studies:   Recent Results (from the past 12 hour(s))   GLUCOSE, POC    Collection Time: 03/13/18  7:38 AM   Result Value Ref Range    Glucose (POC) 252 (H) 70 - 110 mg/dL   GLUCOSE, POC    Collection Time: 03/13/18 11:56 AM   Result Value Ref Range    Glucose (POC) 191 (H) 70 - 110 mg/dL         Discharge Medications:     Current Discharge Medication List      START taking these medications    Details   oxyCODONE-acetaminophen (PERCOCET)  mg per tablet Take 0.5-1 Tabs by mouth every four (4) hours as needed for Pain. Max Daily Amount: 6 Tabs. Qty: 36 Tab, Refills: 0    Associated Diagnoses: Below knee amputation status, right (HCC)      naloxone 2 mg/actuation spry Use 1 spray intranasally into 1 nostril.  Use a new Narcan nasal spray for subsequent doses and administer into alternating nostrils. May repeat every 2 to 3 minutes as needed. Indications: OPIATE-INDUCED RESPIRATORY DEPRESSION  Qty: 1 Blister, Refills: 0         CONTINUE these medications which have CHANGED    Details   oxyCODONE-acetaminophen (PERCOCET) 5-325 mg per tablet Take 2 Tabs by mouth every four (4) hours as needed. Max Daily Amount: 12 Tabs. Qty: 42 Tab, Refills: 0    Associated Diagnoses: Cellulitis of foot, right         CONTINUE these medications which have NOT CHANGED    Details   amLODIPine (NORVASC) 5 mg tablet Take 1 Tab by mouth daily. Qty: 30 Tab, Refills: 5      aspirin 81 mg chewable tablet Take 1 Tab by mouth daily. Qty: 30 Tab, Refills: 0      losartan (COZAAR) 50 mg tablet Take 1 Tab by mouth daily. Qty: 30 Tab, Refills: 0      metFORMIN (GLUCOPHAGE) 500 mg tablet Take 1 Tab by mouth two (2) times daily (with meals). Qty: 30 Tab, Refills: 0      pravastatin (PRAVACHOL) 10 mg tablet Take 1 Tab by mouth nightly. Qty: 30 Tab, Refills: 0      insulin NPH/insulin regular (HUMULIN 70/30) 100 unit/mL (70-30) injection 12 Units by SubCUTAneous route Before breakfast and dinner. Qty: 3 Vial, Refills: 1      Insulin Syringe-Needle U-100 (INSULIN SYRINGE) 1 mL 28 x 1/2\" syrg 1 Package by Does Not Apply route two (2) times a day. Qty: 120 Syringe, Refills: 1               Activity: Activity as tolerated and PT/OT per Home Health    Diet: Diabetic Diet    Wound Care: Keep wound clean and dry and Reinforce dressing PRN.  Follow up in 1 month with Vascular for staple removal     Follow-up:   Dr. Fredi Madrigal 3/19/2018 at 1:30 pm  Vascular Surgery in 1 month for staple removal- patient to set up appointment- [phone]     Discharge time: 60 minutes   Jaimee Morelos NP  3/13/2018, 1:58 PM

## 2018-03-13 NOTE — PROGRESS NOTES
Problem: Mobility Impaired (Adult and Pediatric)  Goal: *Acute Goals and Plan of Care (Insert Text)  Physical Therapy Goals  Initiated 3/11/2018 and to be accomplished within 7 day(s)  1. Patient will move from supine to sit and sit to supine , scoot up and down and roll side to side in bed with modified independence. 2.  Patient will transfer from bed to chair and chair to bed with modified independence using the least restrictive device. 3.  Patient will perform sit to stand with modified independence. 4.  Patient will ambulate with modified independence for 150 feet with the least restrictive device. 5.  Patient will ascend/descend 1 stairs with no handrail(s) with modified independence. 6.  Patient will be independent with home exercise program.     Outcome: Progressing Towards Goal  physical Therapy TREATMENT    Patient: Jessica Salomon (87 y.o. male)  Date: 3/13/2018  Diagnosis: Osteomyelitis ([address]  osteomyelitis Osteomyelitis (HCC)  Procedure(s) (LRB):  right below knee amputation (Right) 5 Days Post-Op  Precautions: Fall   Chart, physical therapy assessment, plan of care and goals were reviewed. PLOF: independent  ASSESSMENT: Pt is Cypriot speaking. 4/10 pain prior to moving, 10/10 s/p gait. No assistance needed for bed mobility. Pt ambulated in room with RW, 45ft, gently controlled hopping, no LOB, multiple standing breaks due to R residual limb throbbing. Pt returned to bed, RLE elevated on 2 pillows. Education: ambulated short distances otherwise keep RLE elevated  Progression toward goals:  []      Improving appropriately and progressing toward goals  [x]      Improving slowly and progressing toward goals  []      Not making progress toward goals and plan of care will be adjusted     PLAN:  Patient continues to benefit from skilled intervention to address the above impairments. Continue treatment per established plan of care.   Discharge Recommendations:  Home Health  Further Equipment Recommendations for Discharge:  rolling walker     SUBJECTIVE:   Patient stated . (reports R residual limb is throbbing)    OBJECTIVE DATA SUMMARY:   Critical Behavior:  Neurologic State: Alert  Orientation Level: Oriented X4  Cognition: Follows commands  Safety/Judgement: Awareness of environment, Fall prevention  Functional Mobility Training:  Bed Mobility:  Supine to Sit: Independent  Sit to Supine: Independent  Transfers:  Sit to Stand: Contact guard assistance  Stand to Sit: Supervision  Balance:  Sitting: Intact  Sitting - Static: Good (unsupported)  Sitting - Dynamic: Good (unsupported)  Standing: Impaired; With support  Standing - Static: Fair  Standing - Dynamic : Fair  Ambulation/Gait Training:  Distance (ft): 45 Feet (ft)  Assistive Device: Gait belt;Walker, rolling  Ambulation - Level of Assistance: Contact guard assistance;Stand-by assistance  Gait Abnormalities: Antalgic  Base of Support: Shift to left  Speed/Jo Ann: Slow    Pain:  Pre:4  Post:10  Pain Scale 1: Numeric (0 - 10)  Pain Location 1: Leg  Pain Orientation 1: Right  Pain Description 1: Aching;Phantom  Activity Tolerance:   Fair  Please refer to the flowsheet for vital signs taken during this treatment.   After treatment:   [] Patient left in no apparent distress sitting up in chair  [x] Patient left in no apparent distress in bed  [x] Call bell left within reach  [x] Nursing notified  [] Caregiver present  [] Bed alarm activated      Case Chandler PTA   Time Calculation: 17 mins

## 2018-03-13 NOTE — PROGRESS NOTES
[facility]  Hospitalist Division        Inpatient Daily Progress Note    Daily progress Note    Patient: Yessenia Dowd MRN: [id number]  [facility]: [id number]    YOB: 1980  Age: 45 y.o. Sex: male    DOA: 3/6/2018 LOS:  LOS: 7 days                    Chief Complaint:  Osteomyelitis- s/p Right BKA     Interval History:    Carmen Cooper a 45 y. o. male with a PMHx of chronic MRSA osteomyelitis Rt ankle, recent MRSA bacteremia, DM-II, HTN who presented to the ED from [facility] for uncontrolled pain and worsening swelling/redness of RLE. The patient has a long history. Patient reported that he had right foot surgery a few months ago but was not able to specify why. Review of records indicated h/o osteomyelitis of ankle, diabetes, gas gangrene, hypertension and anemia. Recent MRI Right foot Lateral mid foot soft tissue wound with sinus tract extending to the calcaneocuboid articulation. There is contiguous and extensive marrow signal abnormality compatible with osteomyelitis involving the majority of the cuboid, calcaneus, inferior talar head and neck, navicular, as well as the fourth and fifth metatarsal shafts proximally. On postcontrast imaging, no evidence of necrotic or nonviable bone demonstrated. 2. Large ankle joint effusion/synovitis with complex appearing and multilocular phlegmon infiltrating through the lateral aspect of Kager's fat pad as well as the sinus tarsi. Suspect infected calcaneocuboid articulation effusion given proximity to adjacent lateral skin wound. Recommendation on previous admission was for amputation, including R BKA, but patient refused and elected to try try IVAB and pain control with understanding that IV treatment was not guaranteed. He was discharged from hospital in beginning of March and returned to ED due to reasons above on 3/6/18. Vascular surgery was contacted in the ED and patient was taken OR on 3/8/18 for Right BKA. Patient tolerated procedure well. His pain was well controlled during admission and he was given PO meds at discharge. Infectious disease was consulted and he was continued on the daptomycin and ceftriaxone during his admission, completing an additional 4 days after the operation, which was considered appropriate by infectious disease and vascular surgery. Vascular surgery recommended elevating the RLE on 2 pillows and keep incision clean, dry and intact. Ampu-shield per  was placed. Patient recommended to follow up in one month for staple removal with Vascular Surgery. Patient currently waiting for acceptance to ARU. Subjective:      C/o increased pain with activity     Objective:      Visit Vitals    /75 (BP 1 Location: Left arm, BP Patient Position: At rest)    Pulse 73    Temp 97.7 °F (36.5 °C)    Resp 16    Ht 5' 2\" (1.575 m)    Wt 65.3 kg (144 lb)    SpO2 100%    BMI 26.34 kg/m2       Physical Exam:  General appearance: alert, cooperative, no distress, appears stated age  Lungs: clear to auscultation bilaterally, no wheezes   Heart: regular rate and rhythm, S1, S2 normal, no murmur, click, rub or gallop  Abdomen: soft, non tender, non distended.  Normoactive bowel sounds  Extremities: RLE with ampushield in place  Skin: Skin color, texture, turgor normal. No rashes or lesions  Neurologic: moves all 4 extremities   PSY: Mood and affect normal, appropriately behaved      Intake and Output:  Current Shift:  03/13 0701 - 03/13 1900  In: 480 [P.O.:480]  Out: 600 [Urine:600]  Last three shifts:  03/11 1901 - 03/13 0700  In: 960 [P.O.:960]  Out: 2550 [Urine:2550]    Recent Results (from the past 24 hour(s))   GLUCOSE, POC    Collection Time: 03/12/18 11:56 AM   Result Value Ref Range    Glucose (POC) 156 (H) 70 - 110 mg/dL   GLUCOSE, POC    Collection Time: 03/12/18  4:26 PM   Result Value Ref Range    Glucose (POC) 184 (H) 70 - 110 mg/dL   GLUCOSE, POC    Collection Time: 03/12/18 10:52 PM Result Value Ref Range    Glucose (POC) 218 (H) 70 - 110 mg/dL   GLUCOSE, POC    Collection Time: 03/13/18  7:38 AM   Result Value Ref Range    Glucose (POC) 252 (H) 70 - 110 mg/dL           Lab Results   Component Value Date/Time    Glucose 135 (H) 03/12/2018 03:45 AM    Glucose 140 (H) 03/11/2018 07:55 AM    Glucose 206 (H) 03/10/2018 04:35 AM    Glucose 170 (H) 03/09/2018 04:16 AM    Glucose 93 03/08/2018 05:16 AM        Assessment/Plan:     Patient Active Problem List   Diagnosis Code    Cellulitis of left knee L03. 80    DM (diabetes mellitus), secondary uncontrolled ([facility]) E13.65    Abscess of bursa, left knee M71.062    Sepsis ([facility] A41.9    Abscess of right thigh L02.415    Cellulitis L03.90    Cellulitis and abscess L03.90, L02.91    Diabetic foot ulcer ([facility]) E11.621, L97.509    Acute renal injury ([facility] N17.9    Hypertension I10    Iron deficiency anemia D50.9    Anemia D64.9    SAVANNA (acute kidney injury) ([address] N17.9    DM (diabetes mellitus) ([facility]) E11.9    Cellulitis of foot, right L03.115    Osteomyelitis ([facility]) M86.9       A/P:  S/p Right BKA 2/2 osteomyelitis   - Completed IV abx course  - Pain management- increase percocet to 7.5     HTN  - Stable off antihypertensives  - Continue to monitor     DM-II  - SSI  - Lantus  - Diabetic Diet    Diabetic Neuropathy  - Gabapentin     DVT prophylaxis  - Heparin     Continue PT/OT    Dispo: awaiting response from [facility] for possible transfer       ELIN Sol [address]  Pager:  [phone]  Office:  [phone]

## 2018-03-13 NOTE — DISCHARGE INSTRUCTIONS
DISCHARGE SUMMARY from Nurse    PATIENT INSTRUCTIONS:    After general anesthesia or intravenous sedation, for 24 hours or while taking prescription Narcotics:  · Limit your activities  · Do not drive and operate hazardous machinery  · Do not make important personal or business decisions  · Do  not drink alcoholic beverages  · If you have not urinated within 8 hours after discharge, please contact your surgeon on call. Report the following to your surgeon:  · Excessive pain, swelling, redness or odor of or around the surgical area  · Temperature over 100.5  · Nausea and vomiting lasting longer than 4 hours or if unable to take medications  · Any signs of decreased circulation or nerve impairment to extremity: change in color, persistent  numbness, tingling, coldness or increase pain  · Any questions  ·     Activity: Activity as tolerated and PT/OT per Home Health     Diet: Diabetic Diet     Wound Care: Keep wound clean and dry and Reinforce dressing PRN. Follow up in 1 month with Vascular for staple removal.     Follow-up:   Dr. Lola Sampson 3/19/2018 at 1:30 pm  Vascular Surgery in 1 month for staple removal- patient to set up [facility] [phone]     What to do at Home:    If you experience any of the following symptoms as listed in the discharge instructions as \"When to Call for Help\", please follow up with your primary care physician and/or call 911. *  Please give a list of your current medications to your Primary Care Provider. *  Please update this list whenever your medications are discontinued, doses are      changed, or new medications (including over-the-counter products) are added. *  Please carry medication information at all times in case of emergency situations.     These are general instructions for a healthy lifestyle:    No smoking/ No tobacco products/ Avoid exposure to second hand smoke  Surgeon General's Warning:  Quitting smoking now greatly reduces serious risk to your health. Obesity, smoking, and sedentary lifestyle greatly increases your risk for illness    A healthy diet, regular physical exercise & weight monitoring are important for maintaining a healthy lifestyle    You may be retaining fluid if you have a history of heart failure or if you experience any of the following symptoms:  Weight gain of 3 pounds or more overnight or 5 pounds in a week, increased swelling in our hands or feet or shortness of breath while lying flat in bed. Please call your doctor as soon as you notice any of these symptoms; do not wait until your next office visit. Recognize signs and symptoms of STROKE:    F-face looks uneven    A-arms unable to move or move unevenly    S-speech slurred or non-existent    T-time-call 911 as soon as signs and symptoms begin-DO NOT go       Back to bed or wait to see if you get better-TIME IS BRAIN. Warning Signs of HEART ATTACK     Call 911 if you have these symptoms:   Chest discomfort. Most heart attacks involve discomfort in the center of the chest that lasts more than a few minutes, or that goes away and comes back. It can feel like uncomfortable pressure, squeezing, fullness, or pain.  Discomfort in other areas of the upper body. Symptoms can include pain or discomfort in one or both arms, the back, neck, jaw, or stomach.  Shortness of breath with or without chest discomfort.  Other signs may include breaking out in a cold sweat, nausea, or lightheadedness. Don't wait more than five minutes to call 911 - MINUTES MATTER! Fast action can save your life. Calling 911 is almost always the fastest way to get lifesaving treatment. Emergency Medical Services staff can begin treatment when they arrive -- up to an hour sooner than if someone gets to the hospital by car. The discharge information has been reviewed with the patient. The patient verbalized understanding.   Discharge medications reviewed with the patient and appropriate educational materials and side effects teaching were provided.   ___________________________________________________________________________________________________________________________________    Patient armband removed and shredded

## 2018-03-14 ENCOUNTER — HOME CARE VISIT (OUTPATIENT)
Dept: HOME HEALTH SERVICES | Facility: HOME HEALTH | Age: 38
End: 2018-03-14

## 2018-03-14 ENCOUNTER — HOME CARE VISIT (OUTPATIENT)
Dept: SCHEDULING | Facility: HOME HEALTH | Age: 38
End: 2018-03-14

## 2018-03-14 ENCOUNTER — APPOINTMENT (OUTPATIENT)
Dept: INFUSION THERAPY | Age: 38
End: 2018-03-14
Payer: SELF-PAY

## 2018-03-14 LAB
BACTERIA SPEC CULT: NORMAL
SERVICE CMNT-IMP: NORMAL

## 2018-03-14 PROCEDURE — G0299 HHS/HOSPICE OF RN EA 15 MIN: HCPCS

## 2018-03-14 PROCEDURE — 400013 HH SOC

## 2018-03-15 ENCOUNTER — APPOINTMENT (OUTPATIENT)
Dept: INFUSION THERAPY | Age: 38
End: 2018-03-15
Payer: SELF-PAY

## 2018-03-16 ENCOUNTER — HOME CARE VISIT (OUTPATIENT)
Dept: SCHEDULING | Facility: HOME HEALTH | Age: 38
End: 2018-03-16

## 2018-03-16 ENCOUNTER — HOME CARE VISIT (OUTPATIENT)
Dept: HOME HEALTH SERVICES | Facility: HOME HEALTH | Age: 38
End: 2018-03-16

## 2018-03-16 PROCEDURE — G0151 HHCP-SERV OF PT,EA 15 MIN: HCPCS

## 2018-03-17 ENCOUNTER — HOME CARE VISIT (OUTPATIENT)
Dept: SCHEDULING | Facility: HOME HEALTH | Age: 38
End: 2018-03-17

## 2018-03-17 ENCOUNTER — HOME CARE VISIT (OUTPATIENT)
Dept: HOME HEALTH SERVICES | Facility: HOME HEALTH | Age: 38
End: 2018-03-17

## 2018-03-17 PROCEDURE — G0155 HHCP-SVS OF CSW,EA 15 MIN: HCPCS

## 2018-03-17 PROCEDURE — G0299 HHS/HOSPICE OF RN EA 15 MIN: HCPCS

## 2018-03-19 ENCOUNTER — HOSPITAL ENCOUNTER (OUTPATIENT)
Dept: LAB | Age: 38
Discharge: HOME | End: 2018-03-19
Payer: SELF-PAY

## 2018-03-19 ENCOUNTER — HOME CARE VISIT (OUTPATIENT)
Dept: SCHEDULING | Facility: HOME HEALTH | Age: 38
End: 2018-03-19

## 2018-03-19 ENCOUNTER — OFFICE VISIT (OUTPATIENT)
Dept: INTERNAL MEDICINE CLINIC | Age: 38
End: 2018-03-19

## 2018-03-19 ENCOUNTER — TELEPHONE (OUTPATIENT)
Dept: INTERNAL MEDICINE CLINIC | Age: 38
End: 2018-03-19

## 2018-03-19 VITALS
SYSTOLIC BLOOD PRESSURE: 98 MMHG | OXYGEN SATURATION: 99 % | DIASTOLIC BLOOD PRESSURE: 60 MMHG | HEART RATE: 57 BPM | TEMPERATURE: 97.1 F

## 2018-03-19 VITALS
RESPIRATION RATE: 16 BRPM | HEIGHT: 62 IN | OXYGEN SATURATION: 100 % | SYSTOLIC BLOOD PRESSURE: 79 MMHG | WEIGHT: 142.8 LBS | TEMPERATURE: 95.9 F | BODY MASS INDEX: 26.28 KG/M2 | DIASTOLIC BLOOD PRESSURE: 55 MMHG | HEART RATE: 59 BPM

## 2018-03-19 DIAGNOSIS — E78.5 DYSLIPIDEMIA: ICD-10-CM

## 2018-03-19 DIAGNOSIS — M86.9 OSTEOMYELITIS, UNSPECIFIED SITE, UNSPECIFIED TYPE (HCC): Primary | ICD-10-CM

## 2018-03-19 DIAGNOSIS — I10 BENIGN HYPERTENSION WITHOUT CHF: ICD-10-CM

## 2018-03-19 DIAGNOSIS — N28.9 ABNORMAL KIDNEY FUNCTION: ICD-10-CM

## 2018-03-19 DIAGNOSIS — Z76.0 MEDICATION REFILL: ICD-10-CM

## 2018-03-19 LAB
ANION GAP SERPL CALC-SCNC: 9 MMOL/L (ref 3–18)
BUN SERPL-MCNC: 41 MG/DL (ref 7–18)
BUN/CREAT SERPL: 28 (ref 12–20)
CALCIUM SERPL-MCNC: 9.3 MG/DL (ref 8.5–10.1)
CHLORIDE SERPL-SCNC: 113 MMOL/L (ref 100–108)
CO2 SERPL-SCNC: 19 MMOL/L (ref 21–32)
CREAT SERPL-MCNC: 1.46 MG/DL (ref 0.6–1.3)
GLUCOSE SERPL-MCNC: 61 MG/DL (ref 74–99)
POTASSIUM SERPL-SCNC: 5.6 MMOL/L (ref 3.5–5.5)
SODIUM SERPL-SCNC: 141 MMOL/L (ref 136–145)

## 2018-03-19 PROCEDURE — 36415 COLL VENOUS BLD VENIPUNCTURE: CPT | Performed by: INTERNAL MEDICINE

## 2018-03-19 PROCEDURE — G0157 HHC PT ASSISTANT EA 15: HCPCS

## 2018-03-19 PROCEDURE — 80048 BASIC METABOLIC PNL TOTAL CA: CPT | Performed by: INTERNAL MEDICINE

## 2018-03-19 RX ORDER — LOSARTAN POTASSIUM 50 MG/1
50 TABLET ORAL DAILY
Qty: 90 TAB | Refills: 3 | Status: ON HOLD | OUTPATIENT
Start: 2018-03-19 | End: 2022-08-26

## 2018-03-19 RX ORDER — AMLODIPINE BESYLATE 5 MG/1
5 TABLET ORAL DAILY
Qty: 90 TAB | Refills: 3 | Status: ON HOLD | OUTPATIENT
Start: 2018-03-19 | End: 2022-08-29 | Stop reason: SDUPTHER

## 2018-03-19 RX ORDER — GUAIFENESIN 100 MG/5ML
81 LIQUID (ML) ORAL DAILY
Qty: 90 TAB | Refills: 3 | Status: SHIPPED | OUTPATIENT
Start: 2018-03-19 | End: 2018-03-19 | Stop reason: SDUPTHER

## 2018-03-19 RX ORDER — GUAIFENESIN 100 MG/5ML
81 LIQUID (ML) ORAL DAILY
Qty: 90 TAB | Refills: 3 | Status: ON HOLD | OUTPATIENT
Start: 2018-03-19 | End: 2022-08-29 | Stop reason: SDUPTHER

## 2018-03-19 RX ORDER — LANCETS
EACH MISCELLANEOUS
Qty: 100 EACH | Refills: 11 | Status: ON HOLD | OUTPATIENT
Start: 2018-03-19 | End: 2022-08-29 | Stop reason: SDUPTHER

## 2018-03-19 RX ORDER — PRAVASTATIN SODIUM 10 MG/1
10 TABLET ORAL
Qty: 90 TAB | Refills: 3 | Status: ON HOLD | OUTPATIENT
Start: 2018-03-19 | End: 2022-08-26

## 2018-03-19 RX ORDER — INSULIN PUMP SYRINGE, 3 ML
EACH MISCELLANEOUS
Qty: 1 KIT | Refills: 0 | Status: SHIPPED | OUTPATIENT
Start: 2018-03-19

## 2018-03-19 RX ORDER — METFORMIN HYDROCHLORIDE 500 MG/1
500 TABLET ORAL 2 TIMES DAILY WITH MEALS
Qty: 90 TAB | Refills: 3 | Status: SHIPPED | OUTPATIENT
Start: 2018-03-19 | End: 2018-03-20 | Stop reason: SDUPTHER

## 2018-03-19 RX ORDER — GUAIFENESIN 100 MG/5ML
81 LIQUID (ML) ORAL DAILY
Qty: 90 TAB | Refills: 3 | Status: SHIPPED | OUTPATIENT
Start: 2018-03-19 | End: 2018-03-19 | Stop reason: CLARIF

## 2018-03-19 NOTE — PROGRESS NOTES
ROOM # 1    Mona Allan presents today for   Chief Complaint   Patient presents with   Major Hospital Follow Up     [facility] f/u       Mona Allan preferred language for health care discussion is english/other. Is someone accompanying this pt? no    Is the patient using any DME equipment during OV? no    Depression Screening:  PHQ over the last two weeks 3/19/2018   Little interest or pleasure in doing things Not at all   Feeling down, depressed or hopeless Not at all   Total Score PHQ 2 0       Learning Assessment:  Learning Assessment 3/19/2018   PRIMARY LEARNER Patient   HIGHEST LEVEL OF EDUCATION - PRIMARY LEARNER  DID NOT GRADUATE [facility] PRIMARY LEARNER LANGUAGE   CO-LEARNER CAREGIVER No   PRIMARY LANGUAGE Setswana    NEED No   LEARNER PREFERENCE PRIMARY LISTENING     READING     DEMONSTRATION   ANSWERED BY patient   RELATIONSHIP SELF       Abuse Screening:  Abuse Screening Questionnaire 3/19/2018   Do you ever feel afraid of your partner? N   Are you in a relationship with someone who physically or mentally threatens you? N   Is it safe for you to go home? Y       Fall Risk  No flowsheet data found. Health Maintenance reviewed and discussed per provider. Yes    Mona Allan is due for TDAP vax, eye exam, microalbumin, foot exam1. Please order/place referral if appropriate. Advance Directive:  1. Do you have an advance directive in place? Patient Reply: no    2. If not, would you like material regarding how to put one in place? Patient Reply: no    Coordination of Care:  1. Have you been to the ER, urgent care clinic since your last visit? Hospitalized since your last visit? no    2. Have you seen or consulted any other health care providers outside of the [address] [place] [address] since your last visit? Include any pap smears or colon screening.  no

## 2018-03-19 NOTE — MR AVS SNAPSHOT
[address] [place] [address] 
 
 
 [address] [facility] [address] 
[phone] Patient: Edward Howard MRN: [id number] ZPB:[id number] Visit Information Date & Time Provider Department Dept. Phone Encounter #  
 3/19/2018  2:00 PM Rebeca Mohr MD [place] [phone] [id number] Follow-up Instructions Return in about 2 weeks (around 4/2/2018) for f/u DM and osteomyelitis. Upcoming Health Maintenance Date Due MICROALBUMIN Q1 1/18/1990 EYE EXAM RETINAL OR DILATED Q1 1/18/1990 DTaP/Tdap/Td series (1 - Tdap) 1/18/2001 FOOT EXAM Q1 5/16/2016 HEMOGLOBIN A1C Q6M 9/6/2018 LIPID PANEL Q1 2/24/2019 Allergies as of 3/19/2018  Review Complete On: 3/19/2018 By: Rebeca Mohr MD  
 No Known Allergies Current Immunizations  Reviewed on 3/6/2018 Name Date Influenza Vaccine 11/21/2017 12:00 AM, 10/1/2017 Pneumococcal Polysaccharide (PPSV-23) 6/26/2013  5:48 PM  
  
 Not reviewed this visit You Were Diagnosed With   
  
 Codes Comments Abnormal kidney function    -  Primary ICD-10-CM: N28.9 ICD-9-CM: 593.9 Benign hypertension without CHF     ICD-10-CM: I10 
ICD-9-CM: 401.1 Uncontrolled type 2 diabetes mellitus without complication, with long-term current use of insulin (HCC)     ICD-10-CM: E11.65, Z79.4 ICD-9-CM: 250.02, V58.67 Dyslipidemia     ICD-10-CM: E78.5 ICD-9-CM: 272.4 Osteomyelitis, unspecified site, unspecified type ([facility]     ICD-10-CM: M86.9 ICD-9-CM: 730.20 Vitals BP Pulse Temp Resp Height(growth percentile) Weight(growth percentile) (!) 79/55 (BP 1 Location: Right arm, BP Patient Position: Sitting) (!) 59 95.9 °F (35.5 °C) (Oral) 16 5' 2\" (1.575 m) 142 lb 12.8 oz (64.8 kg) SpO2 BMI Smoking Status 100% 26.12 kg/m2 Heavy Tobacco Smoker Vitals History BMI and BSA Data Body Mass Index Body Surface Area 26.12 kg/m 2 1.68 m 2 Preferred Pharmacy Pharmacy Name Phone [address], [address] [phone] Your Updated Medication List  
  
   
This list is accurate as of 3/19/18  2:56 PM.  Always use your most recent med list. amLODIPine 5 mg tablet Commonly known as:  Sae Lords Take 1 Tab by mouth daily. aspirin 81 mg chewable tablet Take 1 Tab by mouth daily. Blood-Glucose Meter monitoring kit Commonly known as:  RELION ALL-IN-ONE METER Check fasting sugars three times a day before breakfast, lunch & dinner  
  
 glucose blood VI test strips strip Commonly known as:  blood glucose test  
Check fasting sugars three times a day before breakfast, lunch & dinner for relion meter  
  
 insulin NPH/insulin regular 100 unit/mL (70-30) injection Commonly known as:  HumuLIN 70/30 U-100 Insulin 12 Units by SubCUTAneous route Before breakfast and dinner. Insulin Syringe-Needle U-100 1 mL 28 gauge x 1/2\" Syrg Commonly known as:  INSULIN SYRINGE  
1 Package by Does Not Apply route two (2) times a day. Lancets Misc Check fasting sugars three times a day before breakfast, lunch & dinner for relion meter  
  
 losartan 50 mg tablet Commonly known as:  COZAAR Take 1 Tab by mouth daily. metFORMIN 500 mg tablet Commonly known as:  GLUCOPHAGE Take 1 Tab by mouth two (2) times daily (with meals). naloxone 2 mg/actuation Spry Use 1 spray intranasally into 1 nostril. Use a new Narcan nasal spray for subsequent doses and administer into alternating nostrils. May repeat every 2 to 3 minutes as needed. Indications: OPIATE-INDUCED RESPIRATORY DEPRESSION  
  
 * oxyCODONE-acetaminophen 5-325 mg per tablet Commonly known as:  PERCOCET Take 2 Tabs by mouth every four (4) hours as needed. Max Daily Amount: 12 Tabs. * oxyCODONE-acetaminophen  mg per tablet Commonly known as:  PERCOCET  
 Take 0.5-1 Tabs by mouth every four (4) hours as needed for Pain. Max Daily Amount: 6 Tabs. pravastatin 10 mg tablet Commonly known as:  PRAVACHOL Take 1 Tab by mouth nightly. * Notice: This list has 2 medication(s) that are the same as other medications prescribed for you. Read the directions carefully, and ask your doctor or other care provider to review them with you. Prescriptions Printed Refills  
 amLODIPine (NORVASC) 5 mg tablet 3 Sig: Take 1 Tab by mouth daily. Class: Print Route: Oral  
 losartan (COZAAR) 50 mg tablet 3 Sig: Take 1 Tab by mouth daily. Class: Print Route: Oral  
 metFORMIN (GLUCOPHAGE) 500 mg tablet 3 Sig: Take 1 Tab by mouth two (2) times daily (with meals). Class: Print Route: Oral  
 pravastatin (PRAVACHOL) 10 mg tablet 3 Sig: Take 1 Tab by mouth nightly. Class: Print Route: Oral  
 Blood-Glucose Meter (RELION ALL-IN-ONE METER) monitoring kit 0 Sig: Check fasting sugars three times a day before breakfast, lunch & dinner Class: Print Lancets misc 11 Sig: Check fasting sugars three times a day before breakfast, lunch & dinner for relion meter Class: Print  
 glucose blood VI test strips (BLOOD GLUCOSE TEST) strip 11 Sig: Check fasting sugars three times a day before breakfast, lunch & dinner for relion meter Class: Print Follow-up Instructions Return in about 2 weeks (around 4/2/2018) for f/u DM and osteomyelitis. To-Do List   
 03/20/2018 To Be Determined Appointment with Verline Prader, RN at [facility]  
  
 03/21/2018 To Be Determined Appointment with Diana Phillip PTA at [facility]  
  
 03/22/2018 To Be Determined Appointment with Verline Prader, RN at [facility]  
  
 03/23/2018 To Be Determined Appointment with Saba Tillman PTA at [address] [facility]  
  
 03/26/2018 To Be Determined Appointment with Saba Tillman PTA at [address] [facility]  
  
 03/28/2018 To Be Determined Appointment with Saba Tillman PTA at [address] Patient Instructions 1) Vascular Surgery in 1 month for staple removal- patient to set up appointment- [phone] Call to make an appt. 2) Follow-up in 2 weeks or sooner if worsening symptoms. Introducing [facility]! [employer] introduces [employer] patient portal. Now you can access parts of your medical record, email your doctor's office, and request medication refills online. 1. In your internet browser, go to [email or web address] 2. Click on the First Time User? Click Here link in the Sign In box. You will see the New Member Sign Up page. 3. Enter your [employer] Access Code exactly as it appears below. You will not need to use this code after youve completed the sign-up process. If you do not sign up before the expiration date, you must request a new code. · [employer] Access Code: [email or web address] Expires: 5/27/2018  7:55 AM 
 
4. Enter the last four digits of your Social Security Number (xxxx) and Date of Birth (mm/dd/yyyy) as indicated and click Submit. You will be taken to the next sign-up page. 5. Create a [employer] ID. This will be your [employer] login ID and cannot be changed, so think of one that is secure and easy to remember. 6. Create a [employer] password. You can change your password at any time. 7. Enter your Password Reset Question and Answer. This can be used at a later time if you forget your password. 8. Enter your e-mail address. You will receive e-mail notification when new information is available in [address] 9. Click Sign Up. You can now view and download portions of your medical record. 10. Click the Download Summary menu link to download a portable copy of your medical information. If you have questions, please visit the Frequently Asked Questions section of the [employer] website. Remember, [employer] is NOT to be used for urgent needs. For medical emergencies, dial 911. Now available from your iPhone and Android! Please provide this summary of care documentation to your next provider. If you have any questions after today's visit, please call [phone].

## 2018-03-19 NOTE — TELEPHONE ENCOUNTER
Please call pt to make sure he does not fill his BP medications yet and to recheck his blood pressure. He needs to call us with the results.

## 2018-03-19 NOTE — PROGRESS NOTES
Chief Complaint   Patient presents with   [facility] Follow Up     [facility] f/u       HPI:     Carlin Macdonald is a 45 y.o.  male with history of cellulitis and type 2 DM   here for the above complaint. He is Mosotho speaking, but understands some english. Nurse was . He is accompanied by a friend. He was admitted from 3/6/18-3/13/18 for ostemyelitis of right lower leg. He was put on IV antibiotics in the hospital. Kidney function up. HgA1C 7.5. He was not sent home on oral antibiotics. He was suppose to call vascular to set up appt. To have staples removed. He did not know. Hospital records were reviewed. Pt was asymptomatic. He denies any chest pain, shortness of breath, abdominal pain, headaches or dizziness. He walked out of here fine. He may have taken pain pill before coming into the office. Pt was not symptomatic laying down or standing up. He does not have a DM meter. Past Medical History:   Diagnosis Date    Cellulitis     rt. foot    Diabetes ([address]     Hypercholesterolemia     Hypertension     Osteomyelitis (HCC)     rt toe    Other ill-defined conditions(799.89)      Past Surgical History:   Procedure Laterality Date    HX BELOW KNEE AMPUTATION  03/2018    HX ORTHOPAEDIC      rt.toe     Current Outpatient Prescriptions   Medication Sig    amLODIPine (NORVASC) 5 mg tablet Take 1 Tab by mouth daily.  losartan (COZAAR) 50 mg tablet Take 1 Tab by mouth daily.  metFORMIN (GLUCOPHAGE) 500 mg tablet Take 1 Tab by mouth two (2) times daily (with meals).  pravastatin (PRAVACHOL) 10 mg tablet Take 1 Tab by mouth nightly.     Blood-Glucose Meter (RELION ALL-IN-ONE METER) monitoring kit Check fasting sugars three times a day before breakfast, lunch & dinner    Lancets misc Check fasting sugars three times a day before breakfast, lunch & dinner for relion meter    glucose blood VI test strips (BLOOD GLUCOSE TEST) strip Check fasting sugars three times a day before breakfast, lunch & dinner for relion meter    aspirin 81 mg chewable tablet Take 1 Tab by mouth daily.  oxyCODONE-acetaminophen (PERCOCET)  mg per tablet Take 0.5-1 Tabs by mouth every four (4) hours as needed for Pain. Max Daily Amount: 6 Tabs.  insulin NPH/insulin regular (HUMULIN 70/30) 100 unit/mL (70-30) injection 12 Units by SubCUTAneous route Before breakfast and dinner.  Insulin Syringe-Needle U-100 (INSULIN SYRINGE) 1 mL 28 x 1/2\" syrg 1 Package by Does Not Apply route two (2) times a day.  naloxone 2 mg/actuation spry Use 1 spray intranasally into 1 nostril. Use a new Narcan nasal spray for subsequent doses and administer into alternating nostrils. May repeat every 2 to 3 minutes as needed. Indications: OPIATE-INDUCED RESPIRATORY DEPRESSION    oxyCODONE-acetaminophen (PERCOCET) 5-325 mg per tablet Take 2 Tabs by mouth every four (4) hours as needed. Max Daily Amount: 12 Tabs. No current facility-administered medications for this visit. Facility-Administered Medications Ordered in Other Visits   Medication Dose Route Frequency    sodium chloride (NS) flush 10-40 mL  10-40 mL IntraVENous PRN     Health Maintenance   Topic Date Due    MICROALBUMIN Q1  01/18/1990    EYE EXAM RETINAL OR DILATED Q1  01/18/1990    DTaP/Tdap/Td series (1 - Tdap) 01/18/2001    FOOT EXAM Q1  05/16/2016    HEMOGLOBIN A1C Q6M  09/06/2018    LIPID PANEL Q1  02/24/2019    Pneumococcal 19-64 Medium Risk  Completed    Influenza Age 5 to Adult  Completed     Immunization History   Administered Date(s) Administered    Influenza Vaccine 10/01/2017, 11/21/2017    Pneumococcal Polysaccharide (PPSV-23) 06/26/2013     No LMP for male patient.         Allergies and Intolerances:   No Known Allergies    Family History:   Family History   Problem Relation Age of Onset    Diabetes Mother     Diabetes Father     No Known Problems Sister     No Known Problems Brother     No Known Problems Sister Social History:   He  reports that he has been smoking. He has been smoking about 0.50 packs per day. He has never used smokeless tobacco.  He  reports that he does not drink alcohol. ·     Objective:   Physical exam:   Visit Vitals    BP (!) 79/55 (BP 1 Location: Right arm, BP Patient Position: Sitting)  Comment: pt asymptomatic. He most likely took pain pill prior to appt. Left arm 84/54    Pulse (!) 59    Temp 95.9 °F (35.5 °C) (Oral)    Resp 16    Ht 5' 2\" (1.575 m)    Wt 142 lb 12.8 oz (64.8 kg)    SpO2 100%    BMI 26.12 kg/m2        Generally: Pleasant male in no acute distress  Cardiac Exam: regular, rate, and rhythm. Normal S1 and S2. No murmurs, gallops, or rubs  Pulmonary exam: Clear to auscultation bilaterally  Abdominal exam: Positive bowel sounds in all four quadrants, soft, nondistended, nontender  Extremities: 2+ dorsalis pedis pulses bilaterally. No pedal edema    bilaterally  Right below knee amputation: good wound, c/d/i. Lakisha look good. Wound healing. Nurse cleaned area with saline and rewrapped it. He does not have home health wound care checking the wound. Will contact them. He has home PT who comes 3 times a week, but wound has been checked only once.      LABS/Radiological Tests:  Lab Results   Component Value Date/Time    WBC 7.5 03/12/2018 03:45 AM    HGB 7.2 (L) 03/12/2018 03:45 AM    HCT 23.2 (L) 03/12/2018 03:45 AM    PLATELET 972 59/05/7596 03:45 AM     Lab Results   Component Value Date/Time    Sodium 138 03/12/2018 03:45 AM    Potassium 4.8 03/12/2018 03:45 AM    Chloride 102 03/12/2018 03:45 AM    CO2 28 03/12/2018 03:45 AM    Glucose 135 (H) 03/12/2018 03:45 AM    BUN 57 (H) 03/12/2018 03:45 AM    Creatinine 1.09 03/12/2018 03:45 AM     Lab Results   Component Value Date/Time    Cholesterol, total 93 02/24/2018 05:40 AM    HDL Cholesterol 23 (L) 02/24/2018 05:40 AM    LDL, calculated 49 02/24/2018 05:40 AM    Triglyceride 105 02/24/2018 05:40 AM     No results found for: GPT    Previous labs      ASSESSMENT/PLAN:    1. Osteomyelitis, unspecified site, unspecified type [facility]): resolved. He will call vascular to have the staples removed. 2,. Abnormal kidney function  -     METABOLIC PANEL, BASIC; Future    3. Benign hypertension without CHF: low. He will not take his BP meds yet. He has the rx since he did not have the medications. He will monitor his blood pressure and update us tomorrow with readings. -     amLODIPine (NORVASC) 5 mg tablet; Take 1 Tab by mouth daily. -     losartan (COZAAR) 50 mg tablet; Take 1 Tab by mouth daily. 4. Uncontrolled type 2 diabetes mellitus without complication, with long-term current use of insulin ([address]): We will see how his sugars are doing. Continue the metformin and novolin 70/30. Continue diet and exercise. -     metFORMIN (GLUCOPHAGE) 500 mg tablet; Take 1 Tab by mouth two (2) times daily (with meals). -     Blood-Glucose Meter (RELION ALL-IN-ONE METER) monitoring kit; Check fasting sugars three times a day before breakfast, lunch & dinner  -     Lancets misc; Check fasting sugars three times a day before breakfast, lunch & dinner for relion meter  -     glucose blood VI test strips (BLOOD GLUCOSE TEST) strip; Check fasting sugars three times a day before breakfast, lunch & dinner for relion meter    5. Dyslipidemia  -     pravastatin (PRAVACHOL) 10 mg tablet; Take 1 Tab by mouth nightly. 6.  Medication refill  -     aspirin 81 mg chewable tablet; Take 1 Tab by mouth daily. 7.   Requested Prescriptions     Signed Prescriptions Disp Refills    amLODIPine (NORVASC) 5 mg tablet 90 Tab 3     Sig: Take 1 Tab by mouth daily.  losartan (COZAAR) 50 mg tablet 90 Tab 3     Sig: Take 1 Tab by mouth daily.  metFORMIN (GLUCOPHAGE) 500 mg tablet 90 Tab 3     Sig: Take 1 Tab by mouth two (2) times daily (with meals).  pravastatin (PRAVACHOL) 10 mg tablet 90 Tab 3     Sig: Take 1 Tab by mouth nightly.     Blood-Glucose Meter (RELION ALL-IN-ONE METER) monitoring kit 1 Kit 0     Sig: Check fasting sugars three times a day before breakfast, lunch & dinner    Lancets misc 100 Each 11     Sig: Check fasting sugars three times a day before breakfast, lunch & dinner for relion meter    glucose blood VI test strips (BLOOD GLUCOSE TEST) strip 100 Strip 11     Sig: Check fasting sugars three times a day before breakfast, lunch & dinner for relion meter    aspirin 81 mg chewable tablet 90 Tab 3     Sig: Take 1 Tab by mouth daily. 8. Patient verbalized understanding and agreement with the plan. 9. Patient was given an after-visit summary. 10.   Follow-up Disposition:  Return in about 2 weeks (around 4/2/2018) for f/u DM and osteomyelitis. or sooner if worsening symptoms.                 Ayaka Nunez MD

## 2018-03-19 NOTE — PATIENT INSTRUCTIONS
1) Vascular Surgery in 1 month for staple removal- patient to set up appointment- [phone]     Call to make an appt. 2) Follow-up in 2 weeks or sooner if worsening symptoms.

## 2018-03-19 NOTE — TELEPHONE ENCOUNTER
2 pt. Identifiers confirmed. Pt. Notified of below. Pt. states he does not feel as though he is dizzy or will pass out. He states he is feeling fine and his cousin went to get his meds from the pharmacy. Pt. Advised not to take his BP meds today, to go the the pharmacy tomorrow AM and have his BP checked. Pt. Verbalized understanding. Will call pt. At [phone] tomorrow for BP results. No other questions/concerns at this time.

## 2018-03-20 ENCOUNTER — HOME CARE VISIT (OUTPATIENT)
Dept: SCHEDULING | Facility: HOME HEALTH | Age: 38
End: 2018-03-20

## 2018-03-20 PROCEDURE — G0299 HHS/HOSPICE OF RN EA 15 MIN: HCPCS

## 2018-03-20 NOTE — PROGRESS NOTES
Please let pt know that labs were normal except:     1) kidney function up. Needs to keep hydrated with water. Will monitor. 2) potassium little up at 5.6. Decrease high potassium foods. 3) will recheck levels on Friday or Monday. Whichever is convenient. 4) glucose little low at 61.  Is he having any headaches, dizziness, sweating?

## 2018-03-20 NOTE — TELEPHONE ENCOUNTER
2 pt. Identifiers confirmed. Pt. states that he did not go to the pharmacy today but that a nurse came to look at his wound today and take his BP, which was okay. Pt. Does not remember the numbers and did explain to the nurse that his BP was low at his last OV, but was told by the nurse that his BP was fine. He was able to  his meds and will take his BP meds in the afternoon. He has a f/u aptmt c Dr. Walton Counts 4/2/18 and an aptmt to have his staples removed 4/3/18. No other questions/concerns at this time.

## 2018-03-20 NOTE — TELEPHONE ENCOUNTER
Please call pt to get an update on his blood pressure. Please also call [facility] to see if they can monitor his right lower extremity BKA wound.         Address         [address] [place] [address] Zeny Hernandez [address] [facility] [facility] [address]

## 2018-03-21 ENCOUNTER — HOME CARE VISIT (OUTPATIENT)
Dept: SCHEDULING | Facility: HOME HEALTH | Age: 38
End: 2018-03-21

## 2018-03-21 ENCOUNTER — TELEPHONE (OUTPATIENT)
Dept: INTERNAL MEDICINE CLINIC | Age: 38
End: 2018-03-21

## 2018-03-21 VITALS
DIASTOLIC BLOOD PRESSURE: 70 MMHG | HEART RATE: 90 BPM | TEMPERATURE: 98.3 F | OXYGEN SATURATION: 98 % | SYSTOLIC BLOOD PRESSURE: 130 MMHG

## 2018-03-21 VITALS
RESPIRATION RATE: 16 BRPM | TEMPERATURE: 97 F | DIASTOLIC BLOOD PRESSURE: 70 MMHG | HEART RATE: 69 BPM | SYSTOLIC BLOOD PRESSURE: 100 MMHG | OXYGEN SATURATION: 97 %

## 2018-03-21 PROCEDURE — G0157 HHC PT ASSISTANT EA 15: HCPCS

## 2018-03-21 NOTE — PROGRESS NOTES
Spoke with patient and identifiers was confirmed. Patient didn't know when he could return to the office to get his labs rechecked. Patient couldn't understand the question about the   headaches, dizziness or sweating. Patient said a \"nurse\" is at this house and hung up the phone.

## 2018-03-21 NOTE — TELEPHONE ENCOUNTER
----- Message from Angel Scheuermann, MD sent at 3/20/2018  4:55 PM EDT -----  Please let pt know that labs were normal except:     1) kidney function up. Needs to keep hydrated with water. Will monitor. 2) potassium little up at 5.6. Decrease high potassium foods. 3) will recheck levels on Friday or Monday. Whichever is convenient. 4) glucose little low at 61.  Is he having any headaches, dizziness, sweating?

## 2018-03-22 ENCOUNTER — HOME CARE VISIT (OUTPATIENT)
Dept: SCHEDULING | Facility: HOME HEALTH | Age: 38
End: 2018-03-22

## 2018-03-22 ENCOUNTER — HOME CARE VISIT (OUTPATIENT)
Dept: HOME HEALTH SERVICES | Facility: HOME HEALTH | Age: 38
End: 2018-03-22

## 2018-03-22 VITALS
OXYGEN SATURATION: 95 % | DIASTOLIC BLOOD PRESSURE: 62 MMHG | SYSTOLIC BLOOD PRESSURE: 96 MMHG | HEART RATE: 60 BPM | TEMPERATURE: 97.9 F

## 2018-03-22 PROCEDURE — G0152 HHCP-SERV OF OT,EA 15 MIN: HCPCS

## 2018-03-22 PROCEDURE — G0157 HHC PT ASSISTANT EA 15: HCPCS

## 2018-03-23 ENCOUNTER — HOME CARE VISIT (OUTPATIENT)
Dept: SCHEDULING | Facility: HOME HEALTH | Age: 38
End: 2018-03-23

## 2018-03-23 VITALS
DIASTOLIC BLOOD PRESSURE: 57 MMHG | SYSTOLIC BLOOD PRESSURE: 110 MMHG | OXYGEN SATURATION: 96 % | HEART RATE: 87 BPM | TEMPERATURE: 97.7 F | RESPIRATION RATE: 22 BRPM

## 2018-03-23 PROCEDURE — G0299 HHS/HOSPICE OF RN EA 15 MIN: HCPCS

## 2018-03-23 NOTE — OP NOTES
[address] REPORT    Elise Aranda  MR#: [id number]  : 1980  ACCOUNT #: [de-identified]   DATE OF SERVICE: 2018    PREOPERATIVE DIAGNOSIS:  Right lower extremity osteomyelitis. POSTOPERATIVE DIAGNOSIS:  Right lower extremity osteomyelitis. PROCEDURE:  Right below knee amputation. SURGEON:  Anika Clarke MD    ASSISTANT:  chelita    ANESTHESIA:  General.    INDICATIONS:  This is a 68-year-old gentleman with nonreconstructable right foot osteomyelitis. The patient is scheduled for below-knee amputation. FINDINGS:  The patient underwent a right below knee amputation. COMPLICATIONS:  None. ESTIMATED BLOOD LOSS:  100 mL. SPECIMENS:  Right lower leg. IMPLANTS:  none    PROCEDURE:  After informed consent was obtained, the patient brought to the operating room and placed in the supine position where general anesthetic was administered. Right lower extremity was prepped and draped in the usual sterile fashion. Timeout was performed. A standard posterior flap incision was made below the knee, 14 cm distal to the tibial tuberosity. The incision carried through the skin and subcutaneous tissue. The tibia was divided. Pneumatic saw. The anterior edge was angled and softened with the rasp. The fibula was divided 1 cm proximal to this. Hemostasis was accomplished with multiple stick ties. The wound was then thoroughly irrigated. The lower leg specimen was sent off for histological identification. The fascia was reapproximated with 2-0 Vicryls in an interrupted fashion. The skin was staple closed. Plain dressings were applied. The patient tolerated the procedure well with no complications.       MD NICKI Vo / Rudy Morley  D: 2018 17:10     T: 2018 05:26  JOB #: [id number]

## 2018-03-23 NOTE — DISCHARGE SUMMARY
[address]  MR#: [id number]  : 1980  ACCOUNT #: [de-identified]   ADMIT DATE: 2018  DISCHARGE DATE: 2018    PREOPERATIVE DIAGNOSIS:  Right lower extremity osteomyelitis. POSTOPERATIVE DIAGNOSIS:  Right lower extremity osteomyelitis. PROCEDURE:  Right below knee amputation. SURGEON:  Aylin Hidalgo MD    ASSISTANT:  ***    ANESTHESIA:  General.    INDICATIONS:  This is a 80-year-old gentleman with nonreconstructable right foot osteomyelitis. The patient is scheduled for below-knee amputation. FINDINGS:  The patient underwent a right below knee amputation. COMPLICATIONS:  None. ESTIMATED BLOOD LOSS:  100 mL. SPECIMENS:  Right lower leg. IMPLANTS:  ***    PROCEDURE:  After informed consent was obtained, the patient brought to the operating room and placed in the supine position where general anesthetic was administered. Right lower extremity was prepped and draped in the usual sterile fashion. Timeout was performed. A standard posterior flap incision was made below the knee, 14 cm distal to the tibial tuberosity. The incision carried through the skin and subcutaneous tissue. The tibia was divided. Pneumatic saw. The anterior edge was angled and softened with the rasp. The fibula was divided 1 cm proximal to this. Hemostasis was accomplished with multiple stick ties. The wound was then thoroughly irrigated. The lower leg specimen was sent off for histological identification. The fascia was reapproximated with 2-0 Vicryls in an interrupted fashion. The skin was staple closed. Plain dressings were applied. The patient tolerated the procedure well with no complications.       MD NICKI Best/MURALI  D: 2018 17:10     T: 2018 05:26  JOB #: [id number]

## 2018-03-27 VITALS
SYSTOLIC BLOOD PRESSURE: 98 MMHG | DIASTOLIC BLOOD PRESSURE: 58 MMHG | TEMPERATURE: 97.4 F | OXYGEN SATURATION: 97 % | HEART RATE: 78 BPM | HEART RATE: 70 BPM | TEMPERATURE: 98.7 F | OXYGEN SATURATION: 96 % | DIASTOLIC BLOOD PRESSURE: 70 MMHG | SYSTOLIC BLOOD PRESSURE: 92 MMHG

## 2018-03-28 ENCOUNTER — HOME CARE VISIT (OUTPATIENT)
Dept: HOME HEALTH SERVICES | Facility: HOME HEALTH | Age: 38
End: 2018-03-28

## 2018-03-28 ENCOUNTER — HOME CARE VISIT (OUTPATIENT)
Dept: SCHEDULING | Facility: HOME HEALTH | Age: 38
End: 2018-03-28

## 2018-03-28 ENCOUNTER — TELEPHONE (OUTPATIENT)
Dept: INTERNAL MEDICINE CLINIC | Age: 38
End: 2018-03-28

## 2018-03-28 PROCEDURE — G0157 HHC PT ASSISTANT EA 15: HCPCS

## 2018-03-28 NOTE — TELEPHONE ENCOUNTER
2 pt. Identifiers confirmed. Pt. Notified of below. He States that the nurse did look at his leg and that is was bleeding a little on the side, but this was bc he fell the day before yesterday. He denies any f/c/ns, additional swelling, purulent drainage from wound. He states that it was bleeding a bit today when he changed the bandage, but more bc there was dried blood. Pt. Plans to come in on Monday for recheck labs and office visit and he still has an aptmt to have his staples taken out on Tuesday. Pt. States he has been drinking more water and can decrease his K+ intake. He denies dizziness and the reason he thinks his BG was low before was bc he had just taken his insulin. Pt. Reminded to make sure he eats and has snacks available like apples and peanut butter. Pt. states that he is feeling fine. No other questions/concerns at this time. ----- Message from Jassi Cortez MD sent at 3/20/2018  4:55 PM EDT -----  Please let pt know that labs were normal except:      1) kidney function up. Needs to keep hydrated with water. Will monitor.    2) potassium little up at 5.6. Decrease high potassium foods.    3) will recheck levels on Friday or Monday. Whichever is convenient.     4) glucose little low at 61.  Is he having any headaches, dizziness, sweating?

## 2018-03-28 NOTE — TELEPHONE ENCOUNTER
Please call pt and see if still bleeding. If so, need to daily dressing changes. Any f/c/ns, purulent discharge?

## 2018-03-28 NOTE — TELEPHONE ENCOUNTER
Incoming from PT. Two patient Identifiers confirmed. She stated that pt has a small pin size openings lateral edge of right leg amputation with scant active bleeding. She stated there was not a staple in the location of the bleeding. She wanted to stated it has to be more than a dry dressing to be skilled care. Pt does have an appt Monday with PCP. Dr Ramonita Recinos please advise.

## 2018-03-30 ENCOUNTER — HOME CARE VISIT (OUTPATIENT)
Dept: SCHEDULING | Facility: HOME HEALTH | Age: 38
End: 2018-03-30

## 2018-03-30 VITALS
SYSTOLIC BLOOD PRESSURE: 98 MMHG | OXYGEN SATURATION: 99 % | HEART RATE: 71 BPM | DIASTOLIC BLOOD PRESSURE: 58 MMHG | TEMPERATURE: 98.3 F

## 2022-05-05 NOTE — ROUTINE PROCESS
Bedside and Verbal shift change report given to Karen Juarez RN (oshncoming nurse) by Vern Garcia RN (offgoing nurse). Report included the folowing information SBAR, Kardex, Intake/Output, MAR and Recent Results. n/a

## 2022-08-26 ENCOUNTER — APPOINTMENT (OUTPATIENT)
Dept: VASCULAR SURGERY | Age: 42
DRG: 349 | End: 2022-08-26
Attending: EMERGENCY MEDICINE
Payer: MEDICAID

## 2022-08-26 ENCOUNTER — APPOINTMENT (OUTPATIENT)
Dept: CT IMAGING | Age: 42
DRG: 349 | End: 2022-08-26
Attending: EMERGENCY MEDICINE
Payer: MEDICAID

## 2022-08-26 ENCOUNTER — HOSPITAL ENCOUNTER (INPATIENT)
Age: 42
LOS: 3 days | Discharge: HOME | DRG: 349 | End: 2022-08-29
Attending: EMERGENCY MEDICINE | Admitting: INTERNAL MEDICINE
Payer: MEDICAID

## 2022-08-26 ENCOUNTER — APPOINTMENT (OUTPATIENT)
Dept: GENERAL RADIOLOGY | Age: 42
DRG: 349 | End: 2022-08-26
Attending: EMERGENCY MEDICINE
Payer: MEDICAID

## 2022-08-26 ENCOUNTER — APPOINTMENT (OUTPATIENT)
Dept: MRI IMAGING | Age: 42
DRG: 349 | End: 2022-08-26
Attending: EMERGENCY MEDICINE
Payer: MEDICAID

## 2022-08-26 DIAGNOSIS — M86.161 OTHER ACUTE OSTEOMYELITIS OF RIGHT TIBIA (HCC): Primary | ICD-10-CM

## 2022-08-26 DIAGNOSIS — I10 BENIGN HYPERTENSION WITHOUT CHF: ICD-10-CM

## 2022-08-26 DIAGNOSIS — E11.8 DIABETES MELLITUS TYPE 2 WITH COMPLICATIONS (HCC): ICD-10-CM

## 2022-08-26 DIAGNOSIS — Z76.0 MEDICATION REFILL: ICD-10-CM

## 2022-08-26 DIAGNOSIS — N17.9 AKI (ACUTE KIDNEY INJURY) (HCC): ICD-10-CM

## 2022-08-26 PROBLEM — M86.9 OSTEOMYELITIS OF LOWER EXTREMITY (HCC): Status: RESOLVED | Noted: 2022-08-26 | Resolved: 2022-08-26

## 2022-08-26 PROBLEM — M86.9 OSTEOMYELITIS OF LOWER EXTREMITY (HCC): Status: ACTIVE | Noted: 2022-08-26

## 2022-08-26 PROBLEM — N18.9 ACUTE-ON-CHRONIC KIDNEY INJURY (HCC): Status: ACTIVE | Noted: 2017-09-12

## 2022-08-26 PROBLEM — E78.5 HYPERLIPIDEMIA: Status: ACTIVE | Noted: 2022-08-26

## 2022-08-26 LAB
ALBUMIN SERPL-MCNC: 2.5 G/DL (ref 3.4–5)
ALBUMIN/GLOB SERPL: 0.7 {RATIO} (ref 0.8–1.7)
ALP SERPL-CCNC: 133 U/L (ref 45–117)
ALT SERPL-CCNC: 21 U/L (ref 16–61)
ANION GAP SERPL CALC-SCNC: 10 MMOL/L (ref 3–18)
APPEARANCE UR: CLEAR
AST SERPL-CCNC: 23 U/L (ref 10–38)
ATRIAL RATE: 60 BPM
BACTERIA URNS QL MICRO: ABNORMAL /HPF
BASOPHILS # BLD: 0 K/UL (ref 0–0.1)
BASOPHILS NFR BLD: 0 % (ref 0–2)
BILIRUB SERPL-MCNC: 0.4 MG/DL (ref 0.2–1)
BILIRUB UR QL: NEGATIVE
BUN SERPL-MCNC: 68 MG/DL (ref 7–18)
BUN/CREAT SERPL: 17 (ref 12–20)
CALCIUM SERPL-MCNC: 8.3 MG/DL (ref 8.5–10.1)
CALCIUM SERPL-MCNC: 8.4 MG/DL (ref 8.5–10.1)
CALCULATED P AXIS, ECG09: 39 DEGREES
CALCULATED R AXIS, ECG10: -23 DEGREES
CALCULATED T AXIS, ECG11: 171 DEGREES
CHLORIDE SERPL-SCNC: 111 MMOL/L (ref 100–111)
CO2 SERPL-SCNC: 20 MMOL/L (ref 21–32)
COLOR UR: YELLOW
CREAT SERPL-MCNC: 4 MG/DL (ref 0.6–1.3)
CREAT UR-MCNC: 57 MG/DL (ref 30–125)
DIAGNOSIS, 93000: NORMAL
DIFFERENTIAL METHOD BLD: ABNORMAL
EOSINOPHIL # BLD: 0.1 K/UL (ref 0–0.4)
EOSINOPHIL NFR BLD: 1 % (ref 0–5)
EPITH CASTS URNS QL MICRO: NEGATIVE /LPF (ref 0–5)
ERYTHROCYTE [DISTWIDTH] IN BLOOD BY AUTOMATED COUNT: 14.8 % (ref 11.6–14.5)
EST. AVERAGE GLUCOSE BLD GHB EST-MCNC: 140 MG/DL
GLOBULIN SER CALC-MCNC: 3.8 G/DL (ref 2–4)
GLUCOSE BLD STRIP.AUTO-MCNC: 109 MG/DL (ref 70–110)
GLUCOSE BLD STRIP.AUTO-MCNC: 140 MG/DL (ref 70–110)
GLUCOSE BLD STRIP.AUTO-MCNC: 82 MG/DL (ref 70–110)
GLUCOSE SERPL-MCNC: 133 MG/DL (ref 74–99)
GLUCOSE UR STRIP.AUTO-MCNC: 100 MG/DL
GRAN CASTS URNS QL MICRO: ABNORMAL /LPF
HBA1C MFR BLD: 6.5 % (ref 4.2–5.6)
HCT VFR BLD AUTO: 34.8 % (ref 36–48)
HGB BLD-MCNC: 11.7 G/DL (ref 13–16)
HGB UR QL STRIP: NEGATIVE
HYALINE CASTS URNS QL MICRO: ABNORMAL /LPF (ref 0–2)
IMM GRANULOCYTES # BLD AUTO: 0 K/UL (ref 0–0.04)
IMM GRANULOCYTES NFR BLD AUTO: 0 % (ref 0–0.5)
KETONES UR QL STRIP.AUTO: NEGATIVE MG/DL
LACTATE BLD-SCNC: 0.72 MMOL/L (ref 0.4–2)
LEFT CFA PROX SYS PSV: 108.9 CM/S
LEFT POP A PROX VEL RATIO: 0.8
LEFT POPLITEAL DIST SYS PSV: 55.7 CM/S
LEFT POPLITEAL PROX SYS PSV: 79.3 CM/S
LEFT PROX PFA A PSV: 89.4 CM/S
LEFT SFA DIST VEL RATIO: 0.72
LEFT SFA MID VEL RATIO: 1.47
LEFT SFA PROX VEL RATIO: 0.86
LEFT SUPER FEMORAL DIST SYS PSV: 98.9 CM/S
LEFT SUPER FEMORAL MID SYS PSV: 137.7 CM/S
LEFT SUPER FEMORAL PROX SYS PSV: 93.7 CM/S
LEUKOCYTE ESTERASE UR QL STRIP.AUTO: NEGATIVE
LYMPHOCYTES # BLD: 1.4 K/UL (ref 0.9–3.6)
LYMPHOCYTES NFR BLD: 12 % (ref 21–52)
MCH RBC QN AUTO: 29 PG (ref 24–34)
MCHC RBC AUTO-ENTMCNC: 33.6 G/DL (ref 31–37)
MCV RBC AUTO: 86.4 FL (ref 78–100)
MONOCYTES # BLD: 0.8 K/UL (ref 0.05–1.2)
MONOCYTES NFR BLD: 7 % (ref 3–10)
NEUTS SEG # BLD: 8.8 K/UL (ref 1.8–8)
NEUTS SEG NFR BLD: 79 % (ref 40–73)
NITRITE UR QL STRIP.AUTO: NEGATIVE
NRBC # BLD: 0 K/UL (ref 0–0.01)
NRBC BLD-RTO: 0 PER 100 WBC
P-R INTERVAL, ECG05: 164 MS
PH UR STRIP: 5.5 [PH] (ref 5–8)
PLATELET # BLD AUTO: 185 K/UL (ref 135–420)
PMV BLD AUTO: 12.2 FL (ref 9.2–11.8)
POTASSIUM SERPL-SCNC: 4.3 MMOL/L (ref 3.5–5.5)
PROT SERPL-MCNC: 6.3 G/DL (ref 6.4–8.2)
PROT UR STRIP-MCNC: 300 MG/DL
PROT UR-MCNC: 354 MG/DL
PTH-INTACT SERPL-MCNC: 247.3 PG/ML (ref 18.4–88)
Q-T INTERVAL, ECG07: 488 MS
QRS DURATION, ECG06: 102 MS
QTC CALCULATION (BEZET), ECG08: 488 MS
RBC # BLD AUTO: 4.03 M/UL (ref 4.35–5.65)
RBC #/AREA URNS HPF: NEGATIVE /HPF (ref 0–5)
SODIUM SERPL-SCNC: 141 MMOL/L (ref 136–145)
SODIUM UR-SCNC: 72 MMOL/L (ref 20–110)
SP GR UR REFRACTOMETRY: 1.01 (ref 1–1.03)
TROPONIN-HIGH SENSITIVITY: 36 NG/L (ref 0–78)
UROBILINOGEN UR QL STRIP.AUTO: 0.2 EU/DL (ref 0.2–1)
VENTRICULAR RATE, ECG03: 60 BPM
WBC # BLD AUTO: 11.1 K/UL (ref 4.6–13.2)
WBC URNS QL MICRO: ABNORMAL /HPF (ref 0–4)

## 2022-08-26 PROCEDURE — 99222 1ST HOSP IP/OBS MODERATE 55: CPT | Performed by: INTERNAL MEDICINE

## 2022-08-26 PROCEDURE — 96365 THER/PROPH/DIAG IV INF INIT: CPT

## 2022-08-26 PROCEDURE — 80053 COMPREHEN METABOLIC PANEL: CPT

## 2022-08-26 PROCEDURE — 83036 HEMOGLOBIN GLYCOSYLATED A1C: CPT

## 2022-08-26 PROCEDURE — 65270000046 HC RM TELEMETRY

## 2022-08-26 PROCEDURE — 73718 MRI LOWER EXTREMITY W/O DYE: CPT

## 2022-08-26 PROCEDURE — 81001 URINALYSIS AUTO W/SCOPE: CPT

## 2022-08-26 PROCEDURE — 85025 COMPLETE CBC W/AUTO DIFF WBC: CPT

## 2022-08-26 PROCEDURE — 87205 SMEAR GRAM STAIN: CPT

## 2022-08-26 PROCEDURE — 73590 X-RAY EXAM OF LOWER LEG: CPT

## 2022-08-26 PROCEDURE — 96361 HYDRATE IV INFUSION ADD-ON: CPT

## 2022-08-26 PROCEDURE — 84484 ASSAY OF TROPONIN QUANT: CPT

## 2022-08-26 PROCEDURE — 83970 ASSAY OF PARATHORMONE: CPT

## 2022-08-26 PROCEDURE — 74011000250 HC RX REV CODE- 250

## 2022-08-26 PROCEDURE — 87040 BLOOD CULTURE FOR BACTERIA: CPT

## 2022-08-26 PROCEDURE — 82962 GLUCOSE BLOOD TEST: CPT

## 2022-08-26 PROCEDURE — 74011250636 HC RX REV CODE- 250/636: Performed by: INTERNAL MEDICINE

## 2022-08-26 PROCEDURE — 74011000258 HC RX REV CODE- 258: Performed by: EMERGENCY MEDICINE

## 2022-08-26 PROCEDURE — 96375 TX/PRO/DX INJ NEW DRUG ADDON: CPT

## 2022-08-26 PROCEDURE — 71045 X-RAY EXAM CHEST 1 VIEW: CPT

## 2022-08-26 PROCEDURE — 74011250637 HC RX REV CODE- 250/637: Performed by: INTERNAL MEDICINE

## 2022-08-26 PROCEDURE — 87186 SC STD MICRODIL/AGAR DIL: CPT

## 2022-08-26 PROCEDURE — 93926 LOWER EXTREMITY STUDY: CPT

## 2022-08-26 PROCEDURE — 74176 CT ABD & PELVIS W/O CONTRAST: CPT

## 2022-08-26 PROCEDURE — 82570 ASSAY OF URINE CREATININE: CPT

## 2022-08-26 PROCEDURE — 77030018842 HC SOL IRR SOD CL 9% BAXT -A

## 2022-08-26 PROCEDURE — 83605 ASSAY OF LACTIC ACID: CPT

## 2022-08-26 PROCEDURE — 99285 EMERGENCY DEPT VISIT HI MDM: CPT

## 2022-08-26 PROCEDURE — 87147 CULTURE TYPE IMMUNOLOGIC: CPT

## 2022-08-26 PROCEDURE — 74011250636 HC RX REV CODE- 250/636

## 2022-08-26 PROCEDURE — 93005 ELECTROCARDIOGRAM TRACING: CPT

## 2022-08-26 PROCEDURE — 74011250636 HC RX REV CODE- 250/636: Performed by: EMERGENCY MEDICINE

## 2022-08-26 PROCEDURE — 87077 CULTURE AEROBIC IDENTIFY: CPT

## 2022-08-26 PROCEDURE — 84156 ASSAY OF PROTEIN URINE: CPT

## 2022-08-26 PROCEDURE — 2709999900 HC NON-CHARGEABLE SUPPLY

## 2022-08-26 PROCEDURE — 84300 ASSAY OF URINE SODIUM: CPT

## 2022-08-26 PROCEDURE — APPSS180 APP SPLIT SHARED TIME > 60 MINUTES

## 2022-08-26 RX ORDER — SODIUM CHLORIDE 0.9 % (FLUSH) 0.9 %
5-40 SYRINGE (ML) INJECTION AS NEEDED
Status: DISCONTINUED | OUTPATIENT
Start: 2022-08-26 | End: 2022-08-29 | Stop reason: HOSPADM

## 2022-08-26 RX ORDER — GUAIFENESIN 100 MG/5ML
81 LIQUID (ML) ORAL DAILY
Status: DISCONTINUED | OUTPATIENT
Start: 2022-08-27 | End: 2022-08-29 | Stop reason: HOSPADM

## 2022-08-26 RX ORDER — LOSARTAN POTASSIUM 50 MG/1
50 TABLET ORAL DAILY
Status: CANCELLED | OUTPATIENT
Start: 2022-08-27

## 2022-08-26 RX ORDER — UREA 10 %
2 LOTION (ML) TOPICAL 2 TIMES DAILY
Status: DISCONTINUED | OUTPATIENT
Start: 2022-08-26 | End: 2022-08-29

## 2022-08-26 RX ORDER — MORPHINE SULFATE 4 MG/ML
4 INJECTION INTRAVENOUS
Status: DISCONTINUED | OUTPATIENT
Start: 2022-08-26 | End: 2022-08-26 | Stop reason: SDUPTHER

## 2022-08-26 RX ORDER — FAMOTIDINE 20 MG/1
20 TABLET, FILM COATED ORAL EVERY EVENING
Status: DISCONTINUED | OUTPATIENT
Start: 2022-08-26 | End: 2022-08-29 | Stop reason: HOSPADM

## 2022-08-26 RX ORDER — MAGNESIUM SULFATE 100 %
4 CRYSTALS MISCELLANEOUS AS NEEDED
Status: DISCONTINUED | OUTPATIENT
Start: 2022-08-26 | End: 2022-08-29 | Stop reason: HOSPADM

## 2022-08-26 RX ORDER — ATORVASTATIN CALCIUM 20 MG/1
20 TABLET, FILM COATED ORAL
Status: DISCONTINUED | OUTPATIENT
Start: 2022-08-26 | End: 2022-08-29 | Stop reason: HOSPADM

## 2022-08-26 RX ORDER — INSULIN LISPRO 100 [IU]/ML
INJECTION, SOLUTION INTRAVENOUS; SUBCUTANEOUS
Status: DISCONTINUED | OUTPATIENT
Start: 2022-08-26 | End: 2022-08-29 | Stop reason: HOSPADM

## 2022-08-26 RX ORDER — ONDANSETRON 2 MG/ML
4 INJECTION INTRAMUSCULAR; INTRAVENOUS
Status: DISCONTINUED | OUTPATIENT
Start: 2022-08-26 | End: 2022-08-29 | Stop reason: HOSPADM

## 2022-08-26 RX ORDER — MAGNESIUM SULFATE 100 %
4 CRYSTALS MISCELLANEOUS AS NEEDED
Status: DISCONTINUED | OUTPATIENT
Start: 2022-08-26 | End: 2022-08-26 | Stop reason: SDUPTHER

## 2022-08-26 RX ORDER — ENOXAPARIN SODIUM 100 MG/ML
30 INJECTION SUBCUTANEOUS DAILY
Status: DISCONTINUED | OUTPATIENT
Start: 2022-08-27 | End: 2022-08-29 | Stop reason: HOSPADM

## 2022-08-26 RX ORDER — ACETAMINOPHEN 325 MG/1
650 TABLET ORAL
Status: DISCONTINUED | OUTPATIENT
Start: 2022-08-26 | End: 2022-08-29 | Stop reason: HOSPADM

## 2022-08-26 RX ORDER — ACETAMINOPHEN 650 MG/1
650 SUPPOSITORY RECTAL
Status: DISCONTINUED | OUTPATIENT
Start: 2022-08-26 | End: 2022-08-29 | Stop reason: HOSPADM

## 2022-08-26 RX ORDER — POLYETHYLENE GLYCOL 3350 17 G/17G
17 POWDER, FOR SOLUTION ORAL DAILY PRN
Status: DISCONTINUED | OUTPATIENT
Start: 2022-08-26 | End: 2022-08-29 | Stop reason: HOSPADM

## 2022-08-26 RX ORDER — MORPHINE SULFATE 2 MG/ML
2 INJECTION, SOLUTION INTRAMUSCULAR; INTRAVENOUS
Status: DISCONTINUED | OUTPATIENT
Start: 2022-08-26 | End: 2022-08-29 | Stop reason: HOSPADM

## 2022-08-26 RX ORDER — SODIUM CHLORIDE 0.9 % (FLUSH) 0.9 %
5-40 SYRINGE (ML) INJECTION EVERY 8 HOURS
Status: DISCONTINUED | OUTPATIENT
Start: 2022-08-26 | End: 2022-08-29 | Stop reason: HOSPADM

## 2022-08-26 RX ORDER — DEXTROSE MONOHYDRATE 100 MG/ML
0-250 INJECTION, SOLUTION INTRAVENOUS AS NEEDED
Status: DISCONTINUED | OUTPATIENT
Start: 2022-08-26 | End: 2022-08-29 | Stop reason: HOSPADM

## 2022-08-26 RX ORDER — ATORVASTATIN CALCIUM 40 MG/1
40 TABLET, FILM COATED ORAL
Status: ON HOLD | COMMUNITY
Start: 2022-02-07 | End: 2022-08-29 | Stop reason: SDUPTHER

## 2022-08-26 RX ORDER — SODIUM CHLORIDE 9 MG/ML
75 INJECTION, SOLUTION INTRAVENOUS CONTINUOUS
Status: CANCELLED | OUTPATIENT
Start: 2022-08-26

## 2022-08-26 RX ORDER — PRAVASTATIN SODIUM 20 MG/1
10 TABLET ORAL
Status: DISCONTINUED | OUTPATIENT
Start: 2022-08-26 | End: 2022-08-26

## 2022-08-26 RX ORDER — SODIUM CHLORIDE 0.9 % (FLUSH) 0.9 %
5-10 SYRINGE (ML) INJECTION AS NEEDED
Status: DISCONTINUED | OUTPATIENT
Start: 2022-08-26 | End: 2022-08-29 | Stop reason: HOSPADM

## 2022-08-26 RX ORDER — ONDANSETRON 4 MG/1
4 TABLET, ORALLY DISINTEGRATING ORAL
Status: DISCONTINUED | OUTPATIENT
Start: 2022-08-26 | End: 2022-08-29 | Stop reason: HOSPADM

## 2022-08-26 RX ORDER — SODIUM CHLORIDE 9 MG/ML
50 INJECTION, SOLUTION INTRAVENOUS CONTINUOUS
Status: DISCONTINUED | OUTPATIENT
Start: 2022-08-26 | End: 2022-08-26

## 2022-08-26 RX ORDER — SODIUM CHLORIDE 9 MG/ML
100 INJECTION, SOLUTION INTRAVENOUS CONTINUOUS
Status: DISPENSED | OUTPATIENT
Start: 2022-08-26 | End: 2022-08-27

## 2022-08-26 RX ORDER — OXYCODONE AND ACETAMINOPHEN 5; 325 MG/1; MG/1
1 TABLET ORAL
Status: DISCONTINUED | OUTPATIENT
Start: 2022-08-26 | End: 2022-08-29 | Stop reason: HOSPADM

## 2022-08-26 RX ORDER — INSULIN LISPRO 100 [IU]/ML
INJECTION, SOLUTION INTRAVENOUS; SUBCUTANEOUS
Status: DISCONTINUED | OUTPATIENT
Start: 2022-08-26 | End: 2022-08-26 | Stop reason: SDUPTHER

## 2022-08-26 RX ORDER — AMLODIPINE BESYLATE 5 MG/1
5 TABLET ORAL DAILY
Status: DISCONTINUED | OUTPATIENT
Start: 2022-08-27 | End: 2022-08-29 | Stop reason: HOSPADM

## 2022-08-26 RX ORDER — VANCOMYCIN/0.9 % SOD CHLORIDE 1.5G/250ML
1500 PLASTIC BAG, INJECTION (ML) INTRAVENOUS ONCE
Status: COMPLETED | OUTPATIENT
Start: 2022-08-26 | End: 2022-08-26

## 2022-08-26 RX ORDER — MORPHINE SULFATE 4 MG/ML
4 INJECTION INTRAVENOUS
Status: COMPLETED | OUTPATIENT
Start: 2022-08-26 | End: 2022-08-26

## 2022-08-26 RX ADMIN — ATORVASTATIN CALCIUM 20 MG: 20 TABLET, FILM COATED ORAL at 21:59

## 2022-08-26 RX ADMIN — SODIUM CHLORIDE 1932 ML: 9 INJECTION, SOLUTION INTRAVENOUS at 10:30

## 2022-08-26 RX ADMIN — MORPHINE SULFATE 4 MG: 4 INJECTION, SOLUTION INTRAMUSCULAR; INTRAVENOUS at 16:13

## 2022-08-26 RX ADMIN — VANCOMYCIN HYDROCHLORIDE 1500 MG: 10 INJECTION, POWDER, LYOPHILIZED, FOR SOLUTION INTRAVENOUS at 16:45

## 2022-08-26 RX ADMIN — SODIUM CHLORIDE 50 ML/HR: 9 INJECTION, SOLUTION INTRAVENOUS at 16:59

## 2022-08-26 RX ADMIN — SODIUM CHLORIDE, PRESERVATIVE FREE 10 ML: 5 INJECTION INTRAVENOUS at 16:59

## 2022-08-26 RX ADMIN — SODIUM CHLORIDE 100 ML/HR: 9 INJECTION, SOLUTION INTRAVENOUS at 21:00

## 2022-08-26 RX ADMIN — LACTOBACILLUS TAB 2 TABLET: TAB at 21:59

## 2022-08-26 RX ADMIN — MORPHINE SULFATE 4 MG: 4 INJECTION, SOLUTION INTRAMUSCULAR; INTRAVENOUS at 10:03

## 2022-08-26 RX ADMIN — MORPHINE SULFATE 2 MG: 2 INJECTION, SOLUTION INTRAMUSCULAR; INTRAVENOUS at 21:59

## 2022-08-26 RX ADMIN — SODIUM CHLORIDE, PRESERVATIVE FREE 10 ML: 5 INJECTION INTRAVENOUS at 23:00

## 2022-08-26 RX ADMIN — PIPERACILLIN AND TAZOBACTAM 4.5 G: 4; .5 INJECTION, POWDER, FOR SOLUTION INTRAVENOUS at 10:30

## 2022-08-26 NOTE — CONSULTS
Consult Note  Consult requested by:   Jose Francisco Fajardo is a 43 y.o. male OTHER who is being seen on consult for renal failure  Chief Complaint   Patient presents with    Wound Check     Right leg     Admission diagnosis: <principal problem not specified>     HPI: asked to evaluate for renal failure . presented to er with infection of r stump. hx of crf proteinuria,dm,htn,osteomyelitis. pt is Kazakh speaking,unable to get much hx from the patient. reviewed available medical records.   Past Medical History:   Diagnosis Date    Cellulitis     rt. foot    Diabetes ([address]     Hypercholesterolemia     Hypertension     Osteomyelitis (HCC)     rt toe    Other ill-defined conditions(799.89)       Past Surgical History:   Procedure Laterality Date    HX BELOW KNEE AMPUTATION  03/2018    HX ORTHOPAEDIC      rt.toe       Social History     Socioeconomic History    Marital status: SINGLE     Spouse name: Not on file    Number of children: Not on file    Years of education: Not on file    Highest education level: Not on file   Occupational History    Not on file   Tobacco Use    Smoking status: Heavy Smoker     Packs/day: 0.50     Types: Cigarettes    Smokeless tobacco: Never    Tobacco comments:     pt. counseled to not smoke   Substance and Sexual Activity    Alcohol use: No    Drug use: Yes     Types: Marijuana     Comment: 9/5/17    Sexual activity: Not on file   Other Topics Concern    Not on file   Social History Narrative    Not on file     Social Determinants of Health     Financial Resource Strain: Not on file   Food Insecurity: Not on file   Transportation Needs: Not on file   Physical Activity: Not on file   Stress: Not on file   Social Connections: Not on file   Intimate Partner Violence: Not on file   Housing Stability: Not on file       Family History   Problem Relation Age of Onset    Diabetes Mother     Diabetes Father     No Known Problems Sister     No Known Problems Brother     No Known Problems Sister      No Known Allergies     Home Medications:     Prior to Admission Medications   Prescriptions Last Dose Informant Patient Reported? Taking? Blood-Glucose Meter (RELION ALL-IN-ONE METER) monitoring kit   No No   Sig: Check fasting sugars three times a day before breakfast, lunch & dinner   Insulin Syringe-Needle U-100 (INSULIN SYRINGE) 1 mL 28 x 1/2\" syrg   No No   Si Package by Does Not Apply route two (2) times a day. Lancets misc   No No   Sig: Check fasting sugars three times a day before breakfast, lunch & dinner for relion meter   amLODIPine (NORVASC) 5 mg tablet   No No   Sig: Take 1 Tab by mouth daily. amLODIPine (NORVASC) 5 mg tablet   Yes No   Sig: Take 5 mg by mouth daily. aspirin 81 mg chewable tablet   No No   Sig: Take 1 Tab by mouth daily. glucose blood VI test strips (BLOOD GLUCOSE TEST) strip   No No   Sig: Check fasting sugars three times a day before breakfast, lunch & dinner for relion meter   insulin NPH/insulin regular (HUMULIN 70/30) 100 unit/mL (70-30) injection   No No   Si Units by SubCUTAneous route Before breakfast and dinner. losartan (COZAAR) 50 mg tablet   No No   Sig: Take 1 Tab by mouth daily. metFORMIN (GLUCOPHAGE) 500 mg tablet   No No   Sig: Take 1 Tab by mouth two (2) times daily (with meals). metFORMIN (GLUCOPHAGE) 500 mg tablet   Yes No   Sig: Take 500 mg by mouth two (2) times a day.   naloxone 2 mg/actuation spry   No No   Sig: Use 1 spray intranasally into 1 nostril. Use a new Narcan nasal spray for subsequent doses and administer into alternating nostrils. May repeat every 2 to 3 minutes as needed. Indications: OPIATE-INDUCED RESPIRATORY DEPRESSION   oxyCODONE-acetaminophen (PERCOCET)  mg per tablet   No No   Sig: Take 0.5-1 Tabs by mouth every four (4) hours as needed for Pain. Max Daily Amount: 6 Tabs.    oxyCODONE-acetaminophen (PERCOCET) 5-325 mg per tablet   No No   Sig: Take 2 Tabs by mouth every four (4) hours as needed. Max Daily Amount: 12 Tabs. pravastatin (PRAVACHOL) 10 mg tablet   No No   Sig: Take 1 Tab by mouth nightly. pravastatin (PRAVACHOL) 10 mg tablet   Yes No   Sig: Take 10 mg by mouth daily. take at night      Facility-Administered Medications: None       Current Facility-Administered Medications   Medication Dose Route Frequency    sodium chloride (NS) flush 5-10 mL  5-10 mL IntraVENous PRN    piperacillin-tazobactam (ZOSYN) 3.375 g in 0.9% sodium chloride (MBP/ADV) 100 mL MBP  3.375 g IntraVENous Q8H     Current Outpatient Medications   Medication Sig    amLODIPine (NORVASC) 5 mg tablet Take 5 mg by mouth daily. metFORMIN (GLUCOPHAGE) 500 mg tablet Take 500 mg by mouth two (2) times a day. pravastatin (PRAVACHOL) 10 mg tablet Take 10 mg by mouth daily. take at night    metFORMIN (GLUCOPHAGE) 500 mg tablet Take 1 Tab by mouth two (2) times daily (with meals). amLODIPine (NORVASC) 5 mg tablet Take 1 Tab by mouth daily. losartan (COZAAR) 50 mg tablet Take 1 Tab by mouth daily. pravastatin (PRAVACHOL) 10 mg tablet Take 1 Tab by mouth nightly. Blood-Glucose Meter (RELION ALL-IN-ONE METER) monitoring kit Check fasting sugars three times a day before breakfast, lunch & dinner    Lancets misc Check fasting sugars three times a day before breakfast, lunch & dinner for relion meter    glucose blood VI test strips (BLOOD GLUCOSE TEST) strip Check fasting sugars three times a day before breakfast, lunch & dinner for relion meter    aspirin 81 mg chewable tablet Take 1 Tab by mouth daily. oxyCODONE-acetaminophen (PERCOCET)  mg per tablet Take 0.5-1 Tabs by mouth every four (4) hours as needed for Pain. Max Daily Amount: 6 Tabs. naloxone 2 mg/actuation spry Use 1 spray intranasally into 1 nostril. Use a new Narcan nasal spray for subsequent doses and administer into alternating nostrils. May repeat every 2 to 3 minutes as needed.   Indications: OPIATE-INDUCED RESPIRATORY DEPRESSION oxyCODONE-acetaminophen (PERCOCET) 5-325 mg per tablet Take 2 Tabs by mouth every four (4) hours as needed. Max Daily Amount: 12 Tabs. insulin NPH/insulin regular (HUMULIN 70/30) 100 unit/mL (70-30) injection 12 Units by SubCUTAneous route Before breakfast and dinner. Insulin Syringe-Needle U-100 (INSULIN SYRINGE) 1 mL 28 x 1/2\" syrg 1 Package by Does Not Apply route two (2) times a day. Review of Systems:   Review of systems not obtained due to patient factors. Data Review:    Labs: Results:       Chemistry Recent Labs     08/26/22  1000   *      K 4.3      CO2 20*   BUN 68*   CREA 4.00*   CA 8.3*   AGAP 10   BUCR 17   *   TP 6.3*   ALB 2.5*   GLOB 3.8   AGRAT 0.7*      PTH  Lab Results   Component Value Date/Time    Calcium 8.3 (L) 08/26/2022 10:00 AM    Phosphorus 5.0 (H) 06/21/2016 11:00 AM      CBC w/Diff Recent Labs     08/26/22  1000   WBC 11.1   RBC 4.03*   HGB 11.7*   HCT 34.8*      GRANS 79*   LYMPH 12*   EOS 1      Coagulation No results for input(s): PTP, INR, APTT, INREXT in the last 72 hours. Iron/Ferritin No results for input(s): IRON in the last 72 hours. No lab exists for component: TIBCCALC   BNP No results for input(s): BNPP in the last 72 hours. Cardiac Enzymes No results for input(s): CPK, CKND1, EDA in the last 72 hours.     No lab exists for component: CKRMB, TROIP   Liver Enzymes Recent Labs     08/26/22  1000   TP 6.3*   ALB 2.5*   *      Thyroid Studies Lab Results   Component Value Date/Time    TSH 0.99 03/14/2012 03:30 AM        Urinalysis Lab Results   Component Value Date/Time    Color YELLOW 08/26/2022 10:15 AM    Appearance CLEAR 08/26/2022 10:15 AM    Specific gravity 1.013 08/26/2022 10:15 AM    pH (UA) 5.5 08/26/2022 10:15 AM    Protein 300 (A) 08/26/2022 10:15 AM    Glucose 100 (A) 08/26/2022 10:15 AM    Ketone Negative 08/26/2022 10:15 AM    Bilirubin Negative 08/26/2022 10:15 AM    Urobilinogen 0.2 08/26/2022 10:15 AM Nitrites Negative 08/26/2022 10:15 AM    Leukocyte Esterase Negative 08/26/2022 10:15 AM    Epithelial cells Negative 08/26/2022 10:15 AM    Bacteria FEW (A) 08/26/2022 10:15 AM    WBC 0 to 2 08/26/2022 10:15 AM    RBC Negative 08/26/2022 10:15 AM         IMAGES:   XR Results (maximum last 3): Results from Hospital Encounter encounter on 08/26/22    XR CHEST PORT    Narrative  EXAM: XR CHEST PORT    INDICATION: meets SIRS criteria    COMPARISON: 5/3/2016    FINDINGS: A single view of the chest demonstrates clear lungs. The cardiac and  mediastinal contours and pulmonary vascularity are normal. The bones and soft  tissues are within normal limits. Impression  No acute findings. Interval removal of left PICC line. .      XR TIB/FIB RT    Narrative  History: Pain. COMPARISON: None. TECHNIQUE: 2 views right knee. FINDINGS:    Below-the-knee amputation noted. Joint space is preserved. Mild atherosclerosis. Soft tissue edema and 17 is emphysema noted. Emphysema tracks along the stump  and medial calf to the level of just above the knee. Potential developing  cortical lucency and irregularity along the stump. Impression  Soft tissue emphysema suspect for infection. Potential developing lucency and  cortical irregularity along the tibial stump which may reflect early  osteomyelitis. Results from [place] encounter on 02/22/18    XR ANKLE RT MIN 3 V    Narrative  Examination: Right ankle 3 views    HISTORY: Right ankle swelling. FINDINGS: Nonweightbearing AP, oblique, and lateral projections of the right  ankle are compared with prior right foot radiographs 9/12/2017, as well as prior  MRI of the right foot performed the same date. In the interval from prior foot  radiographs, progressive erosive changes present now involving the anterior  aspect of the calcaneus, cuboid, as well as the base of the fifth metatarsal. No  adjacent soft tissue gas formation is demonstrated.  Multiple foci of heterotopic  ossification are noted across the distal tibiofibular syndesmosis in keeping  with prior high ankle sprain. 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 There is  partial visualization of prior amputation involving the first metatarsal shaft. Circumferential hindfoot and midfoot soft tissue swelling is noted along with  lower extremity edema. No retained radiopaque foreign object or soft tissue gas  formation. Extensive atherosclerotic vascular calcifications are noted. Impression  IMPRESSION:    1. Progressive erosive changes involving the anterior aspect of the calcaneus,  cuboid, and base of the fifth metatarsal compatible with sequela of  osteomyelitis. 2. Circumferential hindfoot and midfoot soft tissue swelling without retained  radiopaque foreign object. CT Results (maximum last 3): Results from [place] [facility] encounter on 07/14/14    CT FEMUR RT W CONT    Narrative  CT of the right thigh with contrast:    Indication: Cellulitis. Concern for abscess. Procedure: Volume acquisition scans of the right thigh from above the hip to  the knee. Bone and soft tissue windows. Sagittal and coronal reformations with  bone targeting. Comparison MRI 6/24/13. Findings: There is edema in the posterior subcutaneous fat of the thigh and  some skin thickening, consistent with cellulitis. In the mid thigh region  posteriorly near the midline there is a irregularity of the skin with  underlying subcutaneous gas an enhancing rim measuring 2 cm suspect for small  subcutaneous abscess. Cellulitis extends to involve the surface of the  posterior compartment musculature but no definite adjacent intramuscular  process is appreciated. Inferior images demonstrate a knee effusion.  No  periosteal reaction or bony destruction would suggest osteomyelitis of the  femur. Hip joint appears normal. Some bladder wall thickening is suggested but  the bladder is not distended. Impression:    1. Small subcutaneous gas collection with adjacent enhancing rim and overlying  skin abnormality consistent with subcutaneous abscess in the posterior mid  thigh level. 2. There is adjacent cellulitis posteriorly. 3. No definite deep intramuscular abscess. No evidence of osteomyelitis. 4. Small knee effusion. Initial after hours report was provided by the covering radiology resident. Results from Hospital Encounter encounter on 03/13/12    CT LOWER EXT RIGHT WITH CONTRAST    Narrative  Ordering MD: Fabricio Matos MD  Signed By: Any Hall MD  ** FINAL **  ---------------------------------------------------------------------  Procedure Date:  Mar 13 2012  9:03PM    Accession Number:  [id number]  Order No: [id number]  Procedure: CTD - LOW EXTREM RT W/CONTRAST  CPT Code: 73127  Reason: EVAL GANGRENE/NECROTING  FASCI  INTERPRETATION:  CLINICAL HISTORY: Great toe ulcer. Cellulitis/infection. Assess for  osteomyelitis. COMPARISON EXAMINATIONS: None. TECHNIQUE:  Thin section axial CT through the foot and ankle,  without contrast.  Coronal and sagittal reconstructions were  generated. --- FINDINGS ---  BONY STRUCTURES: No evidence of fracture, cortical destruction, or  advanced arthropathy. Tiny (1 mm) nonspecific calcification at the  plantar aspect of the first IP articulation. SOFT TISSUES: Extensive confluent subcutaneous edema surrounds the  ankle and extends along the dorsum of the foot. Linear and  multifocal subcutaneous air tracks along the dorsal aspect of the  great toe, to a lesser degree dorsal to the distal metatarsal level,  without definable fluid collection. This extends along the toenail. Deep soft tissue ulcer over the dorsal aspect of the proximal great  toe.  -------------  IMPRESSION:  -------------  1.  Extensive soft tissue edema surrounds the foot and ankle. Linear  multifocal subcutaneous air tracks along the dorsal and both lateral  aspects of the great toe, compatible with soft tissue infection. No  identifiable abscess or evidence of osteomyelitis. 2. Very tiny punctate calcification at plantar aspect of first IP  articulation, nonspecific, though could reflect prior ligamentous or  capsular avulsion injury. Overnight interpretation provided by on-call resident. Results from [place] [facility] encounter on 11/07/11    CT CHEST WITH CONTRAST    Narrative  Ordering MD: Keke Edmonds PAC  Signed By: Afshan Jimenez MD  ** FINAL **  ---------------------------------------------------------------------  Procedure Date:  Nov 7 2011 10:12AM    Accession Number:  [id number]  Order No: [id number]  Procedure: CTD - THORAX W/IV CONTRAST  CPT Code: 30259  Reason: PAIN  INTERPRETATION:  CT thorax with contrast  HISTORY: Chest pain. FINDINGS: Mild groundglass opacity present in the dependent lower  lobes probably due to minimal atelectasis. The lungs are otherwise  clear. There is no evidence of hilar or mediastinal adenopathy. The  aorta is normal in caliber. Questionable small hiatal hernia. Normal  adrenal glands. IMPRESSION:  Minimal dependent groundglass opacity in the lower lobes compatible  with mild atelectasis. Questionable small hiatal hernia. MRI Results (maximum last 3): Results from Hospital Encounter encounter on 08/26/22    MRI TIB/FIB RT WO CONT    Narrative  Multisequence multiplanar MR images of the right tibia/fibula were obtained. HISTORY: Below knee amputation. Swelling. Pain. Reference: Radiographs 0904 hours. Severe subcutaneous edema. Soft tissue air seen on radiographs. Mild muscular  edema. No discrete fluid collection or abscess identified. Surgical margin at the distal tibia and fibula slightly indistinct but no  infiltration of the bone marrow. Minimal reactive marrow edema distally.     No knee joint effusion. Impression  Soft tissue inflammation/edema with soft tissue air, suspect  infection with no discrete abscess. Reactive edema at the tibial and fibular  stump. No high suspicion for osteomyelitis. Results from [place] encounter on 02/22/18    MRI FOOT RT W  WO CONT    Narrative  Examination: MRI right foot without and with contrast.    HISTORY: Right foot pain and swelling, evaluation for potential osteomyelitis  versus neuropathic arthropathy requested. TECHNIQUE: An MR examination of the right foot was performed utilizing a local  coil. Coronal and sagittal STIR and T1 images as well as axial T1 and T2  fat-suppressed images were obtained before the uneventful intravenous  administration of 15 mL MultiHance intravenous gadolinium contrast. Postcontrast  imaging was performed utilizing T1 fat-suppressed techniques and axial, coronal,  and sagittal planes. COMPARISON: Right ankle radiographs 2/23/2018. FINDINGS:    There is a small area of lateral skin ulceration, near to the level of the fifth  metatarsal base, with sinus tract extending to the level of the calcaneocuboid  joint. At this location, there is confluent T1 marrow signal abnormality present  involving the cuboid, the vast of the calcaneus (greatest laterally), talus, as  well as the base of the fourth and fifth metatarsals. Underlying osseous  erosion/distortion of the cuboid is noted, with additional resorptive change  also present involving the anterior margin of the calcaneus, as well as the  fourth and fifth metatarsal bases. No evidence of necrotic or nonenhancing bone  demonstrated. Following contrast administration, tracking from the lateral soft tissue wound,  there is abnormal intramedullary enhancement of the cuboid, calcaneus, talus,  majority of the navicular, as well as the fourth and fifth metatarsal shafts.   There is a large ankle joint effusion/synovitis demonstrated with additional  multilocular effusion/phlegmon present throughout the lateral aspect of Kager's  fat pad, the sinus tarsi, with a separate rim-enhancing collection at the  calcaneocuboid joint, tracking from the lateral skin wound. Prior right first metatarsal shaft amputation. Medially, the ankle flexor tendons appear grossly intact. Anteriorly, extensor  tendons are intact and normal in appearance. Laterally, the peroneal tendons are  normally located. The superior peroneal retinaculum is intact. Peroneal tendons  below the level of the fibular tip are indistinct    The Achilles tendon is intact. Plantar fascia appears within normal limits. Evaluation of the intrinsic foot musculature demonstrates diffuse abnormal  intramuscular edema and enhancement. Impression  IMPRESSION:    1. Lateral mid foot soft tissue wound with sinus tract extending to the  calcaneocuboid articulation. There is contiguous and extensive marrow signal  abnormality compatible with osteomyelitis involving the majority of the cuboid,  calcaneus, inferior talar head and neck, navicular, as well as the fourth and  fifth metatarsal shafts proximally. On postcontrast imaging, no evidence of  necrotic or nonviable bone demonstrated. 2. Large ankle joint effusion/synovitis with complex appearing and multilocular  phlegmon infiltrating through the lateral aspect of Kager's fat pad as well as  the sinus tarsi. Suspect infected calcaneocuboid articulation effusion given  proximity to adjacent lateral skin wound. 3. Truncated appearance to the peroneus brevis and longus tendons. 4. Diffusely abnormal intramuscular edema and enhancement, likely in keeping  with a combination of myositis from underlying infection as well as sequela of  subacute denervation.       Results from [place] [facility] encounter on 09/12/17    MRI FOOT RT WO CONT    Narrative  Examination: MRI right forefoot without contrast.    HISTORY: Lateral right forefoot soft tissue swelling and ulceration. Evaluation  for possible osteomyelitis is requested. TECHNIQUE: MR examination of the right forefoot was performed utilizing a local  coil. Coronal and sagittal STIR and T1 images as well as axial T1 and T2  fat-suppressed images are obtained without contrast.    COMPARISON: Several prior exams, most recently right foot radiographs 9/12/2017. Additional correlation made with prior right foot MRI 4/24/2016 and  corresponding bone scintigraphy 4/27/2016. FINDINGS: Examination is limited by motion artifact which is present on all  provided sequences. In keeping with the clinical history, lateral soft tissue ulceration is noted  involving the forefoot, which tracks directly to the lateral aspect of the  residual fifth metatarsal shaft. At this location, there is confluent T1 marrow  hypointensity, which tracks to the proximal portion of the fifth metatarsal  shaft. Parallel STIR signal abnormality is demonstrated in this location. There are postoperative changes of amputation involving the great toe at the  metatarsophalangeal joint, with additional amputation deformities of the third  and fourth metatarsal heads. Small foci of marrow edema are noted within the  proximal aspects of the third and fourth metatarsal shafts, with suggestion of a  subtle linear hypointensity at the base of the third metatarsal shaft; however,  exact characterization is limited secondary to motion artifact. No corresponding  convincing fracture line demonstrated involving the fourth metatarsal shaft. Small joint effusion or synovitis is noted involving the second  metatarsophalangeal joint. There is mild naviculocuneiform chondrosis. Included  tarsometatarsal joints are normal in alignment. Soft tissue evaluation demonstrates diffuse intrinsic muscular atrophy, with  parallel increased abnormal intramuscular signal. No discrete or drainable fluid  collection. Impression  IMPRESSION:  1.  Motion limited examination. 2. Lateral forefoot soft tissue ulceration, with associated osteomyelitis  involving the fifth metatarsal, greatest distally, without abnormal marrow  signal tracking to the base of the proximal portion of the fifth metatarsal  shaft. 3. Focal areas of marrow edema within the third and fourth metatarsal shafts,  with suggestion of a small nondisplaced stress fracture involving the third  metatarsal shaft. Findings within the fourth metatarsal shaft are likely  reflective of underlying marrow stress reaction, without discrete fracture  identified within the limitations of motion. 4. Subacute on chronic intrinsic muscular denervation, without discrete  subcutaneous or intramuscular fluid collection. Nuclear Medicine Results (maximum last 3): Results from [place] encounter on 02/22/18    NM INFLAM PROC LTD    Narrative  Three-phase bone scan of the feet  Indium WBC scan of the feet:    Technique: Standard three-phase bone scan performed of the bilateral feet after  injection of 36 mCi MDP and dynamic and static images of several modalities were  obtained per protocol. The following day patient's bilateral feet were reimaged after injection of 571  uCi indium-111 WBCs and multiple static images of the feet were acquired    Comparative scintigraphy:  None. Anatomic correlation:  Recent MRI and plain films. Findings:  3 phase bone scan of the feet demonstrates intense tracer  accumulation in the right hindfoot on all 3 phases most intense involving the  talus, navicular and calcaneal bones . On Indium scan there is corresponding  abnormal uptake in this same location of the right hindfoot. Impression  IMPRESSION:  Abnormal three-phase bone scan and indium scan scan compatible with  osteomyelitis as described.       NM BONE SCAN 3 PHASE    Narrative  Three-phase bone scan of the feet  Indium WBC scan of the feet:    Technique: Standard three-phase bone scan performed of the bilateral feet after  injection of 36 mCi MDP and dynamic and static images of several modalities were  obtained per protocol. The following day patient's bilateral feet were reimaged after injection of 571  uCi indium-111 WBCs and multiple static images of the feet were acquired    Comparative scintigraphy:  None. Anatomic correlation:  Recent MRI and plain films. Findings:  3 phase bone scan of the feet demonstrates intense tracer  accumulation in the right hindfoot on all 3 phases most intense involving the  talus, navicular and calcaneal bones . On Indium scan there is corresponding  abnormal uptake in this same location of the right hindfoot. Impression  IMPRESSION:  Abnormal three-phase bone scan and indium scan scan compatible with  osteomyelitis as described. Results from [place] encounter on 04/22/16    NM BONE SCAN 3 PHASE    Narrative  THREE PHASE BONE SCAN    INDICATION:  59-year-old patient with osteomyelitis of the right forefoot. COMPARISON: Right forefoot MRI 4/24/2016, right foot radiographs 4/22/2016. TECHNIQUE:  28 mCi of Technetium-99 MDP was administered IV and three phase  imaging of the bilateral lower extremities to include the feet was performed  utilizing anterior and posterior projections as well as bilateral lateral  projections. Alejandra Amos FINDINGS:  The angiographic phase image demonstrates focally increased  radiotracer uptake in the distribution of the plantar soft tissues of the  anterior right foot. Blood pool images demonstrate persistent asymmetric  radiotracer uptake in the soft tissues of the right forefoot, greatest in a  plantar distribution. On delayed phase imaging, there is focal abnormal  radiotracer uptake noted along the plantar lateral aspect of the right foot as  well as in the region of the right third metatarsal.    Impression  :    1.   Abnormal three phase bone scan with hyperemia on angiographic phase imaging  and associated focally increased radiotracer uptake on delayed acquisitions in  keeping with underlying osteomyelitis as above. Of note, the patient is  scheduled for a tagged white blood cell scan 4/28/2016 for further corroboration  and localization of these findings. US Results (maximum last 3): No results found for this or any previous visit. DEXA Results (maximum last 3): No results found for this or any previous visit. WHITNEY Results (maximum last 3): No results found for this or any previous visit. IR Results (maximum last 3): Results from Hospital Encounter encounter on 07/24/12    IR PICC LINE    Narrative  Ordering MD: Galina Barakat MD  Signed By: [address] [place] [address] RADIOLOGY ADMINISTRATOR  ** FINAL **  ---------------------------------------------------------------------  Procedure Date:  Jul 25 2012  4:00PM    Accession Number:  [id number]  Order No: [id number]  Procedure: SPD - PICC LINE  CPT Code: 92452  Reason: IV ACESS  INTERPRETATION:  PICC inserted by nursing team.  Please see the patient's medical  record for description of the procedure. Please see the chest x-ray  report for tip location. IMPRESSION:  Administrative report. Results from Hospital Encounter encounter on 05/05/12    IR PICC LINE    Narrative  Ordering MD: Davi Garcia MD  Signed By: [address] [place] [address] RADIOLOGY ADMINISTRATOR  ** FINAL **  ---------------------------------------------------------------------  Procedure Date:  May  9 2012 11:54AM    Accession Number:  [id number]  Order No: [id number]  Procedure: SPD - PICC LINE  CPT Code: 44796  Reason: ACCESS FOR ABX  INTERPRETATION:  PICC inserted by nursing team.  Please see the patient's medical  record for description of the procedure. Please see the chest x-ray  report for tip location. IMPRESSION:  Administrative report. lac      VAS/US Results (maximum last 3): PET Results (maximum last 3): No results found for this or any previous visit.       No results found for this or any previous visit. @LASTPROCAMB([id number])    CULTURE:   )No results for input(s): SDES, CULT in the last 72 hours. No results for input(s): CULT in the last 72 hours. Physical Assessment:   Visit Vitals  /79   Pulse 67   Temp 97.8 °F (36.6 °C)   Resp 16   Ht 5' 2\" (1.575 m)   Wt 79.4 kg (175 lb)   SpO2 99%   BMI 32.01 kg/m²     Last 3 Recorded Weights in this Encounter    08/26/22 1021   Weight: 79.4 kg (175 lb)     No intake or output data in the 24 hours ending 08/26/22 1435    Physial Exam:  General appearance: alert, no distress  Skin: normal coloration and turgor, no rashes, no suspicious skin lesions noted. HEENT: Head; normocephalic, atraumatic. RALPH. ENT- ENT exam normal, no neck nodes or sinus tenderness. Lungs: clear to auscultation bilaterally  Heart: S1, S2 normal  Abdomen: soft, non-tender. Bowel sounds normal. No masses,  no organomegaly  Extremities: no edema,r bka    PLAN / RECOMMENDATION:    Acute/crf ,proteinuria could be natural progression of  renal failure. checking urine lytes,urine protein/creatinine and abd ct scan.hold cozaar and metformin. start ns at 50 cc/hour. check phos pth.will follow  Adjust meds for renal failure     Thank you for the consultation to participate in patient's care. I have personally discussed my plan with the referring physician.      Jessica Angulo MD  August 26, 2022

## 2022-08-26 NOTE — PROGRESS NOTES
[address] [facility] Pharmacokinetic Monitoring Service - Vancomycin     Thelma Hernandez is a 43 y.o. male starting on vancomycin therapy for Diabetic foot infection. Pharmacy consulted by MAGDIEL Nance for monitoring and adjustment. Target Concentration: Dosing based on anticipated concentration <15 mg/L due to renal impairment/insufficiency    Additional Antimicrobials: Zosyn    Pertinent Laboratory Values: Wt Readings from Last 1 Encounters:   08/26/22 79.4 kg (175 lb)     Temp Readings from Last 1 Encounters:   08/26/22 97.8 °F (36.6 °C)     No components found for: PROCAL  Estimated Creatinine Clearance: 21.9 mL/min (A) (based on SCr of 4 mg/dL (H)). Recent Labs     08/26/22  1000   WBC 11.1       Pertinent Cultures:  Culture Date Source Results   8/26 Blood In process   MRSA Nasal Swab: N/A. Non-respiratory infection. .    Plan:  Concentration-guided dosing due to renal impairment/insufficiency  Start vancomycin 1500 mg loading dose  Renal labs as indicated   Vancomycin concentration ordered for 8/27 @ 0400   Pharmacy will continue to monitor patient and adjust therapy as indicated    Thank you for the consult,  Josh Alonso, [address]  8/26/2022 3:20 PM

## 2022-08-26 NOTE — ED PROVIDER NOTES
EMERGENCY DEPARTMENT HISTORY AND PHYSICAL EXAM    Date: 8/26/2022  Patient Name: Kalyan Rodriguez    History of Presenting Illness     Chief Complaint   Patient presents with    Wound Check     Right leg         History Provided By: Patient and Japanese interpretor Brenna        Additional History (Context): Kalyan Rodriguez is a 43 y.o. male with diabetes, hypertension, and MRSA  who presents with complaint of right BKA stump pain for 4 days. Denies any drainage or fever. Patient says pain is unbearable. Has been applying medicine topically and padding his prosthesis. His last p.o. this morning was at 630 for coffee and a sandwich. PCP: Jabari Rai MD    Current Facility-Administered Medications   Medication Dose Route Frequency Provider Last Rate Last Admin    sodium chloride (NS) flush 5-10 mL  5-10 mL IntraVENous PRN Cheyenne Frey PA        piperacillin-tazobactam (ZOSYN) 3.375 g in 0.9% sodium chloride (MBP/ADV) 100 mL MBP  3.375 g IntraVENous Q8H Cheyenne Frey PA         Current Outpatient Medications   Medication Sig Dispense Refill    amLODIPine (NORVASC) 5 mg tablet Take 5 mg by mouth daily. metFORMIN (GLUCOPHAGE) 500 mg tablet Take 500 mg by mouth two (2) times a day. pravastatin (PRAVACHOL) 10 mg tablet Take 10 mg by mouth daily. take at night      metFORMIN (GLUCOPHAGE) 500 mg tablet Take 1 Tab by mouth two (2) times daily (with meals). 180 Tab 3    amLODIPine (NORVASC) 5 mg tablet Take 1 Tab by mouth daily. 90 Tab 3    losartan (COZAAR) 50 mg tablet Take 1 Tab by mouth daily. 90 Tab 3    pravastatin (PRAVACHOL) 10 mg tablet Take 1 Tab by mouth nightly.  90 Tab 3    Blood-Glucose Meter (RELION ALL-IN-ONE METER) monitoring kit Check fasting sugars three times a day before breakfast, lunch & dinner 1 Kit 0    Lancets misc Check fasting sugars three times a day before breakfast, lunch & dinner for relion meter 100 Each 11    glucose blood VI test strips (BLOOD GLUCOSE TEST) strip Check fasting sugars three times a day before breakfast, lunch & dinner for relion meter 100 Strip 11    aspirin 81 mg chewable tablet Take 1 Tab by mouth daily. 90 Tab 3    oxyCODONE-acetaminophen (PERCOCET)  mg per tablet Take 0.5-1 Tabs by mouth every four (4) hours as needed for Pain. Max Daily Amount: 6 Tabs. 36 Tab 0    naloxone 2 mg/actuation spry Use 1 spray intranasally into 1 nostril. Use a new Narcan nasal spray for subsequent doses and administer into alternating nostrils. May repeat every 2 to 3 minutes as needed. Indications: OPIATE-INDUCED RESPIRATORY DEPRESSION 1 Blister 0    oxyCODONE-acetaminophen (PERCOCET) 5-325 mg per tablet Take 2 Tabs by mouth every four (4) hours as needed. Max Daily Amount: 12 Tabs. 42 Tab 0    insulin NPH/insulin regular (HUMULIN 70/30) 100 unit/mL (70-30) injection 12 Units by SubCUTAneous route Before breakfast and dinner. 3 Vial 1    Insulin Syringe-Needle U-100 (INSULIN SYRINGE) 1 mL 28 x 1/2\" syrg 1 Package by Does Not Apply route two (2) times a day.  120 Syringe 1       Past History     Past Medical History:  Past Medical History:   Diagnosis Date    Cellulitis     rt. foot    Diabetes ([facility] [address]     Hypercholesterolemia     Hypertension     Osteomyelitis (HCC)     rt toe    Other ill-defined conditions(799.89)        Past Surgical History:  Past Surgical History:   Procedure Laterality Date    HX BELOW KNEE AMPUTATION  03/2018    HX ORTHOPAEDIC      rt.toe       Family History:  Family History   Problem Relation Age of Onset    Diabetes Mother     Diabetes Father     No Known Problems Sister     No Known Problems Brother     No Known Problems Sister        Social History:  Social History     Tobacco Use    Smoking status: Heavy Smoker     Packs/day: 0.50     Types: Cigarettes    Smokeless tobacco: Never    Tobacco comments:     pt. counseled to not smoke   Substance Use Topics    Alcohol use: No    Drug use: Yes     Types: Marijuana     Comment: 9/5/17 Allergies:  No Known Allergies      Review of Systems   Review of Systems   Constitutional:  Negative for fever. Eyes: Negative. Respiratory:  Negative for shortness of breath. Cardiovascular:  Negative for chest pain. Gastrointestinal:  Negative for abdominal pain and nausea. Endocrine: Negative. Genitourinary: Negative. Musculoskeletal:  Positive for arthralgias. Skin:  Positive for wound. Allergic/Immunologic: Negative. Neurological:  Negative for weakness and numbness. Hematological: Negative. Psychiatric/Behavioral: Negative. All Other Systems Negative  Physical Exam     Vitals:    08/26/22 0827 08/26/22 1021   BP: 119/79    Pulse: 67    Resp: 16    Temp: 97.8 °F (36.6 °C)    SpO2: 99%    Weight:  79.4 kg (175 lb)   Height:  5' 2\" (1.575 m)     Physical Exam  Vitals and nursing note reviewed. Constitutional:       General: He is in acute distress. Appearance: He is well-developed and normal weight. He is not ill-appearing, toxic-appearing or diaphoretic. HENT:      Head: Normocephalic and atraumatic. Neck:      Thyroid: No thyromegaly. Vascular: No carotid bruit. Trachea: No tracheal deviation. Cardiovascular:      Rate and Rhythm: Normal rate and regular rhythm. Heart sounds: Normal heart sounds. No murmur heard. No friction rub. No gallop. Pulmonary:      Effort: Pulmonary effort is normal. No respiratory distress. Breath sounds: Normal breath sounds. No stridor. No wheezing or rales. Chest:      Chest wall: No tenderness. Abdominal:      General: There is no distension. Palpations: Abdomen is soft. There is no mass. Tenderness: There is no abdominal tenderness. There is no guarding or rebound. Musculoskeletal:         General: Deformity present. Normal range of motion. Cervical back: Normal range of motion and neck supple. Comments: Right BKA stump closure of skin is open.   I am able to insert a Q-tip approximately 1 inch more laterally than medially. It is dry. No expressible drainage. Area is warm and tender circumferentially approximately 4 cm in diameter. No streaking. No proximal leg tenderness. Skin:     General: Skin is warm and dry. Coloration: Skin is not pale. Neurological:      Mental Status: He is alert and oriented to person, place, and time. Psychiatric:         Speech: Speech normal.         Behavior: Behavior normal.         Thought Content: Thought content normal.         Judgment: Judgment normal.          Diagnostic Study Results     Labs -     Recent Results (from the past 12 hour(s))   CBC WITH AUTOMATED DIFF    Collection Time: 08/26/22 10:00 AM   Result Value Ref Range    WBC 11.1 4.6 - 13.2 K/uL    RBC 4.03 (L) 4.35 - 5.65 M/uL    HGB 11.7 (L) 13.0 - 16.0 g/dL    HCT 34.8 (L) 36.0 - 48.0 %    MCV 86.4 78.0 - 100.0 FL    MCH 29.0 24.0 - 34.0 PG    MCHC 33.6 31.0 - 37.0 g/dL    RDW 14.8 (H) 11.6 - 14.5 %    PLATELET 429 483 - 721 K/uL    MPV 12.2 (H) 9.2 - 11.8 FL    NRBC 0.0 0  WBC    ABSOLUTE NRBC 0.00 0.00 - 0.01 K/uL    NEUTROPHILS 79 (H) 40 - 73 %    LYMPHOCYTES 12 (L) 21 - 52 %    MONOCYTES 7 3 - 10 %    EOSINOPHILS 1 0 - 5 %    BASOPHILS 0 0 - 2 %    IMMATURE GRANULOCYTES 0 0.0 - 0.5 %    ABS. NEUTROPHILS 8.8 (H) 1.8 - 8.0 K/UL    ABS. LYMPHOCYTES 1.4 0.9 - 3.6 K/UL    ABS. MONOCYTES 0.8 0.05 - 1.2 K/UL    ABS. EOSINOPHILS 0.1 0.0 - 0.4 K/UL    ABS. BASOPHILS 0.0 0.0 - 0.1 K/UL    ABS. IMM.  GRANS. 0.0 0.00 - 0.04 K/UL    DF AUTOMATED     METABOLIC PANEL, COMPREHENSIVE    Collection Time: 08/26/22 10:00 AM   Result Value Ref Range    Sodium 141 136 - 145 mmol/L    Potassium 4.3 3.5 - 5.5 mmol/L    Chloride 111 100 - 111 mmol/L    CO2 20 (L) 21 - 32 mmol/L    Anion gap 10 3.0 - 18 mmol/L    Glucose 133 (H) 74 - 99 mg/dL    BUN 68 (H) 7.0 - 18 MG/DL    Creatinine 4.00 (H) 0.6 - 1.3 MG/DL    BUN/Creatinine ratio 17 12 - 20      GFR est AA 20 (L) >60 ml/min/1.73m2 GFR est non-AA 17 (L) >60 ml/min/1.73m2    Calcium 8.3 (L) 8.5 - 10.1 MG/DL    Bilirubin, total 0.4 0.2 - 1.0 MG/DL    ALT (SGPT) 21 16 - 61 U/L    AST (SGOT) 23 10 - 38 U/L    Alk.  phosphatase 133 (H) 45 - 117 U/L    Protein, total 6.3 (L) 6.4 - 8.2 g/dL    Albumin 2.5 (L) 3.4 - 5.0 g/dL    Globulin 3.8 2.0 - 4.0 g/dL    A-G Ratio 0.7 (L) 0.8 - 1.7     TROPONIN-HIGH SENSITIVITY    Collection Time: 08/26/22 10:00 AM   Result Value Ref Range    Troponin-High Sensitivity 36 0 - 78 ng/L   GLUCOSE, POC    Collection Time: 08/26/22 10:09 AM   Result Value Ref Range    Glucose (POC) 140 (H) 70 - 110 mg/dL   POC LACTIC ACID    Collection Time: 08/26/22 10:11 AM   Result Value Ref Range    Lactic Acid (POC) 0.72 0.40 - 2.00 mmol/L   URINALYSIS W/ RFLX MICROSCOPIC    Collection Time: 08/26/22 10:15 AM   Result Value Ref Range    Color YELLOW      Appearance CLEAR      Specific gravity 1.013 1.005 - 1.030      pH (UA) 5.5 5.0 - 8.0      Protein 300 (A) NEG mg/dL    Glucose 100 (A) NEG mg/dL    Ketone Negative NEG mg/dL    Bilirubin Negative NEG      Blood Negative NEG      Urobilinogen 0.2 0.2 - 1.0 EU/dL    Nitrites Negative NEG      Leukocyte Esterase Negative NEG     URINE MICROSCOPIC ONLY    Collection Time: 08/26/22 10:15 AM   Result Value Ref Range    WBC 0 to 2 0 - 4 /hpf    RBC Negative 0 - 5 /hpf    Epithelial cells Negative 0 - 5 /lpf    Bacteria FEW (A) NEG /hpf    Hyaline cast 0 to 1 0 - 2 /lpf    Granular cast 0 to 1 NEG /lpf   EKG, 12 LEAD, INITIAL    Collection Time: 08/26/22 10:28 AM   Result Value Ref Range    Ventricular Rate 60 BPM    Atrial Rate 60 BPM    P-R Interval 164 ms    QRS Duration 102 ms    Q-T Interval 488 ms    QTC Calculation (Bezet) 488 ms    Calculated P Axis 39 degrees    Calculated R Axis -23 degrees    Calculated T Axis 171 degrees    Diagnosis       Normal sinus rhythm  Left ventricular hypertrophy with repolarization abnormality  Prolonged QT  Abnormal ECG  When compared with ECG of 22-NOV-2014 19:32,  ST now depressed in Lateral leads  T wave inversion now evident in Anterolateral leads  QT has lengthened     DUPLEX LOWER EXT ARTERY RIGHT    Collection Time: 08/26/22 11:06 AM   Result Value Ref Range    Left CFA prox sys .9 cm/s    Left super femoral dist sys PSV 98.9 cm/s    Left super femoral mid sys .7 cm/s    Left super femoral prox sys PSV 93.7 cm/s    Left Prox PFA A PSV 89.4 cm/s    Left popliteal dist sys PSV 55.7 cm/s    Left popliteal prox sys PSV 79.3 cm/s    Left SFA Prox Erlin Ratio 0.86     Left SFA Mid Erlin Ratio 1.47     Left SFA Dist Erlin Ratio 0.72     Left Pop A Prox Erlin Ratio 0.80        Radiologic Studies -   MRI TIB/FIB RT WO CONT   Final Result   Soft tissue inflammation/edema with soft tissue air, suspect   infection with no discrete abscess. Reactive edema at the tibial and fibular   stump. No high suspicion for osteomyelitis. DUPLEX LOWER EXT ARTERY RIGHT   Final Result      XR CHEST PORT   Final Result      No acute findings. Interval removal of left PICC line. .      XR TIB/FIB RT   Final Result      Soft tissue emphysema suspect for infection. Potential developing lucency and   cortical irregularity along the tibial stump which may reflect early   osteomyelitis. CT ABD PELV WO CONT    (Results Pending)     CT Results  (Last 48 hours)      None          CXR Results  (Last 48 hours)                 08/26/22 0912  XR CHEST PORT Final result    Impression:      No acute findings. Interval removal of left PICC line. .       Narrative:  EXAM: XR CHEST PORT       INDICATION: meets SIRS criteria       COMPARISON: 5/3/2016        FINDINGS: A single view of the chest demonstrates clear lungs. The cardiac and   mediastinal contours and pulmonary vascularity are normal. The bones and soft   tissues are within normal limits. Medical Decision Making   I am the first provider for this patient.     I reviewed the vital signs, available nursing notes, past medical history, past surgical history, family history and social history. Vital Signs-Reviewed the patient's vital signs. Records Reviewed: Nursing Notes and Old Medical Records    Procedures:  Procedures    Provider Notes (Medical Decision Making): Patient with right BKA stump dehiscence and pain with slight tunneling laterally and concerns for subcu emphysema and osteomyelitis on x-ray. Duplex arterial study shows good vasculature. Vascular surgery has been down to evaluate patient in person as well as packed opening. A swab gram stain was sent. 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 Banken Zosyn ordered here. Dr. Catherine Valdivia will consult for infectious disease and Dr. Taylor a 2 was added to the treatment team and I spoke to him about the patient's new SAVANNA with a creatinine of 4.  CT abdomen pelvis was ordered though patient has no endorsing difficulty with urinating at times. Have admitted to the hospitalist service. Per week prefers IV antibiotics at this point as patient has good prostatic fit and would like not to have to change this. I have admitted to telemetry. MED RECONCILIATION:  Current Facility-Administered Medications   Medication Dose Route Frequency    sodium chloride (NS) flush 5-10 mL  5-10 mL IntraVENous PRN    piperacillin-tazobactam (ZOSYN) 3.375 g in 0.9% sodium chloride (MBP/ADV) 100 mL MBP  3.375 g IntraVENous Q8H     Current Outpatient Medications   Medication Sig    amLODIPine (NORVASC) 5 mg tablet Take 5 mg by mouth daily. metFORMIN (GLUCOPHAGE) 500 mg tablet Take 500 mg by mouth two (2) times a day. pravastatin (PRAVACHOL) 10 mg tablet Take 10 mg by mouth daily. take at night    metFORMIN (GLUCOPHAGE) 500 mg tablet Take 1 Tab by mouth two (2) times daily (with meals). amLODIPine (NORVASC) 5 mg tablet Take 1 Tab by mouth daily. losartan (COZAAR) 50 mg tablet Take 1 Tab by mouth daily.     pravastatin (PRAVACHOL) 10 mg tablet Take 1 Tab by mouth nightly. Blood-Glucose Meter (RELION ALL-IN-ONE METER) monitoring kit Check fasting sugars three times a day before breakfast, lunch & dinner    Lancets misc Check fasting sugars three times a day before breakfast, lunch & dinner for relion meter    glucose blood VI test strips (BLOOD GLUCOSE TEST) strip Check fasting sugars three times a day before breakfast, lunch & dinner for relion meter    aspirin 81 mg chewable tablet Take 1 Tab by mouth daily. oxyCODONE-acetaminophen (PERCOCET)  mg per tablet Take 0.5-1 Tabs by mouth every four (4) hours as needed for Pain. Max Daily Amount: 6 Tabs. naloxone 2 mg/actuation spry Use 1 spray intranasally into 1 nostril. Use a new Narcan nasal spray for subsequent doses and administer into alternating nostrils. May repeat every 2 to 3 minutes as needed. Indications: OPIATE-INDUCED RESPIRATORY DEPRESSION    oxyCODONE-acetaminophen (PERCOCET) 5-325 mg per tablet Take 2 Tabs by mouth every four (4) hours as needed. Max Daily Amount: 12 Tabs. insulin NPH/insulin regular (HUMULIN 70/30) 100 unit/mL (70-30) injection 12 Units by SubCUTAneous route Before breakfast and dinner. Insulin Syringe-Needle U-100 (INSULIN SYRINGE) 1 mL 28 x 1/2\" syrg 1 Package by Does Not Apply route two (2) times a day. Disposition:  admit    Follow-up Information    None         Current Discharge Medication List            Core Measures:    Critical Care Time:   Critical Care Time:   I have spent 47 minutes of critical care time involved in lab review, consultations with specialist, family decision-making, and documentation. During this entire length of time I was immediately available to the patient. Critical Care:   The reason for providing this level of medical care for this critically ill patient was due a critical illness that impaired one or more vital organ systems such that there was a high probability of imminent or life threatening deterioration in the patients condition. This care involved high complexity decision making to assess, manipulate, and support vital system functions, to treat this degreee vital organ system failure and to prevent further life threatening deterioration of the patients condition. Diagnosis     Clinical Impression:   1. Other acute osteomyelitis of right tibia ([facility] [address]    2. SAVANNA (acute kidney injury) ([facility] [address]    3.  Diabetes mellitus type 2 with complications ([facility] [address]

## 2022-08-26 NOTE — ED NOTES
TRANSFER - OUT REPORT:    Verbal report given to Silver Gore RN (name) on Sameer Sheridan  being transferred to [facility] (unit) for routine progression of care       Report consisted of patients Situation, Background, Assessment and   Recommendations(SBAR). Information from the following report(s) SBAR, Kardex, ED Summary, [facility] and Recent Results was reviewed with the receiving nurse. Lines:   Peripheral IV 08/26/22 Left Antecubital (Active)       Peripheral IV 08/26/22 Right Hand (Active)   Site Assessment Clean, dry, & intact 08/26/22 1015   Phlebitis Assessment 0 08/26/22 1015   Infiltration Assessment 0 08/26/22 1015   Dressing Status Clean, dry, & intact 08/26/22 1015   Hub Color/Line Status Green 08/26/22 1015        Opportunity for questions and clarification was provided.       Patient transported with:   Patient's belongings and patient prosthetic

## 2022-08-26 NOTE — PROGRESS NOTES
Pharmacy Note - Cefepime    2000 mg Cefepime IVP q 24 h ordered for treatment of Bone & Joint Infection. Per [facility] Renal / Extended Infusion B Lactam Policy, Cefepime will be changed to 2000 mg IVP x 1 to be followed in 12 hours by 1000 mg IVPB q 12 h each to be infused over 4 hours. .     Estimated Creatinine Clearance: Estimated Creatinine Clearance: 21.9 mL/min (A) (based on SCr of 4 mg/dL (H)). Dialysis Status, SAVANNA, CKD: n/a    BMI:  Body mass index is 32.01 kg/m². Recent Labs     22  1000   WBC 11.1     Temp (24hrs), Av.3 °F (36.8 °C), Min:97.8 °F (36.6 °C), Max:98.7 °F (37.1 °C)      Rationale for Adjustment:  [facility] Renal / Extended Infusion B Lactam Policy, . Pharmacy will continue to monitor and adjust dose as necessary. Please call Inpatient Pharmacy with any questions. Thank you,  Heydi Garza MS R. Ph

## 2022-08-26 NOTE — PROGRESS NOTES
RENAL DOSE ADJUSTMENT MADE PER P/T PROTOCOL     PREVIOUS ORDER:  Famotidine 20 gm po BID     Zosyn 3375 mg IV q8h ordered for treatment of osteomyelitis. Per [facility] Renal / Extended Infusion B Lactam Policy, Zosyn will be changed to 3375 mg IV q12h due to Acute on Chronic renal failure and borderline CrCl of 21 mL/min. Of note, patient received 4500 mg of zosyn in ED this AM.    Estimated Creatinine Clearance: 21.9 mL/min (A) (based on SCr of 4 mg/dL (H)).     Recent Labs     08/26/22  1000   BUN 68*           NEW RENALLY ADJUSTED ORDER: Famotidine 20 mg po every evening and Zosyn 3375 mg IV q12h    Latasha Bustos  8/26/2022 4:26 PM

## 2022-08-26 NOTE — ROUTINE PROCESS
Bedside and Verbal shift change report given to Yin Carter RN (oncoming nurse) by MARLEN Rodriguez RN (offgoing nurse). Report included the following information SBAR, Kardex, MAR, and Recent Results.

## 2022-08-26 NOTE — ED TRIAGE NOTES
Pt arrived in ER complaining of leg pain to right leg. Pt has a prosthesis that is rubbing and created a wound to the right leg. Pt states pain is 10/10.

## 2022-08-26 NOTE — CONSULTS
Infectious Disease Consultation Note        Reason: Right BKA stump infection    Current abx Prior abx   Zosyn, vancomycin since 8/26      Lines:       Assessment :  20-year-old man with past medical history significant for type 2 diabetes, hypertension, MRSA right foot infection status post right below-knee amputation March 2018 presented to [facility] on 8/26/2022 with increasing right BKA stump pain. Clinical presentation consistent with infected right BKA stump  wound infection, ? Acute on chronic osteomyelitis    Acute on chronic kidney disease-likely multifactorial.  Nephrology follow-up appreciated    Recommendations:    Discontinue piperacillin/tazobactam since increased risk of nephrotoxicity along with concurrent vancomycin. Continue vancomycin for now. Start cefepime  Follow-up wound cultures, blood cultures. Modify antibiotics accordingly  Follow-up results of MRI right BKA stump. Agree with vascular surgery regarding need for stump revision if evidence of osteomyelitis noted on MRI  Follow-up nephrology recommendations regarding SAVANNA      Thank you for consultation request. Above plan was discussed in details with patient,  and ED PA, dr. Carter Golden. Please call me if any further questions or concerns. Will continue to participate in the care of this patient. HPI:    20-year-old man with past medical history significant for type 2 diabetes, hypertension, MRSA right foot infection status post right below-knee amputation March 2018 presented to [facility] on 8/26/2022 with increasing right BKA stump pain. Obtained history by talking to patient, review of records. Patient states that he has had increasing pain in the right BKA stump for about 3 days. He denies any fever or chills. Has been applying medicine topically and padding his prosthesis. He decided to come to emergency room today due to worsening right BKA stump pain. In the ED he was noted to have acute kidney injury with a creatinine of 4.   Nephrology consulted. Vascular surgery consulted. Recommended MRI right BKA stump. Patient was given Zosyn, vancomycin. I been consulted for further recommendations. No subjective fever, chills. No recent trauma to right BKA stump. Past Medical History:   Diagnosis Date    Cellulitis     rt. foot    Diabetes ([address]     Hypercholesterolemia     Hypertension     Osteomyelitis ([facility] [address]     rt toe    Other ill-defined conditions(799.89)        Past Surgical History:   Procedure Laterality Date    HX BELOW KNEE AMPUTATION  03/2018    HX ORTHOPAEDIC      rt.toe       home Medication List      Details   !! metFORMIN (GLUCOPHAGE) 500 mg tablet Take 500 mg by mouth two (2) times a day. !! metFORMIN (GLUCOPHAGE) 500 mg tablet Take 1 Tab by mouth two (2) times daily (with meals). Qty: 180 Tab, Refills: 3    Associated Diagnoses: Uncontrolled type 2 diabetes mellitus without complication, with long-term current use of insulin      amLODIPine (NORVASC) 5 mg tablet Take 1 Tab by mouth daily. Qty: 90 Tab, Refills: 3    Associated Diagnoses: Benign hypertension without CHF      losartan (COZAAR) 50 mg tablet Take 1 Tab by mouth daily. Qty: 90 Tab, Refills: 3    Associated Diagnoses: Benign hypertension without CHF      pravastatin (PRAVACHOL) 10 mg tablet Take 1 Tab by mouth nightly.   Qty: 90 Tab, Refills: 3    Associated Diagnoses: Dyslipidemia      Blood-Glucose Meter (RELION ALL-IN-ONE METER) monitoring kit Check fasting sugars three times a day before breakfast, lunch & dinner  Qty: 1 Kit, Refills: 0    Associated Diagnoses: Uncontrolled type 2 diabetes mellitus without complication, with long-term current use of insulin      Lancets misc Check fasting sugars three times a day before breakfast, lunch & dinner for relion meter  Qty: 100 Each, Refills: 11    Associated Diagnoses: Uncontrolled type 2 diabetes mellitus without complication, with long-term current use of insulin      glucose blood VI test strips (BLOOD GLUCOSE TEST) strip Check fasting sugars three times a day before breakfast, lunch & dinner for relion meter  Qty: 100 Strip, Refills: 11    Associated Diagnoses: Uncontrolled type 2 diabetes mellitus without complication, with long-term current use of insulin      aspirin 81 mg chewable tablet Take 1 Tab by mouth daily. Qty: 90 Tab, Refills: 3    Associated Diagnoses: Medication refill      oxyCODONE-acetaminophen (PERCOCET)  mg per tablet Take 0.5-1 Tabs by mouth every four (4) hours as needed for Pain. Max Daily Amount: 6 Tabs. Qty: 36 Tab, Refills: 0    Associated Diagnoses: Below knee amputation status, right      naloxone 2 mg/actuation spry Use 1 spray intranasally into 1 nostril. Use a new Narcan nasal spray for subsequent doses and administer into alternating nostrils. May repeat every 2 to 3 minutes as needed. Indications: OPIATE-INDUCED RESPIRATORY DEPRESSION  Qty: 1 Blister, Refills: 0      oxyCODONE-acetaminophen (PERCOCET) 5-325 mg per tablet Take 2 Tabs by mouth every four (4) hours as needed. Max Daily Amount: 12 Tabs. Qty: 42 Tab, Refills: 0    Associated Diagnoses: Cellulitis of foot, right      insulin NPH/insulin regular (HUMULIN 70/30) 100 unit/mL (70-30) injection 12 Units by SubCUTAneous route Before breakfast and dinner. Qty: 3 Vial, Refills: 1      Insulin Syringe-Needle U-100 (INSULIN SYRINGE) 1 mL 28 x 1/2\" syrg 1 Package by Does Not Apply route two (2) times a day. Qty: 120 Syringe, Refills: 1       !! - Potential duplicate medications found. Please discuss with provider.           Current Facility-Administered Medications   Medication Dose Route Frequency    sodium chloride (NS) flush 5-10 mL  5-10 mL IntraVENous PRN    piperacillin-tazobactam (ZOSYN) 3.375 g in 0.9% sodium chloride (MBP/ADV) 100 mL MBP  3.375 g IntraVENous Q8H    insulin lispro (HUMALOG) injection   SubCUTAneous AC&HS    glucose chewable tablet 16 g  4 Tablet Oral PRN    glucagon (GLUCAGEN) injection 1 mg  1 mg IntraMUSCular PRN    0.9% sodium chloride infusion  50 mL/hr IntraVENous CONTINUOUS    [START ON 8/27/2022] amLODIPine (NORVASC) tablet 5 mg  5 mg Oral DAILY    [START ON 8/27/2022] aspirin chewable tablet 81 mg  81 mg Oral DAILY    pravastatin (PRAVACHOL) tablet 10 mg  10 mg Oral QHS    sodium chloride (NS) flush 5-40 mL  5-40 mL IntraVENous Q8H    sodium chloride (NS) flush 5-40 mL  5-40 mL IntraVENous PRN    acetaminophen (TYLENOL) tablet 650 mg  650 mg Oral Q6H PRN    Or    acetaminophen (TYLENOL) suppository 650 mg  650 mg Rectal Q6H PRN    polyethylene glycol (MIRALAX) packet 17 g  17 g Oral DAILY PRN    ondansetron (ZOFRAN ODT) tablet 4 mg  4 mg Oral Q8H PRN    Or    ondansetron (ZOFRAN) injection 4 mg  4 mg IntraVENous Q6H PRN    famotidine (PEPCID) tablet 20 mg  20 mg Oral QPM    [START ON 8/27/2022] enoxaparin (LOVENOX) injection 30 mg  30 mg SubCUTAneous DAILY    piperacillin-tazobactam (ZOSYN) 3.375 g in 0.9% sodium chloride (MBP/ADV) 100 mL MBP  3.375 g IntraVENous Q6H    vancomycin (VANCOCIN) 1500 mg in  ml infusion  1,500 mg IntraVENous ONCE    Vancomycin: Pharmcy To Dose   Other Rx Dosing/Monitoring    morphine injection 2 mg  2 mg IntraVENous Q3H PRN    oxyCODONE-acetaminophen (PERCOCET) 5-325 mg per tablet 1 Tablet  1 Tablet Oral Q6H PRN       Allergies: Patient has no known allergies.     Family History   Problem Relation Age of Onset    Diabetes Mother     Diabetes Father     No Known Problems Sister     No Known Problems Brother     No Known Problems Sister      Social History     Socioeconomic History    Marital status: SINGLE     Spouse name: Not on file    Number of children: Not on file    Years of education: Not on file    Highest education level: Not on file   Occupational History    Not on file   Tobacco Use    Smoking status: Heavy Smoker     Packs/day: 0.50     Types: Cigarettes    Smokeless tobacco: Never    Tobacco comments:     pt. counseled to not smoke   Substance and Sexual Activity    Alcohol use: No    Drug use: Yes     Types: Marijuana     Comment: 17    Sexual activity: Not on file   Other Topics Concern    Not on file   Social History Narrative    Not on file     Social Determinants of Health     Financial Resource Strain: Not on file   Food Insecurity: Not on file   Transportation Needs: Not on file   Physical Activity: Not on file   Stress: Not on file   Social Connections: Not on file   Intimate Partner Violence: Not on file   Housing Stability: Not on file     Social History     Tobacco Use   Smoking Status Heavy Smoker    Packs/day: 0.50    Types: Cigarettes   Smokeless Tobacco Never   Tobacco Comments    pt. counseled to not smoke        Temp (24hrs), Av.3 °F (36.8 °C), Min:97.8 °F (36.6 °C), Max:98.7 °F (37.1 °C)    Visit Vitals  BP (!) 141/78   Pulse (!) 58   Temp 98.7 °F (37.1 °C)   Resp 13   Ht 5' 2\" (1.575 m)   Wt 79.4 kg (175 lb)   SpO2 98%   BMI 32.01 kg/m²       ROS: Unable to obtain due to patient factors    Physical Exam:    Vitals and nursing note reviewed. Constitutional:       Appearance: He is well-developed and normal weight. He is not ill-appearing, toxic-appearing or diaphoretic. HENT:      Head: Normocephalic and atraumatic. Neck:      Supple  Cardiovascular:      Rate and Rhythm: Normal rate and regular rhythm. Heart sounds: Normal heart sounds. No friction rub. No gallop. Pulmonary:      Effort: Pulmonary effort is normal. No respiratory distress. Abdominal:      General: There is no distension. Palpations: Abdomen is soft. There is no mass. Tenderness: There is no abdominal tenderness. There is no guarding or rebound. Musculoskeletal:          Right BKA stump with small ulcer medially. Unable to probe the bone. No expressible drainage.  + Darkish erythema surrounding surgical site. Minimal purulence noted from the ulcer. Skin:     General: Skin is warm and dry. Coloration: Skin is not pale. Neurological:      Mental Status: He is alert and oriented to person, place, and time. Psychiatric:         Speech: Speech normal.         Behavior: Behavior normal.         Thought Content: Thought content normal.         Judgment: Judgment normal.     Labs: Results:   Chemistry Recent Labs     08/26/22  1000   *      K 4.3      CO2 20*   BUN 68*   CREA 4.00*   CA 8.4*  8.3*   AGAP 10   BUCR 17   *   TP 6.3*   ALB 2.5*   GLOB 3.8   AGRAT 0.7*      CBC w/Diff Recent Labs     08/26/22  1000   WBC 11.1   RBC 4.03*   HGB 11.7*   HCT 34.8*      GRANS 79*   LYMPH 12*   EOS 1      Microbiology No results for input(s): CULT in the last 72 hours. RADIOLOGY:    All available imaging studies/reports in [facility] for this admission were reviewed      Disclaimer: Sections of this note are dictated utilizing voice recognition software, which may have resulted in some phonetic based errors in grammar and contents. Even though attempts were made to correct all the mistakes, some may have been missed, and remained in the body of the document. If questions arise, please contact our department.     Dr. Ayo Murdock, Infectious Disease Specialist  [phone]  August 26, 2022  4:29 PM

## 2022-08-26 NOTE — CONSULTS
Vascular Surgery      Patient: Stacey Nunez MRN: [id number]  CSN: [id number]      YOB: 1980    Age: 43 y.o. Sex: male      DOA: 8/26/2022    Assessment/Plan     35yo M with right leg BKA chronic wound  - IV abx  - MRI of the right BKA stump  - No acute vascular intervention today  - I packed the wound with 1/4inch packing  - Non-weight bearing right stump       HPI:     Stacey Nunez is a 43 y.o. male who presents with chronic ulceration of right BKA stump. Patient has history of right leg cellulitis and gas gangrene in 2018 which led to a long stump BKA. Patient has been tolerating his prosthesis well until for the past few days he has not been tolerating the prosthetic. On my evaluation, the ulceration seems chronic with q tip extending to about 3cm within the ulcer. No drainage, undrained collection nor any concern for cellulitis. Patient denies any fever, abdominal pain, leg pain, chest pain nor any signs or symptoms of sepsis. WBC is 11. I believe this is likely a chronic pressure wound from prosthesis given the long stump. I recommend MRI to r/o ostemo, IV antibiotics, wound care to pack the wound with 1/4inch packing, possible surgical revision of the BKA if concern for osteo that cannot be managed medically.      Past Medical History:   Diagnosis Date    Cellulitis     rt. foot    Diabetes ([address]     Hypercholesterolemia     Hypertension     Osteomyelitis ([facility] [address]     rt toe    Other ill-defined conditions(799.89)        Past Surgical History:   Procedure Laterality Date    HX BELOW KNEE AMPUTATION  03/2018    HX ORTHOPAEDIC      rt.toe       Family History   Problem Relation Age of Onset    Diabetes Mother     Diabetes Father     No Known Problems Sister     No Known Problems Brother     No Known Problems Sister        Social History     Socioeconomic History    Marital status: SINGLE   Tobacco Use    Smoking status: Heavy Smoker     Packs/day: 0.50     Types: Cigarettes Smokeless tobacco: Never    Tobacco comments:     pt. counseled to not smoke   Substance and Sexual Activity    Alcohol use: No    Drug use: Yes     Types: Marijuana     Comment: 9/5/17       Prior to Admission medications    Medication Sig Start Date End Date Taking? Authorizing Provider   amLODIPine (NORVASC) 5 mg tablet Take 5 mg by mouth daily. Provider, Historical   metFORMIN (GLUCOPHAGE) 500 mg tablet Take 500 mg by mouth two (2) times a day. Provider, Historical   pravastatin (PRAVACHOL) 10 mg tablet Take 10 mg by mouth daily. take at night 3/21/18   Mike Daniels MD   metFORMIN (GLUCOPHAGE) 500 mg tablet Take 1 Tab by mouth two (2) times daily (with meals). 3/20/18   Mike Daniels MD   amLODIPine (NORVASC) 5 mg tablet Take 1 Tab by mouth daily. 3/19/18   Mike Daniels MD   losartan (COZAAR) 50 mg tablet Take 1 Tab by mouth daily. 3/19/18   Mike Daniels MD   pravastatin (PRAVACHOL) 10 mg tablet Take 1 Tab by mouth nightly. 3/19/18   Mike Daniels MD   Blood-Glucose Meter (RELION ALL-IN-ONE METER) monitoring kit Check fasting sugars three times a day before breakfast, lunch & dinner 3/19/18   Trey Daniels MD   Lancets misc Check fasting sugars three times a day before breakfast, lunch & dinner for relion meter 3/19/18   Mike Daniels MD   glucose blood VI test strips (BLOOD GLUCOSE TEST) strip Check fasting sugars three times a day before breakfast, lunch & dinner for relion meter 3/19/18   Mike Daniels MD   aspirin 81 mg chewable tablet Take 1 Tab by mouth daily. 3/19/18   Mike Daniels MD   oxyCODONE-acetaminophen (PERCOCET)  mg per tablet Take 0.5-1 Tabs by mouth every four (4) hours as needed for Pain. Max Daily Amount: 6 Tabs. 3/13/18   Daniela Jenkins NP   naloxone 2 mg/actuation spry Use 1 spray intranasally into 1 nostril. Use a new Narcan nasal spray for subsequent doses and administer into alternating nostrils.  May repeat every 2 to 3 minutes as needed. Indications: OPIATE-INDUCED RESPIRATORY DEPRESSION 3/13/18   Frida Jenkins Median, NP   oxyCODONE-acetaminophen (PERCOCET) 5-325 mg per tablet Take 2 Tabs by mouth every four (4) hours as needed. Max Daily Amount: 12 Tabs. 3/12/18   Sae Galeanaconrad LINDA, DO   insulin NPH/insulin regular (HUMULIN 70/30) 100 unit/mL (70-30) injection 12 Units by SubCUTAneous route Before breakfast and dinner. 5/23/15   Cira Razo MD   Insulin Syringe-Needle U-100 (INSULIN SYRINGE) 1 mL 28 x 1/2\" syrg 1 Package by Does Not Apply route two (2) times a day. 5/23/15   Cira Razo MD       No Known Allergies    Review of Systems  Review of Systems - Negative except HPI      Physical Exam:      Visit Vitals  /79   Pulse 67   Temp 97.8 °F (36.6 °C)   Resp 16   Ht 5' 2\" (1.575 m)   Wt 175 lb (79.4 kg)   SpO2 99%   BMI 32.01 kg/m²       Physical Exam:  Pertinent items are noted in HPI. Data Review:  CBC:   Lab Results   Component Value Date/Time    WBC 11.1 08/26/2022 10:00 AM    RBC 4.03 (L) 08/26/2022 10:00 AM    HGB 11.7 (L) 08/26/2022 10:00 AM    HCT 34.8 (L) 08/26/2022 10:00 AM    PLATELET 845 03/95/6559 10:00 AM          Active Problems:    * No active hospital problems.  Michael Cote MD  August 26, 2022

## 2022-08-26 NOTE — H&P
History and Physical          Subjective     HPI: Citlali Almazan is a 43 y.o. male with a PMHx of cellulitis, osteomyelitis, Diabetes, hyperlipidemia, and HTN who presented to the ED with a complaint of constant right BKA stump pain since 4 days. Pt states pain is 7/10 and wears his prothesis daily. No drainage noted to the wound. It is packed with gauze. Denies fever, abdominal pain, chest pain, SOB, n/v/d. In the ER, pt slightly anemic, proteinuria, CO2 20, BUN 68, Cr 4.0, Ca 8.4, blood and would cultures pending. CT abd and pelvis revealed no bowel dilation, obstruction or inflammation. CXR negative. R tib/fib xray revealed soft tissue emphysema suspicious for infection/early osteomyelitis although, Right tib/fib MRI revealed no high suspicion for osteomyelitis. RLE duplex negative. ID, Vascular and wound care were consulted. PMHx:  Past Medical History:   Diagnosis Date    Cellulitis     rt. foot    Diabetes ([facility] [address]     Hypercholesterolemia     Hypertension     Osteomyelitis (HCC)     rt toe    Other ill-defined conditions(799.89)        PSurgHx:  Past Surgical History:   Procedure Laterality Date    HX BELOW KNEE AMPUTATION  03/2018    HX ORTHOPAEDIC      rt.toe       SocialHx:  Social History     Socioeconomic History    Marital status: SINGLE   Tobacco Use    Smoking status: Heavy Smoker     Packs/day: 0.50     Types: Cigarettes    Smokeless tobacco: Never    Tobacco comments:     pt. counseled to not smoke   Substance and Sexual Activity    Alcohol use: No    Drug use: Yes     Types: Marijuana     Comment: 9/5/17       FamilyHx:  Family History   Problem Relation Age of Onset    Diabetes Mother     Diabetes Father     No Known Problems Sister     No Known Problems Brother     No Known Problems Sister        Home Medications:  Prior to Admission Medications   Prescriptions Last Dose Informant Patient Reported? Taking?    Blood-Glucose Meter (RELION ALL-IN-ONE METER) monitoring kit   No No   Sig: Check fasting sugars three times a day before breakfast, lunch & dinner   Insulin Syringe-Needle U-100 (INSULIN SYRINGE) 1 mL 28 x 1/2\" syrg   No No   Si Package by Does Not Apply route two (2) times a day. Lancets misc   No No   Sig: Check fasting sugars three times a day before breakfast, lunch & dinner for relion meter   amLODIPine (NORVASC) 5 mg tablet   No No   Sig: Take 1 Tab by mouth daily. aspirin 81 mg chewable tablet   No No   Sig: Take 1 Tab by mouth daily. glucose blood VI test strips (BLOOD GLUCOSE TEST) strip   No No   Sig: Check fasting sugars three times a day before breakfast, lunch & dinner for relion meter   insulin NPH/insulin regular (HUMULIN 70/30) 100 unit/mL (70-30) injection   No No   Si Units by SubCUTAneous route Before breakfast and dinner. losartan (COZAAR) 50 mg tablet   No No   Sig: Take 1 Tab by mouth daily. metFORMIN (GLUCOPHAGE) 500 mg tablet   No No   Sig: Take 1 Tab by mouth two (2) times daily (with meals). metFORMIN (GLUCOPHAGE) 500 mg tablet   Yes No   Sig: Take 500 mg by mouth two (2) times a day.   naloxone 2 mg/actuation spry   No No   Sig: Use 1 spray intranasally into 1 nostril. Use a new Narcan nasal spray for subsequent doses and administer into alternating nostrils. May repeat every 2 to 3 minutes as needed. Indications: OPIATE-INDUCED RESPIRATORY DEPRESSION   oxyCODONE-acetaminophen (PERCOCET)  mg per tablet   No No   Sig: Take 0.5-1 Tabs by mouth every four (4) hours as needed for Pain. Max Daily Amount: 6 Tabs. oxyCODONE-acetaminophen (PERCOCET) 5-325 mg per tablet   No No   Sig: Take 2 Tabs by mouth every four (4) hours as needed. Max Daily Amount: 12 Tabs. pravastatin (PRAVACHOL) 10 mg tablet   No No   Sig: Take 1 Tab by mouth nightly.       Facility-Administered Medications: None       Allergies:  No Known Allergies     Review of Systems:  CONST: no fever or chills, no fatigue  Eyes: No change in vision, no itching or drainage  PULM: No shortness of breath, no cough or wheeze. CV: no CP, no palpitations, no edema  GI: No abdominal pain, no nausea, no vomiting or diarrhea,   : No urinary frequency, no urgency, no hesitancy or dysuria. MSK: R BKA stump pain, no back pain, no neck pain, no recent trauma. INTEG: R BKA stump + lesion,  No rash, no itching,  ENDO: No polyuria, no polydipsia, no heat or cold intolerance. HEME: No anemia or easy bruising or bleeding. NEURO: No headache, no dizziness        Objective     Physical Exam:  Visit Vitals  /79   Pulse 67   Temp 97.8 °F (36.6 °C)   Resp 16   Ht 5' 2\" (1.575 m)   Wt 79.4 kg (175 lb)   SpO2 99%   BMI 32.01 kg/m²       General: NAD, appears stated age, alert  Skin: warm, dry, no rashes  Eyes: PERRL, sclera is non-icteric  HENT: normocephalic/atraumatic, moist mucus membranes  Respiratory: CTA with no signs of respiratory distress  Cardiovascular: RRR, no m/r/g, no cyanosis or peripheral edema of extremities  GI: soft, non-tender, normal bowel sounds  MSK: R BKA stump packed with gauze, no drainage, warm and tender to touch, R BKA, 4 phalanges amputated on L foot, no erythema  Neuro: moves all extremities, no focal deficits, normal speech      Laboratory Studies:  Recent Results (from the past 24 hour(s))   CBC WITH AUTOMATED DIFF    Collection Time: 08/26/22 10:00 AM   Result Value Ref Range    WBC 11.1 4.6 - 13.2 K/uL    RBC 4.03 (L) 4.35 - 5.65 M/uL    HGB 11.7 (L) 13.0 - 16.0 g/dL    HCT 34.8 (L) 36.0 - 48.0 %    MCV 86.4 78.0 - 100.0 FL    MCH 29.0 24.0 - 34.0 PG    MCHC 33.6 31.0 - 37.0 g/dL    RDW 14.8 (H) 11.6 - 14.5 %    PLATELET 137 775 - 193 K/uL    MPV 12.2 (H) 9.2 - 11.8 FL    NRBC 0.0 0  WBC    ABSOLUTE NRBC 0.00 0.00 - 0.01 K/uL    NEUTROPHILS 79 (H) 40 - 73 %    LYMPHOCYTES 12 (L) 21 - 52 %    MONOCYTES 7 3 - 10 %    EOSINOPHILS 1 0 - 5 %    BASOPHILS 0 0 - 2 %    IMMATURE GRANULOCYTES 0 0.0 - 0.5 %    ABS. NEUTROPHILS 8.8 (H) 1.8 - 8.0 K/UL    ABS. LYMPHOCYTES 1.4 0.9 - 3.6 K/UL    ABS. MONOCYTES 0.8 0.05 - 1.2 K/UL    ABS. EOSINOPHILS 0.1 0.0 - 0.4 K/UL    ABS. BASOPHILS 0.0 0.0 - 0.1 K/UL    ABS. IMM. GRANS. 0.0 0.00 - 0.04 K/UL    DF AUTOMATED     METABOLIC PANEL, COMPREHENSIVE    Collection Time: 08/26/22 10:00 AM   Result Value Ref Range    Sodium 141 136 - 145 mmol/L    Potassium 4.3 3.5 - 5.5 mmol/L    Chloride 111 100 - 111 mmol/L    CO2 20 (L) 21 - 32 mmol/L    Anion gap 10 3.0 - 18 mmol/L    Glucose 133 (H) 74 - 99 mg/dL    BUN 68 (H) 7.0 - 18 MG/DL    Creatinine 4.00 (H) 0.6 - 1.3 MG/DL    BUN/Creatinine ratio 17 12 - 20      GFR est AA 20 (L) >60 ml/min/1.73m2    GFR est non-AA 17 (L) >60 ml/min/1.73m2    Calcium 8.3 (L) 8.5 - 10.1 MG/DL    Bilirubin, total 0.4 0.2 - 1.0 MG/DL    ALT (SGPT) 21 16 - 61 U/L    AST (SGOT) 23 10 - 38 U/L    Alk.  phosphatase 133 (H) 45 - 117 U/L    Protein, total 6.3 (L) 6.4 - 8.2 g/dL    Albumin 2.5 (L) 3.4 - 5.0 g/dL    Globulin 3.8 2.0 - 4.0 g/dL    A-G Ratio 0.7 (L) 0.8 - 1.7     TROPONIN-HIGH SENSITIVITY    Collection Time: 08/26/22 10:00 AM   Result Value Ref Range    Troponin-High Sensitivity 36 0 - 78 ng/L   GLUCOSE, POC    Collection Time: 08/26/22 10:09 AM   Result Value Ref Range    Glucose (POC) 140 (H) 70 - 110 mg/dL   POC LACTIC ACID    Collection Time: 08/26/22 10:11 AM   Result Value Ref Range    Lactic Acid (POC) 0.72 0.40 - 2.00 mmol/L   URINALYSIS W/ RFLX MICROSCOPIC    Collection Time: 08/26/22 10:15 AM   Result Value Ref Range    Color YELLOW      Appearance CLEAR      Specific gravity 1.013 1.005 - 1.030      pH (UA) 5.5 5.0 - 8.0      Protein 300 (A) NEG mg/dL    Glucose 100 (A) NEG mg/dL    Ketone Negative NEG mg/dL    Bilirubin Negative NEG      Blood Negative NEG      Urobilinogen 0.2 0.2 - 1.0 EU/dL    Nitrites Negative NEG      Leukocyte Esterase Negative NEG     URINE MICROSCOPIC ONLY    Collection Time: 08/26/22 10:15 AM   Result Value Ref Range    WBC 0 to 2 0 - 4 /hpf    RBC Negative 0 - 5 /hpf    Epithelial cells Negative 0 - 5 /lpf    Bacteria FEW (A) NEG /hpf    Hyaline cast 0 to 1 0 - 2 /lpf    Granular cast 0 to 1 NEG /lpf   CREATININE, UR, RANDOM    Collection Time: 08/26/22 10:15 AM   Result Value Ref Range    Creatinine, urine 57.00 30 - 125 mg/dL   PROTEIN URINE, RANDOM    Collection Time: 08/26/22 10:15 AM   Result Value Ref Range    Protein, urine random 354 (H) <11.9 mg/dL   SODIUM, UR, RANDOM    Collection Time: 08/26/22 10:15 AM   Result Value Ref Range    Sodium,urine random 72 20 - 110 MMOL/L   EKG, 12 LEAD, INITIAL    Collection Time: 08/26/22 10:28 AM   Result Value Ref Range    Ventricular Rate 60 BPM    Atrial Rate 60 BPM    P-R Interval 164 ms    QRS Duration 102 ms    Q-T Interval 488 ms    QTC Calculation (Bezet) 488 ms    Calculated P Axis 39 degrees    Calculated R Axis -23 degrees    Calculated T Axis 171 degrees    Diagnosis       Normal sinus rhythm  Left ventricular hypertrophy with repolarization abnormality  Prolonged QT  Abnormal ECG  When compared with ECG of 22-NOV-2014 19:32,  ST now depressed in Lateral leads  T wave inversion now evident in Anterolateral leads  QT has lengthened     DUPLEX LOWER EXT ARTERY RIGHT    Collection Time: 08/26/22 11:06 AM   Result Value Ref Range    Left CFA prox sys .9 cm/s    Left super femoral dist sys PSV 98.9 cm/s    Left super femoral mid sys .7 cm/s    Left super femoral prox sys PSV 93.7 cm/s    Left Prox PFA A PSV 89.4 cm/s    Left popliteal dist sys PSV 55.7 cm/s    Left popliteal prox sys PSV 79.3 cm/s    Left SFA Prox Erlin Ratio 0.86     Left SFA Mid Erlin Ratio 1.47     Left SFA Dist Erlin Ratio 0.72     Left Pop A Prox Erlin Ratio 0.80        Imaging Reviewed:  XR TIB/FIB RT    Result Date: 8/26/2022  History: Pain. COMPARISON: None. TECHNIQUE: 2 views right knee. FINDINGS: Below-the-knee amputation noted. Joint space is preserved. Mild atherosclerosis. Soft tissue edema and 17 is emphysema noted.  Emphysema tracks along the stump and medial calf to the level of just above the knee. Potential developing cortical lucency and irregularity along the stump. Soft tissue emphysema suspect for infection. Potential developing lucency and cortical irregularity along the tibial stump which may reflect early osteomyelitis. MRI TIB/FIB RT WO CONT    Result Date: 8/26/2022  Multisequence multiplanar MR images of the right tibia/fibula were obtained. HISTORY: Below knee amputation. Swelling. Pain. Reference: Radiographs 0904 hours. Severe subcutaneous edema. Soft tissue air seen on radiographs. Mild muscular edema. No discrete fluid collection or abscess identified. Surgical margin at the distal tibia and fibula slightly indistinct but no infiltration of the bone marrow. Minimal reactive marrow edema distally. No knee joint effusion. Soft tissue inflammation/edema with soft tissue air, suspect infection with no discrete abscess. Reactive edema at the tibial and fibular stump. No high suspicion for osteomyelitis. XR CHEST PORT    Result Date: 8/26/2022  EXAM: XR CHEST PORT INDICATION: meets SIRS criteria COMPARISON: 5/3/2016 FINDINGS: A single view of the chest demonstrates clear lungs. The cardiac and mediastinal contours and pulmonary vascularity are normal. The bones and soft tissues are within normal limits. No acute findings. Interval removal of left PICC line. .    DUPLEX LOWER EXT ARTERY RIGHT    Result Date: 8/26/2022  · Patient with right below the knee amputation. · Patent right common femoral, superficial femoral, deep femoral, and popliteal arteries with triphasic waveforms.            Assessment/Plan     Hospital Problems  Date Reviewed: 3/19/2018            Codes Class Noted POA    Diabetes mellitus type 2 with complications ([facility] KHI-98-KALINA: E11.8  ICD-9-CM: 250.90  8/26/2022 Unknown        Hyperlipidemia ICD-10-CM: E78.5  ICD-9-CM: 272.4  8/26/2022 Yes        Cellulitis of right lower extremity ICD-10-CM: U72.405  ICD-9-CM: 682.6  2/23/2018 Unknown        Acute-on-chronic kidney injury [facility]) ICD-10-CM: N17.9, N18.9  ICD-9-CM: 584.9, 585.9  9/12/2017 Unknown        Hypertension ICD-10-CM: I10  ICD-9-CM: 401.9  5/19/2015 Yes          Cellulitis of R leg- R BKA stump, wears prosthesis   - IV abx- Vanc and Cefepime  - Vascular Surgery and ID consulted- appreciate assistance  - Blood and Wound cultures pending  - Non weight bearing to R leg   - MRI w/o cont of R BKA stump per Vascular - negative for osteomyeltis  - Wound care consult      Acute on Chronic Kidney Injury-  - Nephrology consulted- appreciate assistance  - IV Fluids  - Strict I&O  - Hold Losartan   - Monitor BMP  - May need to consider discontinuing metformin at discharge     Diabetes Mellitus Type 2  - Sliding Scale Insulin- AC&HS   - Check HbA1c     Hypertension   - Continue Norvasc; hold losartan due to SAVANNA    - monitor BP      Hyperlipidemia- cont pravastatin     Anticipated Discharge: 2 days    DVT Prophylaxis:  [x]Lovenox  []Hep SQ  []SCDs  []Coumadin []DOAC  []On Heparin gtt     Time spent reviewing records, independently interpreting results, obtaining history from patient or caregiver, performing physical exam, ordering tests and medications, communicating with specialists, documenting in the chart, and coordinating overall care is  >75 minutes     MALOU MendozaC  [facility]  Hospitalist Division  Office:  [phone]

## 2022-08-27 LAB
ANION GAP SERPL CALC-SCNC: 5 MMOL/L (ref 3–18)
BASOPHILS # BLD: 0 K/UL (ref 0–0.1)
BASOPHILS NFR BLD: 1 % (ref 0–2)
BUN SERPL-MCNC: 54 MG/DL (ref 7–18)
BUN/CREAT SERPL: 14 (ref 12–20)
CALCIUM SERPL-MCNC: 7.9 MG/DL (ref 8.5–10.1)
CHLORIDE SERPL-SCNC: 116 MMOL/L (ref 100–111)
CO2 SERPL-SCNC: 22 MMOL/L (ref 21–32)
CREAT SERPL-MCNC: 3.74 MG/DL (ref 0.6–1.3)
DIFFERENTIAL METHOD BLD: ABNORMAL
EOSINOPHIL # BLD: 0.2 K/UL (ref 0–0.4)
EOSINOPHIL NFR BLD: 3 % (ref 0–5)
ERYTHROCYTE [DISTWIDTH] IN BLOOD BY AUTOMATED COUNT: 15 % (ref 11.6–14.5)
GLUCOSE BLD STRIP.AUTO-MCNC: 111 MG/DL (ref 70–110)
GLUCOSE BLD STRIP.AUTO-MCNC: 121 MG/DL (ref 70–110)
GLUCOSE BLD STRIP.AUTO-MCNC: 136 MG/DL (ref 70–110)
GLUCOSE BLD STRIP.AUTO-MCNC: 91 MG/DL (ref 70–110)
GLUCOSE SERPL-MCNC: 117 MG/DL (ref 74–99)
HCT VFR BLD AUTO: 32.8 % (ref 36–48)
HGB BLD-MCNC: 10.7 G/DL (ref 13–16)
IMM GRANULOCYTES # BLD AUTO: 0 K/UL (ref 0–0.04)
IMM GRANULOCYTES NFR BLD AUTO: 0 % (ref 0–0.5)
LYMPHOCYTES # BLD: 1.4 K/UL (ref 0.9–3.6)
LYMPHOCYTES NFR BLD: 18 % (ref 21–52)
MAGNESIUM SERPL-MCNC: 1.6 MG/DL (ref 1.6–2.6)
MCH RBC QN AUTO: 29.2 PG (ref 24–34)
MCHC RBC AUTO-ENTMCNC: 32.6 G/DL (ref 31–37)
MCV RBC AUTO: 89.6 FL (ref 78–100)
MONOCYTES # BLD: 0.7 K/UL (ref 0.05–1.2)
MONOCYTES NFR BLD: 9 % (ref 3–10)
NEUTS SEG # BLD: 5.4 K/UL (ref 1.8–8)
NEUTS SEG NFR BLD: 69 % (ref 40–73)
NRBC # BLD: 0 K/UL (ref 0–0.01)
NRBC BLD-RTO: 0 PER 100 WBC
PHOSPHATE SERPL-MCNC: 4.3 MG/DL (ref 2.5–4.9)
PLATELET # BLD AUTO: 178 K/UL (ref 135–420)
PMV BLD AUTO: 12.9 FL (ref 9.2–11.8)
POTASSIUM SERPL-SCNC: 4 MMOL/L (ref 3.5–5.5)
RBC # BLD AUTO: 3.66 M/UL (ref 4.35–5.65)
SODIUM SERPL-SCNC: 143 MMOL/L (ref 136–145)
VANCOMYCIN SERPL-MCNC: 26 UG/ML (ref 5–40)
WBC # BLD AUTO: 7.8 K/UL (ref 4.6–13.2)

## 2022-08-27 PROCEDURE — 84100 ASSAY OF PHOSPHORUS: CPT

## 2022-08-27 PROCEDURE — 74011250637 HC RX REV CODE- 250/637

## 2022-08-27 PROCEDURE — 74011250636 HC RX REV CODE- 250/636

## 2022-08-27 PROCEDURE — 80048 BASIC METABOLIC PNL TOTAL CA: CPT

## 2022-08-27 PROCEDURE — 74011000250 HC RX REV CODE- 250

## 2022-08-27 PROCEDURE — 99232 SBSQ HOSP IP/OBS MODERATE 35: CPT | Performed by: HOSPITALIST

## 2022-08-27 PROCEDURE — 83735 ASSAY OF MAGNESIUM: CPT

## 2022-08-27 PROCEDURE — 36415 COLL VENOUS BLD VENIPUNCTURE: CPT

## 2022-08-27 PROCEDURE — 82962 GLUCOSE BLOOD TEST: CPT

## 2022-08-27 PROCEDURE — 74011250636 HC RX REV CODE- 250/636: Performed by: INTERNAL MEDICINE

## 2022-08-27 PROCEDURE — 85025 COMPLETE CBC W/AUTO DIFF WBC: CPT

## 2022-08-27 PROCEDURE — 74011000258 HC RX REV CODE- 258: Performed by: INTERNAL MEDICINE

## 2022-08-27 PROCEDURE — 80202 ASSAY OF VANCOMYCIN: CPT

## 2022-08-27 PROCEDURE — 65270000046 HC RM TELEMETRY

## 2022-08-27 PROCEDURE — 74011250637 HC RX REV CODE- 250/637: Performed by: INTERNAL MEDICINE

## 2022-08-27 PROCEDURE — 74011000250 HC RX REV CODE- 250: Performed by: INTERNAL MEDICINE

## 2022-08-27 PROCEDURE — 74011250636 HC RX REV CODE- 250/636: Performed by: HOSPITALIST

## 2022-08-27 RX ORDER — SODIUM CHLORIDE 9 MG/ML
75 INJECTION, SOLUTION INTRAVENOUS CONTINUOUS
Status: DISCONTINUED | OUTPATIENT
Start: 2022-08-27 | End: 2022-08-28

## 2022-08-27 RX ORDER — CALCITRIOL 0.25 UG/1
0.25 CAPSULE ORAL DAILY
Status: DISCONTINUED | OUTPATIENT
Start: 2022-08-27 | End: 2022-08-29 | Stop reason: HOSPADM

## 2022-08-27 RX ADMIN — MORPHINE SULFATE 2 MG: 2 INJECTION, SOLUTION INTRAMUSCULAR; INTRAVENOUS at 22:47

## 2022-08-27 RX ADMIN — AMLODIPINE BESYLATE 5 MG: 5 TABLET ORAL at 09:20

## 2022-08-27 RX ADMIN — ATORVASTATIN CALCIUM 20 MG: 20 TABLET, FILM COATED ORAL at 22:40

## 2022-08-27 RX ADMIN — SODIUM CHLORIDE 75 ML/HR: 9 INJECTION, SOLUTION INTRAVENOUS at 13:30

## 2022-08-27 RX ADMIN — CALCITRIOL CAPSULES 0.25 MCG 0.25 MCG: 0.25 CAPSULE ORAL at 09:20

## 2022-08-27 RX ADMIN — SODIUM CHLORIDE, PRESERVATIVE FREE 10 ML: 5 INJECTION INTRAVENOUS at 22:41

## 2022-08-27 RX ADMIN — LACTOBACILLUS TAB 2 TABLET: TAB at 09:20

## 2022-08-27 RX ADMIN — CEFEPIME 2 G: 2 INJECTION, POWDER, FOR SOLUTION INTRAVENOUS at 03:37

## 2022-08-27 RX ADMIN — ASPIRIN 81 MG CHEWABLE TABLET 81 MG: 81 TABLET CHEWABLE at 09:20

## 2022-08-27 RX ADMIN — SODIUM CHLORIDE, PRESERVATIVE FREE 10 ML: 5 INJECTION INTRAVENOUS at 13:30

## 2022-08-27 RX ADMIN — MORPHINE SULFATE 2 MG: 2 INJECTION, SOLUTION INTRAMUSCULAR; INTRAVENOUS at 09:20

## 2022-08-27 RX ADMIN — SODIUM CHLORIDE, PRESERVATIVE FREE 10 ML: 5 INJECTION INTRAVENOUS at 06:11

## 2022-08-27 RX ADMIN — FAMOTIDINE 20 MG: 20 TABLET ORAL at 17:19

## 2022-08-27 RX ADMIN — MORPHINE SULFATE 2 MG: 2 INJECTION, SOLUTION INTRAMUSCULAR; INTRAVENOUS at 15:26

## 2022-08-27 RX ADMIN — LACTOBACILLUS TAB 2 TABLET: TAB at 17:19

## 2022-08-27 RX ADMIN — ENOXAPARIN SODIUM 30 MG: 100 INJECTION SUBCUTANEOUS at 09:20

## 2022-08-27 RX ADMIN — CEFEPIME HYDROCHLORIDE 1 G: 1 INJECTION, POWDER, FOR SOLUTION INTRAMUSCULAR; INTRAVENOUS at 15:26

## 2022-08-27 NOTE — PROGRESS NOTES
Vascular Surgery     Patient seen and evaluated  Resting comfortable  MRI reviewed for no osteo  Continue IV antibiotics  Continue packing changes everyday, I will change the dressing tomorrow morning   Non weight bearing on the stump, no prosthesis until resolution of the ulcer.      Heike Case MD

## 2022-08-27 NOTE — ACP (ADVANCE CARE PLANNING)
Advance Care Planning   Advance Care Planning Inpatient Note  [address] [place] [address]   Spiritual Care Department    Today's Date: 8/27/2022  Unit: [address]    Received request from admission screening. Upon review of chart and communication with care team, patient's decision making abilities are not in question. Patient and Apryl Hopkins ([address] ) on video  was/were present in the room during visit. Goals of ACP Conversation:  Discuss Advance Care planning documents  Facilitate a discussion related to patient's goals of care as they align with the patient's values and beliefs    Health Care Decision Makers:      Primary Decision Maker: Inés Santamaria - [phone]    Summary:  Completed New Documents  Patient named Tevin Hardin (childhood friend) who lives in [address] with phone [phone] as his Primary Decision Maker. Advance Care Planning Documents (Patient Wishes) on file:  Healthcare Power of /Advance Directive appointment of Health care agent  Living Will/ Advance Directive  Anatomical Gift/Organ donation     Assessment:     Patient's capacity to make healthcare decisions is not in question at the time of visit. Patient requested for a  device to be used for better understanding of the ACP document. Patient states that he does not have any family members in the area and he lived with his friend, Tevin Hardin, since his childhood but he is not a blood relative. He is the only person who knows him well and could be trusted.   Interventions:  Discussed and provided education on state decision maker hierarchy  Assisted in the completion of documents according to patient's wishes at this time  Encouraged ongoing ACP conversation with future decision makers and loved ones    Care Preferences Communicated:  No    Outcomes/Plan:  New Advance Directive completed  Returned original document(s) to patient, as well as copies for distribution to appointed agents  Copy of Advance Directive given to staff to scan into medical record  Routed ACP note to attending provider or other IDT member    Abner Honeycutt [facility] on 8/27/2022 at 1:00 PM

## 2022-08-27 NOTE — PROGRESS NOTES
Patient able to ambulate to bathroom using walker. This nurse provided minimal assist and only served to make no obstacles were in his path. Patient expresses appreciation and complains of pain at his amputation site. Will administer pain medication and continue to monitor.

## 2022-08-27 NOTE — PROGRESS NOTES
conducted an initial consultation and Spiritual Assessment for Jayna Pelaez, who is a 43 y. o.,male. Patients Primary Language is: [place]. According to the patients EMR Mandaeism Affiliation is: No Episcopalian. The reason the Patient came to the hospital is:   Patient Active Problem List    Diagnosis Date Noted    Diabetes mellitus type 2 with complications ([facility] [address] 14/58/1036    Hyperlipidemia 08/26/2022    Osteomyelitis ([facility] [address] 03/06/2018    Cellulitis of right lower extremity 02/23/2018    Anemia 09/12/2017    Acute-on-chronic kidney injury ([address] 09/12/2017    DM (diabetes mellitus) ([facility] [address] 09/12/2017    Iron deficiency anemia 05/21/2015    Hypertension 05/19/2015    Diabetic foot ulcer ([address] 05/16/2015    Cellulitis 11/21/2014    Cellulitis and abscess 11/21/2014    Abscess of right thigh 07/15/2014    Sepsis ([facility] [address] 07/14/2014    Abscess of bursa, left knee 06/19/2013    Cellulitis of left knee 06/15/2013    DM (diabetes mellitus), secondary uncontrolled 06/15/2013        The  provided the following Interventions:  Initiated a relationship of care and support. Explored issues of javier, belief, spirituality and Sikhism/ritual needs while hospitalized. Listened empathically. Patient states he has no family in the area. He lives with a trusted friend named Trinda Boxer but is not blood related to him. Provided chaplaincy education about Advance Medical Directive as requested by screening department. Provided information about Spiritual Care Services. Offered assurance of continued prayers on patient's behalf. Chart reviewed. With the use of  agent Galina Vela was able to assist patient in completing an AMD. Patient named Trinda Boxer (friend) who lives in [facility], [address] [place] [address] /[phone] as his Primary Decision Maker. Completed ACP on patient's file.     The following outcomes where achieved:  Patient shared limited information about both his medical narrative and spiritual journey/beliefs.  confirmed Patient's Zoroastrianism Affiliation is Voodoo. Patient processed feeling about current hospitalization. Patient expressed gratitude for 's visit. Assessment:  Patient does not have any Yazidism/cultural needs that will affect patients preferences in health care. There are no spiritual or Yazidism issues which require intervention at this time. Plan:  Chaplains will continue to follow and will provide pastoral care on an as needed/requested basis.  recommends bedside caregivers page  on duty if patient shows signs of acute spiritual or emotional distress.     Bhavna Rodriguez 605 [phone]

## 2022-08-27 NOTE — PROGRESS NOTES
[facility] Hospitalist Group  Progress Note    Patient: Marianela Bridges Age: 43 y.o. : 1980 MR#: [id number] SSN: [id number]  Date/Time: 2022     Subjective: Patient lying in the bed, feels fine, minimal pain at the right stump. Assessment/Plan:     Hospital Problems  Date Reviewed: 3/19/2018            Codes Class Noted POA    Diabetes mellitus type 2 with complications ([facility] [id number]: E11.8  ICD-9-CM: 250.90  2022 Unknown        Hyperlipidemia ICD-10-CM: E78.5  ICD-9-CM: 272.4  2022 Yes        Cellulitis of right lower extremity ICD-10-CM: L03.115  ICD-9-CM: 682.6  2018 Unknown        Acute-on-chronic kidney injury ([facility] ICD-10-CM: N17.9, N18.9  ICD-9-CM: 584.9, 585.9  2017 Unknown        Hypertension ICD-10-CM: I10  ICD-9-CM: 401.9  2015 Yes          Right BKA stump cellulitis with no discrete abscess. MRI with reactive edema at the tibial and fibular stump. There is no suspicion of osteomyelitis. Arterial duplex without any obstructive disease. This likely associated with type II DM. SAVANNA on CKD 3b, multifactorial.  No obstructive pathology on CT abdomen pelvic  Nonobstructive kidney stone on left kidney  Hypertension  Hyperlipidemia  Tobacco smoking dependence  Marijuana use     Plan  Continue on IV antibiotics, culture follow-up for antibiotic selection. ID consult pending. Appreciate vascular consultation, no further intervention  Continue wound care, nonweightbearing on right stump  Avoid nephrotoxic medications, resume IV fluids, nephrology on case  Continue amlodipine, avoid losartan  ISS    Discussed with the patient at the bedside and explained about my above plan care.       Case discussed with:  [x]Patient  []Family  []Nursing  []Case Management  DVT Prophylaxis:  [x]Lovenox  []Hep SQ  []SCDs  []Coumadin   []Eliquis/Xarelto     Objective:   VS: Visit Vitals  BP (!) 147/80   Pulse 60   Temp 98.2 °F (36.8 °C)   Resp 17   Ht 5' 2\" (1.575 m)   Wt 79.4 kg (175 lb)   SpO2 97%   BMI 32.01 kg/m²      Tmax/24hrs: Temp (24hrs), Av.1 °F (36.7 °C), Min:97.3 °F (36.3 °C), Max:98.7 °F (37.1 °C)  IOBRIEF  Intake/Output Summary (Last 24 hours) at 2022 1202  Last data filed at 2022 0600  Gross per 24 hour   Intake 1100.83 ml   Output 1600 ml   Net -499.17 ml       General:  Alert, cooperative, no acute distress    HEENT: PERRLA, anicteric sclerae. Pulmonary:  CTA Bilaterally. No Wheezing/Rales. Cardiovascular: Regular rate and Rhythm. GI:  Soft, Non distended, Non tender. + Bowel sounds. Extremities:  No edema. No calf tenderness. Psych: Good insight. Not anxious or agitated. Neurologic: Alert and oriented X 3. Moves all ext.   Additional: Right AKA stump with ulcer, minimal drainage, no erythema redness    Medications:   Current Facility-Administered Medications   Medication Dose Route Frequency    calcitRIOL (ROCALTROL) capsule 0.25 mcg  0.25 mcg Oral DAILY    sodium chloride (NS) flush 5-10 mL  5-10 mL IntraVENous PRN    amLODIPine (NORVASC) tablet 5 mg  5 mg Oral DAILY    aspirin chewable tablet 81 mg  81 mg Oral DAILY    sodium chloride (NS) flush 5-40 mL  5-40 mL IntraVENous Q8H    sodium chloride (NS) flush 5-40 mL  5-40 mL IntraVENous PRN    acetaminophen (TYLENOL) tablet 650 mg  650 mg Oral Q6H PRN    Or    acetaminophen (TYLENOL) suppository 650 mg  650 mg Rectal Q6H PRN    polyethylene glycol (MIRALAX) packet 17 g  17 g Oral DAILY PRN    ondansetron (ZOFRAN ODT) tablet 4 mg  4 mg Oral Q8H PRN    Or    ondansetron (ZOFRAN) injection 4 mg  4 mg IntraVENous Q6H PRN    famotidine (PEPCID) tablet 20 mg  20 mg Oral QPM    enoxaparin (LOVENOX) injection 30 mg  30 mg SubCUTAneous DAILY    Vancomycin: Pharmcy To Dose   Other Rx Dosing/Monitoring    morphine injection 2 mg  2 mg IntraVENous Q3H PRN    oxyCODONE-acetaminophen (PERCOCET) 5-325 mg per tablet 1 Tablet  1 Tablet Oral Q6H PRN    cefepime (MAXIPIME) 1 g in 0.9% sodium chloride (MBP/ADV) 50 mL MBP  1 g IntraVENous Q12H    insulin lispro (HUMALOG) injection   SubCUTAneous AC&HS    glucose chewable tablet 16 g  4 Tablet Oral PRN    glucagon (GLUCAGEN) injection 1 mg  1 mg IntraMUSCular PRN    dextrose 10% infusion 0-250 mL  0-250 mL IntraVENous PRN    atorvastatin (LIPITOR) tablet 20 mg  20 mg Oral QHS    Lactobacillus Acidoph & Bulgar (FLORANEX) tablet 2 Tablet  2 Tablet Oral BID       Labs:    Recent Results (from the past 24 hour(s))   GLUCOSE, POC    Collection Time: 08/26/22  4:56 PM   Result Value Ref Range    Glucose (POC) 82 70 - 110 mg/dL   GLUCOSE, POC    Collection Time: 08/26/22  9:35 PM   Result Value Ref Range    Glucose (POC) 109 70 - 006 mg/dL   METABOLIC PANEL, BASIC    Collection Time: 08/27/22  1:23 AM   Result Value Ref Range    Sodium 143 136 - 145 mmol/L    Potassium 4.0 3.5 - 5.5 mmol/L    Chloride 116 (H) 100 - 111 mmol/L    CO2 22 21 - 32 mmol/L    Anion gap 5 3.0 - 18 mmol/L    Glucose 117 (H) 74 - 99 mg/dL    BUN 54 (H) 7.0 - 18 MG/DL    Creatinine 3.74 (H) 0.6 - 1.3 MG/DL    BUN/Creatinine ratio 14 12 - 20      GFR est AA 22 (L) >60 ml/min/1.73m2    GFR est non-AA 18 (L) >60 ml/min/1.73m2    Calcium 7.9 (L) 8.5 - 10.1 MG/DL   MAGNESIUM    Collection Time: 08/27/22  1:23 AM   Result Value Ref Range    Magnesium 1.6 1.6 - 2.6 mg/dL   CBC WITH AUTOMATED DIFF    Collection Time: 08/27/22  1:23 AM   Result Value Ref Range    WBC 7.8 4.6 - 13.2 K/uL    RBC 3.66 (L) 4.35 - 5.65 M/uL    HGB 10.7 (L) 13.0 - 16.0 g/dL    HCT 32.8 (L) 36.0 - 48.0 %    MCV 89.6 78.0 - 100.0 FL    MCH 29.2 24.0 - 34.0 PG    MCHC 32.6 31.0 - 37.0 g/dL    RDW 15.0 (H) 11.6 - 14.5 %    PLATELET 755 090 - 768 K/uL    MPV 12.9 (H) 9.2 - 11.8 FL    NRBC 0.0 0  WBC    ABSOLUTE NRBC 0.00 0.00 - 0.01 K/uL    NEUTROPHILS 69 40 - 73 %    LYMPHOCYTES 18 (L) 21 - 52 %    MONOCYTES 9 3 - 10 %    EOSINOPHILS 3 0 - 5 %    BASOPHILS 1 0 - 2 %    IMMATURE GRANULOCYTES 0 0.0 - 0.5 %    ABS. NEUTROPHILS 5.4 1.8 - 8.0 K/UL    ABS. LYMPHOCYTES 1.4 0.9 - 3.6 K/UL    ABS. MONOCYTES 0.7 0.05 - 1.2 K/UL    ABS. EOSINOPHILS 0.2 0.0 - 0.4 K/UL    ABS. BASOPHILS 0.0 0.0 - 0.1 K/UL    ABS. IMM. GRANS. 0.0 0.00 - 0.04 K/UL    DF AUTOMATED     PHOSPHORUS    Collection Time: 08/27/22  1:23 AM   Result Value Ref Range    Phosphorus 4.3 2.5 - 4.9 MG/DL   VANCOMYCIN, RANDOM    Collection Time: 08/27/22  1:23 AM   Result Value Ref Range    Vancomycin, random 26.0 5.0 - 40.0 UG/ML   GLUCOSE, POC    Collection Time: 08/27/22  8:57 AM   Result Value Ref Range    Glucose (POC) 111 (H) 70 - 110 mg/dL   GLUCOSE, POC    Collection Time: 08/27/22 11:48 AM   Result Value Ref Range    Glucose (POC) 136 (H) 70 - 110 mg/dL       Signed By: Maddie Edward MD     August 27, 2022      Disclaimer: Sections of this note are dictated using utilizing voice recognition software. Minor typographical errors may be present. If questions arise, please do not hesitate to contact me or call our department.

## 2022-08-27 NOTE — PROGRESS NOTES
RENAL DAILY PROGRESS NOTE    Patient: Christen Orlando               Sex: male          DOA: 8/26/2022  8:32 AM        YOB: 1980      Age:  43 y.o.        LOS:  LOS: 1 day     Subjective:     Christen Orlando is a 43 y.o.  who presents with Osteomyelitis of lower extremity ([facility] [M86.9]  SAVANNA (acute kidney injury) ([facility] [N17.9]  Diabetes mellitus type 2 with complications ([facility] [X09.9]. Asked to evaluate for renal failure. hx of crf stage 3,dm,htn  Chief complains: Patient denies nausea, vomiting, chest pain, dizziness, shortness of breath or headache.  - Reviewed last 24 hrs events     Current Facility-Administered Medications   Medication Dose Route Frequency    calcitRIOL (ROCALTROL) capsule 0.25 mcg  0.25 mcg Oral DAILY    0.9% sodium chloride infusion  75 mL/hr IntraVENous CONTINUOUS    sodium chloride (NS) flush 5-10 mL  5-10 mL IntraVENous PRN    amLODIPine (NORVASC) tablet 5 mg  5 mg Oral DAILY    aspirin chewable tablet 81 mg  81 mg Oral DAILY    sodium chloride (NS) flush 5-40 mL  5-40 mL IntraVENous Q8H    sodium chloride (NS) flush 5-40 mL  5-40 mL IntraVENous PRN    acetaminophen (TYLENOL) tablet 650 mg  650 mg Oral Q6H PRN    Or    acetaminophen (TYLENOL) suppository 650 mg  650 mg Rectal Q6H PRN    polyethylene glycol (MIRALAX) packet 17 g  17 g Oral DAILY PRN    ondansetron (ZOFRAN ODT) tablet 4 mg  4 mg Oral Q8H PRN    Or    ondansetron (ZOFRAN) injection 4 mg  4 mg IntraVENous Q6H PRN    famotidine (PEPCID) tablet 20 mg  20 mg Oral QPM    enoxaparin (LOVENOX) injection 30 mg  30 mg SubCUTAneous DAILY    Vancomycin: Pharmcy To Dose   Other Rx Dosing/Monitoring    morphine injection 2 mg  2 mg IntraVENous Q3H PRN    oxyCODONE-acetaminophen (PERCOCET) 5-325 mg per tablet 1 Tablet  1 Tablet Oral Q6H PRN    cefepime (MAXIPIME) 1 g in 0.9% sodium chloride (MBP/ADV) 50 mL MBP  1 g IntraVENous Q12H    insulin lispro (HUMALOG) injection   SubCUTAneous AC&HS glucose chewable tablet 16 g  4 Tablet Oral PRN    glucagon (GLUCAGEN) injection 1 mg  1 mg IntraMUSCular PRN    dextrose 10% infusion 0-250 mL  0-250 mL IntraVENous PRN    atorvastatin (LIPITOR) tablet 20 mg  20 mg Oral QHS    Lactobacillus Acidoph & Bulgar (FLORANEX) tablet 2 Tablet  2 Tablet Oral BID       Objective:     Visit Vitals  BP (!) 147/80   Pulse 60   Temp 98.2 °F (36.8 °C)   Resp 17   Ht 5' 2\" (1.575 m)   Wt 79.4 kg (175 lb)   SpO2 97%   BMI 32.01 kg/m²       Intake/Output Summary (Last 24 hours) at 8/27/2022 1225  Last data filed at 8/27/2022 0600  Gross per 24 hour   Intake 1100.83 ml   Output 1600 ml   Net -499.17 ml       Physical Examination:     GEN: AAO X 3,   RS: Chest is bilateral equal, no wheezing / rales / crackles  CVS: S1-S2 heard  Abdomen: Soft, Non tender,   Extremities: No edema,   CNS: Awake & follows commands,   HEENT: Head is atraumatic, PERRLA, conjunctiva pink & non icteric. No JVD or carotid bruit        Data Review:      Labs:     Hematology:   Recent Labs     08/27/22  0123 08/26/22  1000   WBC 7.8 11.1   HGB 10.7* 11.7*   HCT 32.8* 34.8*     Chemistry:   Recent Labs     08/27/22  0123 08/26/22  1000   BUN 54* 68*   CREA 3.74* 4.00*   CA 7.9* 8.4*  8.3*   ALB  --  2.5*   K 4.0 4.3    141   * 111   CO2 22 20*   PHOS 4.3  --    * 133*        Images:    XR (Most Recent). CXR reviewed by me and compared with previous CXR Results from Hospital Encounter encounter on 08/26/22    XR CHEST PORT    Narrative  EXAM: XR CHEST PORT    INDICATION: meets SIRS criteria    COMPARISON: 5/3/2016    FINDINGS: A single view of the chest demonstrates clear lungs. The cardiac and  mediastinal contours and pulmonary vascularity are normal. The bones and soft  tissues are within normal limits. Impression  No acute findings. Interval removal of left PICC line. .       CT (Most Recent) Results from Hospital Encounter encounter on 08/26/22    CT ABD PELV WO CONT    Narrative  CT of abdomen and pelvis without contrast    INDICATION: new SAVANNA; obstruction eval    COMPARISON: None. TECHNIQUE: 5 mm helical scan to the abdomen and pelvis is obtained  from the  diaphragm to the symphysis pubis without  IV contrast administration. All CT scans at this facility performed using dose optimization techniques as  appreciated to a performed exam, to include automated exposure control,  adjustment of the mA and or KU according to patient size (including appropriate  matching for site specific examination), or use of iterative reconstruction  technique. FINDINGS: The study is suboptimal due to lack of IV contrast.  Subtle  abnormality can be under detected. Lung Bases: Moderate reticular opacities in bilateral lungs could represent  chronic interstitial process. The heart is mildly enlarged. .    Liver: Normal.  Gallbladder: Moderately distended with no gallstone or gallbladder wall  thickening. Biliary System: No ductal dilatation. Spleen: Normal.  Pancreas: Normal.  Bowel: The small and large bowel are nondilated. Normal appendix. Scattered  diverticula in the colon. Moderate stool burden throughout the colon. No bowel  obstruction or inflammation. Adrenal Glands: Normal.  Kidneys: 2 mm calculus in upper pole of right kidney. No obstructive calculi or  hydronephrosis. Lower genitourinary system: The bladder is poorly distended. The prostate is not  enlarged. Peritoneum/Retroperitoneum: No adenopathy. Vasculature: Unremarkable for age. Other:  No free fluid. Mild anasarca. CT OSSEOUS STRUCTURES:    Unremarkable for age. Impression  1. No bowel dilatation, obstruction or inflammation. Mild colonic stool burden. 2.  Gallbladder hydrops with no CT evidence of gallstone or biliary dilatation. 3.  Nonobstructive calculus in upper pole of left kidney. No obstructive  uropathy. Thank you for this referral.       EKG No results found for this or any previous visit.      I have personally reviewed the old medical records and labs    Plan / Recommendation:      1.  Acute/crf stage3,no available labs for 4 years,has nephrotic range proteinuria,holding arbs for now,continue ivf for 24 hours  2.dm,stopped metformin  3.sec hyperparathyroidism,start calcitriol    D/w Dr. Paulo Garcia MD  Nephrology  8/27/2022

## 2022-08-28 LAB
ANION GAP SERPL CALC-SCNC: 7 MMOL/L (ref 3–18)
BASOPHILS # BLD: 0.1 K/UL (ref 0–0.1)
BASOPHILS NFR BLD: 1 % (ref 0–2)
BUN SERPL-MCNC: 48 MG/DL (ref 7–18)
BUN/CREAT SERPL: 16 (ref 12–20)
CALCIUM SERPL-MCNC: 8 MG/DL (ref 8.5–10.1)
CHLORIDE SERPL-SCNC: 114 MMOL/L (ref 100–111)
CO2 SERPL-SCNC: 20 MMOL/L (ref 21–32)
CREAT SERPL-MCNC: 2.98 MG/DL (ref 0.6–1.3)
DIFFERENTIAL METHOD BLD: ABNORMAL
EOSINOPHIL # BLD: 0.2 K/UL (ref 0–0.4)
EOSINOPHIL NFR BLD: 3 % (ref 0–5)
ERYTHROCYTE [DISTWIDTH] IN BLOOD BY AUTOMATED COUNT: 14.8 % (ref 11.6–14.5)
GLUCOSE BLD STRIP.AUTO-MCNC: 113 MG/DL (ref 70–110)
GLUCOSE BLD STRIP.AUTO-MCNC: 123 MG/DL (ref 70–110)
GLUCOSE BLD STRIP.AUTO-MCNC: 92 MG/DL (ref 70–110)
GLUCOSE BLD STRIP.AUTO-MCNC: 93 MG/DL (ref 70–110)
GLUCOSE SERPL-MCNC: 93 MG/DL (ref 74–99)
HCT VFR BLD AUTO: 33.3 % (ref 36–48)
HGB BLD-MCNC: 10.9 G/DL (ref 13–16)
IMM GRANULOCYTES # BLD AUTO: 0 K/UL (ref 0–0.04)
IMM GRANULOCYTES NFR BLD AUTO: 0 % (ref 0–0.5)
LYMPHOCYTES # BLD: 1.3 K/UL (ref 0.9–3.6)
LYMPHOCYTES NFR BLD: 17 % (ref 21–52)
MCH RBC QN AUTO: 29.1 PG (ref 24–34)
MCHC RBC AUTO-ENTMCNC: 32.7 G/DL (ref 31–37)
MCV RBC AUTO: 88.8 FL (ref 78–100)
MONOCYTES # BLD: 0.8 K/UL (ref 0.05–1.2)
MONOCYTES NFR BLD: 10 % (ref 3–10)
NEUTS SEG # BLD: 5.1 K/UL (ref 1.8–8)
NEUTS SEG NFR BLD: 69 % (ref 40–73)
NRBC # BLD: 0 K/UL (ref 0–0.01)
NRBC BLD-RTO: 0 PER 100 WBC
PLATELET # BLD AUTO: 182 K/UL (ref 135–420)
PMV BLD AUTO: 13 FL (ref 9.2–11.8)
POTASSIUM SERPL-SCNC: 3.8 MMOL/L (ref 3.5–5.5)
RBC # BLD AUTO: 3.75 M/UL (ref 4.35–5.65)
SODIUM SERPL-SCNC: 141 MMOL/L (ref 136–145)
VANCOMYCIN SERPL-MCNC: 12.5 UG/ML (ref 5–40)
WBC # BLD AUTO: 7.4 K/UL (ref 4.6–13.2)

## 2022-08-28 PROCEDURE — 77030018842 HC SOL IRR SOD CL 9% BAXT -A

## 2022-08-28 PROCEDURE — 99232 SBSQ HOSP IP/OBS MODERATE 35: CPT | Performed by: HOSPITALIST

## 2022-08-28 PROCEDURE — 74011250637 HC RX REV CODE- 250/637

## 2022-08-28 PROCEDURE — 74011000250 HC RX REV CODE- 250

## 2022-08-28 PROCEDURE — 36415 COLL VENOUS BLD VENIPUNCTURE: CPT

## 2022-08-28 PROCEDURE — 74011250636 HC RX REV CODE- 250/636: Performed by: HOSPITALIST

## 2022-08-28 PROCEDURE — 74011250637 HC RX REV CODE- 250/637: Performed by: INTERNAL MEDICINE

## 2022-08-28 PROCEDURE — 74011000258 HC RX REV CODE- 258: Performed by: INTERNAL MEDICINE

## 2022-08-28 PROCEDURE — 80048 BASIC METABOLIC PNL TOTAL CA: CPT

## 2022-08-28 PROCEDURE — 85025 COMPLETE CBC W/AUTO DIFF WBC: CPT

## 2022-08-28 PROCEDURE — 74011250636 HC RX REV CODE- 250/636: Performed by: INTERNAL MEDICINE

## 2022-08-28 PROCEDURE — 80202 ASSAY OF VANCOMYCIN: CPT

## 2022-08-28 PROCEDURE — 65270000046 HC RM TELEMETRY

## 2022-08-28 PROCEDURE — 82962 GLUCOSE BLOOD TEST: CPT

## 2022-08-28 PROCEDURE — 74011250636 HC RX REV CODE- 250/636

## 2022-08-28 RX ORDER — VANCOMYCIN HYDROCHLORIDE
1250 ONCE
Status: COMPLETED | OUTPATIENT
Start: 2022-08-28 | End: 2022-08-28

## 2022-08-28 RX ADMIN — CEFEPIME HYDROCHLORIDE 1 G: 1 INJECTION, POWDER, FOR SOLUTION INTRAMUSCULAR; INTRAVENOUS at 04:15

## 2022-08-28 RX ADMIN — ATORVASTATIN CALCIUM 20 MG: 20 TABLET, FILM COATED ORAL at 22:42

## 2022-08-28 RX ADMIN — LACTOBACILLUS TAB 2 TABLET: TAB at 09:31

## 2022-08-28 RX ADMIN — FAMOTIDINE 20 MG: 20 TABLET ORAL at 17:59

## 2022-08-28 RX ADMIN — SODIUM CHLORIDE, PRESERVATIVE FREE 10 ML: 5 INJECTION INTRAVENOUS at 15:38

## 2022-08-28 RX ADMIN — SODIUM CHLORIDE 75 ML/HR: 9 INJECTION, SOLUTION INTRAVENOUS at 00:54

## 2022-08-28 RX ADMIN — ASPIRIN 81 MG CHEWABLE TABLET 81 MG: 81 TABLET CHEWABLE at 09:31

## 2022-08-28 RX ADMIN — MORPHINE SULFATE 2 MG: 2 INJECTION, SOLUTION INTRAMUSCULAR; INTRAVENOUS at 22:42

## 2022-08-28 RX ADMIN — MORPHINE SULFATE 2 MG: 2 INJECTION, SOLUTION INTRAMUSCULAR; INTRAVENOUS at 09:32

## 2022-08-28 RX ADMIN — AMLODIPINE BESYLATE 5 MG: 5 TABLET ORAL at 09:31

## 2022-08-28 RX ADMIN — ENOXAPARIN SODIUM 30 MG: 100 INJECTION SUBCUTANEOUS at 09:31

## 2022-08-28 RX ADMIN — SODIUM CHLORIDE, PRESERVATIVE FREE 10 ML: 5 INJECTION INTRAVENOUS at 22:54

## 2022-08-28 RX ADMIN — CALCITRIOL CAPSULES 0.25 MCG 0.25 MCG: 0.25 CAPSULE ORAL at 09:31

## 2022-08-28 RX ADMIN — VANCOMYCIN HYDROCHLORIDE 1250 MG: 10 INJECTION, POWDER, LYOPHILIZED, FOR SOLUTION INTRAVENOUS at 16:22

## 2022-08-28 RX ADMIN — CEFEPIME HYDROCHLORIDE 1 G: 1 INJECTION, POWDER, FOR SOLUTION INTRAMUSCULAR; INTRAVENOUS at 17:59

## 2022-08-28 RX ADMIN — MORPHINE SULFATE 2 MG: 2 INJECTION, SOLUTION INTRAMUSCULAR; INTRAVENOUS at 17:00

## 2022-08-28 RX ADMIN — SODIUM CHLORIDE, PRESERVATIVE FREE 10 ML: 5 INJECTION INTRAVENOUS at 05:50

## 2022-08-28 NOTE — PROGRESS NOTES
RENAL DAILY PROGRESS NOTE    Patient: Heather Parker               Sex: male          DOA: 8/26/2022  8:32 AM        YOB: 1980      Age:  43 y.o.        LOS:  LOS: 2 days     Subjective:     Heather Parker is a 43 y.o.  who presents with Osteomyelitis of lower extremity ([facility] [M86.9]  SAVANNA (acute kidney injury) ([facility] [N17.9]  Diabetes mellitus type 2 with complications ([facility] [S64.2]. Asked to evaluate for renal failure. hx of crf stage 3,dm,htn  Chief complains: Patient denies nausea, vomiting, chest pain, dizziness, shortness of breath or headache.  - Reviewed last 24 hrs events     Current Facility-Administered Medications   Medication Dose Route Frequency    calcitRIOL (ROCALTROL) capsule 0.25 mcg  0.25 mcg Oral DAILY    sodium chloride (NS) flush 5-10 mL  5-10 mL IntraVENous PRN    amLODIPine (NORVASC) tablet 5 mg  5 mg Oral DAILY    aspirin chewable tablet 81 mg  81 mg Oral DAILY    sodium chloride (NS) flush 5-40 mL  5-40 mL IntraVENous Q8H    sodium chloride (NS) flush 5-40 mL  5-40 mL IntraVENous PRN    acetaminophen (TYLENOL) tablet 650 mg  650 mg Oral Q6H PRN    Or    acetaminophen (TYLENOL) suppository 650 mg  650 mg Rectal Q6H PRN    polyethylene glycol (MIRALAX) packet 17 g  17 g Oral DAILY PRN    ondansetron (ZOFRAN ODT) tablet 4 mg  4 mg Oral Q8H PRN    Or    ondansetron (ZOFRAN) injection 4 mg  4 mg IntraVENous Q6H PRN    famotidine (PEPCID) tablet 20 mg  20 mg Oral QPM    enoxaparin (LOVENOX) injection 30 mg  30 mg SubCUTAneous DAILY    Vancomycin: Pharmcy To Dose   Other Rx Dosing/Monitoring    morphine injection 2 mg  2 mg IntraVENous Q3H PRN    oxyCODONE-acetaminophen (PERCOCET) 5-325 mg per tablet 1 Tablet  1 Tablet Oral Q6H PRN    cefepime (MAXIPIME) 1 g in 0.9% sodium chloride (MBP/ADV) 50 mL MBP  1 g IntraVENous Q12H    insulin lispro (HUMALOG) injection   SubCUTAneous AC&HS    glucose chewable tablet 16 g  4 Tablet Oral PRN    glucagon (GLUCAGEN) injection 1 mg  1 mg IntraMUSCular PRN    dextrose 10% infusion 0-250 mL  0-250 mL IntraVENous PRN    atorvastatin (LIPITOR) tablet 20 mg  20 mg Oral QHS    Lactobacillus Acidoph & Bulgar (FLORANEX) tablet 2 Tablet  2 Tablet Oral BID       Objective:     Visit Vitals  /76 (BP 1 Location: Right upper arm, BP Patient Position: Semi fowlers; Lying)   Pulse (!) 57   Temp 98.6 °F (37 °C)   Resp 18   Ht 5' 2\" (1.575 m)   Wt 79.4 kg (175 lb)   SpO2 96%   BMI 32.01 kg/m²       Intake/Output Summary (Last 24 hours) at 8/28/2022 1412  Last data filed at 8/28/2022 1255  Gross per 24 hour   Intake 600 ml   Output --   Net 600 ml         Physical Examination:     GEN: AAO X 3,   RS: Chest is bilateral equal, no wheezing / rales / crackles  CVS: S1-S2 heard  Abdomen: Soft, Non tender,   Extremities: No edema,   CNS: Awake & follows commands,   HEENT: Head is atraumatic, PERRLA, conjunctiva pink & non icteric. No JVD or carotid bruit        Data Review:      Labs:     Hematology:   Recent Labs     08/28/22  0320 08/27/22  0123 08/26/22  1000   WBC 7.4 7.8 11.1   HGB 10.9* 10.7* 11.7*   HCT 33.3* 32.8* 34.8*       Chemistry:   Recent Labs     08/28/22  0320 08/27/22  0123 08/26/22  1000   BUN 48* 54* 68*   CREA 2.98* 3.74* 4.00*   CA 8.0* 7.9* 8.4*  8.3*   ALB  --   --  2.5*   K 3.8 4.0 4.3    143 141   * 116* 111   CO2 20* 22 20*   PHOS  --  4.3  --    GLU 93 117* 133*          Images:    XR (Most Recent). CXR reviewed by me and compared with previous CXR Results from Hospital Encounter encounter on 08/26/22    XR CHEST PORT    Narrative  EXAM: XR CHEST PORT    INDICATION: meets SIRS criteria    COMPARISON: 5/3/2016    FINDINGS: A single view of the chest demonstrates clear lungs. The cardiac and  mediastinal contours and pulmonary vascularity are normal. The bones and soft  tissues are within normal limits. Impression  No acute findings. Interval removal of left PICC line. .       CT (Most Recent) Results from Hospital Encounter encounter on 08/26/22    CT ABD PELV WO CONT    Narrative  CT of abdomen and pelvis without contrast    INDICATION: new SAVANNA; obstruction eval    COMPARISON: None. TECHNIQUE: 5 mm helical scan to the abdomen and pelvis is obtained  from the  diaphragm to the symphysis pubis without  IV contrast administration. All CT scans at this facility performed using dose optimization techniques as  appreciated to a performed exam, to include automated exposure control,  adjustment of the mA and or KU according to patient size (including appropriate  matching for site specific examination), or use of iterative reconstruction  technique. FINDINGS: The study is suboptimal due to lack of IV contrast.  Subtle  abnormality can be under detected. Lung Bases: Moderate reticular opacities in bilateral lungs could represent  chronic interstitial process. The heart is mildly enlarged. .    Liver: Normal.  Gallbladder: Moderately distended with no gallstone or gallbladder wall  thickening. Biliary System: No ductal dilatation. Spleen: Normal.  Pancreas: Normal.  Bowel: The small and large bowel are nondilated. Normal appendix. Scattered  diverticula in the colon. Moderate stool burden throughout the colon. No bowel  obstruction or inflammation. Adrenal Glands: Normal.  Kidneys: 2 mm calculus in upper pole of right kidney. No obstructive calculi or  hydronephrosis. Lower genitourinary system: The bladder is poorly distended. The prostate is not  enlarged. Peritoneum/Retroperitoneum: No adenopathy. Vasculature: Unremarkable for age. Other:  No free fluid. Mild anasarca. CT OSSEOUS STRUCTURES:    Unremarkable for age. Impression  1. No bowel dilatation, obstruction or inflammation. Mild colonic stool burden. 2.  Gallbladder hydrops with no CT evidence of gallstone or biliary dilatation. 3.  Nonobstructive calculus in upper pole of left kidney. No obstructive  uropathy.     Thank you for this referral.       EKG No results found for this or any previous visit. I have personally reviewed the old medical records and labs    Plan / Recommendation:      1. Acute/crf stage3,no available labs for 4 years,has nephrotic range proteinuria,holding arbs for now,improving. stop ivf,drinking fluids  2.dm,stopped metformin  3.sec hyperparathyroidism,started calcitriol    D/w Dr. Iain Mayorga MD  Nephrology  8/28/2022

## 2022-08-28 NOTE — PROGRESS NOTES
Morphine  administered IV for complaint of phantom pain in right BKA. 2310 Patient asleep with eyes closed without complaint voiced of pain or discomfort. Bedside and Verbal shift change report given to Elicia Pak (oncoming nurse) by Johnny Woodruff RN (offgoing nurse). Report given with SBAR, Kardex, Intake/Output, MAR and Recent Results.

## 2022-08-28 NOTE — PROGRESS NOTES
[address] [facility] Pharmacokinetic Monitoring Service - Vancomycin    Consulting Provider: MAGDIEL Drake   Indication: Diabetic Foot Infection  Target Concentration: Dosing based on anticipated concentration <15 mg/L due to renal impairment/insufficiency  Day of Therapy: 3  Additional Antimicrobials: Cefepime    Pertinent Laboratory Values:   Temp: 98.6 °F (37 °C)  Weight: 79.4 kg (175 lb)  Recent Labs     08/28/22  0320 08/27/22  0123   CREA 2.98* 3.74*   BUN 48* 54*   WBC 7.4 7.8       Estimated Creatinine Clearance: 29.5 mL/min (A) (based on SCr of 2.98 mg/dL (H)). Pertinent Cultures:  Culture Date Source Results   8/26 Blood No growth x 19 hours   8/26 Leg wound GNR and Staph species   MRSA Nasal Swab: N/A.  Non-respiratory infection    Assessment:  Date/Time Current Dose Concentration Timing of Concentration (h) AUC   8/26 1500 mg x 1 @ 1645  - -   8/27 No dose 26 @ 0123 6.5 hours -   8/28   1250 mg IV x 1@ 1600 12.5 @ 0320     Note: Serum concentrations collected for AUC dosing may appear elevated if collected in close proximity to the dose administered, this is not necessarily an indication of toxicity    Plan:  Concentration-guided dosing due to renal impairment  1250 mg IV x 1 dose ordered for today @ 1600  Renal labs as indicated   Vancomycin concentration ordered for AM labs tomorrow  Pharmacy will continue to monitor patient and adjust therapy as indicated    Thank you for the consult,  Pardeep Weiner [facility]  8/28/2022

## 2022-08-28 NOTE — PROGRESS NOTES
[facility] Hospitalist Group  Progress Note    Patient: Pauline Severs Age: 43 y.o. : 1980 MR#: [id number] SSN: [id number]  Date/Time: 2022     Subjective: Patient lying in the bed, feels fine, minimal pain at the right stump. Assessment/Plan:     Hospital Problems  Date Reviewed: 3/19/2018            Codes Class Noted POA    Diabetes mellitus type 2 with complications ([facility] [id number]: E11.8  ICD-9-CM: 250.90  2022 Unknown        Hyperlipidemia ICD-10-CM: E78.5  ICD-9-CM: 272.4  2022 Yes        Cellulitis of right lower extremity ICD-10-CM: L03.115  ICD-9-CM: 682.6  2018 Unknown        Acute-on-chronic kidney injury ([facility] ICD-10-CM: N17.9, N18.9  ICD-9-CM: 584.9, 585.9  2017 Unknown        Hypertension ICD-10-CM: I10  ICD-9-CM: 401.9  2015 Yes          Right BKA stump cellulitis with no discrete abscess. MRI with reactive edema at the tibial and fibular stump. There is no suspicion of osteomyelitis. Arterial duplex without any obstructive disease. This likely associated with type II DM. SAVANNA on CKD 3b, multifactorial.  Creatinine trending down  Nonobstructive kidney stone on left kidney  Hypertension  Hyperlipidemia  Tobacco smoking dependence  Marijuana use     Plan  Continue on IV antibiotics, culture follow-up for antibiotic selection. ID consult pending. Appreciate vascular consultation, no further intervention  Continue wound care, nonweightbearing on right stump  Nephrology on case, defer IV fluids to nephrology  Continue amlodipine, avoid losartan  ISS    Discussed with the patient at the bedside and explained about my above plan care. Case discussed with:  [x]Patient  []Family  []Nursing  []Case Management  DVT Prophylaxis:  [x]Lovenox  []Hep SQ  []SCDs  []Coumadin   []Eliquis/Xarelto     Objective:   VS: Visit Vitals  /76 (BP 1 Location: Right upper arm, BP Patient Position: Semi fowlers; Lying)   Pulse (!) 57   Temp 98.6 °F (37 °C)   Resp 18   Ht 5' 2\" (1.575 m)   Wt 79.4 kg (175 lb)   SpO2 96%   BMI 32.01 kg/m²      Tmax/24hrs: Temp (24hrs), Av.3 °F (36.8 °C), Min:98 °F (36.7 °C), Max:98.6 °F (37 °C)  IOBRIEF  Intake/Output Summary (Last 24 hours) at 2022 1531  Last data filed at 2022 1255  Gross per 24 hour   Intake 600 ml   Output --   Net 600 ml       General:  Alert, cooperative, no acute distress    Pulmonary:  CTA Bilaterally. No Wheezing/Rales. Cardiovascular: Regular rate and Rhythm. GI:  Soft, Non distended, Non tender. + Bowel sounds. Extremities:  No edema. No calf tenderness. Psych: Good insight. Not anxious or agitated. Neurologic: Alert and oriented X 3. Moves all ext.   Additional: Right AKA stump with ulcer, minimal drainage, no erythema redness    Medications:   Current Facility-Administered Medications   Medication Dose Route Frequency    vancomycin (VANCOCIN) 1250 mg in  ml infusion  1,250 mg IntraVENous ONCE    calcitRIOL (ROCALTROL) capsule 0.25 mcg  0.25 mcg Oral DAILY    sodium chloride (NS) flush 5-10 mL  5-10 mL IntraVENous PRN    amLODIPine (NORVASC) tablet 5 mg  5 mg Oral DAILY    aspirin chewable tablet 81 mg  81 mg Oral DAILY    sodium chloride (NS) flush 5-40 mL  5-40 mL IntraVENous Q8H    sodium chloride (NS) flush 5-40 mL  5-40 mL IntraVENous PRN    acetaminophen (TYLENOL) tablet 650 mg  650 mg Oral Q6H PRN    Or    acetaminophen (TYLENOL) suppository 650 mg  650 mg Rectal Q6H PRN    polyethylene glycol (MIRALAX) packet 17 g  17 g Oral DAILY PRN    ondansetron (ZOFRAN ODT) tablet 4 mg  4 mg Oral Q8H PRN    Or    ondansetron (ZOFRAN) injection 4 mg  4 mg IntraVENous Q6H PRN    famotidine (PEPCID) tablet 20 mg  20 mg Oral QPM    enoxaparin (LOVENOX) injection 30 mg  30 mg SubCUTAneous DAILY    Vancomycin: Pharmcy To Dose   Other Rx Dosing/Monitoring    morphine injection 2 mg  2 mg IntraVENous Q3H PRN    oxyCODONE-acetaminophen (PERCOCET) 5-325 mg per tablet 1 Tablet  1 Tablet Oral Q6H PRN    cefepime (MAXIPIME) 1 g in 0.9% sodium chloride (MBP/ADV) 50 mL MBP  1 g IntraVENous Q12H    insulin lispro (HUMALOG) injection   SubCUTAneous AC&HS    glucose chewable tablet 16 g  4 Tablet Oral PRN    glucagon (GLUCAGEN) injection 1 mg  1 mg IntraMUSCular PRN    dextrose 10% infusion 0-250 mL  0-250 mL IntraVENous PRN    atorvastatin (LIPITOR) tablet 20 mg  20 mg Oral QHS    Lactobacillus Acidoph & Bulgar (FLORANEX) tablet 2 Tablet  2 Tablet Oral BID       Labs:    Recent Results (from the past 24 hour(s))   GLUCOSE, POC    Collection Time: 08/27/22  4:08 PM   Result Value Ref Range    Glucose (POC) 121 (H) 70 - 110 mg/dL   GLUCOSE, POC    Collection Time: 08/27/22  9:41 PM   Result Value Ref Range    Glucose (POC) 91 70 - 858 mg/dL   METABOLIC PANEL, BASIC    Collection Time: 08/28/22  3:20 AM   Result Value Ref Range    Sodium 141 136 - 145 mmol/L    Potassium 3.8 3.5 - 5.5 mmol/L    Chloride 114 (H) 100 - 111 mmol/L    CO2 20 (L) 21 - 32 mmol/L    Anion gap 7 3.0 - 18 mmol/L    Glucose 93 74 - 99 mg/dL    BUN 48 (H) 7.0 - 18 MG/DL    Creatinine 2.98 (H) 0.6 - 1.3 MG/DL    BUN/Creatinine ratio 16 12 - 20      GFR est AA 28 (L) >60 ml/min/1.73m2    GFR est non-AA 23 (L) >60 ml/min/1.73m2    Calcium 8.0 (L) 8.5 - 10.1 MG/DL   CBC WITH AUTOMATED DIFF    Collection Time: 08/28/22  3:20 AM   Result Value Ref Range    WBC 7.4 4.6 - 13.2 K/uL    RBC 3.75 (L) 4.35 - 5.65 M/uL    HGB 10.9 (L) 13.0 - 16.0 g/dL    HCT 33.3 (L) 36.0 - 48.0 %    MCV 88.8 78.0 - 100.0 FL    MCH 29.1 24.0 - 34.0 PG    MCHC 32.7 31.0 - 37.0 g/dL    RDW 14.8 (H) 11.6 - 14.5 %    PLATELET 962 481 - 629 K/uL    MPV 13.0 (H) 9.2 - 11.8 FL    NRBC 0.0 0  WBC    ABSOLUTE NRBC 0.00 0.00 - 0.01 K/uL    NEUTROPHILS 69 40 - 73 %    LYMPHOCYTES 17 (L) 21 - 52 %    MONOCYTES 10 3 - 10 %    EOSINOPHILS 3 0 - 5 %    BASOPHILS 1 0 - 2 %    IMMATURE GRANULOCYTES 0 0.0 - 0.5 %    ABS. NEUTROPHILS 5.1 1.8 - 8.0 K/UL    ABS.  LYMPHOCYTES 1.3 0.9 - 3.6 K/UL    ABS. MONOCYTES 0.8 0.05 - 1.2 K/UL    ABS. EOSINOPHILS 0.2 0.0 - 0.4 K/UL    ABS. BASOPHILS 0.1 0.0 - 0.1 K/UL    ABS. IMM. GRANS. 0.0 0.00 - 0.04 K/UL    DF AUTOMATED     VANCOMYCIN, RANDOM    Collection Time: 08/28/22  3:20 AM   Result Value Ref Range    Vancomycin, random 12.5 5.0 - 40.0 UG/ML   GLUCOSE, POC    Collection Time: 08/28/22  8:37 AM   Result Value Ref Range    Glucose (POC) 93 70 - 110 mg/dL   GLUCOSE, POC    Collection Time: 08/28/22  1:00 PM   Result Value Ref Range    Glucose (POC) 123 (H) 70 - 110 mg/dL       Signed By: John Paul Borjas MD     August 28, 2022      Disclaimer: Sections of this note are dictated using utilizing voice recognition software. Minor typographical errors may be present. If questions arise, please do not hesitate to contact me or call our department.

## 2022-08-29 VITALS
HEART RATE: 58 BPM | RESPIRATION RATE: 18 BRPM | WEIGHT: 175 LBS | HEIGHT: 62 IN | BODY MASS INDEX: 32.2 KG/M2 | TEMPERATURE: 97.6 F | SYSTOLIC BLOOD PRESSURE: 129 MMHG | DIASTOLIC BLOOD PRESSURE: 83 MMHG | OXYGEN SATURATION: 97 %

## 2022-08-29 LAB
ANION GAP SERPL CALC-SCNC: 6 MMOL/L (ref 3–18)
BACTERIA SPEC CULT: ABNORMAL
BASOPHILS # BLD: 0 K/UL (ref 0–0.1)
BASOPHILS NFR BLD: 1 % (ref 0–2)
BUN SERPL-MCNC: 52 MG/DL (ref 7–18)
BUN/CREAT SERPL: 18 (ref 12–20)
CALCIUM SERPL-MCNC: 8.8 MG/DL (ref 8.5–10.1)
CHLORIDE SERPL-SCNC: 113 MMOL/L (ref 100–111)
CO2 SERPL-SCNC: 20 MMOL/L (ref 21–32)
CREAT SERPL-MCNC: 2.94 MG/DL (ref 0.6–1.3)
DIFFERENTIAL METHOD BLD: ABNORMAL
EOSINOPHIL # BLD: 0.3 K/UL (ref 0–0.4)
EOSINOPHIL NFR BLD: 4 % (ref 0–5)
ERYTHROCYTE [DISTWIDTH] IN BLOOD BY AUTOMATED COUNT: 14.4 % (ref 11.6–14.5)
GLUCOSE BLD STRIP.AUTO-MCNC: 102 MG/DL (ref 70–110)
GLUCOSE BLD STRIP.AUTO-MCNC: 82 MG/DL (ref 70–110)
GLUCOSE SERPL-MCNC: 91 MG/DL (ref 74–99)
GRAM STN SPEC: ABNORMAL
GRAM STN SPEC: ABNORMAL
HCT VFR BLD AUTO: 33.8 % (ref 36–48)
HGB BLD-MCNC: 11.1 G/DL (ref 13–16)
IMM GRANULOCYTES # BLD AUTO: 0 K/UL (ref 0–0.04)
IMM GRANULOCYTES NFR BLD AUTO: 0 % (ref 0–0.5)
LYMPHOCYTES # BLD: 1.6 K/UL (ref 0.9–3.6)
LYMPHOCYTES NFR BLD: 23 % (ref 21–52)
MCH RBC QN AUTO: 28.9 PG (ref 24–34)
MCHC RBC AUTO-ENTMCNC: 32.8 G/DL (ref 31–37)
MCV RBC AUTO: 88 FL (ref 78–100)
MONOCYTES # BLD: 0.6 K/UL (ref 0.05–1.2)
MONOCYTES NFR BLD: 8 % (ref 3–10)
NEUTS SEG # BLD: 4.4 K/UL (ref 1.8–8)
NEUTS SEG NFR BLD: 64 % (ref 40–73)
NRBC # BLD: 0 K/UL (ref 0–0.01)
NRBC BLD-RTO: 0 PER 100 WBC
PLATELET # BLD AUTO: 213 K/UL (ref 135–420)
PMV BLD AUTO: 12.6 FL (ref 9.2–11.8)
POTASSIUM SERPL-SCNC: 4.1 MMOL/L (ref 3.5–5.5)
RBC # BLD AUTO: 3.84 M/UL (ref 4.35–5.65)
SERVICE CMNT-IMP: ABNORMAL
SODIUM SERPL-SCNC: 139 MMOL/L (ref 136–145)
VANCOMYCIN SERPL-MCNC: 22 UG/ML (ref 5–40)
WBC # BLD AUTO: 6.9 K/UL (ref 4.6–13.2)

## 2022-08-29 PROCEDURE — 74011250637 HC RX REV CODE- 250/637: Performed by: INTERNAL MEDICINE

## 2022-08-29 PROCEDURE — 97530 THERAPEUTIC ACTIVITIES: CPT

## 2022-08-29 PROCEDURE — 80048 BASIC METABOLIC PNL TOTAL CA: CPT

## 2022-08-29 PROCEDURE — 82962 GLUCOSE BLOOD TEST: CPT

## 2022-08-29 PROCEDURE — 97166 OT EVAL MOD COMPLEX 45 MIN: CPT

## 2022-08-29 PROCEDURE — 2709999900 HC NON-CHARGEABLE SUPPLY

## 2022-08-29 PROCEDURE — 97535 SELF CARE MNGMENT TRAINING: CPT

## 2022-08-29 PROCEDURE — 97162 PT EVAL MOD COMPLEX 30 MIN: CPT

## 2022-08-29 PROCEDURE — 99239 HOSP IP/OBS DSCHRG MGMT >30: CPT | Performed by: HOSPITALIST

## 2022-08-29 PROCEDURE — 74011000258 HC RX REV CODE- 258: Performed by: INTERNAL MEDICINE

## 2022-08-29 PROCEDURE — 80202 ASSAY OF VANCOMYCIN: CPT

## 2022-08-29 PROCEDURE — 74011250637 HC RX REV CODE- 250/637

## 2022-08-29 PROCEDURE — 74011250636 HC RX REV CODE- 250/636

## 2022-08-29 PROCEDURE — 85025 COMPLETE CBC W/AUTO DIFF WBC: CPT

## 2022-08-29 PROCEDURE — 36415 COLL VENOUS BLD VENIPUNCTURE: CPT

## 2022-08-29 PROCEDURE — 74011250636 HC RX REV CODE- 250/636: Performed by: INTERNAL MEDICINE

## 2022-08-29 RX ORDER — GLIMEPIRIDE 1 MG/1
1 TABLET ORAL
Qty: 30 TABLET | Refills: 0 | Status: SHIPPED | OUTPATIENT
Start: 2022-08-29 | End: 2022-09-28

## 2022-08-29 RX ORDER — IBUPROFEN 200 MG
CAPSULE ORAL
Qty: 60 STRIP | Refills: 0 | Status: SHIPPED | OUTPATIENT
Start: 2022-08-29

## 2022-08-29 RX ORDER — AMOXICILLIN AND CLAVULANATE POTASSIUM 500; 125 MG/1; MG/1
1 TABLET, FILM COATED ORAL EVERY 12 HOURS
Qty: 18 TABLET | Refills: 0 | Status: SHIPPED | OUTPATIENT
Start: 2022-08-29 | End: 2022-09-07

## 2022-08-29 RX ORDER — AMOXICILLIN AND CLAVULANATE POTASSIUM 500; 125 MG/1; MG/1
1 TABLET, FILM COATED ORAL EVERY 12 HOURS
Status: DISCONTINUED | OUTPATIENT
Start: 2022-08-29 | End: 2022-08-29 | Stop reason: HOSPADM

## 2022-08-29 RX ORDER — AMLODIPINE BESYLATE 5 MG/1
5 TABLET ORAL DAILY
Qty: 30 TABLET | Refills: 0 | Status: SHIPPED | OUTPATIENT
Start: 2022-08-29 | End: 2022-09-28

## 2022-08-29 RX ORDER — CALCITRIOL 0.25 UG/1
0.25 CAPSULE ORAL DAILY
Qty: 30 CAPSULE | Refills: 0 | Status: SHIPPED | OUTPATIENT
Start: 2022-08-30 | End: 2022-09-29

## 2022-08-29 RX ORDER — ATORVASTATIN CALCIUM 40 MG/1
40 TABLET, FILM COATED ORAL
Qty: 30 TABLET | Refills: 0 | Status: SHIPPED | OUTPATIENT
Start: 2022-08-29 | End: 2022-09-28

## 2022-08-29 RX ORDER — GUAIFENESIN 100 MG/5ML
81 LIQUID (ML) ORAL DAILY
Qty: 30 TABLET | Refills: 0 | Status: SHIPPED | OUTPATIENT
Start: 2022-08-29 | End: 2022-09-28

## 2022-08-29 RX ORDER — LANCETS
EACH MISCELLANEOUS
Qty: 100 EACH | Refills: 0 | Status: SHIPPED | OUTPATIENT
Start: 2022-08-29

## 2022-08-29 RX ADMIN — ASPIRIN 81 MG CHEWABLE TABLET 81 MG: 81 TABLET CHEWABLE at 10:04

## 2022-08-29 RX ADMIN — AMOXICILLIN AND CLAVULANATE POTASSIUM 1 TABLET: 500; 125 TABLET, FILM COATED ORAL at 10:38

## 2022-08-29 RX ADMIN — ENOXAPARIN SODIUM 30 MG: 100 INJECTION SUBCUTANEOUS at 10:05

## 2022-08-29 RX ADMIN — CEFEPIME HYDROCHLORIDE 1 G: 1 INJECTION, POWDER, FOR SOLUTION INTRAMUSCULAR; INTRAVENOUS at 03:50

## 2022-08-29 RX ADMIN — AMLODIPINE BESYLATE 5 MG: 5 TABLET ORAL at 10:04

## 2022-08-29 RX ADMIN — LACTOBACILLUS TAB 2 TABLET: TAB at 10:05

## 2022-08-29 RX ADMIN — OXYCODONE HYDROCHLORIDE AND ACETAMINOPHEN 1 TABLET: 5; 325 TABLET ORAL at 12:12

## 2022-08-29 RX ADMIN — CALCITRIOL CAPSULES 0.25 MCG 0.25 MCG: 0.25 CAPSULE ORAL at 10:04

## 2022-08-29 NOTE — PROGRESS NOTES
Discharge order noted for today. Referral placed in que for [facility] for indigent services and spoke with Luiza Watt. Met with patient and agreeable to the transition plan today. Transport has been arranged with self. Patient's home health  orders have been forwarded to [facility] home health  agency via [employer]. Updated bedside RN, Mojgan Campos,  to the transition plan. Discharge information has been documented on the AVS.     Patient completed [employer] paperwork for shower chair, rolling walker, and wheelchair. Patient declined wheelchair due to narrow hallways in home. Rolling walker and shower chair was delivered to room. Patient will receive indigent medications for Augmentin,  Amaryl, and Bio-K plus. 46 - Witnessed with Geena Asher with [facility] that patient declined home health services. Reason for Admission:  Osteomyelitis of lower extremity ([facility] [M86.9]  SAVANNA (acute kidney injury) ([facility] [N17.9]  Diabetes mellitus type 2 with complications ([facility] [F79.1]                 RUR Score:    10%            Plan for utilizing home health:    yes                      Likelihood of Readmission:   LOW                         Transition of Care Plan:              Initial assessment completed with patient. Cognitive status of patient: oriented to time, place, person and situation. Face sheet information confirmed:  yes. The patient designates Betty Friedman [phone] to participate in his discharge plan and to receive any needed information. This patient lives in a single family home with friends. Patient was able to navigate steps as needed. Prior to hospitalization, patient was considered to be independent with ADLs/IADLS : yes . Patient has a current ACP document on file: yes      Healthcare Decision Maker:   Primary Decision Maker: Paul Gregg - [phone]      The patient will provide transportation home upon discharge.        The patient already has crutches available in the home. Patient is not currently active with home health. Patient has not stayed in a skilled nursing facility or rehab. This patient is on dialysis :no    List of available Home Health agencies were provided and reviewed with the patient prior to discharge. Freedom of choice signed: yes, for [address] and spoke to [address], the discharge plan is Home with Home Health. The patient states that he can obtain his medications from the pharmacy, and take his medications as directed. Patient's current insurance is medicaid pending       Care Management Interventions  PCP Verified by CM:  Yes  Mode of Transport at Discharge: Self  Transition of Care Consult (CM Consult): Discharge Planning  Discharge Durable Medical Equipment: No  Physical Therapy Consult: Yes  Occupational Therapy Consult: Yes  Speech Therapy Consult: No  Support Systems: Other Family Member(s)  Confirm Follow Up Transport: Self  The Plan for Transition of Care is Related to the Following Treatment Goals : home health  The Patient and/or Patient Representative was Provided with a Choice of Provider and Agrees with the Discharge Plan?: Yes  Freedom of Choice List was Provided with Basic Dialogue that Supports the Patient's Individualized Plan of Care/Goals, Treatment Preferences and Shares the Quality Data Associated with the Providers?: Yes  Discharge Location  Patient Expects to be Discharged to[de-identified] Home with home health        TORRIE Foster, RN  Pager # [phone]  Care Manager

## 2022-08-29 NOTE — PROGRESS NOTES
Problem: Diabetes Self-Management  Goal: *Disease process and treatment process  Description: Define diabetes and identify own type of diabetes; list 3 options for treating diabetes. Outcome: Progressing Towards Goal  Goal: *Incorporating nutritional management into lifestyle  Description: Describe effect of type, amount and timing of food on blood glucose; list 3 methods for planning meals. Outcome: Progressing Towards Goal  Goal: *Incorporating physical activity into lifestyle  Description: State effect of exercise on blood glucose levels. Outcome: Progressing Towards Goal  Goal: *Developing strategies to promote health/change behavior  Description: Define the ABC's of diabetes; identify appropriate screenings, schedule and personal plan for screenings. Outcome: Progressing Towards Goal  Goal: *Using medications safely  Description: State effect of diabetes medications on diabetes; name diabetes medication taking, action and side effects. Outcome: Progressing Towards Goal  Goal: *Monitoring blood glucose, interpreting and using results  Description: Identify recommended blood glucose targets  and personal targets. Outcome: Progressing Towards Goal  Goal: *Prevention, detection, treatment of acute complications  Description: List symptoms of hyper- and hypoglycemia; describe how to treat low blood sugar and actions for lowering  high blood glucose level. Outcome: Progressing Towards Goal  Goal: *Prevention, detection and treatment of chronic complications  Description: Define the natural course of diabetes and describe the relationship of blood glucose levels to long term complications of diabetes.   Outcome: Progressing Towards Goal  Goal: *Developing strategies to address psychosocial issues  Description: Describe feelings about living with diabetes; identify support needed and support network  Outcome: Progressing Towards Goal  Goal: *Insulin pump training  Outcome: Progressing Towards Goal  Goal: *Sick day guidelines  Outcome: Progressing Towards Goal  Goal: *Patient Specific Goal (EDIT GOAL, INSERT TEXT)  Outcome: Progressing Towards Goal     Problem: Patient Education: Go to Patient Education Activity  Goal: Patient/Family Education  Outcome: Progressing Towards Goal     Problem: Falls - Risk of  Goal: *Absence of Falls  Description: Document Jean Claude Mercado Fall Risk and appropriate interventions in the flowsheet. Outcome: Progressing Towards Goal  Note: Fall Risk Interventions:  Mobility Interventions: Communicate number of staff needed for ambulation/transfer, Patient to call before getting OOB, Utilize walker, cane, or other assistive device         Medication Interventions: Teach patient to arise slowly, Patient to call before getting OOB                   Problem: Patient Education: Go to Patient Education Activity  Goal: Patient/Family Education  Outcome: Progressing Towards Goal     Problem: Risk for Spread of Infection  Goal: Prevent transmission of infectious organism to others  Description: Prevent the transmission of infectious organisms to other patients, staff members, and visitors.   Outcome: Progressing Towards Goal     Problem: Patient Education:  Go to Education Activity  Goal: Patient/Family Education  Outcome: Progressing Towards Goal

## 2022-08-29 NOTE — PROGRESS NOTES
RENAL DAILY PROGRESS NOTE            51-year-old male with past medical history of diabetes, hypertension, admitted for right BKA stump cellulitis following for acute kidney injury. Subjective:       Complaint:    Overnight events noted  no nausea, vomiting, chest pain, short of breath, cough, seizure. IMPRESSION:   Acute on chronic kidney injury, sepsis ATN  Chronic kidney disease with proteinuria, diabetic nephropathy no available baseline creatinine  Proteinuria  Sepsis  Diabetes  Hypertension   PLAN:   His creatinine gradually improving but still on higher side. Clinically he appears well-hydrated. In absence of baseline renal function hard to predict any further improvement in renal function. I discussed with him at length about importance for close follow-up outpatient as he is high risk of further decline in kidney function. Continue amlodipine for blood pressure control, will add [facility] outpatients once renal function more stabilizing. His discharge is pending, need close outpatient follow-up with renal clinic. Discussed with Dr. Elsy Gutiérrez.        Current Facility-Administered Medications   Medication Dose Route Frequency    amoxicillin-clavulanate (AUGMENTIN) 500-125 mg per tablet 1 Tablet  1 Tablet Oral Q12H    L. acidophilus,casei,rhamnosus (BIO-K PLUS) capsule 1 Capsule  1 Capsule Oral DAILY    calcitRIOL (ROCALTROL) capsule 0.25 mcg  0.25 mcg Oral DAILY    sodium chloride (NS) flush 5-10 mL  5-10 mL IntraVENous PRN    amLODIPine (NORVASC) tablet 5 mg  5 mg Oral DAILY    aspirin chewable tablet 81 mg  81 mg Oral DAILY    sodium chloride (NS) flush 5-40 mL  5-40 mL IntraVENous Q8H    sodium chloride (NS) flush 5-40 mL  5-40 mL IntraVENous PRN    acetaminophen (TYLENOL) tablet 650 mg  650 mg Oral Q6H PRN    Or    acetaminophen (TYLENOL) suppository 650 mg  650 mg Rectal Q6H PRN    polyethylene glycol (MIRALAX) packet 17 g  17 g Oral DAILY PRN    ondansetron (ZOFRAN ODT) tablet 4 mg  4 mg Oral Q8H PRN    Or    ondansetron (ZOFRAN) injection 4 mg  4 mg IntraVENous Q6H PRN    famotidine (PEPCID) tablet 20 mg  20 mg Oral QPM    enoxaparin (LOVENOX) injection 30 mg  30 mg SubCUTAneous DAILY    morphine injection 2 mg  2 mg IntraVENous Q3H PRN    oxyCODONE-acetaminophen (PERCOCET) 5-325 mg per tablet 1 Tablet  1 Tablet Oral Q6H PRN    insulin lispro (HUMALOG) injection   SubCUTAneous AC&HS    glucose chewable tablet 16 g  4 Tablet Oral PRN    glucagon (GLUCAGEN) injection 1 mg  1 mg IntraMUSCular PRN    dextrose 10% infusion 0-250 mL  0-250 mL IntraVENous PRN    atorvastatin (LIPITOR) tablet 20 mg  20 mg Oral QHS       Review of Symptoms: comprehensive ROS negative except above.    Objective:   Patient Vitals for the past 24 hrs:   Temp Pulse Resp BP SpO2   08/29/22 1201 97.6 °F (36.4 °C) (!) 58 18 129/83 97 %   08/29/22 0829 97.9 °F (36.6 °C) (!) 59 20 125/78 96 %   08/29/22 0446 98.2 °F (36.8 °C) (!) 58 18 -- 96 %   08/29/22 0139 98.5 °F (36.9 °C) 60 18 (!) 157/88 97 %   08/28/22 2146 98.5 °F (36.9 °C) (!) 59 18 138/83 99 %   08/28/22 2000 -- -- -- -- 97 %   08/28/22 1633 98 °F (36.7 °C) (!) 58 18 (!) 145/83 96 %   08/28/22 1255 98.6 °F (37 °C) (!) 57 18 132/76 96 %        Weight change:      08/27 1901 - 08/29 0700  In: 840 [P.O.:840]  Out: 1125 [Urine:1125]    Intake/Output Summary (Last 24 hours) at 8/29/2022 1217  Last data filed at 8/29/2022 0529  Gross per 24 hour   Intake 480 ml   Output 1125 ml   Net -645 ml     Physical Exam:   General: comfortable, no acute distress   HEENT sclera anicteric,   CVS: S1S2 heard,  no rub  RS: + air entry b/l,   Abd: Soft, Non tender,   Neuro: non focal, awake, alert , CN II-XII are grossly intact  Extrm: right BKA,   Skin: no visible  Rash  Musculoskeletal: No gross joints or bone deformities         Data Review:     LABS:   Hematology:   Recent Labs     08/29/22  0322 08/28/22  0320 08/27/22  0123   WBC 6.9 7.4 7.8   HGB 11.1* 10.9* 10.7*   HCT 33.8* 33.3* 32.8* Chemistry:   Recent Labs     08/29/22  0322 08/28/22  0320 08/27/22  0123   BUN 52* 48* 54*   CREA 2.94* 2.98* 3.74*   CA 8.8 8.0* 7.9*   K 4.1 3.8 4.0    141 143   * 114* 116*   CO2 20* 20* 22   PHOS  --   --  4.3   GLU 91 93 117*            Procedures/imaging: see electronic medical records for all procedures, Xrays and details which were not copied into this note but were reviewed prior to creation of Plan          Assessment & Plan:     As above         Claudeen Ruder, MD  8/29/2022  12:17 PM

## 2022-08-29 NOTE — PROGRESS NOTES
Spoke to patient via . Patient does not have insurance and is pending medicaid. Spoke to patients nurse Queta Chew to inform her I spoke to patient per . Spoke to Ethan Haq she is aware of the patient does not have insurance.

## 2022-08-29 NOTE — PROGRESS NOTES
Infectious Disease progress Note        Reason: Right BKA stump infection    Current abx Prior abx   Zosyn, vancomycin since 8/26      Lines:       Assessment :  55-year-old man with past medical history significant for type 2 diabetes, hypertension, MRSA right foot infection status post right below-knee amputation March 2018 presented to [facility] on 8/26/2022 with increasing right BKA stump pain. Clinical presentation consistent with infected right BKA stump  wound infection  Wound cx 8/26- MSSA, enterococcus, acinetobacter     No clinical or radiographic evidence of osteomyelitis noted on recent MRI. Clinically improving. Resolved right BKA stump tenderness. Acute on chronic kidney disease-likely multifactorial.  Nephrology follow-up appreciated    Recommendations:    Discontinue cefepime, vancomycin. Start p.o. Augmentin till 9/7/22. Probiotics while on antibiotics  Continue wound care right BKA stump per vascular surgery  Follow-up nephrology recommendations regarding SAVANNA      Above plan was discussed in details with patient,dr solitario. Please call me if any further questions or concerns. Will continue to participate in the care of this patient. HPI:    Feels better. Resolved pain right BKA stump  Past Medical History:   Diagnosis Date    Cellulitis     rt. foot    Diabetes ([address]     Hypercholesterolemia     Hypertension     Osteomyelitis ([address]     rt toe    Other ill-defined conditions(799.89)        Past Surgical History:   Procedure Laterality Date    HX BELOW KNEE AMPUTATION  03/2018    HX ORTHOPAEDIC      rt.toe       home Medication List      Details   !! metFORMIN (GLUCOPHAGE) 500 mg tablet Take 500 mg by mouth two (2) times a day. !! metFORMIN (GLUCOPHAGE) 500 mg tablet Take 1 Tab by mouth two (2) times daily (with meals).   Qty: 180 Tab, Refills: 3    Associated Diagnoses: Uncontrolled type 2 diabetes mellitus without complication, with long-term current use of insulin      amLODIPine (NORVASC) 5 mg tablet Take 1 Tab by mouth daily. Qty: 90 Tab, Refills: 3    Associated Diagnoses: Benign hypertension without CHF      losartan (COZAAR) 50 mg tablet Take 1 Tab by mouth daily. Qty: 90 Tab, Refills: 3    Associated Diagnoses: Benign hypertension without CHF      pravastatin (PRAVACHOL) 10 mg tablet Take 1 Tab by mouth nightly. Qty: 90 Tab, Refills: 3    Associated Diagnoses: Dyslipidemia      Blood-Glucose Meter (RELION ALL-IN-ONE METER) monitoring kit Check fasting sugars three times a day before breakfast, lunch & dinner  Qty: 1 Kit, Refills: 0    Associated Diagnoses: Uncontrolled type 2 diabetes mellitus without complication, with long-term current use of insulin      Lancets misc Check fasting sugars three times a day before breakfast, lunch & dinner for relion meter  Qty: 100 Each, Refills: 11    Associated Diagnoses: Uncontrolled type 2 diabetes mellitus without complication, with long-term current use of insulin      glucose blood VI test strips (BLOOD GLUCOSE TEST) strip Check fasting sugars three times a day before breakfast, lunch & dinner for relion meter  Qty: 100 Strip, Refills: 11    Associated Diagnoses: Uncontrolled type 2 diabetes mellitus without complication, with long-term current use of insulin      aspirin 81 mg chewable tablet Take 1 Tab by mouth daily. Qty: 90 Tab, Refills: 3    Associated Diagnoses: Medication refill      oxyCODONE-acetaminophen (PERCOCET)  mg per tablet Take 0.5-1 Tabs by mouth every four (4) hours as needed for Pain. Max Daily Amount: 6 Tabs. Qty: 36 Tab, Refills: 0    Associated Diagnoses: Below knee amputation status, right      naloxone 2 mg/actuation spry Use 1 spray intranasally into 1 nostril. Use a new Narcan nasal spray for subsequent doses and administer into alternating nostrils. May repeat every 2 to 3 minutes as needed.   Indications: OPIATE-INDUCED RESPIRATORY DEPRESSION  Qty: 1 Blister, Refills: 0      oxyCODONE-acetaminophen (PERCOCET) 5-325 mg per tablet Take 2 Tabs by mouth every four (4) hours as needed. Max Daily Amount: 12 Tabs. Qty: 42 Tab, Refills: 0    Associated Diagnoses: Cellulitis of foot, right      insulin NPH/insulin regular (HUMULIN 70/30) 100 unit/mL (70-30) injection 12 Units by SubCUTAneous route Before breakfast and dinner. Qty: 3 Vial, Refills: 1      Insulin Syringe-Needle U-100 (INSULIN SYRINGE) 1 mL 28 x 1/2\" syrg 1 Package by Does Not Apply route two (2) times a day. Qty: 120 Syringe, Refills: 1       !! - Potential duplicate medications found. Please discuss with provider.           Current Facility-Administered Medications   Medication Dose Route Frequency    calcitRIOL (ROCALTROL) capsule 0.25 mcg  0.25 mcg Oral DAILY    sodium chloride (NS) flush 5-10 mL  5-10 mL IntraVENous PRN    amLODIPine (NORVASC) tablet 5 mg  5 mg Oral DAILY    aspirin chewable tablet 81 mg  81 mg Oral DAILY    sodium chloride (NS) flush 5-40 mL  5-40 mL IntraVENous Q8H    sodium chloride (NS) flush 5-40 mL  5-40 mL IntraVENous PRN    acetaminophen (TYLENOL) tablet 650 mg  650 mg Oral Q6H PRN    Or    acetaminophen (TYLENOL) suppository 650 mg  650 mg Rectal Q6H PRN    polyethylene glycol (MIRALAX) packet 17 g  17 g Oral DAILY PRN    ondansetron (ZOFRAN ODT) tablet 4 mg  4 mg Oral Q8H PRN    Or    ondansetron (ZOFRAN) injection 4 mg  4 mg IntraVENous Q6H PRN    famotidine (PEPCID) tablet 20 mg  20 mg Oral QPM    enoxaparin (LOVENOX) injection 30 mg  30 mg SubCUTAneous DAILY    Vancomycin: Pharmcy To Dose   Other Rx Dosing/Monitoring    morphine injection 2 mg  2 mg IntraVENous Q3H PRN    oxyCODONE-acetaminophen (PERCOCET) 5-325 mg per tablet 1 Tablet  1 Tablet Oral Q6H PRN    cefepime (MAXIPIME) 1 g in 0.9% sodium chloride (MBP/ADV) 50 mL MBP  1 g IntraVENous Q12H    insulin lispro (HUMALOG) injection   SubCUTAneous AC&HS    glucose chewable tablet 16 g  4 Tablet Oral PRN    glucagon (GLUCAGEN) injection 1 mg  1 mg IntraMUSCular PRN    dextrose 10% infusion 0-250 mL  0-250 mL IntraVENous PRN    atorvastatin (LIPITOR) tablet 20 mg  20 mg Oral QHS    Lactobacillus Acidoph & Bulgar (FLORANEX) tablet 2 Tablet  2 Tablet Oral BID       Allergies: Patient has no known allergies. Family History   Problem Relation Age of Onset    Diabetes Mother     Diabetes Father     No Known Problems Sister     No Known Problems Brother     No Known Problems Sister      Social History     Socioeconomic History    Marital status: SINGLE     Spouse name: Not on file    Number of children: Not on file    Years of education: Not on file    Highest education level: Not on file   Occupational History    Not on file   Tobacco Use    Smoking status: Heavy Smoker     Packs/day: 0.50     Types: Cigarettes    Smokeless tobacco: Never    Tobacco comments:     pt. counseled to not smoke   Substance and Sexual Activity    Alcohol use: No    Drug use: Yes     Types: Marijuana     Comment: 17    Sexual activity: Not on file   Other Topics Concern    Not on file   Social History Narrative    Not on file     Social Determinants of Health     Financial Resource Strain: Not on file   Food Insecurity: Not on file   Transportation Needs: Not on file   Physical Activity: Not on file   Stress: Not on file   Social Connections: Not on file   Intimate Partner Violence: Not on file   Housing Stability: Not on file     Social History     Tobacco Use   Smoking Status Heavy Smoker    Packs/day: 0.50    Types: Cigarettes   Smokeless Tobacco Never   Tobacco Comments    pt. counseled to not smoke        Temp (24hrs), Av.3 °F (36.8 °C), Min:97.9 °F (36.6 °C), Max:98.6 °F (37 °C)    Visit Vitals  /78   Pulse (!) 59   Temp 97.9 °F (36.6 °C)   Resp 20   Ht 5' 2\" (1.575 m)   Wt 79.4 kg (175 lb)   SpO2 96%   BMI 32.01 kg/m²       ROS: Unable to obtain due to patient factors    Physical Exam:    Vitals and nursing note reviewed. Constitutional:       Appearance: He is well-developed and normal weight. He is not ill-appearing, toxic-appearing or diaphoretic. HENT:      Head: Normocephalic and atraumatic. Neck:      Supple  Cardiovascular:      Rate and Rhythm: Normal rate and regular rhythm. Heart sounds: Normal heart sounds. No friction rub. No gallop. Pulmonary:      Effort: Pulmonary effort is normal. No respiratory distress. Abdominal:      General: There is no distension. Palpations: Abdomen is soft. There is no mass. Tenderness: There is no abdominal tenderness. There is no guarding or rebound. Musculoskeletal:          Right BKA stump with small ulcer medially. Unable to probe the bone. No expressible drainage.  + Darkish erythema surrounding surgical site. Resolved right BKA stump tenderness  Skin:     General: Skin is warm and dry. Coloration: Skin is not pale. Neurological:      Mental Status: He is alert and oriented to person, place, and time. Psychiatric:         Speech: Speech normal.         Behavior: Behavior normal.         Thought Content:  Thought content normal.         Judgment: Judgment normal.     Labs: Results:   Chemistry Recent Labs     08/29/22  0322 08/28/22  0320 08/27/22  0123 08/26/22  1000   GLU 91 93 117* 133*    141 143 141   K 4.1 3.8 4.0 4.3   * 114* 116* 111   CO2 20* 20* 22 20*   BUN 52* 48* 54* 68*   CREA 2.94* 2.98* 3.74* 4.00*   CA 8.8 8.0* 7.9* 8.4*  8.3*   AGAP 6 7 5 10   BUCR 18 16 14 17   AP  --   --   --  133*   TP  --   --   --  6.3*   ALB  --   --   --  2.5*   GLOB  --   --   --  3.8   AGRAT  --   --   --  0.7*        CBC w/Diff Recent Labs     08/29/22  0322 08/28/22  0320 08/27/22  0123   WBC 6.9 7.4 7.8   RBC 3.84* 3.75* 3.66*   HGB 11.1* 10.9* 10.7*   HCT 33.8* 33.3* 32.8*    182 178   GRANS 64 69 69   LYMPH 23 17* 18*   EOS 4 3 3        Microbiology Recent Labs     08/26/22  1015 08/26/22  1000   CULT NO GROWTH 3 DAYS NO GROWTH 3 DAYS          RADIOLOGY:    All available imaging studies/reports in connect care for this admission were reviewed      Disclaimer: Sections of this note are dictated utilizing voice recognition software, which may have resulted in some phonetic based errors in grammar and contents. Even though attempts were made to correct all the mistakes, some may have been missed, and remained in the body of the document. If questions arise, please contact our department.     Dr. Nancy Ng, Infectious Disease Specialist  [phone]  August 29, 2022  4:29 PM

## 2022-08-29 NOTE — PROGRESS NOTES
Problem: Self Care Deficits Care Plan (Adult)  Goal: *Acute Goals and Plan of Care (Insert Text)  Outcome: Resolved/Met   OCCUPATIONAL THERAPY EVALUATION/DISCHARGE    Patient: Mariposa Page (79 y.o. male)  Date: 8/29/2022  Primary Diagnosis: Osteomyelitis of lower extremity ([facility] [address] [M86.9]  SAVANNA (acute kidney injury) ([facility] [N17.9]  Diabetes mellitus type 2 with complications ([facility] [S93.3]       Precautions:   Fall, Skin  PLOF: independent with ADLs and functional mobility     ASSESSMENT AND RECOMMENDATIONS:  Nursing/RN cleared for pt to participate in OT evaluation and tx session. Green[place]ic video remote  utilized for communication. Dr. Georgia Mosqueda addressed pt with d/c plan for wear of RLE prosthetic leg for short distance mobility as needed e.g. toilet transfer and keep doffed remainder of time for increased healing. Bed mobility: independent supine <-> sit edge of bed. LB dress: independent doff and don RLE prosthetic leg while seated edge of bed, vc's to remain seated versus standing to don left shoe with laces, pt verbalized understanding and complied. Toilet transfer: Mod I utilizing RW for pressure relief during mobility of RLE, pt provided good return demonstration for safety with use of RW-nursing notified of toilet transfer with RW and c/o RLE residual stump pain 5/10. Patient sitting upright in bed at end of tx session. Patient verbalized understanding to utilize call bell to request assist as needed. Patient presents at baseline as PLOF with ADLs and functional mobility. Patient verbalized understanding of skilled OT services not indicated at this time while in acute hospital. Pt will benefit from shower seat, RW, w/c and follow up for RLE prosthesis fitting d/t foul smell noted with sock shrinkers and appears to have poor fit of prosthesis, MD and  notified.        Further Equipment Recommendations for Discharge: shower seat, RW, w/c and follow up for RLE prosthesis fitting d/t foul smell noted with sock shrinkers and appears to have poor fit of prosthesis    AMPAC: At this time and based on an AM-PAC score of 24/24, no further OT is recommended upon discharge due to (i.e. patient at baseline functional statusetc). Recommend patient returns to prior setting with prior services. This AMPAC score should be considered in conjunction with interdisciplinary team recommendations to determine the most appropriate discharge setting. Patient's social support, diagnosis, medical stability, and prior level of function should also be taken into consideration. SUBJECTIVE:   Patient stated My roommate and I share the work of making meals and housework.     OBJECTIVE DATA SUMMARY:     Past Medical History:   Diagnosis Date    Cellulitis     rt. foot    Diabetes ([facility]     Hypercholesterolemia     Hypertension     Osteomyelitis ([facility]     rt toe    Other ill-defined conditions(799.89)      Past Surgical History:   Procedure Laterality Date    HX BELOW KNEE AMPUTATION  03/2018    HX ORTHOPAEDIC      rt.toe     Barriers to Learning/Limitations: None  Compensate with: visual, verbal, tactile, kinesthetic cues/model    Home Situation:   Home Situation  Home Environment: Private residence  # Steps to Enter: 1  One/Two Story Residence: One story  Living Alone: No  Support Systems: Friend/Neighbor  Patient Expects to be Discharged to[de-identified] Home  Current DME Used/Available at Home: None  Tub or Shower Type: Tub/Shower combination  []     Right hand dominant   []     Left hand dominant    Cognitive/Behavioral Status:  Neurologic State: Alert; Appropriate for age  Orientation Level: Oriented X4  Cognition: Follows commands  Safety/Judgement: Fall prevention    Skin: RLE residual limb wound  Edema: none noted    Vision/Perceptual:  appears intact     Coordination: BUE  Coordination: Within functional limits  Fine Motor Skills-Upper: Left Intact; Right Intact    Gross Motor Skills-Upper: Left Intact; Right Intact    Balance:  Sitting: Intact  Standing: Intact; With support    Strength: BUE  Strength: Within functional limits     Tone & Sensation: BUE  Tone: Normal     Range of Motion: BUE  AROM: Within functional limits     Functional Mobility and Transfers for ADLs:  Bed Mobility:  Supine to Sit: Independent  Sit to Supine: Independent  Transfers:  Sit to Stand: Modified independent  Stand to Sit: Modified independent     Toilet Transfer : Modified independent     ADL Assessment:  Feeding: Independent    Oral Facial Hygiene/Grooming: Modified Independent    Bathing: Modified independent    Upper Body Dressing: Independent    Lower Body Dressing: Independent    Toileting: Modified independent     ADL Intervention:  Lower Body Dressing Assistance  Shoes with Cloth Laces: Independent  Leg Crossed Method Used: No  Position Performed: Seated edge of bed  Cognitive Retraining  Safety/Judgement: Fall prevention    Pain:  Pain level pre-treatment: 5/10   Pain level post-treatment: 5/10   Pain Intervention(s): Medication (see MAR); Rest, Ice, Repositioning   Response to intervention: Nurse notified, See doc flow    Activity Tolerance:   good  Please refer to the flowsheet for vital signs taken during this treatment. After treatment:   []  Patient left in no apparent distress sitting up in chair  [x]  Patient left in no apparent distress in bed  [x]  Call bell left within reach  [x]  Nursing notified  []  Caregiver present  []  Bed alarm activated    COMMUNICATION/EDUCATION:   [x]      Role of Occupational Therapy in the acute care setting  [x]      Home safety education was provided and the patient/caregiver indicated understanding. [x]      Patient/family have participated as able and agree with findings and recommendations. []      Patient is unable to participate in plan of care at this time.     Thank you for this referral.  Elba Lowry  Time Calculation: 47 mins      Eval Complexity: History: MEDIUM Complexity : Expanded review of history including physical, cognitive and psychosocial  history ; Examination: MEDIUM Complexity : 3-5 performance deficits relating to physical, cognitive , or psychosocial skils that result in activity limitations and / or participation restrictions; Decision Making:MEDIUM Complexity : Patient may present with comorbidities that affect occupational performnce. Miniml to moderate modification of tasks or assistance (eg, physical or verbal ) with assesment(s) is necessary to enable patient to complete evaluation     [facility] Daily Activity In[facility] Short Form (6-Clicks)*    How much HELP from another person does the patient currently need    (If the patient hasn't done an activity recently, how much help from another person do you think he/she would need if he/she tried?)   Total (Total A or Dep)   A Lot  (Mod to Max A)   A Little (Sup or Min A)   None (Mod I to I)   Putting on and taking off regular lower body clothing? [] 1 [] 2 [] 3 [x] 4   2. Bathing (including washing, rinsing,      drying)? [] 1 [] 2 [] 3 [x] 4   3. Toileting, which includes using toilet, bedpan or urinal?   [] 1 [] 2 [] 3 [x] 4   4. Putting on and taking off regular upper body clothing? [] 1 [] 2 [] 3 [x] 4   5. Taking care of personal grooming such as brushing teeth? [] 1 [] 2 [] 3 [x] 4   6. Eating meals?    [] 1 [] 2 [] 3 [x] 4

## 2022-08-29 NOTE — PROGRESS NOTES
Received discharge prescriptions for patient to outpatient pharmacy. Will follow up with nurse and case management as it looks like patient does not have any insurance.

## 2022-08-29 NOTE — DISCHARGE SUMMARY
Discharge Summary    Patient: Can Brannon MRN: [id number]  CSN: [id number]    YOB: 1980  Age: 43 y.o. Sex: male    DOA: 8/26/2022 LOS:  LOS: 3 days        Disposition: Home with [facility] AT [facility]    Discharge Date:   8/29/2022    Admission Diagnosis: Osteomyelitis of lower extremity ([facility] [address] [M86.9]  SAVANNA (acute kidney injury) ([facility] [address] [N17.9]  Diabetes mellitus type 2 with complications ([facility] [address] [T60.7]    Discharge Diagnosis:    Right BKA stump cellulitis with no discrete abscess. SAVANNA on CKD 3b, multifactorial.   Nonobstructive kidney stone on left kidney  Hypertension  Hyperlipidemia  Tobacco smoking dependence  Marijuana use    Discharge Condition: Stable      PHYSICAL EXAM  Visit Vitals  /78   Pulse (!) 59   Temp 97.9 °F (36.6 °C)   Resp 20   Ht 5' 2\" (1.575 m)   Wt 79.4 kg (175 lb)   SpO2 96%   BMI 32.01 kg/m²       General: Alert, cooperative, no acute distress    HEENT: PERRLA, EOMI. Anicteric sclerae. Lungs:  CTA Bilaterally. No Wheezing/Rales. Heart:             Regular rate and Rhythm. Abdomen: Soft, Non distended, Non tender. + Bowel sounds. Extremities: No edema. Right AKA stump with ulcer, no erythema redness  Psych:   Good insight. Not anxious or agitated. Neurologic:  AA, oriented X 3. Moves all ext                                 Hospital Course:   Can Brannon is a 43 y.o. male with a PMHx of cellulitis, osteomyelitis, Diabetes, hyperlipidemia, and HTN who presented to the ED with a complaint of constant right BKA stump pain since 4 days. Pt states pain is 7/10 and wears his prothesis daily. No drainage noted to the wound. It is packed with gauze. Denies fever, abdominal pain, chest pain, SOB, n/v/d. Patient was admitted to hospital with impression of right BKA stump infection. ID and vascular surgery were consulted. Patient also underwent MRI, which showed no osteomyelitis and also no abscess. Cultures were sent, continued on antibiotics.   Vascular surgery saw the patient, recommended no intervention but continue antibiotic regimen. ID follow-up on the patient, cultures were reviewed and recommended okay to discharge with p.o. antibiotics. Patient also had acute renal failure on chronic disease, IV fluids were started and nephrology was consulted. Patient's creatinine improved, almost back to baseline. Discussed with nephrology okay for discharge from the standpoint. PT/OT saw the patient recommended home health care. Patient was discharged home with p.o. antibiotics. I discussed with the patient in detail using  [id number] and explained in detail about his discharge plan, follow-up appointments, new medications and side effects. I have advised him about not to use prosthesis for long duration till the wound heals, advised about follow-ups, advised about compliance with the medication. Patient understood and agreed with my plan. Procedures: None     Consults:   Dr. Karla Richards, vascular surgery  Dr. Roman De La Cruz, infectious disease    Imaging studies:   MRI Results (most recent):  Results from Hospital Encounter encounter on 08/26/22    MRI TIB/FIB RT WO CONT    Narrative  Multisequence multiplanar MR images of the right tibia/fibula were obtained. HISTORY: Below knee amputation. Swelling. Pain. Reference: Radiographs 0904 hours. Severe subcutaneous edema. Soft tissue air seen on radiographs. Mild muscular  edema. No discrete fluid collection or abscess identified. Surgical margin at the distal tibia and fibula slightly indistinct but no  infiltration of the bone marrow. Minimal reactive marrow edema distally. No knee joint effusion. Impression  Soft tissue inflammation/edema with soft tissue air, suspect  infection with no discrete abscess. Reactive edema at the tibial and fibular  stump. No high suspicion for osteomyelitis.         Discharge Medications:     Current Discharge Medication List        START taking these medications    Details amoxicillin-clavulanate (AUGMENTIN) 500-125 mg per tablet Take 1 Tablet by mouth every twelve (12) hours for 9 days. Qty: 18 Tablet, Refills: 0      calcitRIOL (ROCALTROL) 0.25 mcg capsule Take 1 Capsule by mouth daily for 30 days. Qty: 30 Capsule, Refills: 0      L. acidophilus,casei,rhamnosus (BIO-K PLUS) 50 billion cell cpDR capsule Take 1 Capsule by mouth daily for 30 days. Qty: 30 Capsule, Refills: 0      glimepiride (AMARYL) 1 mg tablet Take 1 Tablet by mouth every morning for 30 days. Qty: 30 Tablet, Refills: 0           CONTINUE these medications which have CHANGED    Details   glucose blood VI test strips (blood glucose test) strip Check fasting sugars three times a day before breakfast, lunch & dinner for relion meter  Qty: 60 Strip, Refills: 0    Associated Diagnoses: Uncontrolled type 2 diabetes mellitus without complication, with long-term current use of insulin      lancets misc Check fasting sugars three times a day before breakfast, lunch & dinner for relion meter  Qty: 100 Each, Refills: 0    Associated Diagnoses: Uncontrolled type 2 diabetes mellitus without complication, with long-term current use of insulin      amLODIPine (NORVASC) 5 mg tablet Take 1 Tablet by mouth daily for 30 days. Qty: 30 Tablet, Refills: 0    Associated Diagnoses: Benign hypertension without CHF      aspirin 81 mg chewable tablet Take 1 Tablet by mouth daily for 30 days. Qty: 30 Tablet, Refills: 0    Associated Diagnoses: Medication refill      atorvastatin (LIPITOR) 40 mg tablet Take 1 Tablet by mouth nightly for 30 days.   Qty: 30 Tablet, Refills: 0           CONTINUE these medications which have NOT CHANGED    Details   Blood-Glucose Meter (RELION ALL-IN-ONE METER) monitoring kit Check fasting sugars three times a day before breakfast, lunch & dinner  Qty: 1 Kit, Refills: 0    Associated Diagnoses: Uncontrolled type 2 diabetes mellitus without complication, with long-term current use of insulin           STOP taking these medications       metFORMIN (GLUCOPHAGE) 500 mg tablet Comments:   Reason for Stopping:         naloxone 2 mg/actuation spry Comments:   Reason for Stopping:         insulin NPH/insulin regular (HUMULIN 70/30) 100 unit/mL (70-30) injection Comments:   Reason for Stopping:         Insulin Syringe-Needle U-100 (INSULIN SYRINGE) 1 mL 28 x 1/2\" syrg Comments:   Reason for Stopping: It is important that you take the medication exactly as they are prescribed. Keep your medication in the bottles provided by the pharmacist and keep a list of the medication names, dosages, and times to be taken in your wallet. Do not take other medications without consulting your doctor. DIET:  Cardiac Diet and Diabetic Diet    ACTIVITY: Activity as tolerated  Home health care for PT/OT consult      ADDITIONAL INFORMATION: If you experience any of the following symptoms but not limited to Fever, chills, nausea, vomiting, diarrhea, change in mentation, falling, bleeding, shortness of breath, chest pain, please call your primary care physician or return to the emergency room if you cannot get hold of your doctor:     FOLLOW UP CARE:  PCP in 5-7 days. Please call and set up an appointment. Dr. Branden Hui in 1 week  Dr. Melissa Gary, nephrology in 2 weeks    Minutes spent on discharge: 40 minutes spent coordinating this discharge (review instructions/follow-up, prescriptions, preparing report for sign off)    Yvette Pablo MD  8/29/2022 11:31 AM    Disclaimer: Sections of this note are dictated using utilizing voice recognition software. Minor typographical errors may be present. If questions arise, please do not hesitate to contact me or call our department.

## 2022-08-29 NOTE — DISCHARGE INSTRUCTIONS
Discharge Instructions    Patient: Juliocesar Roca MRN: [id number]  CSN: [id number]    YOB: 1980  Age: 43 y.o. Sex: male    DOA: 8/26/2022 LOS: [unfilled]  Discharge Date: [unfilled]     DIET:  Cardiac Diet and Diabetic Diet    ACTIVITY: Activity as tolerated  Home health care for PT/OT consult      ADDITIONAL INFORMATION: If you experience any of the following symptoms but not limited to Fever, chills, nausea, vomiting, diarrhea, change in mentation, falling, bleeding, shortness of breath, chest pain, please call your primary care physician or return to the emergency room if you cannot get hold of your doctor:     FOLLOW UP CARE:  PCP in 5-7 days. Please call and set up an appointment.   Dr. Banegas Anchors in 1 week      Betsy Peoples MD  8/29/2022 11:30 AM

## 2022-08-29 NOTE — HOME CARE
Received home health referral for PT/OT. With discharge order noted for today. Required the use of an  machine services. Patient has declined home health services at this time. Witnessed by Jos Song . Notified Ashanti Gamble MD via [employer].      ---  Geena Shore LPN  [facility] - INPATIENT Liaison

## 2022-08-29 NOTE — PROGRESS NOTES
Problem: Mobility Impaired (Adult and Pediatric)  Goal: *Acute Goals and Plan of Care (Insert Text)  Description: Physical Therapy Goals  Initiated 8/29/2022 and to be accomplished within 7 day(s)  1. Patient will move from supine to sit and sit to supine  in bed with modified independence. 2.  Patient will transfer from bed to chair and chair to bed with modified independence using the least restrictive device. 3.  Patient will perform sit to stand with modified independence. 4.  Patient will ambulate with modified independence for 50 feet with the least restrictive device. PLOF: Independent. Has RLE prosthesis. Roommates for support. Outcome: Progressing Towards Goal   PHYSICAL THERAPY EVALUATION    Patient: Gamal Fairbanks (31 y.o. male)  Date: 8/29/2022  Primary Diagnosis: Osteomyelitis of lower extremity ([facility] [address] [M86.9]  SAVANNA (acute kidney injury) ([facility] [address] [N17.9]  Diabetes mellitus type 2 with complications ([facility] [address] [O22.3]  Precautions: Fall, NWB (RLE; no prosthesis d/t ulcer)  ASSESSMENT :   utilized for evaluation. Reports previously independent and working. Now with RLE ulcer and per vascular orders for NWB RLE and no prosthesis d/t ulcer. Mod I for bed mobility. Intact seated balance. Declines standing or amb trial without use of RLE prosthesis. Expresses concerns and frustrations with current situation. Re-assured patient concerns would be elevated to interdisciplinary team.  Educated on need for RN assistance with mobility; verbalized understanding. Call bell in reach. Patient voices multiple social concerns during evaluation including lack of resources/support and need for mobility and continued employment to care for family. NWB RLE with no right prosthesis use d/t ulcer is significantly limiting to patient's independence and ability to fulfil basic needs. Patient will benefit from skilled intervention to address the above impairments.   Patient's rehabilitation potential is considered to be Good  Factors which may influence rehabilitation potential include:   []         None noted  []         Mental ability/status  [x]         Medical condition  []         Home/family situation and support systems  []         Safety awareness  []         Pain tolerance/management  []         Other:      PLAN :  Recommendations and Planned Interventions:   [x]           Bed Mobility Training             [x]    Neuromuscular Re-Education  [x]           Transfer Training                   []    Orthotic/Prosthetic Training  [x]           Gait Training                          []    Modalities  [x]           Therapeutic Exercises           []    Edema Management/Control  [x]           Therapeutic Activities            [x]    Family Training/Education  [x]           Patient Education  []           Other (comment):    Frequency/Duration: Patient will be followed by physical therapy 3-5 times a week to address goals. Further Equipment Recommendations for Discharge: rolling walker, bedside commode, and wheelchair    AMPA Basic Mobility Inpatient Short Form:  17/20, with stairs omitted  This AMPAC score should be considered in conjunction with interdisciplinary team recommendations to determine the most appropriate discharge setting. Patient's social support, diagnosis, medical stability, and prior level of function should also be taken into consideration. Based on an AM-PAC score of 17/20 if omitting stairs and current functional mobility deficits, it is recommended that the patient have 2-3 sessions per week of physical therapy at d/c to increase the patient's independence. Patient voices multiple social concerns during evaluation including lack of resources/support and need for mobility and continued employment to care for family. NWB RLE with no right prosthesis use d/t ulcer is significantly limiting to patient's independence and ability to fulfil basic needs.       SUBJECTIVE: Patient stated [place] just need the antibiotics and then to go home.     OBJECTIVE DATA SUMMARY:     Past Medical History:   Diagnosis Date    Cellulitis     rt. foot    Diabetes ([facility] [address]     Hypercholesterolemia     Hypertension     Osteomyelitis ([facility] [address]     rt toe    Other ill-defined conditions(799.89)      Past Surgical History:   Procedure Laterality Date    HX BELOW KNEE AMPUTATION  03/2018    HX ORTHOPAEDIC      rt.toe     Barriers to Learning/Limitations: yes;  language  Compensate with: Visual Cues, Verbal Cues, Tactile Cues and Kinesthetic Cues    Home Situation:  Home Situation  Home Environment: Private residence  # Steps to Enter: 1  One/Two Story Residence: One story  Living Alone: No  Support Systems: Friend/Neighbor  Patient Expects to be Discharged to[de-identified] Home  Current DME Used/Available at Home: None    Critical Behavior:  Neurologic State: Alert  Orientation Level: Oriented to person;Oriented to place    Strength:    Manual Muscle Testing (LE)         R     L    Hip Flexion:   3/5  5/5  Knee EXT:     5/5  Knee FLEX:     5/5  Ankle DF:     5/5  _________________________________________________   Range Of Motion:  RLE  BKA  LLE  AROM WFL  Functional Mobility:  Bed Mobility:  Supine to Sit: Modified independent  Sit to Supine: Modified independent  Balance:   Sitting: Intact  Neuro Re-education:  Seated EOB 8 minutes    Pain:  Pain level pre-treatment: 0/10   Pain level post-treatment: 0/10     Activity Tolerance:   Good    After treatment:   []         Patient left in no apparent distress sitting up in chair  [x]         Patient left in no apparent distress in bed  [x]         Call bell left within reach  [x]         Nursing notified  []         Caregiver present  []         Bed alarm activated  []         SCDs applied    COMMUNICATION/EDUCATION:   [x]         Role of physical therapy and plan of care in the acute care setting.   [x]         Fall prevention education was provided and the patient/caregiver indicated understanding. [x]         Patient/family have participated as able in goal setting and plan of care. []         Patient/family agree to work toward stated goals and plan of care. []         Patient understands intent and goals of therapy, but is neutral about his/her participation. []         Patient is unable to participate in goal setting/plan of care: ongoing with therapy staff. Thank you for this referral.  Gordo Duncan, PT   Time Calculation: 19 mins    Eval Complexity: History: MEDIUM  Complexity : 1-2 comorbidities / personal factors will impact the outcome/ POC Exam:MEDIUM Complexity : 3 Standardized tests and measures addressing body structure, function, activity limitation and / or participation in recreation  Presentation: MEDIUM Complexity : Evolving with changing characteristics  Clinical Decision Making:Medium Complexity    Clinical judgement; ROM, MMT, functional mobility Overall Complexity:MEDIUM    Kamlesh Santana AM-PAC® Basic Mobility Inpatient Short Form (6-Clicks) Version 2    How much HELP from another person does the patient currently need    (If the patient hasn't done an activity recently, how much help from another person do you think he/she would need if he/she tried?)   Total (Total A or Dep)   A Lot  (Mod to Max A)   A Little (Sup or Min A)   None (Mod I to I)   Turning from your back to your side while in a flat bed without using bedrails? [] 1 [] 2 [] 3 [x] 4   2. Moving from lying on your back to sitting on the side of a flat bed without using bedrails? [] 1 [] 2 [] 3 [x] 4   3. Moving to and from a bed to a chair (including a wheelchair)? Clinical judgement/pending use of RLE prosthesis [] 1 [] 2 [x] 3 [] 4   4. Standing up from a chair using your arms (e.g., wheelchair, or bedside chair)? Clinical judgement/pending use of RLE prosthesis [] 1 [] 2 [x] 3 [] 4   5. Walking in hospital room?   Clinical judgement/pending use of RLE prosthesis [] 1 [] 2 [x] 3 [] 4   6. Climbing 3-5 steps with a railing?+  Not tested [] 1 [] 2 [] 3 [] 4   +If stair climbing cannot be assessed, skip item #6. Sum responses from items 1-5.

## 2022-08-29 NOTE — WOUND CARE
Physical Exam  Room 473: pt not seen at this time, pt's dressing changes are by Dr. Dwain Garcia. Will turn over care to nursing staff at this time.   Leeann STANLEYN, RN, [facility] [place], [address]

## 2022-08-29 NOTE — PROGRESS NOTES
Morphine administered IV for complaint of right phantom pain in BKA . 2310 Resting quietly with eyes closed and no complaint of pain at present. Bedside and Verbal shift change report given to REBECA Bui (oncoming nurse) by Art Salazar RN (offgoing nurse). Report given with SBAR, Kardex, Intake/Output, MAR and Recent Results.

## 2022-10-11 ENCOUNTER — HOSPITAL ENCOUNTER (INPATIENT)
Age: 42
LOS: 7 days | Discharge: HOME | DRG: 349 | End: 2022-10-18
Attending: EMERGENCY MEDICINE | Admitting: HOSPITALIST
Payer: MEDICAID

## 2022-10-11 ENCOUNTER — APPOINTMENT (OUTPATIENT)
Dept: MRI IMAGING | Age: 42
DRG: 349 | End: 2022-10-11
Attending: PHYSICIAN ASSISTANT
Payer: MEDICAID

## 2022-10-11 ENCOUNTER — APPOINTMENT (OUTPATIENT)
Dept: GENERAL RADIOLOGY | Age: 42
DRG: 349 | End: 2022-10-11
Attending: PHYSICIAN ASSISTANT
Payer: MEDICAID

## 2022-10-11 DIAGNOSIS — M86.60 ACUTE ON CHRONIC OSTEOMYELITIS (HCC): Primary | ICD-10-CM

## 2022-10-11 DIAGNOSIS — M86.10 ACUTE ON CHRONIC OSTEOMYELITIS (HCC): Primary | ICD-10-CM

## 2022-10-11 PROBLEM — M79.604 RIGHT LEG PAIN: Status: ACTIVE | Noted: 2022-10-11

## 2022-10-11 LAB
ALBUMIN SERPL-MCNC: 2.4 G/DL (ref 3.4–5)
ALBUMIN/GLOB SERPL: 0.5 {RATIO} (ref 0.8–1.7)
ALP SERPL-CCNC: 104 U/L (ref 45–117)
ALT SERPL-CCNC: 19 U/L (ref 16–61)
ANION GAP SERPL CALC-SCNC: 10 MMOL/L (ref 3–18)
AST SERPL-CCNC: 20 U/L (ref 10–38)
BASOPHILS # BLD: 0.1 K/UL (ref 0–0.1)
BASOPHILS NFR BLD: 1 % (ref 0–2)
BILIRUB SERPL-MCNC: 0.4 MG/DL (ref 0.2–1)
BUN SERPL-MCNC: 55 MG/DL (ref 7–18)
BUN/CREAT SERPL: 13 (ref 12–20)
CALCIUM SERPL-MCNC: 8.7 MG/DL (ref 8.5–10.1)
CHLORIDE SERPL-SCNC: 110 MMOL/L (ref 100–111)
CO2 SERPL-SCNC: 21 MMOL/L (ref 21–32)
CREAT SERPL-MCNC: 4.24 MG/DL (ref 0.6–1.3)
DIFFERENTIAL METHOD BLD: ABNORMAL
EOSINOPHIL # BLD: 0.1 K/UL (ref 0–0.4)
EOSINOPHIL NFR BLD: 1 % (ref 0–5)
ERYTHROCYTE [DISTWIDTH] IN BLOOD BY AUTOMATED COUNT: 14 % (ref 11.6–14.5)
EST. AVERAGE GLUCOSE BLD GHB EST-MCNC: 128 MG/DL
GLOBULIN SER CALC-MCNC: 4.5 G/DL (ref 2–4)
GLUCOSE BLD STRIP.AUTO-MCNC: 117 MG/DL (ref 70–110)
GLUCOSE BLD STRIP.AUTO-MCNC: 131 MG/DL (ref 70–110)
GLUCOSE BLD STRIP.AUTO-MCNC: 71 MG/DL (ref 70–110)
GLUCOSE BLD STRIP.AUTO-MCNC: 73 MG/DL (ref 70–110)
GLUCOSE SERPL-MCNC: 118 MG/DL (ref 74–99)
HBA1C MFR BLD: 6.1 % (ref 4.2–5.6)
HCT VFR BLD AUTO: 32.8 % (ref 36–48)
HGB BLD-MCNC: 10.7 G/DL (ref 13–16)
IMM GRANULOCYTES # BLD AUTO: 0 K/UL (ref 0–0.04)
IMM GRANULOCYTES NFR BLD AUTO: 0 % (ref 0–0.5)
LACTATE BLD-SCNC: 0.6 MMOL/L (ref 0.4–2)
LYMPHOCYTES # BLD: 2.1 K/UL (ref 0.9–3.6)
LYMPHOCYTES NFR BLD: 19 % (ref 21–52)
MCH RBC QN AUTO: 29.5 PG (ref 24–34)
MCHC RBC AUTO-ENTMCNC: 32.6 G/DL (ref 31–37)
MCV RBC AUTO: 90.4 FL (ref 78–100)
MONOCYTES # BLD: 0.6 K/UL (ref 0.05–1.2)
MONOCYTES NFR BLD: 5 % (ref 3–10)
NEUTS SEG # BLD: 8.3 K/UL (ref 1.8–8)
NEUTS SEG NFR BLD: 74 % (ref 40–73)
NRBC # BLD: 0 K/UL (ref 0–0.01)
NRBC BLD-RTO: 0 PER 100 WBC
PLATELET # BLD AUTO: 290 K/UL (ref 135–420)
PMV BLD AUTO: 11.3 FL (ref 9.2–11.8)
POTASSIUM SERPL-SCNC: 3.7 MMOL/L (ref 3.5–5.5)
PROT SERPL-MCNC: 6.9 G/DL (ref 6.4–8.2)
RBC # BLD AUTO: 3.63 M/UL (ref 4.35–5.65)
SODIUM SERPL-SCNC: 141 MMOL/L (ref 136–145)
WBC # BLD AUTO: 11.1 K/UL (ref 4.6–13.2)

## 2022-10-11 PROCEDURE — 83605 ASSAY OF LACTIC ACID: CPT

## 2022-10-11 PROCEDURE — 73590 X-RAY EXAM OF LOWER LEG: CPT

## 2022-10-11 PROCEDURE — 73718 MRI LOWER EXTREMITY W/O DYE: CPT

## 2022-10-11 PROCEDURE — 83036 HEMOGLOBIN GLYCOSYLATED A1C: CPT

## 2022-10-11 PROCEDURE — 87040 BLOOD CULTURE FOR BACTERIA: CPT

## 2022-10-11 PROCEDURE — 80053 COMPREHEN METABOLIC PANEL: CPT

## 2022-10-11 PROCEDURE — 2709999900 HC NON-CHARGEABLE SUPPLY

## 2022-10-11 PROCEDURE — 82962 GLUCOSE BLOOD TEST: CPT

## 2022-10-11 PROCEDURE — 85025 COMPLETE CBC W/AUTO DIFF WBC: CPT

## 2022-10-11 PROCEDURE — 99285 EMERGENCY DEPT VISIT HI MDM: CPT

## 2022-10-11 PROCEDURE — 74011250636 HC RX REV CODE- 250/636: Performed by: PHYSICIAN ASSISTANT

## 2022-10-11 PROCEDURE — 99223 1ST HOSP IP/OBS HIGH 75: CPT | Performed by: HOSPITALIST

## 2022-10-11 PROCEDURE — 77030037878 HC DRSG MEPILEX >48IN BORD MOLN -B

## 2022-10-11 PROCEDURE — 74011000250 HC RX REV CODE- 250: Performed by: HOSPITALIST

## 2022-10-11 PROCEDURE — 74011250636 HC RX REV CODE- 250/636: Performed by: HOSPITALIST

## 2022-10-11 PROCEDURE — 65270000029 HC RM PRIVATE

## 2022-10-11 PROCEDURE — 96374 THER/PROPH/DIAG INJ IV PUSH: CPT

## 2022-10-11 PROCEDURE — 74011250636 HC RX REV CODE- 250/636: Performed by: INTERNAL MEDICINE

## 2022-10-11 RX ORDER — NALOXONE HYDROCHLORIDE 0.4 MG/ML
0.4 INJECTION, SOLUTION INTRAMUSCULAR; INTRAVENOUS; SUBCUTANEOUS
Status: DISCONTINUED | OUTPATIENT
Start: 2022-10-11 | End: 2022-10-18 | Stop reason: HOSPADM

## 2022-10-11 RX ORDER — ENOXAPARIN SODIUM 100 MG/ML
30 INJECTION SUBCUTANEOUS EVERY 24 HOURS
Status: DISCONTINUED | OUTPATIENT
Start: 2022-10-11 | End: 2022-10-18 | Stop reason: HOSPADM

## 2022-10-11 RX ORDER — ACETAMINOPHEN 325 MG/1
650 TABLET ORAL
Status: DISCONTINUED | OUTPATIENT
Start: 2022-10-11 | End: 2022-10-18 | Stop reason: HOSPADM

## 2022-10-11 RX ORDER — DEXTROSE MONOHYDRATE 100 MG/ML
0-250 INJECTION, SOLUTION INTRAVENOUS AS NEEDED
Status: DISCONTINUED | OUTPATIENT
Start: 2022-10-11 | End: 2022-10-18 | Stop reason: HOSPADM

## 2022-10-11 RX ORDER — SODIUM CHLORIDE 9 MG/ML
75 INJECTION, SOLUTION INTRAVENOUS CONTINUOUS
Status: DISCONTINUED | OUTPATIENT
Start: 2022-10-11 | End: 2022-10-11 | Stop reason: SDUPTHER

## 2022-10-11 RX ORDER — INSULIN LISPRO 100 [IU]/ML
INJECTION, SOLUTION INTRAVENOUS; SUBCUTANEOUS
Status: DISCONTINUED | OUTPATIENT
Start: 2022-10-11 | End: 2022-10-18 | Stop reason: HOSPADM

## 2022-10-11 RX ORDER — POLYETHYLENE GLYCOL 3350 17 G/17G
17 POWDER, FOR SOLUTION ORAL DAILY PRN
Status: DISCONTINUED | OUTPATIENT
Start: 2022-10-11 | End: 2022-10-18 | Stop reason: HOSPADM

## 2022-10-11 RX ORDER — MAGNESIUM SULFATE 100 %
4 CRYSTALS MISCELLANEOUS AS NEEDED
Status: DISCONTINUED | OUTPATIENT
Start: 2022-10-11 | End: 2022-10-18 | Stop reason: HOSPADM

## 2022-10-11 RX ORDER — SODIUM CHLORIDE 0.9 % (FLUSH) 0.9 %
5-40 SYRINGE (ML) INJECTION AS NEEDED
Status: DISCONTINUED | OUTPATIENT
Start: 2022-10-11 | End: 2022-10-18 | Stop reason: HOSPADM

## 2022-10-11 RX ORDER — SODIUM CHLORIDE 0.9 % (FLUSH) 0.9 %
5-40 SYRINGE (ML) INJECTION EVERY 8 HOURS
Status: DISCONTINUED | OUTPATIENT
Start: 2022-10-11 | End: 2022-10-18 | Stop reason: HOSPADM

## 2022-10-11 RX ORDER — MORPHINE SULFATE 4 MG/ML
INJECTION, SOLUTION INTRAMUSCULAR; INTRAVENOUS
Status: DISPENSED
Start: 2022-10-11 | End: 2022-10-12

## 2022-10-11 RX ORDER — MORPHINE SULFATE 4 MG/ML
4 INJECTION INTRAVENOUS
Status: COMPLETED | OUTPATIENT
Start: 2022-10-11 | End: 2022-10-11

## 2022-10-11 RX ORDER — ONDANSETRON 2 MG/ML
4 INJECTION INTRAMUSCULAR; INTRAVENOUS
Status: DISCONTINUED | OUTPATIENT
Start: 2022-10-11 | End: 2022-10-18 | Stop reason: HOSPADM

## 2022-10-11 RX ORDER — MORPHINE SULFATE 2 MG/ML
2-4 INJECTION, SOLUTION INTRAMUSCULAR; INTRAVENOUS
Status: DISCONTINUED | OUTPATIENT
Start: 2022-10-11 | End: 2022-10-18 | Stop reason: HOSPADM

## 2022-10-11 RX ORDER — SODIUM CHLORIDE 9 MG/ML
75 INJECTION, SOLUTION INTRAVENOUS CONTINUOUS
Status: DISPENSED | OUTPATIENT
Start: 2022-10-11 | End: 2022-10-12

## 2022-10-11 RX ADMIN — MORPHINE SULFATE 4 MG: 2 INJECTION, SOLUTION INTRAMUSCULAR; INTRAVENOUS at 20:28

## 2022-10-11 RX ADMIN — SODIUM CHLORIDE, PRESERVATIVE FREE 10 ML: 5 INJECTION INTRAVENOUS at 22:28

## 2022-10-11 RX ADMIN — SODIUM CHLORIDE, PRESERVATIVE FREE 10 ML: 5 INJECTION INTRAVENOUS at 15:47

## 2022-10-11 RX ADMIN — ENOXAPARIN SODIUM 30 MG: 100 INJECTION SUBCUTANEOUS at 22:08

## 2022-10-11 RX ADMIN — SODIUM CHLORIDE 75 ML/HR: 9 INJECTION, SOLUTION INTRAVENOUS at 21:30

## 2022-10-11 RX ADMIN — MORPHINE SULFATE 4 MG: 4 INJECTION INTRAVENOUS at 12:20

## 2022-10-11 NOTE — PROGRESS NOTES
MRI Screening form needs to be filled out and faxed to [phone] MRI can be scheduled. If unable to obtain information from pt, [employer] needs to be contacted.  If pt is claustro or will need pain meds, please have ordered in advance in order to facilitate exam.

## 2022-10-11 NOTE — CONSULTS
Infectious Disease Consultation Note        Reason: Right below-knee amputation stump osteomyelitis    Current abx Prior abx         Lines:       Assessment :  70-year-old man with past medical history significant for type 2 diabetes, hypertension, MRSA right foot infection status post right below-knee amputation March 2018 presented to [facility] on 10/11/2022 with increasing right BKA stump pain. Hospitalization August 2022 for infected right BKA stump  wound infection  Wound cx 8/26- MSSA, enterococcus, acinetobacter      Now with few day history of worsening right BKA stump pain, x-ray right BKA stump concerning for acute/chronic osteomyelitis    Patient presents with a highly complex clinical picture. Etiology of right BKA stump pain not entirely clear at this time. X-ray findings concerning for acute/chronic osteomyelitis. However patient does not have any erythema/warmth or purulent drainage from right below-knee amputation stump. Difficulty determining if right below-knee amputation stump pain is due to mechanical trauma from prosthesis, superficial ulceration versus indolent infection. Await MRI     Acute on chronic kidney disease-? Prerenal ? NSAIDs use     Recommendations:     Monitor off antibiotics  Follow-up results of MRI right BKA stump  Follow-up vascular surgery recommendations  Management of acute kidney injury per primary team        Thank you for consultation request. Above plan was discussed in details with patient,dr. Danna Rodriguez and ED PA. Please call me if any further questions or concerns. Will continue to participate in the care of this patient. HPI:    70-year-old man with past medical history significant for type 2 diabetes, hypertension, MRSA right foot infection status post right below-knee amputation March 2018 presented to [facility] on 10/11/2022 with increasing right BKA stump pain. Patient is known to me from prior inpatient consultation at [facility].   I last saw him in August 2021 at which time he presented with increasing right BKA stump pain. He had minimal purulent drainage from the right BKA stump at that time. He was treated with oral antibiotics. He was admitted to the [facility] January 2022 for left foot infection. He was sent home on prolonged doxycycline, fluconazole. Per records patient was supposed to follow-up with [facility] infectious disease. However it does not seem patient followed with them subsequently. Patient also had appointment with [facility] vascular surgery which she missed in February 2022. Patient states that his right BKA ulcer had healed completely after his last hospitalization at [facility]. About 4 to 5 days ago he started noticing increasing pain right BKA stump. About 2 days ago he started noticing drainage from the right BKA stump. He came to emergency room today due to persistent pain right BKA stump. X-ray right BKA stump revealed findings concerning for acute/chronic osteomyelitis. Vascular was consulted. I was consulted for further recommendations. Patient denies any fever or chills. He denies noticing any purulent drainage from the right BKA stump. Denies any recent trauma to the right BKA stump. He does state that the pain gets worse when he wears his prosthesis.       Past Medical History:   Diagnosis Date    Cellulitis     rt. foot    Diabetes ([address]     Hypercholesterolemia     Hypertension     Osteomyelitis ([address]     rt toe    Other ill-defined conditions(799.89)        Past Surgical History:   Procedure Laterality Date    HX BELOW KNEE AMPUTATION  03/2018    HX ORTHOPAEDIC      rt.toe       Patient's Medications   Start Taking    No medications on file   Continue Taking    BLOOD-GLUCOSE METER (RELION ALL-IN-ONE METER) MONITORING KIT    Check fasting sugars three times a day before breakfast, lunch & dinner    GLUCOSE BLOOD VI TEST STRIPS (BLOOD GLUCOSE TEST) STRIP    Check fasting sugars three times a day before breakfast, lunch & dinner for relion meter    LANCETS MISC    Check fasting sugars three times a day before breakfast, lunch & dinner for relion meter   These Medications have changed    No medications on file   Stop Taking    No medications on file       No current facility-administered medications for this encounter. Current Outpatient Medications   Medication Sig    glucose blood VI test strips (blood glucose test) strip Check fasting sugars three times a day before breakfast, lunch & dinner for relion meter    lancets misc Check fasting sugars three times a day before breakfast, lunch & dinner for relion meter    Blood-Glucose Meter (RELION ALL-IN-ONE METER) monitoring kit Check fasting sugars three times a day before breakfast, lunch & dinner       Allergies: Patient has no known allergies.     Family History   Problem Relation Age of Onset    Diabetes Mother     Diabetes Father     No Known Problems Sister     No Known Problems Brother     No Known Problems Sister      Social History     Socioeconomic History    Marital status: SINGLE     Spouse name: Not on file    Number of children: Not on file    Years of education: Not on file    Highest education level: Not on file   Occupational History    Not on file   Tobacco Use    Smoking status: Heavy Smoker     Packs/day: 0.50     Types: Cigarettes    Smokeless tobacco: Never    Tobacco comments:     pt. counseled to not smoke   Substance and Sexual Activity    Alcohol use: No    Drug use: Yes     Types: Marijuana     Comment: 9/5/17    Sexual activity: Not on file   Other Topics Concern    Not on file   Social History Narrative    Not on file     Social Determinants of Health     Financial Resource Strain: Not on file   Food Insecurity: Not on file   Transportation Needs: Not on file   Physical Activity: Not on file   Stress: Not on file   Social Connections: Not on file   Intimate Partner Violence: Not on file   Housing Stability: Not on file     Social History     Tobacco Use   Smoking Status Heavy Smoker    Packs/day: 0.50    Types: Cigarettes   Smokeless Tobacco Never   Tobacco Comments    pt. counseled to not smoke        Temp (24hrs), Av.5 °F (36.4 °C), Min:97.5 °F (36.4 °C), Max:97.5 °F (36.4 °C)    Visit Vitals  /72 (BP 1 Location: Left upper arm, BP Patient Position: At rest)   Pulse 63   Temp 97.5 °F (36.4 °C)   Resp 18   SpO2 100%       ROS: 12 point ROS obtained in details. Pertinent positives as mentioned in HPI,   otherwise negative    Physical Exam:    Vitals and nursing note reviewed. Constitutional:       Appearance: He is well-developed and normal weight. He is not ill-appearing, toxic-appearing or diaphoretic. HENT:      Head: Normocephalic and atraumatic. Neck:      Supple  Cardiovascular:      Rate and Rhythm: Normal rate and regular rhythm. Heart sounds: Normal heart sounds. No friction rub. No gallop. Pulmonary:      Effort: Pulmonary effort is normal. No respiratory distress. Abdominal:      General: There is no distension. Palpations: Abdomen is soft. There is no mass. Tenderness: There is no abdominal tenderness. There is no guarding or rebound. Musculoskeletal:          Right BKA stump with dry skin. Linear ulceration perpendicular to each other with serous drainage noted on the stump. No bright red erythema/warmth/induration/significant tenderness overlying the right BKA stump. No edema left leg  Skin:     General: Skin is warm and dry. Coloration: Skin is not pale. Neurological:      Mental Status: He is alert and oriented to person, place, and time. Psychiatric:         Speech: Speech normal.         Behavior: Behavior normal.         Thought Content:  Thought content normal.         Judgment: Judgment normal.     Labs: Results:   Chemistry Recent Labs     10/11/22  0910   *      K 3.7      CO2 21   BUN 55*   CREA 4.24*   CA 8.7   AGAP 10   BUCR 13      TP 6.9   ALB 2.4*   GLOB 4.5*   AGRAT 0.5*      CBC w/Diff Recent Labs     10/11/22  0910   WBC 11.1   RBC 3.63*   HGB 10.7*   HCT 32.8*      GRANS 74*   LYMPH 19*   EOS 1      Microbiology No results for input(s): CULT in the last 72 hours. RADIOLOGY:    All available imaging studies/reports in [facility] for this admission were reviewed      Disclaimer: Sections of this note are dictated utilizing voice recognition software, which may have resulted in some phonetic based errors in grammar and contents. Even though attempts were made to correct all the mistakes, some may have been missed, and remained in the body of the document. If questions arise, please contact our department.     Dr. Nhan Liu, Infectious Disease Specialist  [phone]  October 11, 2022  1:37 PM

## 2022-10-11 NOTE — H&P
History & Physical    Patient: Sherman Holt MRN: [id number]  CSN: [id number]    YOB: 1980  Age: 43 y.o. Sex: male      DOA: 10/11/2022    Chief Complaint   Patient presents with    Wound Check         Dragon medical dictation software was used for portions of this report. Unintended errors may occur. If you have any questions regarding the note or its content please set up a time to discuss by calling the nurse on the floor. Assessment/Plan     Right lower extremity pain at the stump: No obvious infection noted. X-ray shows acute versus chronic osteomyelitis. Infectious disease and podiatry were consulted. Holding off on IV antibiotics at this time. MRI of the right BKA stump has been ordered. Vascular surgery consultation requested. Acute kidney injury: We will place on IV fluids and recheck labs in AM.    Diabetes mellitus type 2: We will check blood sugars regularly and place correct insulin coverage. Check hemoglobin A1c. DVT prophylaxis: Lovenox subcutaneous. Active Problems:    Right leg pain (10/11/2022)         HPI:     Sherman Holt is a 43 y.o. male who has a known history of for diabetes mellitus type 2, osteomyelitis of the right lower extremity, hyperlipidemia, hypertension had a history of right below-knee amputation that was performed about 3 to 4 years ago and he mentions he wears a prosthetic on a daily basis but lately has noticed significant pain at the amputation and he denies any trauma to the area. No complaints of any fevers chills no wound or any drainage. Patient has been putting some topical ointment to relieve some of the symptoms. And he does mention that the symptoms are worse with walking. Patient mentions that he has not followed up with anyone for his amputation for a while.   In the emergency room patient was suspected of having osteomyelitis and podiatry was consulted and also infectious disease was consulted for antibiotic usage.  ID at this time wanted to hold off on antibiotic and will wait for the MRI to determine if there is any obvious infection and to the evaluation of podiatry. Past Medical History:   Diagnosis Date    Cellulitis     rt. foot    Diabetes ([address]     Hypercholesterolemia     Hypertension     Osteomyelitis ([facility])     rt toe    Other ill-defined conditions(799.89)        Past Surgical History:   Procedure Laterality Date    HX BELOW KNEE AMPUTATION  03/2018    HX ORTHOPAEDIC      rt.toe       Family History   Problem Relation Age of Onset    Diabetes Mother     Diabetes Father     No Known Problems Sister     No Known Problems Brother     No Known Problems Sister        Social History     Socioeconomic History    Marital status: SINGLE   Tobacco Use    Smoking status: Heavy Smoker     Packs/day: 0.50     Types: Cigarettes    Smokeless tobacco: Never    Tobacco comments:     pt. counseled to not smoke   Substance and Sexual Activity    Alcohol use: No    Drug use: Yes     Types: Marijuana     Comment: 9/5/17       Prior to Admission medications    Medication Sig Start Date End Date Taking? Authorizing Provider   glucose blood VI test strips (blood glucose test) strip Check fasting sugars three times a day before breakfast, lunch & dinner for relion meter 8/29/22   Lacy Damon MD   lancets misc Check fasting sugars three times a day before breakfast, lunch & dinner for relion meter 8/29/22   Lacy Damon MD   Blood-Glucose Meter (RELION ALL-IN-ONE METER) monitoring kit Check fasting sugars three times a day before breakfast, lunch & dinner 3/19/18   Mike Daniels MD       No Known Allergies      Review of Systems  GENERAL: No fevers or chills. HEENT: No change in vision, no earache, tinnitus, sore throat or sinus congestion. NECK: No pain or stiffness. CARDIOVASCULAR: No chest pain or pressure. No palpitations. PULMONARY: No shortness of breath, cough or wheeze. GASTROINTESTINAL: No abdominal pain, nausea, vomiting or diarrhea, melena or bright red blood per rectum. GENITOURINARY: No urinary frequency, urgency, hesitancy or dysuria. MUSCULOSKELETAL: No joint or muscle pain, no back pain, no recent trauma. DERMATOLOGIC: No rash, no itching, no lesions. ENDOCRINE: No polyuria, polydipsia, no heat or cold intolerance. No recent change in weight. HEMATOLOGICAL: No anemia or easy bruising or bleeding. NEUROLOGIC: No headache, seizures, numbness, tingling or weakness. PSYCHIATRIC: No depression, anxiety, mood disorder, no loss of interest in normal activity or change in sleep pattern. Physical Exam:     Physical Exam:  Visit Vitals  /72 (BP 1 Location: Left upper arm, BP Patient Position: At rest)   Pulse 63   Temp 97.5 °F (36.4 °C)   Resp 18   Ht 5' 2\" (1.575 m)   Wt 79.4 kg (175 lb)   SpO2 100%   BMI 32.01 kg/m²      O2 Device: None (Room air)    Temp (24hrs), Av.5 °F (36.4 °C), Min:97.5 °F (36.4 °C), Max:97.5 °F (36.4 °C)         General:  Alert, cooperative, no distress, appears stated age. Head:  Normocephalic, without obvious abnormality, atraumatic. Eyes:  Conjunctivae/corneas clear. PERRL, EOMs intact. Nose: Nares normal. No drainage or sinus tenderness. Throat: Lips, mucosa, and tongue normal. Teeth and gums normal.   Neck: Supple, symmetrical, trachea midline, no adenopathy, thyroid: no enlargement/tenderness/nodules, no carotid bruit and no JVD. Back:   ROM normal. No CVA tenderness. Lungs:   Clear to auscultation bilaterally. Chest wall:  No tenderness or deformity. Heart:  Regular rate and rhythm, S1, S2 normal, no murmur, click, rub or gallop. Abdomen: Soft, non-tender. Bowel sounds normal. No masses,  No organomegaly. Extremities: Extremities normal, atraumatic, no cyanosis or edema. Right BKA   Pulses: 2+ and symmetric all extremities.    Skin: Skin color, texture, turgor normal.  2 cm circular wound noted on the stump, no erythema or warmth or drainage or fluctuance. Minimal tenderness to palpation. Neurologic: CNII-XII intact. No focal motor or sensory deficit. Labs Reviewed:    Recent Results (from the past 24 hour(s))   CBC WITH AUTOMATED DIFF    Collection Time: 10/11/22  9:10 AM   Result Value Ref Range    WBC 11.1 4.6 - 13.2 K/uL    RBC 3.63 (L) 4.35 - 5.65 M/uL    HGB 10.7 (L) 13.0 - 16.0 g/dL    HCT 32.8 (L) 36.0 - 48.0 %    MCV 90.4 78.0 - 100.0 FL    MCH 29.5 24.0 - 34.0 PG    MCHC 32.6 31.0 - 37.0 g/dL    RDW 14.0 11.6 - 14.5 %    PLATELET 286 358 - 129 K/uL    MPV 11.3 9.2 - 11.8 FL    NRBC 0.0 0  WBC    ABSOLUTE NRBC 0.00 0.00 - 0.01 K/uL    NEUTROPHILS 74 (H) 40 - 73 %    LYMPHOCYTES 19 (L) 21 - 52 %    MONOCYTES 5 3 - 10 %    EOSINOPHILS 1 0 - 5 %    BASOPHILS 1 0 - 2 %    IMMATURE GRANULOCYTES 0 0.0 - 0.5 %    ABS. NEUTROPHILS 8.3 (H) 1.8 - 8.0 K/UL    ABS. LYMPHOCYTES 2.1 0.9 - 3.6 K/UL    ABS. MONOCYTES 0.6 0.05 - 1.2 K/UL    ABS. EOSINOPHILS 0.1 0.0 - 0.4 K/UL    ABS. BASOPHILS 0.1 0.0 - 0.1 K/UL    ABS. IMM. GRANS. 0.0 0.00 - 0.04 K/UL    DF AUTOMATED     METABOLIC PANEL, COMPREHENSIVE    Collection Time: 10/11/22  9:10 AM   Result Value Ref Range    Sodium 141 136 - 145 mmol/L    Potassium 3.7 3.5 - 5.5 mmol/L    Chloride 110 100 - 111 mmol/L    CO2 21 21 - 32 mmol/L    Anion gap 10 3.0 - 18 mmol/L    Glucose 118 (H) 74 - 99 mg/dL    BUN 55 (H) 7.0 - 18 MG/DL    Creatinine 4.24 (H) 0.6 - 1.3 MG/DL    BUN/Creatinine ratio 13 12 - 20      eGFR 17 (L) >60 ml/min/1.73m2    Calcium 8.7 8.5 - 10.1 MG/DL    Bilirubin, total 0.4 0.2 - 1.0 MG/DL    ALT (SGPT) 19 16 - 61 U/L    AST (SGOT) 20 10 - 38 U/L    Alk.  phosphatase 104 45 - 117 U/L    Protein, total 6.9 6.4 - 8.2 g/dL    Albumin 2.4 (L) 3.4 - 5.0 g/dL    Globulin 4.5 (H) 2.0 - 4.0 g/dL    A-G Ratio 0.5 (L) 0.8 - 1.7     POC LACTIC ACID    Collection Time: 10/11/22 12:11 PM   Result Value Ref Range    Lactic Acid (POC) 0.60 0.40 - 2.00 mmol/L       Procedures/imaging: see electronic medical records for all procedures/Xrays and details which were not copied into this note but were reviewed prior to creation of Juan Francisco Rueda MD  October 11, 2022  [phone].

## 2022-10-11 NOTE — ED PROVIDER NOTES
EMERGENCY DEPARTMENT HISTORY AND PHYSICAL EXAM        Date: 10/11/2022  Patient Name: Theodora Weathesr    History of Presenting Illness     Chief Complaint   Patient presents with    Wound Check       History Provided By: Patient   used    HPI: Theodora Weathers, 43 y.o. male PMHx significant for DM, osteomyelitis, hypercholesteremia, htn presents ambulatory to the ED. Patient reports right below the knee amputation that was performed 3 to 4 years ago. Patient wears prosthetic leg daily. Patient reports increased pain to amputation. Denies trauma or injury. Denies fever, chills, drainage. Patient has been applying Neosporin without relief of symptoms. Patient does not follow-up with anyone for his amputation. Symptoms are worse with walking because prosthetic leg rubs against the wound. Pt reports amputation performed 3-4 years ago. There are no other complaints, changes, or physical findings at this time. PCP: Brooke Roman MD    No current facility-administered medications on file prior to encounter.      Current Outpatient Medications on File Prior to Encounter   Medication Sig Dispense Refill    glucose blood VI test strips (blood glucose test) strip Check fasting sugars three times a day before breakfast, lunch & dinner for relion meter 60 Strip 0    lancets misc Check fasting sugars three times a day before breakfast, lunch & dinner for relion meter 100 Each 0    Blood-Glucose Meter (RELION ALL-IN-ONE METER) monitoring kit Check fasting sugars three times a day before breakfast, lunch & dinner 1 Kit 0       Past History     Past Medical History:  Past Medical History:   Diagnosis Date    Cellulitis     rt. foot    Diabetes ([address]     Hypercholesterolemia     Hypertension     Osteomyelitis (HCC)     rt toe    Other ill-defined conditions(799.89)        Past Surgical History:  Past Surgical History:   Procedure Laterality Date    HX BELOW KNEE AMPUTATION  03/2018    HX ORTHOPAEDIC rt.toe       Family History:  Family History   Problem Relation Age of Onset    Diabetes Mother     Diabetes Father     No Known Problems Sister     No Known Problems Brother     No Known Problems Sister        Social History:  Social History     Tobacco Use    Smoking status: Heavy Smoker     Packs/day: 0.50     Types: Cigarettes    Smokeless tobacco: Never    Tobacco comments:     pt. counseled to not smoke   Substance Use Topics    Alcohol use: No    Drug use: Yes     Types: Marijuana     Comment: 9/5/17       Allergies:  No Known Allergies      Review of Systems   Review of Systems   Constitutional:  Negative for chills and fever. HENT:  Negative for facial swelling. Eyes:  Negative for photophobia and visual disturbance. Respiratory:  Negative for shortness of breath. Cardiovascular:  Negative for chest pain. Gastrointestinal:  Negative for abdominal pain, nausea and vomiting. Genitourinary:  Negative for flank pain. Skin:  Positive for wound. Negative for color change, pallor and rash. Neurological:  Negative for dizziness, weakness, light-headedness and headaches. All other systems reviewed and are negative. Physical Exam   Physical Exam  Vitals and nursing note reviewed. Constitutional:       General: He is not in acute distress. Appearance: He is well-developed. Comments: Pt in NAD   HENT:      Head: Normocephalic and atraumatic. Eyes:      Conjunctiva/sclera: Conjunctivae normal.   Cardiovascular:      Rate and Rhythm: Normal rate and regular rhythm. Heart sounds: Normal heart sounds. Pulmonary:      Effort: Pulmonary effort is normal. No respiratory distress. Breath sounds: Normal breath sounds. Abdominal:      General: Bowel sounds are normal. There is no distension. Palpations: Abdomen is soft. Musculoskeletal:         General: Normal range of motion. Comments: Right leg: Below the knee amputation. 2 cm superficial linear wound to stump.   No surrounding erythema, warmth, drainage, induration, fluctuance. Mild TTP. Skin:     General: Skin is warm. Findings: No rash. Neurological:      Mental Status: He is alert and oriented to person, place, and time. Psychiatric:         Behavior: Behavior normal.       Diagnostic Study Results     Labs -     Recent Results (from the past 12 hour(s))   CBC WITH AUTOMATED DIFF    Collection Time: 10/11/22  9:10 AM   Result Value Ref Range    WBC 11.1 4.6 - 13.2 K/uL    RBC 3.63 (L) 4.35 - 5.65 M/uL    HGB 10.7 (L) 13.0 - 16.0 g/dL    HCT 32.8 (L) 36.0 - 48.0 %    MCV 90.4 78.0 - 100.0 FL    MCH 29.5 24.0 - 34.0 PG    MCHC 32.6 31.0 - 37.0 g/dL    RDW 14.0 11.6 - 14.5 %    PLATELET 254 128 - 875 K/uL    MPV 11.3 9.2 - 11.8 FL    NRBC 0.0 0  WBC    ABSOLUTE NRBC 0.00 0.00 - 0.01 K/uL    NEUTROPHILS 74 (H) 40 - 73 %    LYMPHOCYTES 19 (L) 21 - 52 %    MONOCYTES 5 3 - 10 %    EOSINOPHILS 1 0 - 5 %    BASOPHILS 1 0 - 2 %    IMMATURE GRANULOCYTES 0 0.0 - 0.5 %    ABS. NEUTROPHILS 8.3 (H) 1.8 - 8.0 K/UL    ABS. LYMPHOCYTES 2.1 0.9 - 3.6 K/UL    ABS. MONOCYTES 0.6 0.05 - 1.2 K/UL    ABS. EOSINOPHILS 0.1 0.0 - 0.4 K/UL    ABS. BASOPHILS 0.1 0.0 - 0.1 K/UL    ABS. IMM. GRANS. 0.0 0.00 - 0.04 K/UL    DF AUTOMATED     METABOLIC PANEL, COMPREHENSIVE    Collection Time: 10/11/22  9:10 AM   Result Value Ref Range    Sodium 141 136 - 145 mmol/L    Potassium 3.7 3.5 - 5.5 mmol/L    Chloride 110 100 - 111 mmol/L    CO2 21 21 - 32 mmol/L    Anion gap 10 3.0 - 18 mmol/L    Glucose 118 (H) 74 - 99 mg/dL    BUN 55 (H) 7.0 - 18 MG/DL    Creatinine 4.24 (H) 0.6 - 1.3 MG/DL    BUN/Creatinine ratio 13 12 - 20      eGFR 17 (L) >60 ml/min/1.73m2    Calcium 8.7 8.5 - 10.1 MG/DL    Bilirubin, total 0.4 0.2 - 1.0 MG/DL    ALT (SGPT) 19 16 - 61 U/L    AST (SGOT) 20 10 - 38 U/L    Alk.  phosphatase 104 45 - 117 U/L    Protein, total 6.9 6.4 - 8.2 g/dL    Albumin 2.4 (L) 3.4 - 5.0 g/dL    Globulin 4.5 (H) 2.0 - 4.0 g/dL    A-G Ratio 0.5 (L) 0.8 - 1.7     HEMOGLOBIN A1C WITH EAG    Collection Time: 10/11/22  9:10 AM   Result Value Ref Range    Hemoglobin A1c 6.1 (H) 4.2 - 5.6 %    Est. average glucose 128 mg/dL   POC LACTIC ACID    Collection Time: 10/11/22 12:11 PM   Result Value Ref Range    Lactic Acid (POC) 0.60 0.40 - 2.00 mmol/L   GLUCOSE, POC    Collection Time: 10/11/22  5:08 PM   Result Value Ref Range    Glucose (POC) 73 70 - 110 mg/dL   GLUCOSE, POC    Collection Time: 10/11/22  6:41 PM   Result Value Ref Range    Glucose (POC) 71 70 - 110 mg/dL       Radiologic Studies -   XR TIB/FIB RT   Final Result      Acute on chronic appearing osteomyelitis of the proximal tibia at the level of   amputation. MRI TIB/FIB RT W  WO CONT    (Results Pending)     CT Results  (Last 48 hours)      None          CXR Results  (Last 48 hours)      None            Medical Decision Making   I am the first provider for this patient. I reviewed the vital signs, available nursing notes, past medical history, past surgical history, family history and social history. Vital Signs-Reviewed the patient's vital signs. Patient Vitals for the past 12 hrs:   Temp Pulse Resp BP SpO2   10/11/22 1747 98.5 °F (36.9 °C) (!) 56 18 126/78 99 %   10/11/22 1652 98 °F (36.7 °C) (!) 55 18 101/64 100 %   10/11/22 0859 97.5 °F (36.4 °C) 63 18 122/72 100 %       Records Reviewed: Nursing Notes, Old Medical Records, Previous Radiology Studies, and Previous Laboratory Studies    Provider Notes (Medical Decision Making):   DDx: Superficial wound, Osteo, Secondary infection    44 yo M who presents with pain to BKA. On exam small superficial wound overlying erythema or warmth. Patient afebrile, not tachycardic, leukocytosis. X-ray shows acute on chronic osteomyelitis. Spoke with vascular surgery who states they will follow while inpatient and evaluate for possible surgery if necessary. Spoke with infectious disease who does not recommend antibiotics at this time.   Patient admitted for further management. ED Course:   Initial assessment performed. The patients presenting problems have been discussed, and they are in agreement with the care plan formulated and outlined with them. I have encouraged them to ask questions as they arise throughout their visit. 1205: Spoke with Dr. Andreas Ashley, consult ortho. Discussed patient's previous BKA with concern for acute on chronic osteomyelitis. He states Ortho does not perform amputations at [facility] and he recommends consult Dr. Cassandra Mehta. 1210: Dr. Cassandra Mehta, consult vascular surgery. Discussed previous BKA. He recommends admission, consult ID with MRI. He states he will follow while inpatient. 1227: Spoke with Dr Cat Goldstein, infectious disease. Discussed concern for acute on chronic osteomyelitis. Discussed that wound is well-appearing. Patient afebrile, not tachycardic with no white count. She reports holding antibiotic at this time and she will come evaluate patient in ED. Disposition:  Admitted    PLAN:  1. Current Discharge Medication List        2. Follow-up Information    None       Return to ED if worse     Diagnosis     Clinical Impression:   1. Acute on chronic osteomyelitis [facility])        Attestations:    Maire Bamberger, PA    Please note that this dictation was completed with MUJIN, the computer voice recognition software. Quite often unanticipated grammatical, syntax, homophones, and other interpretive errors are inadvertently transcribed by the computer software. Please disregard these errors. Please excuse any errors that have escaped final proofreading. Thank you.

## 2022-10-11 NOTE — ED NOTES
TRANSFER - OUT REPORT:    Verbal report given to [address] (name) on Kiki Peralta  being transferred to [facility] (unit) for routine progression of care       Report consisted of patients Situation, Background, Assessment and   Recommendations(SBAR). Information from the following report(s) SBAR, ED Summary, MAR, Recent Results, and Cardiac Rhythm SB/SR  was reviewed with the receiving nurse. Lines:   Peripheral IV 10/11/22 Right Arm (Active)   Site Assessment Clean, dry, & intact 10/11/22 0932   Phlebitis Assessment 0 10/11/22 0932   Infiltration Assessment 0 10/11/22 0932   Dressing Status Clean, dry, & intact 10/11/22 0932   Hub Color/Line Status Pink 10/11/22 0932        Opportunity for questions and clarification was provided.

## 2022-10-12 LAB
ANION GAP SERPL CALC-SCNC: 7 MMOL/L (ref 3–18)
BUN SERPL-MCNC: 55 MG/DL (ref 7–18)
BUN/CREAT SERPL: 14 (ref 12–20)
CALCIUM SERPL-MCNC: 8.1 MG/DL (ref 8.5–10.1)
CHLORIDE SERPL-SCNC: 114 MMOL/L (ref 100–111)
CO2 SERPL-SCNC: 22 MMOL/L (ref 21–32)
CREAT SERPL-MCNC: 3.81 MG/DL (ref 0.6–1.3)
ERYTHROCYTE [DISTWIDTH] IN BLOOD BY AUTOMATED COUNT: 14.2 % (ref 11.6–14.5)
GLUCOSE BLD STRIP.AUTO-MCNC: 121 MG/DL (ref 70–110)
GLUCOSE BLD STRIP.AUTO-MCNC: 134 MG/DL (ref 70–110)
GLUCOSE BLD STRIP.AUTO-MCNC: 94 MG/DL (ref 70–110)
GLUCOSE SERPL-MCNC: 74 MG/DL (ref 74–99)
HCT VFR BLD AUTO: 31.1 % (ref 36–48)
HGB BLD-MCNC: 9.9 G/DL (ref 13–16)
MCH RBC QN AUTO: 28.6 PG (ref 24–34)
MCHC RBC AUTO-ENTMCNC: 31.8 G/DL (ref 31–37)
MCV RBC AUTO: 89.9 FL (ref 78–100)
NRBC # BLD: 0 K/UL (ref 0–0.01)
NRBC BLD-RTO: 0 PER 100 WBC
PLATELET # BLD AUTO: 237 K/UL (ref 135–420)
PMV BLD AUTO: 11.6 FL (ref 9.2–11.8)
POTASSIUM SERPL-SCNC: 4.1 MMOL/L (ref 3.5–5.5)
RBC # BLD AUTO: 3.46 M/UL (ref 4.35–5.65)
SODIUM SERPL-SCNC: 143 MMOL/L (ref 136–145)
WBC # BLD AUTO: 5.4 K/UL (ref 4.6–13.2)

## 2022-10-12 PROCEDURE — 87205 SMEAR GRAM STAIN: CPT

## 2022-10-12 PROCEDURE — 65270000029 HC RM PRIVATE

## 2022-10-12 PROCEDURE — 74011000258 HC RX REV CODE- 258: Performed by: INTERNAL MEDICINE

## 2022-10-12 PROCEDURE — 82962 GLUCOSE BLOOD TEST: CPT

## 2022-10-12 PROCEDURE — 87077 CULTURE AEROBIC IDENTIFY: CPT

## 2022-10-12 PROCEDURE — 74011250636 HC RX REV CODE- 250/636: Performed by: INTERNAL MEDICINE

## 2022-10-12 PROCEDURE — 74011250636 HC RX REV CODE- 250/636: Performed by: HOSPITALIST

## 2022-10-12 PROCEDURE — 87186 SC STD MICRODIL/AGAR DIL: CPT

## 2022-10-12 PROCEDURE — 74011000250 HC RX REV CODE- 250: Performed by: HOSPITALIST

## 2022-10-12 PROCEDURE — 85027 COMPLETE CBC AUTOMATED: CPT

## 2022-10-12 PROCEDURE — 36415 COLL VENOUS BLD VENIPUNCTURE: CPT

## 2022-10-12 PROCEDURE — 99232 SBSQ HOSP IP/OBS MODERATE 35: CPT | Performed by: HOSPITALIST

## 2022-10-12 PROCEDURE — 80048 BASIC METABOLIC PNL TOTAL CA: CPT

## 2022-10-12 RX ORDER — VANCOMYCIN/0.9 % SOD CHLORIDE 1.5G/250ML
1500 PLASTIC BAG, INJECTION (ML) INTRAVENOUS ONCE
Status: COMPLETED | OUTPATIENT
Start: 2022-10-12 | End: 2022-10-12

## 2022-10-12 RX ADMIN — VANCOMYCIN HYDROCHLORIDE 1500 MG: 10 INJECTION, POWDER, LYOPHILIZED, FOR SOLUTION INTRAVENOUS at 17:06

## 2022-10-12 RX ADMIN — MORPHINE SULFATE 4 MG: 2 INJECTION, SOLUTION INTRAMUSCULAR; INTRAVENOUS at 20:54

## 2022-10-12 RX ADMIN — SODIUM CHLORIDE, PRESERVATIVE FREE 10 ML: 5 INJECTION INTRAVENOUS at 16:32

## 2022-10-12 RX ADMIN — PIPERACILLIN AND TAZOBACTAM 3.38 G: 3; .375 INJECTION, POWDER, FOR SOLUTION INTRAVENOUS at 19:25

## 2022-10-12 RX ADMIN — MORPHINE SULFATE 2 MG: 2 INJECTION, SOLUTION INTRAMUSCULAR; INTRAVENOUS at 11:07

## 2022-10-12 RX ADMIN — ENOXAPARIN SODIUM 30 MG: 100 INJECTION SUBCUTANEOUS at 20:57

## 2022-10-12 RX ADMIN — PIPERACILLIN AND TAZOBACTAM 4.5 G: 4; .5 INJECTION, POWDER, FOR SOLUTION INTRAVENOUS at 12:00

## 2022-10-12 NOTE — DIABETES MGMT
Diabetes/ Glycemic Control Plan of Care  Recommendations:   Patient with history of T2DM, currently controlled. A1C 6.1%. Blood glucose currently controlled. Noted corrective insulin coverage ordered for patient as needed  Will continue inpatient monitoring. Assessment:   DX:   1. Acute on chronic osteomyelitis (HCC)           Fasting/ Morning blood glucose:   Lab Results   Component Value Date/Time    Glucose 74 10/12/2022 03:46 AM    Glucose (POC) 121 (H) 10/12/2022 11:10 AM    Glucose,  (H) 03/05/2018 03:10 PM     IV Fluids containing dextrose: none  Steroids:  none     Blood glucose values:        Latest Reference Range & Units 10/11/22 17:08 10/11/22 18:41 10/11/22 18:58 10/11/22 22:01 10/12/22 11:10   GLUCOSE,FAST - POC 70 - 110 mg/dL 73 71 117 (H) 131 (H) 121 (H)   (H): Data is abnormally high  Within target range (70-180mg/dL):  yes   Current insulin orders:   Lispro corrective insulin coverage   Total Daily Dose previous 24 hours = none   Current A1c:   Lab Results   Component Value Date/Time    Hemoglobin A1c 6.1 (H) 10/11/2022 09:10 AM      Equivalent to an average Blood Glucose of 129 mg/dl for 2-3 months prior to admission  Adequate glycemic control PTA:   Nutrition/Diet:   Active Orders   Diet    ADULT DIET Regular      Meal Intake:  Patient Vitals for the past 168 hrs:   % Diet Eaten   10/12/22 0839 51 - 75%     Supplement Intake:  No data found. Home diabetes medications:   Key Antihyperglycemic Medications       Patient is on no antihyperglycemic meds. Plan/Goals:   Blood glucose will be within target of 70 - 180 mg/dl within 72 hours  Reinforce dietary and medication compliance at home.        Education:  [] Refer to Diabetes Education Record                       [x] Education not indicated at this time     Marquita Cervantes RN [facility]  Ext [phone]

## 2022-10-12 NOTE — PROGRESS NOTES
[address] [facility] Pharmacokinetic Monitoring Service - Vancomycin    Consulting Provider: Huma Ozuna   Indication: Bone and Joint Infection  Target Concentration: Goal AUC/ROGELIO 400-600 mg*hr/L  Day of Therapy: 1  Additional Antimicrobials: Piperacillin/Tazobactam    Pertinent Laboratory Values:   Temp: 97.6 °F (36.4 °C)  Weight: 79.4 kg (175 lb)  Recent Labs     10/12/22  0346 10/11/22  0910   CREA 3.81* 4.24*   BUN 55* 55*   WBC 5.4 11.1       Estimated Creatinine Clearance: 23 mL/min (A) (based on SCr of 3.81 mg/dL (H)). Pertinent Cultures:  Culture Date Source Results   10/12 blood NGTD   MRSA Nasal Swab: N/A.  Non-respiratory infection    Assessment:  Date/Time Current Dose Concentration Timing of Concentration (h) AUC   10/12 1500 mg - - -   Note: Serum concentrations collected for AUC dosing may appear elevated if collected in close proximity to the dose administered, this is not necessarily an indication of toxicity    Plan:  Loading dose of 1500 mg x 1 dose  Will start 1000 mg every 36 hours starting on 10/14 AM  Renal labs as indicated   Vancomycin concentration ordered for AM labs tomorrow  Pharmacy will continue to monitor patient and adjust therapy as indicated    Thank you for the consult,  Prabhjot Jeff  10/12/2022

## 2022-10-12 NOTE — PROGRESS NOTES
Called Gala back. SHe is Amelia's primary care provider. Saw Amelia yesterday. She states they rechecked BMP and sodium has continued to be low. Amelia's diarrhea has mostly resolved, she is feeling better physically, but reporting depressive symptoms and feeling depressed in regards to health troubles and many appointments.   CORE team will continue to reach out to Amelia and attempt to get her scheduled for follow up appointment. WIll also notify providers regarding lab results.    Wound culture obtained via I/D MD qureshi, label placed on specimen, request made for transport to lab. Specimen placed in lab  location.

## 2022-10-12 NOTE — PROGRESS NOTES
Infectious Disease progress Note        Reason: Right below-knee amputation stump osteomyelitis    Current abx Prior abx         Lines:       Assessment :  72-year-old man with past medical history significant for type 2 diabetes, hypertension, MRSA right foot infection status post right below-knee amputation March 2018 presented to [facility] on 10/11/2022 with increasing right BKA stump pain. Hospitalization August 2022 for infected right BKA stump  wound infection  Wound cx 8/26- MSSA, enterococcus, acinetobacter      Now with few day history of worsening right BKA stump pain, x-ray right BKA stump concerning for acute/chronic osteomyelitis    Patient presents with a highly complex clinical picture. Presentation consistent with  chronic osteomyelitis right below-knee amputation stump   x-ray findings concerning for acute/chronic osteomyelitis. purulent drainage from right below-knee amputation stump noted on today's exam      MRI findings reviewed. D/w dr. Hawk Condon. Findings suggestive of right BKA stump osteomyelitis- likely chronic. Ideally patient will require surgical intervention/stump revision for cure. This was discussed with the patient by Dr. Nick Robertson and myself. Patient does not wish to have higher level of amputation at this time. Acute on chronic kidney disease-? Prerenal ? NSAIDs use     Recommendations:     Obtain wound cultures right BKA stump drainage-obtain specimen and gave it to RN  Start Zosyn, vancomycin after wound cultures obtained  Follow-up vascular surgery recommendations  Management of acute kidney injury per primary team  Will give IV antibiotics for few days till improvement in patient's symptoms. Will subsequently need prolonged oral antibiotics for 8 to 12 weeks. Risk of failure of prolonged antibiotics and need for surgical intervention/higher level of amputation discussed with patient. Patient verbalizes understanding.   He had multiple questions about co-pay of outpatient antibiotics/refills   I discussed all this with him in details with the help of online       Above plan was discussed in details with patient,dr. Ruba Falcon and dr. Jamar Eldridge. Please call me if any further questions or concerns. Will continue to participate in the care of this patient.   HPI:    Continues to have pain in right BKA stump  Past Medical History:   Diagnosis Date    Cellulitis     rt. foot    Diabetes ([address]     Hypercholesterolemia     Hypertension     Osteomyelitis ([address]     rt toe    Other ill-defined conditions(799.89)        Past Surgical History:   Procedure Laterality Date    HX BELOW KNEE AMPUTATION  03/2018    HX ORTHOPAEDIC      rt.toe       Current Discharge Medication List        CONTINUE these medications which have NOT CHANGED    Details   glucose blood VI test strips (blood glucose test) strip Check fasting sugars three times a day before breakfast, lunch & dinner for relion meter  Qty: 60 Strip, Refills: 0    Associated Diagnoses: Uncontrolled type 2 diabetes mellitus without complication, with long-term current use of insulin      lancets misc Check fasting sugars three times a day before breakfast, lunch & dinner for relion meter  Qty: 100 Each, Refills: 0    Associated Diagnoses: Uncontrolled type 2 diabetes mellitus without complication, with long-term current use of insulin      Blood-Glucose Meter (RELION ALL-IN-ONE METER) monitoring kit Check fasting sugars three times a day before breakfast, lunch & dinner  Qty: 1 Kit, Refills: 0    Associated Diagnoses: Uncontrolled type 2 diabetes mellitus without complication, with long-term current use of insulin             Current Facility-Administered Medications   Medication Dose Route Frequency    piperacillin-tazobactam (ZOSYN) 3.375 g in 0.9% sodium chloride (MBP/ADV) 100 mL MBP  3.375 g IntraVENous Q8H    sodium chloride (NS) flush 5-40 mL  5-40 mL IntraVENous Q8H    sodium chloride (NS) flush 5-40 mL  5-40 mL IntraVENous PRN    acetaminophen (TYLENOL) tablet 650 mg  650 mg Oral Q6H PRN    Or    acetaminophen (TYLENOL) suppository 650 mg  650 mg Rectal Q6H PRN    polyethylene glycol (MIRALAX) packet 17 g  17 g Oral DAILY PRN    ondansetron (ZOFRAN) injection 4 mg  4 mg IntraVENous Q6H PRN    insulin lispro (HUMALOG) injection   SubCUTAneous AC&HS    glucose chewable tablet 16 g  4 Tablet Oral PRN    glucagon (GLUCAGEN) injection 1 mg  1 mg IntraMUSCular PRN    dextrose 10% infusion 0-250 mL  0-250 mL IntraVENous PRN    0.9% sodium chloride infusion  75 mL/hr IntraVENous CONTINUOUS    enoxaparin (LOVENOX) injection 30 mg  30 mg SubCUTAneous Q24H    morphine injection 2-4 mg  2-4 mg IntraVENous Q3H PRN    naloxone (NARCAN) injection 0.4 mg  0.4 mg IntraVENous EVERY 2 MINUTES AS NEEDED       Allergies: Patient has no known allergies.     Family History   Problem Relation Age of Onset    Diabetes Mother     Diabetes Father     No Known Problems Sister     No Known Problems Brother     No Known Problems Sister      Social History     Socioeconomic History    Marital status: SINGLE     Spouse name: Not on file    Number of children: Not on file    Years of education: Not on file    Highest education level: Not on file   Occupational History    Not on file   Tobacco Use    Smoking status: Heavy Smoker     Packs/day: 0.50     Types: Cigarettes    Smokeless tobacco: Never    Tobacco comments:     pt. counseled to not smoke   Substance and Sexual Activity    Alcohol use: No    Drug use: Yes     Types: Marijuana     Comment: 9/5/17    Sexual activity: Not on file   Other Topics Concern    Not on file   Social History Narrative    Not on file     Social Determinants of Health     Financial Resource Strain: Not on file   Food Insecurity: Not on file   Transportation Needs: Not on file   Physical Activity: Not on file   Stress: Not on file   Social Connections: Not on file   Intimate Partner Violence: Not on file   Housing Stability: Not on file Social History     Tobacco Use   Smoking Status Heavy Smoker    Packs/day: 0.50    Types: Cigarettes   Smokeless Tobacco Never   Tobacco Comments    pt. counseled to not smoke        Temp (24hrs), Av.8 °F (36.6 °C), Min:97.3 °F (36.3 °C), Max:98.5 °F (36.9 °C)    Visit Vitals  /75   Pulse (!) 54   Temp 97.3 °F (36.3 °C)   Resp 17   Ht 5' 2\" (1.575 m)   Wt 79.4 kg (175 lb)   SpO2 97%   BMI 32.01 kg/m²       ROS: 12 point ROS obtained in details. Pertinent positives as mentioned in HPI,   otherwise negative    Physical Exam:    Vitals and nursing note reviewed. Constitutional:       Appearance: He is well-developed and normal weight. He is not ill-appearing, toxic-appearing or diaphoretic. HENT:      Head: Normocephalic and atraumatic. Neck:      Supple  Cardiovascular:      Rate and Rhythm: Normal rate and regular rhythm. Heart sounds: Normal heart sounds. No friction rub. No gallop. Pulmonary:      Effort: Pulmonary effort is normal. No respiratory distress. Abdominal:      General: There is no distension. Palpations: Abdomen is soft. There is no mass. Tenderness: There is no abdominal tenderness. There is no guarding or rebound. Musculoskeletal:          Right BKA stump with dry skin. Linear ulceration perpendicular to each other with serous drainage noted on the stump. No bright red erythema/warmth/induration/significant tenderness overlying the right BKA stump. No edema left leg  Skin:     General: Skin is warm and dry. Coloration: Skin is not pale. Neurological:      Mental Status: He is alert and oriented to person, place, and time. Psychiatric:         Speech: Speech normal.         Behavior: Behavior normal.         Thought Content:  Thought content normal.         Judgment: Judgment normal.     Labs: Results:   Chemistry Recent Labs     10/12/22  0346 10/11/22  0910   GLU 74 118*    141   K 4.1 3.7   * 110   CO2 22 21   BUN 55* 55*   CREA 3.81* 4.24*   CA 8.1* 8.7   AGAP 7 10   BUCR 14 13   AP  --  104   TP  --  6.9   ALB  --  2.4*   GLOB  --  4.5*   AGRAT  --  0.5*        CBC w/Diff Recent Labs     10/12/22  0346 10/11/22  0910   WBC 5.4 11.1   RBC 3.46* 3.63*   HGB 9.9* 10.7*   HCT 31.1* 32.8*    290   GRANS  --  74*   LYMPH  --  19*   EOS  --  1        Microbiology Recent Labs     10/11/22  1220 10/11/22  1205   CULT NO GROWTH AFTER 17 HOURS NO GROWTH AFTER 17 HOURS          RADIOLOGY:    All available imaging studies/reports in [facility] care for this admission were reviewed    Total time spent >35 minutes. High complexity decision making was performed during the evaluation of this patient at high risk for decompensation      Above mentioned total time spent on reviewing the case/medical record/data/notes/EMR/patient examination/documentation/coordinating care with nurse/consultants, exclusive of procedures with complex decision making performed and > 50% time spent in face to face evaluation. Disclaimer: Sections of this note are dictated utilizing voice recognition software, which may have resulted in some phonetic based errors in grammar and contents. Even though attempts were made to correct all the mistakes, some may have been missed, and remained in the body of the document. If questions arise, please contact our department.     Dr. Corita Gitelman, Infectious Disease Specialist  [phone]  October 12, 2022  1:37 PM

## 2022-10-12 NOTE — PROGRESS NOTES
MD Stalin Robertson stated to not give IV Antibiotic ordered for 1100 until wound culture results. Spoke with MD Michael Pacheco concerning low HR trend, no orders given, will continue to monitor. Patient requesting refill of Prilosec  Last OV - 7-  Last labs 8-  Please advise. Thanks.

## 2022-10-12 NOTE — PROGRESS NOTES
Problem: Falls - Risk of  Goal: *Absence of Falls  Description: Document [place] Fall Risk and appropriate interventions in the flowsheet.   Outcome: Progressing Towards Goal  Note: Fall Risk Interventions:  Mobility Interventions: Patient to call before getting OOB         Medication Interventions: Patient to call before getting OOB, Teach patient to arise slowly                   Problem: Patient Education: Go to Patient Education Activity  Goal: Patient/Family Education  Outcome: Progressing Towards Goal

## 2022-10-12 NOTE — PROGRESS NOTES
Pharmacist Review and Automatic Dose Adjustment of Prophylactic Enoxaparin    *Review reason for admission/hospital problem list*    The reviewing pharmacist has made an adjustment to the ordered enoxaparin dose or converted to UFH per the approved [facility] protocol and table as identified below. Kiki Peralta is a 43 y.o. male. No lab exists for component: CREATININE    Estimated Creatinine Clearance: 20.7 mL/min (A) (based on SCr of 4.24 mg/dL (H)).     Height:   Ht Readings from Last 1 Encounters:   10/11/22 157.5 cm (62\")     Weight:  Wt Readings from Last 1 Encounters:   10/11/22 79.4 kg (175 lb)               Plan: Based upon the patient's weight and renal function, the ordered enoxaparin dose of 40 mg daily has been changed/converted to 30 mg daily      Thank you,  Abhay Selby, Antonio Carmona

## 2022-10-12 NOTE — CONSULTS
Hayden Larson    Chief Complaint   Patient presents with    Wound Check       History and Physical    51-year-old male with diabetes hypertension and history of right below-knee amputation. He came to the hospital with pain and drainage from an ulcer at the site on his right BKA. He was seen by for this back in August as well. MRI at that time showed gas in the soft tissue. Vascular studies were done then showing triphasic waveforms at the femoral-popliteal location. He came back again now with increasing pain and drainage. He is a full-time worker from [employer] and walks with his prosthesis. Past Medical History:   Diagnosis Date    Cellulitis     rt. foot    Diabetes ([address]     Hypercholesterolemia     Hypertension     Osteomyelitis ([address]     rt toe    Other ill-defined conditions(799.89)      Patient Active Problem List   Diagnosis Code    Cellulitis of left knee L03. 116    DM (diabetes mellitus), secondary uncontrolled [id number]    Abscess of bursa, left knee M71.062    Sepsis ([address] A41.9    Abscess of right thigh L02.415    Cellulitis L03.90    Cellulitis and abscess L03.90, L02.91    Diabetic foot ulcer (HCC) E11.621, L97.509    Hypertension I10    Iron deficiency anemia D50.9    Anemia D64.9    Acute-on-chronic kidney injury ([address] N17.9, N18.9    DM (diabetes mellitus) ([address] E11.9    Cellulitis of right lower extremity L03.115    Osteomyelitis (HCC) M86.9    Diabetes mellitus type 2 with complications (HCC) P03.4    Hyperlipidemia E78.5    Right leg pain M79.604     Past Surgical History:   Procedure Laterality Date    HX BELOW KNEE AMPUTATION  03/2018    HX ORTHOPAEDIC      rt.toe     Current Facility-Administered Medications   Medication Dose Route Frequency Provider Last Rate Last Admin    sodium chloride (NS) flush 5-40 mL  5-40 mL IntraVENous Q8H Ron Nuñez MD   10 mL at 10/11/22 2228    sodium chloride (NS) flush 5-40 mL  5-40 mL IntraVENous PRN Earlene Watts MD        acetaminophen (TYLENOL) tablet 650 mg  650 mg Oral Q6H PRN Fredi Nuñez MD        Or    acetaminophen (TYLENOL) suppository 650 mg  650 mg Rectal Q6H PRN Fredi Nuñez MD        polyethylene glycol (MIRALAX) packet 17 g  17 g Oral DAILY PRN Ferdi Nuñez MD        ondansetron (ZOFRAN) injection 4 mg  4 mg IntraVENous Q6H PRN Fredi Nuñez MD        insulin lispro (HUMALOG) injection   SubCUTAneous AC&HS Fredi Nuñez MD        glucose chewable tablet 16 g  4 Tablet Oral PRN Fredi Nuñez MD        glucagon (GLUCAGEN) injection 1 mg  1 mg IntraMUSCular PRN Ron Nuñez MD        dextrose 10% infusion 0-250 mL  0-250 mL IntraVENous PRN Fredi Nuñez MD        0.9% sodium chloride infusion  75 mL/hr IntraVENous CONTINUOUS Alva Canada MD 75 mL/hr at 10/11/22 2130 75 mL/hr at 10/11/22 2130    enoxaparin (LOVENOX) injection 30 mg  30 mg SubCUTAneous Q24H Annmarie Nuñez MD   30 mg at 10/11/22 2208    morphine injection 2-4 mg  2-4 mg IntraVENous Q3H PRN Berenice LOPEZ DO   4 mg at 10/11/22 2028    naloxone (NARCAN) injection 0.4 mg  0.4 mg IntraVENous EVERY 2 MINUTES AS NEEDED Roe Liang DO        morphine 4 mg/mL injection              No Known Allergies  Social History     Socioeconomic History    Marital status: SINGLE     Spouse name: Not on file    Number of children: Not on file    Years of education: Not on file    Highest education level: Not on file   Occupational History    Not on file   Tobacco Use    Smoking status: Heavy Smoker     Packs/day: 0.50     Types: Cigarettes    Smokeless tobacco: Never    Tobacco comments:     pt. counseled to not smoke   Substance and Sexual Activity    Alcohol use: No    Drug use: Yes     Types: Marijuana     Comment: 9/5/17    Sexual activity: Not on file   Other Topics Concern    Not on file   Social History Narrative    Not on file     Social Determinants of Health     Financial Resource Strain: Not on file   Food Insecurity: Not on file Transportation Needs: Not on file   Physical Activity: Not on file   Stress: Not on file   Social Connections: Not on file   Intimate Partner Violence: Not on file   Housing Stability: Not on file      Family History   Problem Relation Age of Onset    Diabetes Mother     Diabetes Father     No Known Problems Sister     No Known Problems Brother     No Known Problems Sister        Review of Systems    A full review of systems was completed times ten organ systems and was deemed negative unless otherwise mentioned in the HPI. Physical Exam:    Visit Vitals  /74 (BP 1 Location: Left upper arm, BP Patient Position: At rest)   Pulse (!) 50   Temp 97.6 °F (36.4 °C)   Resp 16   Ht 5' 2\" (1.575 m)   Wt 79.4 kg (175 lb)   SpO2 98%   BMI 32.01 kg/m²      Pleasant affect no acute distress  Limited language but does speak some English  Head is normocephalic and atraumatic  Neck no JVD  Chest is clear  Cardiac is regular  Abdomen soft flat nontender  Right lower extremity without edema. Not much calf muscle at the base of the amputation site. He has a dime size ulcer which is draining pus and is painful. Left leg with no ulceration  X-ray and MRI confirm osteomyelitis of the distal tibia    Impression and Plan:  Complex scenario as he does work full-time and is best suited with the below-knee amputation site as he already has his prosthesis as well. He becomes quite upset if we talked about an above-knee amputation as a fix for this. I completely understand his point of view regarding his ability to work with Change to an above-knee amputation. We will discussed with ID and medical team regarding treatment for this infection. There is not much wiggle room on the stump for surgical revision regarding availability of soft tissue and muscle. Ml Gomez MD    PLEASE NOTE:  This document has been produced using voice recognition software. Unrecognized errors in transcription may be present.

## 2022-10-12 NOTE — WOUND CARE
Physical Exam  Patient: Kevin Smith   Room #: 152  Date:  10/12/22    ROXANN: [id number]    Situation: Wound Care Consult for \"evaluate and treat\"    Background:    PMH:    Cellulitis       rt. foot    Diabetes ([facility]      Hypercholesterolemia      Hypertension      Osteomyelitis ([facility]       rt toe    Other ill-defined conditions(799.89)                 Past Surgical History:   Procedure Laterality Date    HX BELOW KNEE AMPUTATION   03/2018    HX ORTHOPAEDIC         rt.toe      Current Dx: Right lower extremity pain at the stump: No obvious infection noted. X-ray shows acute versus chronic osteomyelitis. Infectious disease and podiatry were consulted. Holding off on IV antibiotics at this time. MRI of the right BKA stump has been ordered. Vascular surgery consultation requested. Acute kidney injury: We will place on IV fluids and recheck labs in AM.     Diabetes mellitus type 2: We will check blood sugars regularly and place correct insulin coverage. Check hemoglobin A1c. DVT prophylaxis: Lovenox subcutaneous. Christian Score: 23/23   BMI: 32.01   Preventive measures in place: Pressure redistribution mattress and Limited layers    Assessment:   Patient found reclined. Patient is Awake and alert, Oriented x person, place, time and situation, and Pleasant and conversant. Using video , patient reports that he received his prosthetic leg via donation and is unsure if he is able to go back to them for possible adjustments. The prosthetic appears in good condition, however, over time, the skin and bone are subject to changes due to weight bearing. He also needs replacement sleeves as he is wearing the same which were provided for him with his prosthesis. Wound(s) Description:         Wound #1    Location: Right, BKA stump  Stage/Etiology: Traumatic    Characteristics: infected, draining yellow/whitish pus, and smell fungal. Wound bed is pale pink open.  [place]-wound skin is maceration and callous  Drainage: Purulent and Moderate   Measurements: 2x2x0.8cm  Photo:           POA wounds #: 1      Recommendations:    Wound care orders as follows: Clean wound with wound spray then pack with Opticell Ag rope and cover with heel silicone. Change every other day and PRN. Other orders: Prosthetist from [address] to come assess prosthesis and make adjustments as needed. Supplies Used: Alginate sheet         Care discussed with primary nurse, Nicole Dillon RN. Care turned over to nursing staff at this time. Deborah STANLEYN, RN, [address] Dept  [phone]

## 2022-10-12 NOTE — PROGRESS NOTES
[facility] Hospitalist Group  Progress Note    Patient: Cadence Omalley Age: 43 y.o. : 1980 MR#: [id number] SSN: [id number]  Date/Time: 10/12/2022     Subjective: Patient Citizen of [place]-speaking, video  used #[id number]. Patient feels fine, mild pain at the right BKA site, sometimes discharge and drainage. He works 6 to 8 hours a day and uses prosthesis. Assessment/Plan:   Right lower extremity pain at the stump with osteomyelitis  Acute kidney injury on CKD stage IV  Diabetes mellitus type 2    Plan  Will continue empiric antibiotics, MRI results noted  Discussed with ID, follow-up cultures  Vascular surgery on case, patient refusing IR amputation  Continue IV fluids more, monitor creatinine  Continue SSI and monitor blood sugar  DVT prophylaxis: Lovenox subcutaneous. Discussed with the patient and explained in detail about above plan care. Patient understood and agreed with my plan. Case discussed with:  [x]Patient  []Family  [x]Nursing  []Case Management  DVT Prophylaxis:  [x]Lovenox  []Hep SQ  []SCDs  []Coumadin   []Eliquis/Xarelto     Objective:   VS: Visit Vitals  /80   Pulse (!) 54   Temp 97.6 °F (36.4 °C)   Resp 18   Ht 5' 2\" (1.575 m)   Wt 79.4 kg (175 lb)   SpO2 96%   BMI 32.01 kg/m²      Tmax/24hrs: Temp (24hrs), Av.8 °F (36.6 °C), Min:97.3 °F (36.3 °C), Max:98.5 °F (36.9 °C)  IOBRIEF  Intake/Output Summary (Last 24 hours) at 10/12/2022 1507  Last data filed at 10/12/2022 1111  Gross per 24 hour   Intake 1140 ml   Output 1400 ml   Net -260 ml       General:  Alert, cooperative, no acute distress    HEENT: PERRLA, anicteric sclerae. Pulmonary:  CTA Bilaterally. No Wheezing/Rales. Cardiovascular: Regular rate and Rhythm. GI:  Soft, Non distended, Non tender. + Bowel sounds. Extremities:  No edema. Left foot TMA, right leg BKA stump with a small area of minimal discharge, no erythema, no redness, no tenderness  Psych: Good insight.  Not anxious or agitated. Neurologic: Alert and oriented X 3. Moves all ext.   Additional:    Medications:   Current Facility-Administered Medications   Medication Dose Route Frequency    piperacillin-tazobactam (ZOSYN) 4.5 g in 0.9% sodium chloride (MBP/ADV) 100 mL MBP  4.5 g IntraVENous NOW    Followed by    piperacillin-tazobactam (ZOSYN) 3.375 g in 0.9% sodium chloride (MBP/ADV) 100 mL MBP  3.375 g IntraVENous Q8H    vancomycin (VANCOCIN) 1500 mg in  ml infusion  1,500 mg IntraVENous ONCE    [START ON 10/14/2022] vancomycin (VANCOCIN) 1,000 mg in 0.9% sodium chloride 250 mL (VIAL-MATE)  1,000 mg IntraVENous Q36H    sodium chloride (NS) flush 5-40 mL  5-40 mL IntraVENous Q8H    sodium chloride (NS) flush 5-40 mL  5-40 mL IntraVENous PRN    acetaminophen (TYLENOL) tablet 650 mg  650 mg Oral Q6H PRN    Or    acetaminophen (TYLENOL) suppository 650 mg  650 mg Rectal Q6H PRN    polyethylene glycol (MIRALAX) packet 17 g  17 g Oral DAILY PRN    ondansetron (ZOFRAN) injection 4 mg  4 mg IntraVENous Q6H PRN    insulin lispro (HUMALOG) injection   SubCUTAneous AC&HS    glucose chewable tablet 16 g  4 Tablet Oral PRN    glucagon (GLUCAGEN) injection 1 mg  1 mg IntraMUSCular PRN    dextrose 10% infusion 0-250 mL  0-250 mL IntraVENous PRN    0.9% sodium chloride infusion  75 mL/hr IntraVENous CONTINUOUS    enoxaparin (LOVENOX) injection 30 mg  30 mg SubCUTAneous Q24H    morphine injection 2-4 mg  2-4 mg IntraVENous Q3H PRN    naloxone (NARCAN) injection 0.4 mg  0.4 mg IntraVENous EVERY 2 MINUTES AS NEEDED       Labs:    Recent Results (from the past 24 hour(s))   GLUCOSE, POC    Collection Time: 10/11/22  5:08 PM   Result Value Ref Range    Glucose (POC) 73 70 - 110 mg/dL   GLUCOSE, POC    Collection Time: 10/11/22  6:41 PM   Result Value Ref Range    Glucose (POC) 71 70 - 110 mg/dL   GLUCOSE, POC    Collection Time: 10/11/22  6:58 PM   Result Value Ref Range    Glucose (POC) 117 (H) 70 - 110 mg/dL   GLUCOSE, POC    Collection Time: 10/11/22 10:01 PM   Result Value Ref Range    Glucose (POC) 131 (H) 70 - 461 mg/dL   METABOLIC PANEL, BASIC    Collection Time: 10/12/22  3:46 AM   Result Value Ref Range    Sodium 143 136 - 145 mmol/L    Potassium 4.1 3.5 - 5.5 mmol/L    Chloride 114 (H) 100 - 111 mmol/L    CO2 22 21 - 32 mmol/L    Anion gap 7 3.0 - 18 mmol/L    Glucose 74 74 - 99 mg/dL    BUN 55 (H) 7.0 - 18 MG/DL    Creatinine 3.81 (H) 0.6 - 1.3 MG/DL    BUN/Creatinine ratio 14 12 - 20      eGFR 19 (L) >60 ml/min/1.73m2    Calcium 8.1 (L) 8.5 - 10.1 MG/DL   CBC W/O DIFF    Collection Time: 10/12/22  3:46 AM   Result Value Ref Range    WBC 5.4 4.6 - 13.2 K/uL    RBC 3.46 (L) 4.35 - 5.65 M/uL    HGB 9.9 (L) 13.0 - 16.0 g/dL    HCT 31.1 (L) 36.0 - 48.0 %    MCV 89.9 78.0 - 100.0 FL    MCH 28.6 24.0 - 34.0 PG    MCHC 31.8 31.0 - 37.0 g/dL    RDW 14.2 11.6 - 14.5 %    PLATELET 790 900 - 405 K/uL    MPV 11.6 9.2 - 11.8 FL    NRBC 0.0 0  WBC    ABSOLUTE NRBC 0.00 0.00 - 0.01 K/uL   GLUCOSE, POC    Collection Time: 10/12/22 11:10 AM   Result Value Ref Range    Glucose (POC) 121 (H) 70 - 110 mg/dL       Signed By: Bharati Haynes MD     October 12, 2022      Disclaimer: Sections of this note are dictated using utilizing voice recognition software. Minor typographical errors may be present. If questions arise, please do not hesitate to contact me or call our department.

## 2022-10-12 NOTE — PROGRESS NOTES
Pharmacy Note     Zosyn 3375 mg IV q8h ordered for treatment of Osteomyelitis. Per [address] [facility] / Extended Infusion B Lactam Policy, Zosyn will be changed to 4500 mg IV x 1 over 30 min, followed by 3375 mg IV q8h over 4 hours. Estimated Creatinine Clearance: Estimated Creatinine Clearance: 23 mL/min (A) (based on SCr of 3.81 mg/dL (H)). Dialysis Status, SAVANNA, CKD:      BMI:  Body mass index is 32.01 kg/m². Recent Labs     10/12/22  0346 10/11/22  0910   WBC 5.4 11.1     Temp (24hrs), Av.8 °F (36.6 °C), Min:97.3 °F (36.3 °C), Max:98.5 °F (36.9 °C)      Rationale for Adjustment:  Extended infusion policy. Pharmacy will continue to monitor and adjust dose as necessary. Please call with any questions.     Thank you,  Vita Walls

## 2022-10-13 LAB
GLUCOSE BLD STRIP.AUTO-MCNC: 105 MG/DL (ref 70–110)
GLUCOSE BLD STRIP.AUTO-MCNC: 110 MG/DL (ref 70–110)
GLUCOSE BLD STRIP.AUTO-MCNC: 86 MG/DL (ref 70–110)
GLUCOSE BLD STRIP.AUTO-MCNC: 97 MG/DL (ref 70–110)
VANCOMYCIN SERPL-MCNC: 18.4 UG/ML (ref 5–40)

## 2022-10-13 PROCEDURE — 74011000258 HC RX REV CODE- 258: Performed by: INTERNAL MEDICINE

## 2022-10-13 PROCEDURE — 74011250636 HC RX REV CODE- 250/636: Performed by: INTERNAL MEDICINE

## 2022-10-13 PROCEDURE — 80202 ASSAY OF VANCOMYCIN: CPT

## 2022-10-13 PROCEDURE — 65270000029 HC RM PRIVATE

## 2022-10-13 PROCEDURE — 82962 GLUCOSE BLOOD TEST: CPT

## 2022-10-13 PROCEDURE — 36415 COLL VENOUS BLD VENIPUNCTURE: CPT

## 2022-10-13 PROCEDURE — 99232 SBSQ HOSP IP/OBS MODERATE 35: CPT | Performed by: FAMILY MEDICINE

## 2022-10-13 RX ADMIN — PIPERACILLIN AND TAZOBACTAM 3.38 G: 3; .375 INJECTION, POWDER, FOR SOLUTION INTRAVENOUS at 09:20

## 2022-10-13 RX ADMIN — PIPERACILLIN AND TAZOBACTAM 3.38 G: 3; .375 INJECTION, POWDER, FOR SOLUTION INTRAVENOUS at 18:40

## 2022-10-13 RX ADMIN — PIPERACILLIN AND TAZOBACTAM 3.38 G: 3; .375 INJECTION, POWDER, FOR SOLUTION INTRAVENOUS at 03:37

## 2022-10-13 RX ADMIN — MORPHINE SULFATE 4 MG: 2 INJECTION, SOLUTION INTRAMUSCULAR; INTRAVENOUS at 19:27

## 2022-10-13 RX ADMIN — MORPHINE SULFATE 4 MG: 2 INJECTION, SOLUTION INTRAMUSCULAR; INTRAVENOUS at 08:38

## 2022-10-13 NOTE — PROGRESS NOTES
Infectious Disease progress Note        Reason: Right below-knee amputation stump osteomyelitis    Current abx Prior abx   Zosyn, vancomycin since 10/12/2022      Lines:       Assessment :  40-year-old man with past medical history significant for type 2 diabetes, hypertension, MRSA right foot infection status post right below-knee amputation March 2018 presented to [facility] on 10/11/2022 with increasing right BKA stump pain. Hospitalization August 2022 for infected right BKA stump  wound infection  Wound cx 8/26- MSSA, enterococcus, acinetobacter      Now with few day history of worsening right BKA stump pain, x-ray right BKA stump concerning for acute/chronic osteomyelitis    Patient presents with a highly complex clinical picture. Presentation consistent with  chronic osteomyelitis right below-knee amputation stump   x-ray findings concerning for acute/chronic osteomyelitis. Wound cultures 10/12-gram-negative rods, Staph aureus      MRI findings reviewed. D/w dr. Dalton Hardin. Findings suggestive of right BKA stump osteomyelitis- likely chronic. Ideally patient will require surgical intervention/stump revision for cure. This was discussed with the patient by Dr. Shanon Tucker and myself. Patient does not wish to have higher level of amputation at this time. Acute on chronic kidney disease-? Prerenal ? NSAIDs use    Improved right stump pain on today's exam     Recommendations:     Continue Zosyn, vancomycin   Follow-up wound cultures, modify antibiotics accordingly  Follow-up vascular surgery recommendations  Management of acute kidney injury per primary team  Will give IV antibiotics for few days till improvement in patient's symptoms. Will subsequently need prolonged oral antibiotics for 8 to 12 weeks. Risk of failure of prolonged antibiotics and need for surgical intervention/higher level of amputation discussed with patient. Patient verbalizes understanding.   He had multiple questions about co-pay of outpatient antibiotics/refills   I discussed all this with him in details with the help of online       Above plan was discussed in details with patient,dr. Sharlene Ricketts. Please call me if any further questions or concerns. Will continue to participate in the care of this patient.   HPI:    Continues to have pain in right BKA stump  Past Medical History:   Diagnosis Date    Cellulitis     rt. foot    Diabetes ([address]     Hypercholesterolemia     Hypertension     Osteomyelitis ([address]     rt toe    Other ill-defined conditions(799.89)        Past Surgical History:   Procedure Laterality Date    HX BELOW KNEE AMPUTATION  03/2018    HX ORTHOPAEDIC      rt.toe       Current Discharge Medication List        CONTINUE these medications which have NOT CHANGED    Details   glucose blood VI test strips (blood glucose test) strip Check fasting sugars three times a day before breakfast, lunch & dinner for relion meter  Qty: 60 Strip, Refills: 0    Associated Diagnoses: Uncontrolled type 2 diabetes mellitus without complication, with long-term current use of insulin      lancets misc Check fasting sugars three times a day before breakfast, lunch & dinner for relion meter  Qty: 100 Each, Refills: 0    Associated Diagnoses: Uncontrolled type 2 diabetes mellitus without complication, with long-term current use of insulin      Blood-Glucose Meter (RELION ALL-IN-ONE METER) monitoring kit Check fasting sugars three times a day before breakfast, lunch & dinner  Qty: 1 Kit, Refills: 0    Associated Diagnoses: Uncontrolled type 2 diabetes mellitus without complication, with long-term current use of insulin             Current Facility-Administered Medications   Medication Dose Route Frequency    piperacillin-tazobactam (ZOSYN) 3.375 g in 0.9% sodium chloride (MBP/ADV) 100 mL MBP  3.375 g IntraVENous Q8H    [START ON 10/14/2022] vancomycin (VANCOCIN) 1,000 mg in 0.9% sodium chloride 250 mL (VIAL-MATE)  1,000 mg IntraVENous Q36H    sodium chloride (NS) flush 5-40 mL  5-40 mL IntraVENous Q8H    sodium chloride (NS) flush 5-40 mL  5-40 mL IntraVENous PRN    acetaminophen (TYLENOL) tablet 650 mg  650 mg Oral Q6H PRN    Or    acetaminophen (TYLENOL) suppository 650 mg  650 mg Rectal Q6H PRN    polyethylene glycol (MIRALAX) packet 17 g  17 g Oral DAILY PRN    ondansetron (ZOFRAN) injection 4 mg  4 mg IntraVENous Q6H PRN    insulin lispro (HUMALOG) injection   SubCUTAneous AC&HS    glucose chewable tablet 16 g  4 Tablet Oral PRN    glucagon (GLUCAGEN) injection 1 mg  1 mg IntraMUSCular PRN    dextrose 10% infusion 0-250 mL  0-250 mL IntraVENous PRN    enoxaparin (LOVENOX) injection 30 mg  30 mg SubCUTAneous Q24H    morphine injection 2-4 mg  2-4 mg IntraVENous Q3H PRN    naloxone (NARCAN) injection 0.4 mg  0.4 mg IntraVENous EVERY 2 MINUTES AS NEEDED       Allergies: Patient has no known allergies.     Family History   Problem Relation Age of Onset    Diabetes Mother     Diabetes Father     No Known Problems Sister     No Known Problems Brother     No Known Problems Sister      Social History     Socioeconomic History    Marital status: SINGLE     Spouse name: Not on file    Number of children: Not on file    Years of education: Not on file    Highest education level: Not on file   Occupational History    Not on file   Tobacco Use    Smoking status: Heavy Smoker     Packs/day: 0.50     Types: Cigarettes    Smokeless tobacco: Never    Tobacco comments:     pt. counseled to not smoke   Substance and Sexual Activity    Alcohol use: No    Drug use: Yes     Types: Marijuana     Comment: 9/5/17    Sexual activity: Not on file   Other Topics Concern    Not on file   Social History Narrative    Not on file     Social Determinants of Health     Financial Resource Strain: Not on file   Food Insecurity: Not on file   Transportation Needs: Not on file   Physical Activity: Not on file   Stress: Not on file   Social Connections: Not on file   Intimate Partner Violence: Not on file   Housing Stability: Not on file     Social History     Tobacco Use   Smoking Status Heavy Smoker    Packs/day: 0.50    Types: Cigarettes   Smokeless Tobacco Never   Tobacco Comments    pt. counseled to not smoke        Temp (24hrs), Av.7 °F (36.5 °C), Min:97.5 °F (36.4 °C), Max:98 °F (36.7 °C)    Visit Vitals  /78   Pulse (!) 53   Temp 98 °F (36.7 °C)   Resp 20   Ht 5' 2\" (1.575 m)   Wt 79.4 kg (175 lb)   SpO2 98%   BMI 32.01 kg/m²       ROS: 12 point ROS obtained in details. Pertinent positives as mentioned in HPI,   otherwise negative    Physical Exam:    Vitals and nursing note reviewed. Constitutional:       Appearance: He is well-developed and normal weight. He is not ill-appearing, toxic-appearing or diaphoretic. HENT:      Head: Normocephalic and atraumatic. Neck:      Supple  Cardiovascular:      Rate and Rhythm: Normal rate and regular rhythm. Heart sounds: Normal heart sounds. No friction rub. No gallop. Pulmonary:      Effort: Pulmonary effort is normal. No respiratory distress. Abdominal:      General: There is no distension. Palpations: Abdomen is soft. There is no mass. Tenderness: There is no abdominal tenderness. There is no guarding or rebound. Musculoskeletal:          Right BKA stump with dry skin. Linear ulceration perpendicular to each other with serous drainage noted on the stump. No bright red erythema/warmth/induration/significant tenderness overlying the right BKA stump. No edema left leg. Improved tenderness right BKA stump  Skin:     General: Skin is warm and dry. Coloration: Skin is not pale. Neurological:      Mental Status: He is alert and oriented to person, place, and time. Psychiatric:         Speech: Speech normal.         Behavior: Behavior normal.         Thought Content:  Thought content normal.         Judgment: Judgment normal.     Labs: Results:   Chemistry Recent Labs     10/12/22  0346 10/11/22  0910   GLU 74 118*    141   K 4.1 3.7   * 110   CO2 22 21   BUN 55* 55*   CREA 3.81* 4.24*   CA 8.1* 8.7   AGAP 7 10   BUCR 14 13   AP  --  104   TP  --  6.9   ALB  --  2.4*   GLOB  --  4.5*   AGRAT  --  0.5*        CBC w/Diff Recent Labs     10/12/22  0346 10/11/22  0910   WBC 5.4 11.1   RBC 3.46* 3.63*   HGB 9.9* 10.7*   HCT 31.1* 32.8*    290   GRANS  --  74*   LYMPH  --  19*   EOS  --  1        Microbiology Recent Labs     10/12/22  1102 10/11/22  1220 10/11/22  1205   CULT PENDING NO GROWTH 2 DAYS NO GROWTH 2 DAYS            RADIOLOGY:    All available imaging studies/reports in [facility] for this admission were reviewed    Total time spent >35 minutes. High complexity decision making was performed during the evaluation of this patient at high risk for decompensation      Above mentioned total time spent on reviewing the case/medical record/data/notes/EMR/patient examination/documentation/coordinating care with nurse/consultants, exclusive of procedures with complex decision making performed and > 50% time spent in face to face evaluation. Disclaimer: Sections of this note are dictated utilizing voice recognition software, which may have resulted in some phonetic based errors in grammar and contents. Even though attempts were made to correct all the mistakes, some may have been missed, and remained in the body of the document. If questions arise, please contact our department.     Dr. Fredy Morse, Infectious Disease Specialist  [phone]  October 13, 2022  1:37 PM

## 2022-10-13 NOTE — PROGRESS NOTES
[facility]  Progress Note    Patient: Margret Friedman Age: 43 y.o. : 1980 MR#: [id number] SSN: [id number]  Date: 10/13/2022     Subjective/24-hour events:     Unchanged clinically. Assessment:   Osteomyelitis of the right lower extremity BKA stump site   SAVANNA on CKD 4  DM2 with hyperglycemia  Obesity, BMI 32.0    Plan:   Continue antibiotics, management per ID. Vascular continuing to follow the patient has declined above-the-knee amputation to this point. Continue to monitor progress. Further plan pending overall response to current treatment. Therapy services: None. DME: TBD. Anticipated disposition: TBD. Case discussed with:  [x]Patient  []Family  []Nursing  []Case Management  DVT Prophylaxis:  [x]Lovenox  []Hep SQ  []SCDs  []Coumadin   []On Heparin gtt    Objective:   VS: Visit Vitals  BP (!) 146/90 (BP 1 Location: Left upper arm)   Pulse (!) 58   Temp 97 °F (36.1 °C)   Resp 14   Ht 5' 2\" (1.575 m)   Wt 79.4 kg (175 lb)   SpO2 97%   BMI 32.01 kg/m²      Tmax/24hrs: Temp (24hrs), Av.5 °F (36.4 °C), Min:97 °F (36.1 °C), Max:98 °F (36.7 °C)    Intake/Output Summary (Last 24 hours) at 10/13/2022 1609  Last data filed at 10/13/2022 1154  Gross per 24 hour   Intake 780 ml   Output 1000 ml   Net -220 ml       General:  In NAD. Nontoxic-appearing. Cardiovascular:  RRR. Pulmonary:  Lungs clear bilaterally, no wheezes. No accessory muscle use. GI:  Abdomen soft, NTTP. Extremities: Right lower extremity BKA. Neuro:  Awake and alert.   Moves extremities spontaneously, motor grossly nonfocal.    Labs:    Recent Results (from the past 24 hour(s))   GLUCOSE, POC    Collection Time: 10/12/22  4:27 PM   Result Value Ref Range    Glucose (POC) 94 70 - 110 mg/dL   GLUCOSE, POC    Collection Time: 10/12/22 11:07 PM   Result Value Ref Range    Glucose (POC) 134 (H) 70 - 110 mg/dL   VANCOMYCIN, RANDOM    Collection Time: 10/13/22  4:27 AM   Result Value Ref Range Vancomycin, random 18.4 5.0 - 40.0 UG/ML   GLUCOSE, POC    Collection Time: 10/13/22  7:55 AM   Result Value Ref Range    Glucose (POC) 86 70 - 110 mg/dL   GLUCOSE, POC    Collection Time: 10/13/22 11:23 AM   Result Value Ref Range    Glucose (POC) 105 70 - 110 mg/dL   GLUCOSE, POC    Collection Time: 10/13/22  4:00 PM   Result Value Ref Range    Glucose (POC) 97 70 - 110 mg/dL       Signed By: Ovidio Salamanca MD     October 13, 2022

## 2022-10-13 NOTE — PROGRESS NOTES
Resting comfortably today has pain at the site  Cultures have been sent await final verdict  May need local debridement  Will discuss with team regarding choice of long-term antibiotic and realistic goal for the site to heal.

## 2022-10-13 NOTE — PROGRESS NOTES
[address] [facility] Pharmacokinetic Monitoring Service - Vancomycin    Consulting Provider: Elkin Gauthier MD  Indication: Bone and Joint Infection  Target Concentration: Goal AUC/ROGELIO 400-600 mg*hr/L  Day of Therapy: 2  Additional Antimicrobials: Zosyn    Pertinent Laboratory Values: Wt Readings from Last 1 Encounters:   10/11/22 79.4 kg (175 lb)     Temp Readings from Last 1 Encounters:   10/13/22 97.5 °F (36.4 °C)     Recent Labs     10/12/22  0346 10/11/22  0910   CREA 3.81* 4.24*   BUN 55* 55*     No components found for: PROCAL  Estimated Creatinine Clearance: 23 mL/min (A) (based on SCr of 3.81 mg/dL (H)). Recent Labs     10/12/22  0346 10/11/22  0910   WBC 5.4 11.1       Pertinent Cultures:  Culture Date Source Results   10/12 Wound Pending   10/11 Blood NO GROWTH AFTER 17 HOURS        MRSA Nasal Swab: N/A. Non-respiratory infection. .        Assessment:  Date/Time Current Dose Concentration Timing of Concentration (h) AUC    10/12 1706 1500 mg x 1 - - -   10/13 0425 - 18.4 11    10/14 0100 1 gm IV q36h - - 498   Note: Serum concentrations collected for AUC dosing may appear elevated if collected in close proximity to the dose administered, this is not necessarily an indication of toxicity    Plan:  Current dosing regimen is therapeutic  Pharmacy will continue to monitor patient and adjust therapy as indicated    Thank you for the consult,  ANTHONY Fabian  10/13/2022 6:12 AM

## 2022-10-13 NOTE — PROGRESS NOTES
conducted an initial consultation and Spiritual Assessment for Theodora Weathers, who is a 43 y. o.,male. Patients Primary Language is: [place]. According to the patients EMR Buddhism Affiliation is: No Mu-ism. The reason the Patient came to the hospital is:   Patient Active Problem List    Diagnosis Date Noted    Right leg pain 10/11/2022    Diabetes mellitus type 2 with complications ([facility] [address] 62/79/5460    Hyperlipidemia 08/26/2022    Osteomyelitis ([facility] [address] 03/06/2018    Cellulitis of right lower extremity 02/23/2018    Anemia 09/12/2017    Acute-on-chronic kidney injury ([address] 09/12/2017    DM (diabetes mellitus) ([facility] 09/12/2017    Iron deficiency anemia 05/21/2015    Hypertension 05/19/2015    Diabetic foot ulcer ([facility] [address] 05/16/2015    Cellulitis 11/21/2014    Cellulitis and abscess 11/21/2014    Abscess of right thigh 07/15/2014    Sepsis ([facility] [address] 07/14/2014    Abscess of bursa, left knee 06/19/2013    Cellulitis of left knee 06/15/2013    DM (diabetes mellitus), secondary uncontrolled 06/15/2013        The  provided the following Interventions:  Initiated a relationship of care and support. Listened empathically. Provided information about Spiritual Care Services. Offered prayer and assurance of continued prayers on patient's behalf. Chart reviewed. The following outcomes where achieved:  Patient shared limited information about both their medical narrative and spiritual journey/beliefs.  confirmed Patient's Buddhism Affiliation. Patient processed feeling about current hospitalization. Patient expressed gratitude for 's visit. Assessment:  Patient does not have any Nondenominational/cultural needs that will affect patients preferences in health care. There are no spiritual or Nondenominational issues which require intervention at this time. Plan:  Chaplains will continue to follow and will provide pastoral care on an as needed/requested basis.    recommends bedside caregivers page  on duty if patient shows signs of acute spiritual or emotional distress.       [address] [facility]   [phone]

## 2022-10-14 LAB
ANION GAP SERPL CALC-SCNC: 4 MMOL/L (ref 3–18)
BASOPHILS # BLD: 0 K/UL (ref 0–0.1)
BASOPHILS NFR BLD: 0 % (ref 0–2)
BUN SERPL-MCNC: 50 MG/DL (ref 7–18)
BUN/CREAT SERPL: 13 (ref 12–20)
CALCIUM SERPL-MCNC: 8.5 MG/DL (ref 8.5–10.1)
CHLORIDE SERPL-SCNC: 112 MMOL/L (ref 100–111)
CO2 SERPL-SCNC: 25 MMOL/L (ref 21–32)
CREAT SERPL-MCNC: 3.78 MG/DL (ref 0.6–1.3)
DIFFERENTIAL METHOD BLD: ABNORMAL
EOSINOPHIL # BLD: 0.2 K/UL (ref 0–0.4)
EOSINOPHIL NFR BLD: 4 % (ref 0–5)
ERYTHROCYTE [DISTWIDTH] IN BLOOD BY AUTOMATED COUNT: 13.7 % (ref 11.6–14.5)
GLUCOSE BLD STRIP.AUTO-MCNC: 125 MG/DL (ref 70–110)
GLUCOSE BLD STRIP.AUTO-MCNC: 91 MG/DL (ref 70–110)
GLUCOSE SERPL-MCNC: 92 MG/DL (ref 74–99)
HCT VFR BLD AUTO: 33.1 % (ref 36–48)
HGB BLD-MCNC: 10.7 G/DL (ref 13–16)
IMM GRANULOCYTES # BLD AUTO: 0 K/UL (ref 0–0.04)
IMM GRANULOCYTES NFR BLD AUTO: 0 % (ref 0–0.5)
LYMPHOCYTES # BLD: 1.6 K/UL (ref 0.9–3.6)
LYMPHOCYTES NFR BLD: 29 % (ref 21–52)
MCH RBC QN AUTO: 29.2 PG (ref 24–34)
MCHC RBC AUTO-ENTMCNC: 32.3 G/DL (ref 31–37)
MCV RBC AUTO: 90.2 FL (ref 78–100)
MONOCYTES # BLD: 0.4 K/UL (ref 0.05–1.2)
MONOCYTES NFR BLD: 7 % (ref 3–10)
NEUTS SEG # BLD: 3.3 K/UL (ref 1.8–8)
NEUTS SEG NFR BLD: 59 % (ref 40–73)
NRBC # BLD: 0 K/UL (ref 0–0.01)
NRBC BLD-RTO: 0 PER 100 WBC
PLATELET # BLD AUTO: 246 K/UL (ref 135–420)
PMV BLD AUTO: 11.7 FL (ref 9.2–11.8)
POTASSIUM SERPL-SCNC: 4.4 MMOL/L (ref 3.5–5.5)
RBC # BLD AUTO: 3.67 M/UL (ref 4.35–5.65)
SODIUM SERPL-SCNC: 141 MMOL/L (ref 136–145)
WBC # BLD AUTO: 5.6 K/UL (ref 4.6–13.2)

## 2022-10-14 PROCEDURE — 74011000258 HC RX REV CODE- 258: Performed by: INTERNAL MEDICINE

## 2022-10-14 PROCEDURE — 74011250636 HC RX REV CODE- 250/636: Performed by: INTERNAL MEDICINE

## 2022-10-14 PROCEDURE — 2709999900 HC NON-CHARGEABLE SUPPLY

## 2022-10-14 PROCEDURE — 82962 GLUCOSE BLOOD TEST: CPT

## 2022-10-14 PROCEDURE — 36415 COLL VENOUS BLD VENIPUNCTURE: CPT

## 2022-10-14 PROCEDURE — 99232 SBSQ HOSP IP/OBS MODERATE 35: CPT | Performed by: FAMILY MEDICINE

## 2022-10-14 PROCEDURE — 85025 COMPLETE CBC W/AUTO DIFF WBC: CPT

## 2022-10-14 PROCEDURE — 74011000250 HC RX REV CODE- 250: Performed by: HOSPITALIST

## 2022-10-14 PROCEDURE — 65270000029 HC RM PRIVATE

## 2022-10-14 PROCEDURE — 74011250637 HC RX REV CODE- 250/637: Performed by: INTERNAL MEDICINE

## 2022-10-14 PROCEDURE — 80048 BASIC METABOLIC PNL TOTAL CA: CPT

## 2022-10-14 PROCEDURE — 74011250637 HC RX REV CODE- 250/637: Performed by: FAMILY MEDICINE

## 2022-10-14 PROCEDURE — 74011250636 HC RX REV CODE- 250/636: Performed by: HOSPITALIST

## 2022-10-14 RX ORDER — DIPHENHYDRAMINE HCL 25 MG
50 CAPSULE ORAL
Status: COMPLETED | OUTPATIENT
Start: 2022-10-14 | End: 2022-10-14

## 2022-10-14 RX ORDER — AMLODIPINE BESYLATE 5 MG/1
5 TABLET ORAL DAILY
Status: DISCONTINUED | OUTPATIENT
Start: 2022-10-14 | End: 2022-10-18 | Stop reason: HOSPADM

## 2022-10-14 RX ORDER — DIPHENHYDRAMINE HCL 25 MG
50 CAPSULE ORAL ONCE
Status: COMPLETED | OUTPATIENT
Start: 2022-10-14 | End: 2022-10-14

## 2022-10-14 RX ADMIN — SODIUM CHLORIDE, PRESERVATIVE FREE 10 ML: 5 INJECTION INTRAVENOUS at 15:44

## 2022-10-14 RX ADMIN — MORPHINE SULFATE 4 MG: 2 INJECTION, SOLUTION INTRAMUSCULAR; INTRAVENOUS at 17:08

## 2022-10-14 RX ADMIN — ENOXAPARIN SODIUM 30 MG: 100 INJECTION SUBCUTANEOUS at 00:30

## 2022-10-14 RX ADMIN — DIPHENHYDRAMINE HYDROCHLORIDE 50 MG: 25 CAPSULE ORAL at 22:32

## 2022-10-14 RX ADMIN — AMLODIPINE BESYLATE 5 MG: 5 TABLET ORAL at 15:44

## 2022-10-14 RX ADMIN — ENOXAPARIN SODIUM 30 MG: 100 INJECTION SUBCUTANEOUS at 20:28

## 2022-10-14 RX ADMIN — PIPERACILLIN AND TAZOBACTAM 3.38 G: 3; .375 INJECTION, POWDER, FOR SOLUTION INTRAVENOUS at 00:34

## 2022-10-14 RX ADMIN — VANCOMYCIN HYDROCHLORIDE 1000 MG: 1 INJECTION, POWDER, LYOPHILIZED, FOR SOLUTION INTRAVENOUS at 05:04

## 2022-10-14 RX ADMIN — DIPHENHYDRAMINE HYDROCHLORIDE 50 MG: 25 CAPSULE ORAL at 05:04

## 2022-10-14 RX ADMIN — PIPERACILLIN AND TAZOBACTAM 3.38 G: 3; .375 INJECTION, POWDER, FOR SOLUTION INTRAVENOUS at 10:00

## 2022-10-14 RX ADMIN — SODIUM CHLORIDE, PRESERVATIVE FREE 10 ML: 5 INJECTION INTRAVENOUS at 05:05

## 2022-10-14 RX ADMIN — SODIUM CHLORIDE, PRESERVATIVE FREE 10 ML: 5 INJECTION INTRAVENOUS at 00:34

## 2022-10-14 RX ADMIN — MORPHINE SULFATE 4 MG: 2 INJECTION, SOLUTION INTRAMUSCULAR; INTRAVENOUS at 20:16

## 2022-10-14 RX ADMIN — SODIUM CHLORIDE, PRESERVATIVE FREE 10 ML: 5 INJECTION INTRAVENOUS at 21:35

## 2022-10-14 RX ADMIN — PIPERACILLIN AND TAZOBACTAM 3.38 G: 3; .375 INJECTION, POWDER, FOR SOLUTION INTRAVENOUS at 17:08

## 2022-10-14 NOTE — PROGRESS NOTES
[facility]  Progress Note    Patient: Nandini Streeter Age: 43 y.o. : 1980 MR#: [id number] SSN: [id number]  Date: 10/14/2022     Subjective/24-hour events:     No new issues overnight. No significant pain reported currently, remains afebrile. Assessment:   Osteomyelitis of the right lower extremity BKA stump site   SAAVNNA on CKD 4  DM2 with hyperglycemia  Elevated blood pressures  Obesity, BMI 32.0    Plan:   Continue antibiotic therapy per ID recommendations. Blood pressures running high overall. We will add antihypertensive and monitor response. May be able to hold off on any additional surgical intervention per discussion with consultants today. We will continue IV antibiotics for now with plan for finalization of therapy in the next 2 to 3 days. Patient is uninsured and will need a primary care physician established prior to discharge. Will also need to ensure that patient will be able to obtain necessary medications once he is out of the hospital.  Continue supportive care otherwise. Follow. Therapy services: None. DME: TBD. Anticipated disposition: TBD. Case discussed with:  [x]Patient  []Family  [x]Nursing  [x]Case Management  DVT Prophylaxis:  [x]Lovenox  []Hep SQ  []SCDs  []Coumadin   []On Heparin gtt    Objective:   VS: Visit Vitals  BP (!) 148/92   Pulse (!) 52   Temp 97.6 °F (36.4 °C)   Resp 16   Ht 5' 2\" (1.575 m)   Wt 79.4 kg (175 lb)   SpO2 97%   BMI 32.01 kg/m²      Tmax/24hrs: Temp (24hrs), Av.8 °F (36.6 °C), Min:97 °F (36.1 °C), Max:98.2 °F (36.8 °C)    Intake/Output Summary (Last 24 hours) at 10/14/2022 1337  Last data filed at 10/14/2022 0504  Gross per 24 hour   Intake 570 ml   Output 2000 ml   Net -1430 ml       General:  In NAD. Nontoxic-appearing. Cardiovascular:  RRR. Pulmonary:  Lungs clear bilaterally, no wheezes. GI:  Abdomen soft, NTTP. Extremities: Right lower extremity BKA. Neuro:  Awake and alert.   Moves extremities spontaneously. Labs:    Recent Results (from the past 24 hour(s))   GLUCOSE, POC    Collection Time: 10/13/22  4:00 PM   Result Value Ref Range    Glucose (POC) 97 70 - 110 mg/dL   GLUCOSE, POC    Collection Time: 10/13/22  9:38 PM   Result Value Ref Range    Glucose (POC) 110 70 - 110 mg/dL   CBC WITH AUTOMATED DIFF    Collection Time: 10/14/22  3:28 AM   Result Value Ref Range    WBC 5.6 4.6 - 13.2 K/uL    RBC 3.67 (L) 4.35 - 5.65 M/uL    HGB 10.7 (L) 13.0 - 16.0 g/dL    HCT 33.1 (L) 36.0 - 48.0 %    MCV 90.2 78.0 - 100.0 FL    MCH 29.2 24.0 - 34.0 PG    MCHC 32.3 31.0 - 37.0 g/dL    RDW 13.7 11.6 - 14.5 %    PLATELET 235 684 - 058 K/uL    MPV 11.7 9.2 - 11.8 FL    NRBC 0.0 0  WBC    ABSOLUTE NRBC 0.00 0.00 - 0.01 K/uL    NEUTROPHILS 59 40 - 73 %    LYMPHOCYTES 29 21 - 52 %    MONOCYTES 7 3 - 10 %    EOSINOPHILS 4 0 - 5 %    BASOPHILS 0 0 - 2 %    IMMATURE GRANULOCYTES 0 0.0 - 0.5 %    ABS. NEUTROPHILS 3.3 1.8 - 8.0 K/UL    ABS. LYMPHOCYTES 1.6 0.9 - 3.6 K/UL    ABS. MONOCYTES 0.4 0.05 - 1.2 K/UL    ABS. EOSINOPHILS 0.2 0.0 - 0.4 K/UL    ABS. BASOPHILS 0.0 0.0 - 0.1 K/UL    ABS. IMM.  GRANS. 0.0 0.00 - 0.04 K/UL    DF AUTOMATED     METABOLIC PANEL, BASIC    Collection Time: 10/14/22  3:28 AM   Result Value Ref Range    Sodium 141 136 - 145 mmol/L    Potassium 4.4 3.5 - 5.5 mmol/L    Chloride 112 (H) 100 - 111 mmol/L    CO2 25 21 - 32 mmol/L    Anion gap 4 3.0 - 18 mmol/L    Glucose 92 74 - 99 mg/dL    BUN 50 (H) 7.0 - 18 MG/DL    Creatinine 3.78 (H) 0.6 - 1.3 MG/DL    BUN/Creatinine ratio 13 12 - 20      eGFR 20 (L) >60 ml/min/1.73m2    Calcium 8.5 8.5 - 10.1 MG/DL   GLUCOSE, POC    Collection Time: 10/14/22 10:05 AM   Result Value Ref Range    Glucose (POC) 91 70 - 110 mg/dL       Signed By: Jackson Velazco MD     October 14, 2022

## 2022-10-14 NOTE — PROGRESS NOTES
[address] [facility] Pharmacokinetic Monitoring Service - Vancomycin    Consulting Provider: Miya Wilkerson MD  Indication: Bone and Joint Infection  Target Concentration: Goal AUC/ROGELIO 400-600 mg*hr/L  Day of Therapy: 3  Additional Antimicrobials: Zosyn    Pertinent Laboratory Values: Wt Readings from Last 1 Encounters:   10/11/22 79.4 kg (175 lb)     Temp Readings from Last 1 Encounters:   10/14/22 97.8 °F (36.6 °C)     Recent Labs     10/12/22  0346 10/11/22  0910   CREA 3.81* 4.24*   BUN 55* 55*     No components found for: PROCAL  Estimated Creatinine Clearance: 23 mL/min (A) (by C-G formula based on SCr of 3.81 mg/dL (H)). Recent Labs     10/14/22  0328 10/12/22  0346   WBC 5.6 5.4       Pertinent Cultures:  Culture Date Source Results   10/12 Wound GNRs, S. aureus   10/11 Blood NGTD   MRSA Nasal Swab: N/A. Non-respiratory infection. .      Assessment:  Date/Time Current Dose Concentration Timing of Concentration (h) AUC    10/12 1706 1500 mg x 1 - - -   10/13 0425 - 18.4 11    10/14 0100 1 gm IV q36h - - 498   Note: Serum concentrations collected for AUC dosing may appear elevated if collected in close proximity to the dose administered, this is not necessarily an indication of toxicity    Plan:  Current dosing regimen is therapeutic  Random level ordered for tomorrow AM  Pharmacy will continue to monitor patient and adjust therapy as indicated    Thank you for the consult,  Nida Crane, JamisonD, BCPS  10/14/2022 6:12 AM

## 2022-10-14 NOTE — PROGRESS NOTES
Pain is resolved since on antibiotics  His wound on the distal stump has dramatically improved since on antibiotics he has no further drainage the wound really is actually very hard to find now. He is happy with his progress and does not want surgical revision. Would be best treated with revision of his prosthesis cup to avoid pressure ulcers.   Also think long-term antibiotics for osteomyelitis is ideal

## 2022-10-14 NOTE — PROGRESS NOTES
Problem: Falls - Risk of  Goal: *Absence of Falls  Description: Document Lito Lerner Fall Risk and appropriate interventions in the flowsheet.   Outcome: Progressing Towards Goal  Note: Fall Risk Interventions:  Mobility Interventions: Bed/chair exit alarm         Medication Interventions: Bed/chair exit alarm                   Problem: Patient Education: Go to Patient Education Activity  Goal: Patient/Family Education  Outcome: Progressing Towards Goal     Problem: Pain  Goal: *Control of Pain  Outcome: Progressing Towards Goal     Problem: Patient Education: Go to Patient Education Activity  Goal: Patient/Family Education  Outcome: Progressing Towards Goal

## 2022-10-15 LAB
BACTERIA SPEC CULT: ABNORMAL
GLUCOSE BLD STRIP.AUTO-MCNC: 104 MG/DL (ref 70–110)
GLUCOSE BLD STRIP.AUTO-MCNC: 117 MG/DL (ref 70–110)
GLUCOSE BLD STRIP.AUTO-MCNC: 122 MG/DL (ref 70–110)
GLUCOSE BLD STRIP.AUTO-MCNC: 96 MG/DL (ref 70–110)
GRAM STN SPEC: ABNORMAL
SERVICE CMNT-IMP: ABNORMAL
VANCOMYCIN SERPL-MCNC: 18 UG/ML (ref 5–40)

## 2022-10-15 PROCEDURE — 99232 SBSQ HOSP IP/OBS MODERATE 35: CPT | Performed by: FAMILY MEDICINE

## 2022-10-15 PROCEDURE — 36415 COLL VENOUS BLD VENIPUNCTURE: CPT

## 2022-10-15 PROCEDURE — 80202 ASSAY OF VANCOMYCIN: CPT

## 2022-10-15 PROCEDURE — 74011250636 HC RX REV CODE- 250/636: Performed by: HOSPITALIST

## 2022-10-15 PROCEDURE — 74011000250 HC RX REV CODE- 250: Performed by: HOSPITALIST

## 2022-10-15 PROCEDURE — 74011250636 HC RX REV CODE- 250/636: Performed by: INTERNAL MEDICINE

## 2022-10-15 PROCEDURE — 65270000029 HC RM PRIVATE

## 2022-10-15 PROCEDURE — 74011000258 HC RX REV CODE- 258: Performed by: INTERNAL MEDICINE

## 2022-10-15 PROCEDURE — 82962 GLUCOSE BLOOD TEST: CPT

## 2022-10-15 PROCEDURE — 74011250637 HC RX REV CODE- 250/637: Performed by: FAMILY MEDICINE

## 2022-10-15 RX ORDER — DIPHENHYDRAMINE HCL 25 MG
25 CAPSULE ORAL
Status: DISCONTINUED | OUTPATIENT
Start: 2022-10-15 | End: 2022-10-18 | Stop reason: HOSPADM

## 2022-10-15 RX ADMIN — PIPERACILLIN AND TAZOBACTAM 3.38 G: 3; .375 INJECTION, POWDER, FOR SOLUTION INTRAVENOUS at 19:27

## 2022-10-15 RX ADMIN — PIPERACILLIN AND TAZOBACTAM 3.38 G: 3; .375 INJECTION, POWDER, FOR SOLUTION INTRAVENOUS at 01:19

## 2022-10-15 RX ADMIN — MORPHINE SULFATE 4 MG: 2 INJECTION, SOLUTION INTRAMUSCULAR; INTRAVENOUS at 23:02

## 2022-10-15 RX ADMIN — PIPERACILLIN AND TAZOBACTAM 3.38 G: 3; .375 INJECTION, POWDER, FOR SOLUTION INTRAVENOUS at 10:55

## 2022-10-15 RX ADMIN — SODIUM CHLORIDE, PRESERVATIVE FREE 10 ML: 5 INJECTION INTRAVENOUS at 21:17

## 2022-10-15 RX ADMIN — VANCOMYCIN HYDROCHLORIDE 1000 MG: 1 INJECTION, POWDER, LYOPHILIZED, FOR SOLUTION INTRAVENOUS at 18:26

## 2022-10-15 RX ADMIN — ENOXAPARIN SODIUM 30 MG: 100 INJECTION SUBCUTANEOUS at 21:15

## 2022-10-15 RX ADMIN — MORPHINE SULFATE 2 MG: 2 INJECTION, SOLUTION INTRAMUSCULAR; INTRAVENOUS at 11:01

## 2022-10-15 RX ADMIN — MORPHINE SULFATE 2 MG: 2 INJECTION, SOLUTION INTRAMUSCULAR; INTRAVENOUS at 18:26

## 2022-10-15 RX ADMIN — SODIUM CHLORIDE, PRESERVATIVE FREE 10 ML: 5 INJECTION INTRAVENOUS at 05:31

## 2022-10-15 RX ADMIN — AMLODIPINE BESYLATE 5 MG: 5 TABLET ORAL at 10:54

## 2022-10-15 RX ADMIN — SODIUM CHLORIDE, PRESERVATIVE FREE 10 ML: 5 INJECTION INTRAVENOUS at 14:00

## 2022-10-15 NOTE — PROGRESS NOTES
[address] [facility] Pharmacokinetic Monitoring Service - Vancomycin    Consulting Provider: Suzette Rosales MD  Indication: Bone and Joint Infection  Target Concentration: Goal AUC/ROGELIO 400-600 mg*hr/L  Day of Therapy: 4  Additional Antimicrobials: Zosyn    Pertinent Laboratory Values: Wt Readings from Last 1 Encounters:   10/11/22 79.4 kg (175 lb)     Temp Readings from Last 1 Encounters:   10/14/22 97.8 °F (36.6 °C)     Recent Labs     10/14/22  0328   CREA 3.78*   BUN 50*       Estimated Creatinine Clearance: 23 mL/min (A) (by C-G formula based on SCr of 3.81 mg/dL (H)). Recent Labs     10/14/22  0328   WBC 5.6       Pertinent Cultures:  Culture Date Source Results   10/12 Wound Drainage MODERATE POSSIBLE STAPHYLOCOCCUS AUREUS     MODERATE GRAM NEGATIVE RODS     10/11 Blood NO GROWTH 3 DAYS   MRSA Nasal Swab: N/A. Non-respiratory infection. .      Assessment:  Date/Time Current Dose Concentration Timing of Concentration (h) AUC    10/12 1706 1500 mg x 1 - - -   10/13 0425 - 18.4 11     10/14 0504 1 gm IV q36h - - 498   10/15 0327  18 22 530   Note: Serum concentrations collected for AUC dosing may appear elevated if collected in close proximity to the dose administered, this is not necessarily an indication of toxicity    Plan:  Current dosing regimen is therapeutic  Pharmacy will continue to monitor patient and adjust therapy as indicated

## 2022-10-15 NOTE — PROGRESS NOTES
Problem: Falls - Risk of  Goal: *Absence of Falls  Description: Document [place] Fall Risk and appropriate interventions in the flowsheet.   Outcome: Progressing Towards Goal  Note: Fall Risk Interventions:  Mobility Interventions: Bed/chair exit alarm, Patient to call before getting OOB, Strengthening exercises (ROM-active/passive)         Medication Interventions: Bed/chair exit alarm, Patient to call before getting OOB, Teach patient to arise slowly         History of Falls Interventions: Bed/chair exit alarm, Room close to nurse's station         Problem: Patient Education: Go to Patient Education Activity  Goal: Patient/Family Education  Outcome: Progressing Towards Goal     Problem: Pain  Goal: *Control of Pain  Outcome: Progressing Towards Goal     Problem: Patient Education: Go to Patient Education Activity  Goal: Patient/Family Education  Outcome: Progressing Towards Goal

## 2022-10-15 NOTE — ROUTINE PROCESS
Bedside and Verbal shift change report given to Nigel Jalloh RN (oncoming nurse) by Gavin Gao RN (offgoing nurse). Report included the following information SBAR, Kardex, MAR and Recent Results.     SITUATION:  Code Status: Full Code  Reason for Admission: Right leg pain [C31.672]  Hospital day: 4  Problem List:       Hospital Problems  Date Reviewed: 3/19/2018            Codes Class Noted POA    Right leg pain ICD-10-CM: M79.604  ICD-9-CM: 729.5  10/11/2022 Unknown           BACKGROUND:   Past Medical History:   Past Medical History:   Diagnosis Date    Cellulitis     rt. foot    Diabetes ([facility] [address]     Hypercholesterolemia     Hypertension     Osteomyelitis ([facility] [address]     rt toe    Other ill-defined conditions(799.89)       Patient taking anticoagulants yes    Patient has a defibrillator: no    History of shots YES for example, flu, pneumonia, tetanus   Isolation History YES for example, MRSA, CDiff    ASSESSMENT:  Changes in Assessment Throughout Shift: NONE  Significant Changes in 24 hours (for example, RR/code, fall)  Patient has Central Line: no   Patient has Velásquez Cath: no   Mobility Issues  PT  IV Patency  OR Checklist  Pending Tests    Last Vitals:  Vitals w/ MEWS Score (last day)       Date/Time MEWS Score Pulse Resp Temp BP Level of Consciousness SpO2    10/15/22 0725 1 51 18 97.7 °F (36.5 °C) 175/94 0 97 %    10/15/22 0450 2 50 16 98.1 °F (36.7 °C) 147/87 0 97 %    10/15/22 0014 1 51 18 97.5 °F (36.4 °C) 139/79 0 97 %    10/14/22 2016 1 61 19 97 °F (36.1 °C) 118/75 0 98 %    10/14/22 1438 1 58 18 97.2 °F (36.2 °C) 132/81 0 96 %    10/14/22 1006 1 52 16 97.6 °F (36.4 °C) 148/92 0 97 %    10/14/22 0415 1 53 18 97.8 °F (36.6 °C) 156/72 0 96 %          PAIN    Pain Assessment    Pain Intensity 1: 0 (10/15/22 0730)    Pain Location 1: Leg    Pain Intervention(s) 1: Medication (see MAR)    Patient Stated Pain Goal: 0  Intervention effective: yes  Time of last intervention: 2016 Reassessment Completed: yes   Other actions taken for pain: Distraction    Last 3 Weights:  Last 3 Recorded Weights in this Encounter    10/11/22 1412   Weight: 79.4 kg (175 lb)   Weight change:     INTAKE/OUPUT    Current Shift: No intake/output data recorded. Last three shifts: 10/13 1901 - 10/15 0700  In: 1000 [P.O.:350; I.V.:650]  Out: 3850 [Urine:3850]    RECOMMENDATIONS AND DISCHARGE PLANNING  Patient needs and requests: Pain Management    Pending tests/procedures: labs     Discharge plan for patient: Home    Discharge planning Needs or Barriers: none    Estimated Discharge Date: 10/18/2022 Posted on Whiteboard in Patients Room: yes       \"HEALS\" SAFETY CHECK  A safety check occurred in the patient's room between off going nurse and oncoming nurse listed above. The safety check included the below items:    H  High Alert Medications Verify all high alert medication drips (heparin, PCA, etc.)  E  Equipment Suction is set up for ALL patients (with mary jo)  Red plugs utilized for all equipment (IV pumps, etc.)  WOWs wiped down at end of shift. Room stocked with oxygen, suction, and other unit-specific supplies  A  Alarms Bed alarm is set for fall risk patients  Ensure chair alarm is in place and activated if patient is up in a chair  L  Lines Check IV for any infiltration  Velásquez bag is empty if patient has a Velásquez   Tubing and IV bags are labeled  S  Safety  Room is clean, patient is clean, and equipment is clean. Hallways are clear from equipment besides carts. Fall bracelet on for fall risk patients  Ensure room is clear and free of clutter  Suction is set up for ALL patients (with mary jo)  Hallways are clear from equipment besides carts.    Isolation precautions followed, supplies available outside room, sign posted    Gavin Gao RN

## 2022-10-15 NOTE — PROGRESS NOTES
Bedside shift change report given to Ally Mae RN (oncoming nurse) by Inga Lockwood RN (offgoing nurse). Report included the following information SBAR, Kardex, Intake/Output, MAR, and Recent Results.

## 2022-10-15 NOTE — PROGRESS NOTES
[facility]    Patient: Ronald Johnson Age: 43 y.o. : 1980 MR#: [id number] SSN: [id number]  Date: 10/15/2022     Subjective/24-hour events:     Unchanged clinically. Assessment:   Osteomyelitis of the right lower extremity BKA stump site   SAVANNA on CKD 4  DM2 with hyperglycemia  Elevated blood pressures  Obesity, BMI 32.0    Plan:   Zosyn and vancomycin continued, management per ID. BP continues to run high overall. Amlodipine initiated yesterday, adjust dosing further as necessary to optimize control. Disposition to be determined. Patient will need IV antibiotics at discharge and will need to be established with a PCP. We will follow-up with care management early next week to discuss progress with regard to discharge planning. Therapy services: None. DME: TBD. Anticipated disposition: TBD. Case discussed with:  [x]Patient  []Family  [x]Nursing  [x]Case Management  DVT Prophylaxis:  [x]Lovenox  []Hep SQ  []SCDs  []Coumadin   []On Heparin gtt    Objective:   VS: Visit Vitals  BP (!) 175/94   Pulse (!) 51   Temp 97.7 °F (36.5 °C)   Resp 18   Ht 5' 2\" (1.575 m)   Wt 79.4 kg (175 lb)   SpO2 97%   BMI 32.01 kg/m²      Tmax/24hrs: Temp (24hrs), Av.5 °F (36.4 °C), Min:97 °F (36.1 °C), Max:98.1 °F (36.7 °C)    Intake/Output Summary (Last 24 hours) at 10/15/2022 0910  Last data filed at 10/15/2022 0531  Gross per 24 hour   Intake --   Output 1850 ml   Net -1850 ml       General:  In NAD. Nontoxic-appearing. Cardiovascular:  RRR. Pulmonary:  Lungs clear bilaterally, no wheezes. GI:  Abdomen soft, NTTP. Extremities: Right lower extremity BKA. Neuro:  Awake and alert. Moves extremities spontaneously.     Labs:    Recent Results (from the past 24 hour(s))   GLUCOSE, POC    Collection Time: 10/14/22 10:05 AM   Result Value Ref Range    Glucose (POC) 91 70 - 110 mg/dL   GLUCOSE, POC    Collection Time: 10/14/22  9:32 PM   Result Value Ref Range    Glucose (POC) 125 (H) 70 - 110 mg/dL   VANCOMYCIN, RANDOM    Collection Time: 10/15/22  3:27 AM   Result Value Ref Range    Vancomycin, random 18.0 5.0 - 40.0 UG/ML   GLUCOSE, POC    Collection Time: 10/15/22  8:14 AM   Result Value Ref Range    Glucose (POC) 117 (H) 70 - 110 mg/dL       Signed By: Sy Amador MD     October 15, 2022

## 2022-10-15 NOTE — PROGRESS NOTES
Wound Prevention Checklist    Patient: Satya Malik (36 y.o. male)  Date: 10/15/2022  Diagnosis: Right leg pain [M79.604] <principal problem not specified>    Precautions:         []  Heel prevention boots placed on patient    []  Patient turned q2h during shift    []  Lift team ordered    []  Patient on Teresa bed/Specialty bed    [x]  Each Wound is documented during shift (Stage, Color, drainage, odor, measurements, and dressings)    [x]  Dual skin check done with REBECA Barker

## 2022-10-15 NOTE — PROGRESS NOTES
Problem: Falls - Risk of  Goal: *Absence of Falls  Description: Document Darlen [place] Fall Risk and appropriate interventions in the flowsheet.   Outcome: Progressing Towards Goal  Note: Fall Risk Interventions:  Mobility Interventions: Bed/chair exit alarm         Medication Interventions: Bed/chair exit alarm, Patient to call before getting OOB                   Problem: Patient Education: Go to Patient Education Activity  Goal: Patient/Family Education  Outcome: Progressing Towards Goal     Problem: Pain  Goal: *Control of Pain  Outcome: Progressing Towards Goal     Problem: Patient Education: Go to Patient Education Activity  Goal: Patient/Family Education  Outcome: Progressing Towards Goal

## 2022-10-16 LAB
GLUCOSE BLD STRIP.AUTO-MCNC: 118 MG/DL (ref 70–110)
GLUCOSE BLD STRIP.AUTO-MCNC: 122 MG/DL (ref 70–110)
GLUCOSE BLD STRIP.AUTO-MCNC: 128 MG/DL (ref 70–110)
GLUCOSE BLD STRIP.AUTO-MCNC: 139 MG/DL (ref 70–110)

## 2022-10-16 PROCEDURE — 74011000250 HC RX REV CODE- 250: Performed by: HOSPITALIST

## 2022-10-16 PROCEDURE — 82962 GLUCOSE BLOOD TEST: CPT

## 2022-10-16 PROCEDURE — 2709999900 HC NON-CHARGEABLE SUPPLY

## 2022-10-16 PROCEDURE — 74011250636 HC RX REV CODE- 250/636: Performed by: INTERNAL MEDICINE

## 2022-10-16 PROCEDURE — 65270000029 HC RM PRIVATE

## 2022-10-16 PROCEDURE — 74011250636 HC RX REV CODE- 250/636: Performed by: HOSPITALIST

## 2022-10-16 PROCEDURE — 74011250637 HC RX REV CODE- 250/637: Performed by: FAMILY MEDICINE

## 2022-10-16 PROCEDURE — 74011000258 HC RX REV CODE- 258: Performed by: INTERNAL MEDICINE

## 2022-10-16 PROCEDURE — 99232 SBSQ HOSP IP/OBS MODERATE 35: CPT | Performed by: FAMILY MEDICINE

## 2022-10-16 PROCEDURE — 74011250637 HC RX REV CODE- 250/637: Performed by: INTERNAL MEDICINE

## 2022-10-16 RX ADMIN — DIPHENHYDRAMINE HYDROCHLORIDE 25 MG: 25 CAPSULE ORAL at 00:07

## 2022-10-16 RX ADMIN — PIPERACILLIN AND TAZOBACTAM 3.38 G: 3; .375 INJECTION, POWDER, FOR SOLUTION INTRAVENOUS at 02:16

## 2022-10-16 RX ADMIN — ENOXAPARIN SODIUM 30 MG: 100 INJECTION SUBCUTANEOUS at 20:13

## 2022-10-16 RX ADMIN — PIPERACILLIN AND TAZOBACTAM 3.38 G: 3; .375 INJECTION, POWDER, FOR SOLUTION INTRAVENOUS at 10:03

## 2022-10-16 RX ADMIN — DIPHENHYDRAMINE HYDROCHLORIDE 25 MG: 25 CAPSULE ORAL at 18:53

## 2022-10-16 RX ADMIN — MORPHINE SULFATE 4 MG: 2 INJECTION, SOLUTION INTRAMUSCULAR; INTRAVENOUS at 21:06

## 2022-10-16 RX ADMIN — PIPERACILLIN AND TAZOBACTAM 3.38 G: 3; .375 INJECTION, POWDER, FOR SOLUTION INTRAVENOUS at 18:50

## 2022-10-16 RX ADMIN — SODIUM CHLORIDE, PRESERVATIVE FREE 10 ML: 5 INJECTION INTRAVENOUS at 07:25

## 2022-10-16 RX ADMIN — MORPHINE SULFATE 2 MG: 2 INJECTION, SOLUTION INTRAMUSCULAR; INTRAVENOUS at 14:59

## 2022-10-16 RX ADMIN — AMLODIPINE BESYLATE 5 MG: 5 TABLET ORAL at 10:02

## 2022-10-16 RX ADMIN — SODIUM CHLORIDE, PRESERVATIVE FREE 10 ML: 5 INJECTION INTRAVENOUS at 21:08

## 2022-10-16 RX ADMIN — MORPHINE SULFATE 2 MG: 2 INJECTION, SOLUTION INTRAMUSCULAR; INTRAVENOUS at 10:10

## 2022-10-16 RX ADMIN — SODIUM CHLORIDE, PRESERVATIVE FREE 10 ML: 5 INJECTION INTRAVENOUS at 14:00

## 2022-10-16 NOTE — ROUTINE PROCESS
Bedside and Verbal shift change report given to [facility], RN (oncoming nurse) by Scotty Peña RN (offgoing nurse). Report included the following information SBAR, Kardex, MAR and Recent Results.     SITUATION:  Code Status: Full Code  Reason for Admission: Right leg pain [F69.803]  Hospital day: 5  Problem List:       Hospital Problems  Date Reviewed: 3/19/2018            Codes Class Noted POA    Right leg pain ICD-10-CM: M79.604  ICD-9-CM: 729.5  10/11/2022 Unknown         BACKGROUND:   Past Medical History:   Past Medical History:   Diagnosis Date    Cellulitis     rt. foot    Diabetes ([facility] [address]     Hypercholesterolemia     Hypertension     Osteomyelitis ([facility] [address]     rt toe    Other ill-defined conditions(799.89)       Patient taking anticoagulants yes    Patient has a defibrillator: no    History of shots YES for example, flu, pneumonia, tetanus   Isolation History YES for example, MRSA, CDiff    ASSESSMENT:  Changes in Assessment Throughout Shift: NONE  Significant Changes in 24 hours (for example, RR/code, fall)  Patient has Central Line: no   Patient has Velásquez Cath: no   Mobility Issues  PT  IV Patency  OR Checklist  Pending Tests    Last Vitals:  Vitals w/ MEWS Score (last day)       Date/Time MEWS Score Pulse Resp Temp BP Level of Consciousness SpO2    10/16/22 0442 0 61 14 97.4 °F (36.3 °C) 146/73 0 100 %    10/16/22 0006 1 58 16 97.8 °F (36.6 °C) 153/77 0 97 %    10/15/22 2101 1 56 18 97.7 °F (36.5 °C) 161/82 0 97 %    10/15/22 1534 1 54 20 98.4 °F (36.9 °C) 140/83 0 98 %    10/15/22 1222 1 53 20 98.1 °F (36.7 °C) 121/75 0 98 %    10/15/22 0725 1 51 18 97.7 °F (36.5 °C) 175/94 0 97 %    10/15/22 0450 2 50 16 98.1 °F (36.7 °C) 147/87 0 97 %    10/15/22 0014 1 51 18 97.5 °F (36.4 °C) 139/79 0 97 %          PAIN    Pain Assessment    Pain Intensity 1: 0 (10/16/22 0735)    Pain Location 1: Leg (stump)    Pain Intervention(s) 1: Medication (see MAR)    Patient Stated Pain Goal: 0  Intervention effective: yes  Time of last intervention: 2302 Reassessment Completed: yes   Other actions taken for pain: Distraction    Last 3 Weights:  Last 3 Recorded Weights in this Encounter    10/11/22 1412   Weight: 79.4 kg (175 lb)   Weight change:     INTAKE/OUPUT    Current Shift: No intake/output data recorded. Last three shifts: 10/14 1901 - 10/16 0700  In: 1270 [P.O.:720; I.V.:550]  Out: 3450 [Urine:3450]    RECOMMENDATIONS AND DISCHARGE PLANNING  Patient needs and requests: Pain Management    Pending tests/procedures: labs     Discharge plan for patient: Home    Discharge planning Needs or Barriers: none    Estimated Discharge Date: 10/18/2022 Posted on Whiteboard in Patients Room: yes       \"HEALS\" SAFETY CHECK  A safety check occurred in the patient's room between off going nurse and oncoming nurse listed above. The safety check included the below items:    H  High Alert Medications Verify all high alert medication drips (heparin, PCA, etc.)  E  Equipment Suction is set up for ALL patients (with mary jo)  Red plugs utilized for all equipment (IV pumps, etc.)  WOWs wiped down at end of shift. Room stocked with oxygen, suction, and other unit-specific supplies  A  Alarms Bed alarm is set for fall risk patients  Ensure chair alarm is in place and activated if patient is up in a chair  L  Lines Check IV for any infiltration  Velásquez bag is empty if patient has a Velásquez   Tubing and IV bags are labeled  S  Safety  Room is clean, patient is clean, and equipment is clean. Hallways are clear from equipment besides carts. Fall bracelet on for fall risk patients  Ensure room is clear and free of clutter  Suction is set up for ALL patients (with mary jo)  Hallways are clear from equipment besides carts.    Isolation precautions followed, supplies available outside room, sign posted    Isela Rangel RN

## 2022-10-16 NOTE — PROGRESS NOTES
Patient is complaining of generalized itching. Lotion was applied but ineffective. Dr. Adry Pleitez is informed. New order of Diphenhydramine 25 mg PO q 4 hrs PRN is received.

## 2022-10-16 NOTE — PROGRESS NOTES
Problem: Falls - Risk of  Goal: *Absence of Falls  Description: Document Lito Blades Fall Risk and appropriate interventions in the flowsheet. Outcome: Progressing Towards Goal  Note: Fall Risk Interventions:  Mobility Interventions: Bed/chair exit alarm, Patient to call before getting OOB         Medication Interventions: Bed/chair exit alarm         History of Falls Interventions: Bed/chair exit alarm, Room close to nurse's station, Assess for delayed presentation/identification of injury for 48 hrs (comment for end date)         Problem: Patient Education: Go to Patient Education Activity  Goal: Patient/Family Education  Outcome: Progressing Towards Goal     Problem: Pain  Goal: *Control of Pain  Outcome: Progressing Towards Goal     Problem: Patient Education: Go to Patient Education Activity  Goal: Patient/Family Education  Outcome: Progressing Towards Goal     Problem: Risk for Spread of Infection  Goal: Prevent transmission of infectious organism to others  Description: Prevent the transmission of infectious organisms to other patients, staff members, and visitors.   Outcome: Progressing Towards Goal     Problem: Patient Education:  Go to Education Activity  Goal: Patient/Family Education  Outcome: Progressing Towards Goal

## 2022-10-16 NOTE — PROGRESS NOTES
Bedside shift change report given to Arcenio Zuniga RN (oncoming nurse) by [facility], RN (offgoing nurse). Report included the following information SBAR, Kardex, Intake/Output, MAR, and Recent Results.

## 2022-10-16 NOTE — PROGRESS NOTES
Problem: Falls - Risk of  Goal: *Absence of Falls  Description: Document William Mariam Fall Risk and appropriate interventions in the flowsheet.   Outcome: Progressing Towards Goal  Note: Fall Risk Interventions:  Mobility Interventions: Bed/chair exit alarm, Patient to call before getting OOB, Strengthening exercises (ROM-active/passive)         Medication Interventions: Bed/chair exit alarm, Patient to call before getting OOB, Teach patient to arise slowly         History of Falls Interventions: Bed/chair exit alarm, Room close to nurse's station         Problem: Patient Education: Go to Patient Education Activity  Goal: Patient/Family Education  Outcome: Progressing Towards Goal     Problem: Pain  Goal: *Control of Pain  Outcome: Progressing Towards Goal     Problem: Patient Education: Go to Patient Education Activity  Goal: Patient/Family Education  Outcome: Progressing Towards Goal

## 2022-10-16 NOTE — PROGRESS NOTES
[facility]    Patient: Rosio Fletcher Age: 43 y.o. : 1980 MR#: [id number] SSN: [id number]  Date: 10/16/2022     Subjective/24-hour events:     Seen in presence of remote . No new complaints. Assessment:   Osteomyelitis of the right lower extremity BKA stump site   SAVANNA on CKD 4  DM2 with hyperglycemia  Elevated blood pressures  Obesity, BMI 32.0    Plan:   Vancomycin and Zosyn continued. Will await ID follow-up in AM to discuss plan going forward. Anticipate need for IV antibiotics going forward per discussion late last week. BP is improving somewhat and are more acceptable since initiation of amlodipine. Continue to monitor. Disposition to be determined. Patient will need IV antibiotics at discharge and will need to be established with a PCP. Will discuss progress with discharge planning further with case management tomorrow morning. Plan communicated with patient via remote  and he is able to voice understanding. All questions answered. Therapy services: None. DME: TBD. Anticipated disposition: Home with need for IV ABX. Case discussed with:  [x]Patient  []Family  [x]Nursing  [x]Case Management  DVT Prophylaxis:  [x]Lovenox  []Hep SQ  []SCDs  []Coumadin   []On Heparin gtt    Objective:   VS: Visit Vitals  BP (!) 146/73 (BP 1 Location: Right upper arm, BP Patient Position: At rest;Semi fowlers)   Pulse 61   Temp 97.4 °F (36.3 °C)   Resp 14   Ht 5' 2\" (1.575 m)   Wt 79.4 kg (175 lb)   SpO2 100%   BMI 32.01 kg/m²      Tmax/24hrs: Temp (24hrs), Av.9 °F (36.6 °C), Min:97.4 °F (36.3 °C), Max:98.4 °F (36.9 °C)    Intake/Output Summary (Last 24 hours) at 10/16/2022 0918  Last data filed at 10/16/2022 0442  Gross per 24 hour   Intake 720 ml   Output 1600 ml   Net -880 ml       General:  In NAD. Nontoxic-appearing. Cardiovascular:  RRR. Pulmonary:  Lungs clear bilaterally, no wheezes.     GI:  Abdomen soft, NTTP.  Extremities: Right lower extremity BKA. Neuro:  Awake and alert. Moves extremities spontaneously.     Labs:    Recent Results (from the past 24 hour(s))   GLUCOSE, POC    Collection Time: 10/15/22 12:19 PM   Result Value Ref Range    Glucose (POC) 104 70 - 110 mg/dL   GLUCOSE, POC    Collection Time: 10/15/22  4:38 PM   Result Value Ref Range    Glucose (POC) 122 (H) 70 - 110 mg/dL   GLUCOSE, POC    Collection Time: 10/15/22  9:14 PM   Result Value Ref Range    Glucose (POC) 96 70 - 110 mg/dL   GLUCOSE, POC    Collection Time: 10/16/22  7:54 AM   Result Value Ref Range    Glucose (POC) 139 (H) 70 - 110 mg/dL       Signed By: Radha Lyle MD     October 16, 2022

## 2022-10-17 LAB
BACTERIA SPEC CULT: NORMAL
BACTERIA SPEC CULT: NORMAL
DATE LAST DOSE: NORMAL
GLUCOSE BLD STRIP.AUTO-MCNC: 101 MG/DL (ref 70–110)
GLUCOSE BLD STRIP.AUTO-MCNC: 108 MG/DL (ref 70–110)
GLUCOSE BLD STRIP.AUTO-MCNC: 128 MG/DL (ref 70–110)
GLUCOSE BLD STRIP.AUTO-MCNC: 94 MG/DL (ref 70–110)
REPORTED DOSE,DOSE: NORMAL UNITS
REPORTED DOSE/TIME,TMG: 1800
SERVICE CMNT-IMP: NORMAL
SERVICE CMNT-IMP: NORMAL
VANCOMYCIN TROUGH SERPL-MCNC: 17.7 UG/ML (ref 10–20)

## 2022-10-17 PROCEDURE — 74011250636 HC RX REV CODE- 250/636: Performed by: INTERNAL MEDICINE

## 2022-10-17 PROCEDURE — 36415 COLL VENOUS BLD VENIPUNCTURE: CPT

## 2022-10-17 PROCEDURE — 74011250637 HC RX REV CODE- 250/637: Performed by: INTERNAL MEDICINE

## 2022-10-17 PROCEDURE — 74011000250 HC RX REV CODE- 250: Performed by: HOSPITALIST

## 2022-10-17 PROCEDURE — 74011250637 HC RX REV CODE- 250/637: Performed by: FAMILY MEDICINE

## 2022-10-17 PROCEDURE — 2709999900 HC NON-CHARGEABLE SUPPLY

## 2022-10-17 PROCEDURE — 99232 SBSQ HOSP IP/OBS MODERATE 35: CPT | Performed by: FAMILY MEDICINE

## 2022-10-17 PROCEDURE — 82962 GLUCOSE BLOOD TEST: CPT

## 2022-10-17 PROCEDURE — 74011000258 HC RX REV CODE- 258: Performed by: INTERNAL MEDICINE

## 2022-10-17 PROCEDURE — 77030037878 HC DRSG MEPILEX >48IN BORD MOLN -B

## 2022-10-17 PROCEDURE — 80202 ASSAY OF VANCOMYCIN: CPT

## 2022-10-17 PROCEDURE — 65270000029 HC RM PRIVATE

## 2022-10-17 PROCEDURE — 74011250636 HC RX REV CODE- 250/636: Performed by: HOSPITALIST

## 2022-10-17 RX ORDER — LEVOFLOXACIN 250 MG/1
250 TABLET ORAL DAILY
Qty: 87 TABLET | Refills: 0 | Status: SHIPPED | OUTPATIENT
Start: 2022-10-17 | End: 2022-10-18 | Stop reason: SDUPTHER

## 2022-10-17 RX ORDER — DOXYCYCLINE 100 MG/1
100 CAPSULE ORAL EVERY 12 HOURS
Status: DISCONTINUED | OUTPATIENT
Start: 2022-10-17 | End: 2022-10-18 | Stop reason: HOSPADM

## 2022-10-17 RX ORDER — LEVOFLOXACIN 250 MG/1
250 TABLET ORAL EVERY 24 HOURS
Status: DISCONTINUED | OUTPATIENT
Start: 2022-10-18 | End: 2022-10-18 | Stop reason: HOSPADM

## 2022-10-17 RX ORDER — DOXYCYCLINE 100 MG/1
100 CAPSULE ORAL 2 TIMES DAILY
Qty: 174 CAPSULE | Refills: 0 | Status: SHIPPED | OUTPATIENT
Start: 2022-10-17 | End: 2022-10-18 | Stop reason: SDUPTHER

## 2022-10-17 RX ORDER — LEVOFLOXACIN 500 MG/1
500 TABLET, FILM COATED ORAL ONCE
Status: COMPLETED | OUTPATIENT
Start: 2022-10-17 | End: 2022-10-17

## 2022-10-17 RX ADMIN — DIPHENHYDRAMINE HYDROCHLORIDE 25 MG: 25 CAPSULE ORAL at 04:10

## 2022-10-17 RX ADMIN — SODIUM CHLORIDE, PRESERVATIVE FREE 10 ML: 5 INJECTION INTRAVENOUS at 14:00

## 2022-10-17 RX ADMIN — PIPERACILLIN AND TAZOBACTAM 3.38 G: 3; .375 INJECTION, POWDER, FOR SOLUTION INTRAVENOUS at 02:07

## 2022-10-17 RX ADMIN — SODIUM CHLORIDE, PRESERVATIVE FREE 10 ML: 5 INJECTION INTRAVENOUS at 21:06

## 2022-10-17 RX ADMIN — DIPHENHYDRAMINE HYDROCHLORIDE 25 MG: 25 CAPSULE ORAL at 00:16

## 2022-10-17 RX ADMIN — DOXYCYCLINE HYCLATE 100 MG: 100 CAPSULE ORAL at 21:03

## 2022-10-17 RX ADMIN — ENOXAPARIN SODIUM 30 MG: 100 INJECTION SUBCUTANEOUS at 21:03

## 2022-10-17 RX ADMIN — DIPHENHYDRAMINE HYDROCHLORIDE 25 MG: 25 CAPSULE ORAL at 17:05

## 2022-10-17 RX ADMIN — MORPHINE SULFATE 2 MG: 2 INJECTION, SOLUTION INTRAMUSCULAR; INTRAVENOUS at 09:21

## 2022-10-17 RX ADMIN — MORPHINE SULFATE 2 MG: 2 INJECTION, SOLUTION INTRAMUSCULAR; INTRAVENOUS at 15:25

## 2022-10-17 RX ADMIN — LEVOFLOXACIN 500 MG: 500 TABLET, FILM COATED ORAL at 09:20

## 2022-10-17 RX ADMIN — AMLODIPINE BESYLATE 5 MG: 5 TABLET ORAL at 09:20

## 2022-10-17 RX ADMIN — DOXYCYCLINE HYCLATE 100 MG: 100 CAPSULE ORAL at 09:20

## 2022-10-17 RX ADMIN — VANCOMYCIN HYDROCHLORIDE 1000 MG: 1 INJECTION, POWDER, LYOPHILIZED, FOR SOLUTION INTRAVENOUS at 06:12

## 2022-10-17 RX ADMIN — SODIUM CHLORIDE, PRESERVATIVE FREE 10 ML: 5 INJECTION INTRAVENOUS at 06:12

## 2022-10-17 RX ADMIN — MORPHINE SULFATE 2 MG: 2 INJECTION, SOLUTION INTRAMUSCULAR; INTRAVENOUS at 21:06

## 2022-10-17 NOTE — PROGRESS NOTES
Reason for Admission:  Right leg pain [M79.604]                 RUR Score:    10%            Plan for utilizing home health:    TBD                      Likelihood of Readmission:   LOW                         Transition of Care Plan:              Initial assessment completed with patient. Cognitive status of patient: oriented to time, place, person and situation. Face sheet information confirmed:  yes. The patient designates KATERINE Russell([phone]) to participate in his discharge plan and to receive any needed information. This patient lives in a single family home with friend. Patient is able to navigate steps as needed. Prior to hospitalization, patient was considered to be independent with ADLs/IADLS : yes . Patient has a current ACP document on file: yes      Healthcare Decision Maker:   Primary Decision Maker: Beata San - [phone]      Patient wants to drive self home. The patient already has [employer] chair, and  crutches  available in the home. Patient is not currently active with home health. Patient has not stayed in a skilled nursing facility or rehab. This patient is on dialysis :no     Freedom of choice signed: no     Currently, the discharge plan is Home. CM will continue to monitor and assist with transition of care needs. The patient states that he cannot obtain his medications from the pharmacy, and take his medications as directed. Patient's current insurance is medicaid pending       Care Management Interventions  PCP Verified by CM: Yes  Mode of Transport at Discharge: Self  Discharge Durable Medical Equipment: No  Physical Therapy Consult: No  Occupational Therapy Consult: No  Support Systems: Friend/Neighbor  Discharge Location  Patient Expects to be Discharged to[de-identified] Home        TORRIE Gómez, RN  Pager # [phone]  Care Manager

## 2022-10-17 NOTE — PROGRESS NOTES
Infectious Disease progress Note        Reason: Right below-knee amputation stump osteomyelitis    Current abx Prior abx   Zosyn, vancomycin since 10/12/2022      Lines:       Assessment :  44-year-old man with past medical history significant for type 2 diabetes, hypertension, MRSA right foot infection status post right below-knee amputation March 2018 presented to [facility] on 10/11/2022 with increasing right BKA stump pain. Hospitalization August 2022 for infected right BKA stump  wound infection  Wound cx 8/26- MSSA, enterococcus, acinetobacter      Now with few day history of worsening right BKA stump pain, x-ray right BKA stump concerning for acute/chronic osteomyelitis    Patient presents with a highly complex clinical picture. Presentation consistent with  chronic osteomyelitis right below-knee amputation stump   x-ray findings concerning for acute/chronic osteomyelitis. Wound cultures 10/12-enterobacter,MRSA  Antibiotic management complicated due to inability to use Bactrim due to concurrent acute/chronic kidney injury    MRI findings reviewed. D/w dr. Sylvia Mcmullen. Findings suggestive of right BKA stump osteomyelitis- likely chronic. Ideally patient will require surgical intervention/stump revision for cure. This was discussed with the patient by Dr. Teresita Still and myself. Patient does not wish to have higher level of amputation at this time. Acute on chronic kidney disease-? Prerenal ? NSAIDs use    Improved right stump pain on today's exam     Recommendations:     Discontinue Zosyn, vancomycin   Start p.o. levofloxacin, doxycycline till 1/12/2023  Follow-up with vascular surgery as outpatient  Management of acute kidney injury per primary team  If patient's right BKA stump heals completely with 3 months of prolonged antibiotic, he may not need any further intervention.   If he has recurrent drainage/pain after discoloration antibiotics, his options will be lifelong suppressive antibiotics (keeping in mind risk of antibiotic side effects, resistance) versus right above-knee amputation. Patient verbalizes understanding and currently wishes to proceed with 3 months of oral antibiotics. He is requesting to make appointment with Dr. Cydney Brown and give us address so we can follow-up with him      Above plan was discussed in details with patient,dr. Roger Duke. Please call me if any further questions or concerns. Will continue to participate in the care of this patient.   HPI:    Improved pain in right BKA stump  Past Medical History:   Diagnosis Date    Cellulitis     rt. foot    Diabetes ([address]     Hypercholesterolemia     Hypertension     Osteomyelitis ([address]     rt toe    Other ill-defined conditions(799.89)        Past Surgical History:   Procedure Laterality Date    HX BELOW KNEE AMPUTATION  03/2018    HX ORTHOPAEDIC      rt.toe       Current Discharge Medication List        CONTINUE these medications which have NOT CHANGED    Details   glucose blood VI test strips (blood glucose test) strip Check fasting sugars three times a day before breakfast, lunch & dinner for relion meter  Qty: 60 Strip, Refills: 0    Associated Diagnoses: Uncontrolled type 2 diabetes mellitus without complication, with long-term current use of insulin      lancets misc Check fasting sugars three times a day before breakfast, lunch & dinner for relion meter  Qty: 100 Each, Refills: 0    Associated Diagnoses: Uncontrolled type 2 diabetes mellitus without complication, with long-term current use of insulin      Blood-Glucose Meter (RELION ALL-IN-ONE METER) monitoring kit Check fasting sugars three times a day before breakfast, lunch & dinner  Qty: 1 Kit, Refills: 0    Associated Diagnoses: Uncontrolled type 2 diabetes mellitus without complication, with long-term current use of insulin             Current Facility-Administered Medications   Medication Dose Route Frequency    levoFLOXacin (LEVAQUIN) tablet 500 mg  500 mg Oral ONCE    [START ON 10/18/2022] levoFLOXacin (LEVAQUIN) tablet 250 mg  250 mg Oral Q24H    doxycycline (VIBRAMYCIN) capsule 100 mg  100 mg Oral Q12H    diphenhydrAMINE (BENADRYL) capsule 25 mg  25 mg Oral Q4H PRN    amLODIPine (NORVASC) tablet 5 mg  5 mg Oral DAILY    sodium chloride (NS) flush 5-40 mL  5-40 mL IntraVENous Q8H    sodium chloride (NS) flush 5-40 mL  5-40 mL IntraVENous PRN    acetaminophen (TYLENOL) tablet 650 mg  650 mg Oral Q6H PRN    Or    acetaminophen (TYLENOL) suppository 650 mg  650 mg Rectal Q6H PRN    polyethylene glycol (MIRALAX) packet 17 g  17 g Oral DAILY PRN    ondansetron (ZOFRAN) injection 4 mg  4 mg IntraVENous Q6H PRN    insulin lispro (HUMALOG) injection   SubCUTAneous AC&HS    glucose chewable tablet 16 g  4 Tablet Oral PRN    glucagon (GLUCAGEN) injection 1 mg  1 mg IntraMUSCular PRN    dextrose 10% infusion 0-250 mL  0-250 mL IntraVENous PRN    enoxaparin (LOVENOX) injection 30 mg  30 mg SubCUTAneous Q24H    morphine injection 2-4 mg  2-4 mg IntraVENous Q3H PRN    naloxone (NARCAN) injection 0.4 mg  0.4 mg IntraVENous EVERY 2 MINUTES AS NEEDED       Allergies: Patient has no known allergies.     Family History   Problem Relation Age of Onset    Diabetes Mother     Diabetes Father     No Known Problems Sister     No Known Problems Brother     No Known Problems Sister      Social History     Socioeconomic History    Marital status: SINGLE     Spouse name: Not on file    Number of children: Not on file    Years of education: Not on file    Highest education level: Not on file   Occupational History    Not on file   Tobacco Use    Smoking status: Heavy Smoker     Packs/day: 0.50     Types: Cigarettes    Smokeless tobacco: Never    Tobacco comments:     pt. counseled to not smoke   Substance and Sexual Activity    Alcohol use: No    Drug use: Yes     Types: Marijuana     Comment: 9/5/17    Sexual activity: Not on file   Other Topics Concern    Not on file   Social History Narrative    Not on file Social Determinants of Health     Financial Resource Strain: Not on file   Food Insecurity: Not on file   Transportation Needs: Not on file   Physical Activity: Not on file   Stress: Not on file   Social Connections: Not on file   Intimate Partner Violence: Not on file   Housing Stability: Not on file     Social History     Tobacco Use   Smoking Status Heavy Smoker    Packs/day: 0.50    Types: Cigarettes   Smokeless Tobacco Never   Tobacco Comments    pt. counseled to not smoke        Temp (24hrs), Av.9 °F (36.6 °C), Min:97.4 °F (36.3 °C), Max:98.3 °F (36.8 °C)    Visit Vitals  /79 (BP 1 Location: Right lower arm, BP Patient Position: At rest;Semi fowlers)   Pulse (!) 53   Temp 97.4 °F (36.3 °C)   Resp 16   Ht 5' 2\" (1.575 m)   Wt 79.4 kg (175 lb)   SpO2 96%   BMI 32.01 kg/m²       ROS: 12 point ROS obtained in details. Pertinent positives as mentioned in HPI,   otherwise negative    Physical Exam:    Vitals and nursing note reviewed. Constitutional:       Appearance: He is well-developed and normal weight. He is not ill-appearing, toxic-appearing or diaphoretic. HENT:      Head: Normocephalic and atraumatic. Neck:      Supple  Cardiovascular:      Rate and Rhythm: Normal rate and regular rhythm. Heart sounds: Normal heart sounds. No friction rub. No gallop. Pulmonary:      Effort: Pulmonary effort is normal. No respiratory distress. Abdominal:      General: There is no distension. Palpations: Abdomen is soft. There is no mass. Tenderness: There is no abdominal tenderness. There is no guarding or rebound. Musculoskeletal:          Right BKA stump with dry skin. Linear ulceration perpendicular to each other with serous drainage noted on the stump. No bright red erythema/warmth/induration/significant tenderness overlying the right BKA stump. No edema left leg. Improved tenderness right BKA stump  Skin:     General: Skin is warm and dry.       Coloration: Skin is not pale.   Neurological:      Mental Status: He is alert and oriented to person, place, and time. Psychiatric:         Speech: Speech normal.         Behavior: Behavior normal.         Thought Content: Thought content normal.         Judgment: Judgment normal.     Labs: Results:   Chemistry No results for input(s): GLU, NA, K, CL, CO2, BUN, CREA, CA, AGAP, BUCR, TBIL, AP, TP, ALB, GLOB, AGRAT in the last 72 hours. No lab exists for component: GPT     CBC w/Diff No results for input(s): WBC, RBC, HGB, HCT, PLT, GRANS, LYMPH, EOS, HGBEXT, HCTEXT, PLTEXT, HGBEXT, HCTEXT, PLTEXT in the last 72 hours. Microbiology No results for input(s): CULT in the last 72 hours. RADIOLOGY:    All available imaging studies/reports in [facility] for this admission were reviewed    Total time spent >35 minutes. High complexity decision making was performed during the evaluation of this patient at high risk for decompensation     Above mentioned total time spent on reviewing the case/medical record/data/notes/EMR/patient examination/documentation/coordinating care with nurse/consultants, exclusive of procedures with complex decision making performed and > 50% time spent in face to face evaluation. Disclaimer: Sections of this note are dictated utilizing voice recognition software, which may have resulted in some phonetic based errors in grammar and contents. Even though attempts were made to correct all the mistakes, some may have been missed, and remained in the body of the document. If questions arise, please contact our department.     Dr. Anjali Valle, Infectious Disease Specialist  [phone]  October 17, 2022  1:37 PM

## 2022-10-17 NOTE — PROGRESS NOTES
[facility] Hospitalists  Progress Note    Patient: Lauren Burdick Age: 43 y.o. : 1980 MR#: [id number] SSN: [id number]  Date: 10/17/2022     Subjective/24-hour events:     Comfortably, no new issues overnight. Assessment:   Osteomyelitis of the right lower extremity BKA stump site   SAVANNA on CKD 4  DM2 with hyperglycemia  Elevated blood pressures  Obesity, BMI 32.0    Plan:   Discharge planning. Patient is medically stable for discharge at this point but will need to make arrangements to ensure that he is able to obtain medications and has follow-up appointments in place. Also need to verify residence/safe disposition as patient has been living with a friend. Patient states to me that he has been unable to contact his friend as recently as today. Care management aware and continuing to work on these issues. Discussed with CM on unit. Discussed with outpatient pharmacy. We will send recommended medications to them for pricing per care management request as patient has already received indigent medications once this year on discharge from a previous admission. Therapy services: None. DME: TBD. Anticipated disposition: Home with need for IV ABX. Case discussed with:  [x]Patient  []Family  [x]Nursing  [x]Case Management  DVT Prophylaxis:  [x]Lovenox  []Hep SQ  []SCDs  []Coumadin   []On Heparin gtt    Objective:   VS: Visit Vitals  BP (!) 165/84 (BP 1 Location: Right upper arm, BP Patient Position: At rest)   Pulse (!) 53   Temp 98.2 °F (36.8 °C)   Resp 18   Ht 5' 2\" (1.575 m)   Wt 79.4 kg (175 lb)   SpO2 96%   BMI 32.01 kg/m²      Tmax/24hrs: Temp (24hrs), Av.1 °F (36.7 °C), Min:97.4 °F (36.3 °C), Max:98.3 °F (36.8 °C)    Intake/Output Summary (Last 24 hours) at 10/17/2022 1430  Last data filed at 10/17/2022 1305  Gross per 24 hour   Intake 2430 ml   Output 2450 ml   Net -20 ml       General:  In NAD. Nontoxic-appearing. Cardiovascular:  RRR.     Pulmonary:  Lungs clear bilaterally, no wheezes. GI:  Abdomen soft, NTTP. Extremities: Right lower extremity BKA. Neuro:  Awake and alert. Moves extremities spontaneously.     Labs:    Recent Results (from the past 24 hour(s))   GLUCOSE, POC    Collection Time: 10/16/22  4:37 PM   Result Value Ref Range    Glucose (POC) 122 (H) 70 - 110 mg/dL   GLUCOSE, POC    Collection Time: 10/16/22  8:59 PM   Result Value Ref Range    Glucose (POC) 128 (H) 70 - 110 mg/dL   VANCOMYCIN, TROUGH    Collection Time: 10/17/22  3:40 AM   Result Value Ref Range    Vancomycin,trough 17.7 10.0 - 20.0 ug/mL    Reported dose date 20221015      Reported dose time: 1800      Reported dose: 1000MG UNITS   GLUCOSE, POC    Collection Time: 10/17/22  8:50 AM   Result Value Ref Range    Glucose (POC) 128 (H) 70 - 110 mg/dL   GLUCOSE, POC    Collection Time: 10/17/22 11:55 AM   Result Value Ref Range    Glucose (POC) 101 70 - 110 mg/dL       Signed By: Sarai Hamm MD     October 17, 2022

## 2022-10-17 NOTE — ROUTINE PROCESS
Bedside and Verbal shift change report given to Seth Bassett RN (oncoming nurse) by Deidra Lara RN (offgoing nurse). Report included the following information SBAR, Kardex, MAR and Recent Results.     SITUATION:  Code Status: Full Code  Reason for Admission: Right leg pain [Y97.055]  Hospital day: 6  Problem List:       Hospital Problems  Date Reviewed: 3/19/2018            Codes Class Noted POA    Right leg pain ICD-10-CM: M79.604  ICD-9-CM: 729.5  10/11/2022 Unknown       BACKGROUND:   Past Medical History:   Past Medical History:   Diagnosis Date    Cellulitis     rt. foot    Diabetes ([facility] [address]     Hypercholesterolemia     Hypertension     Osteomyelitis ([facility] [address]     rt toe    Other ill-defined conditions(799.89)       Patient taking anticoagulants yes    Patient has a defibrillator: no    History of shots YES for example, flu, pneumonia, tetanus   Isolation History YES for example, MRSA, CDiff    ASSESSMENT:  Changes in Assessment Throughout Shift: NONE  Significant Changes in 24 hours (for example, RR/code, fall)  Patient has Central Line: no   Patient has Velásquez Cath: no   Mobility Issues  PT  IV Patency  OR Checklist  Pending Tests    Last Vitals:  Vitals w/ MEWS Score (last day)       Date/Time MEWS Score Pulse Resp Temp BP Level of Consciousness SpO2    10/17/22 0337 1 53 16 97.4 °F (36.3 °C) 135/79 0 96 %    10/17/22 0013 1 61 15 98.2 °F (36.8 °C) 144/81 0 96 %    10/16/22 2006 0 52 14 98 °F (36.7 °C) 150/82 0 97 %    10/16/22 1635 1 53 18 98.3 °F (36.8 °C) 141/84 0 97 %    10/16/22 1143 1 53 18 98.2 °F (36.8 °C) 134/84 0 94 %    10/16/22 0917 1 53 18 97.4 °F (36.3 °C) 129/83 0 97 %    10/16/22 0442 0 61 14 97.4 °F (36.3 °C) 146/73 0 100 %    10/16/22 0006 1 58 16 97.8 °F (36.6 °C) 153/77 0 97 %          PAIN    Pain Assessment    Pain Intensity 1: 0 (10/17/22 0337)    Pain Location 1: Leg (STUMP)    Pain Intervention(s) 1: Medication (see MAR)    Patient Stated Pain Goal: 0  Intervention effective: yes  Time of last intervention: 2106 Reassessment Completed: yes   Other actions taken for pain: Distraction    Last 3 Weights:  Last 3 Recorded Weights in this Encounter    10/11/22 1412   Weight: 79.4 kg (175 lb)   Weight change:     INTAKE/OUPUT    Current Shift: No intake/output data recorded. Last three shifts: 10/15 1901 - 10/17 0700  In: 2490 [P.O.:2040; I.V.:450]  Out: 3250 [Urine:3250]    RECOMMENDATIONS AND DISCHARGE PLANNING  Patient needs and requests: Pain Management    Pending tests/procedures: labs     Discharge plan for patient: Home    Discharge planning Needs or Barriers: none    Estimated Discharge Date: 10/18/2022 Posted on Whiteboard in Patients Room: yes       \"HEALS\" SAFETY CHECK  A safety check occurred in the patient's room between off going nurse and oncoming nurse listed above. The safety check included the below items:    H  High Alert Medications Verify all high alert medication drips (heparin, PCA, etc.)  E  Equipment Suction is set up for ALL patients (with mray jo)  Red plugs utilized for all equipment (IV pumps, etc.)  WOWs wiped down at end of shift. Room stocked with oxygen, suction, and other unit-specific supplies  A  Alarms Bed alarm is set for fall risk patients  Ensure chair alarm is in place and activated if patient is up in a chair  L  Lines Check IV for any infiltration  Velásquez bag is empty if patient has a Velásquez   Tubing and IV bags are labeled  S  Safety  Room is clean, patient is clean, and equipment is clean. Hallways are clear from equipment besides carts. Fall bracelet on for fall risk patients  Ensure room is clear and free of clutter  Suction is set up for ALL patients (with mary jo)  Hallways are clear from equipment besides carts.    Isolation precautions followed, supplies available outside room, sign posted    Willie Guerrero RN

## 2022-10-17 NOTE — PROGRESS NOTES
Problem: Falls - Risk of  Goal: *Absence of Falls  Description: Document Cally Embs Fall Risk and appropriate interventions in the flowsheet. Outcome: Progressing Towards Goal  Note: Fall Risk Interventions:  Mobility Interventions: Patient to call before getting OOB, Utilize walker, cane, or other assistive device         Medication Interventions: Teach patient to arise slowly, Patient to call before getting OOB         History of Falls Interventions: Bed/chair exit alarm, Door open when patient unattended, Room close to nurse's station         Problem: Patient Education: Go to Patient Education Activity  Goal: Patient/Family Education  Outcome: Progressing Towards Goal     Problem: Pain  Goal: *Control of Pain  Outcome: Progressing Towards Goal     Problem: Patient Education: Go to Patient Education Activity  Goal: Patient/Family Education  Outcome: Progressing Towards Goal     Problem: Risk for Spread of Infection  Goal: Prevent transmission of infectious organism to others  Description: Prevent the transmission of infectious organisms to other patients, staff members, and visitors.   Outcome: Progressing Towards Goal     Problem: Patient Education:  Go to Education Activity  Goal: Patient/Family Education  Outcome: Progressing Towards Goal

## 2022-10-17 NOTE — PROGRESS NOTES
[facility] Pharmacokinetic Monitoring Service - Vancomycin    Consulting Provider: Josey Campos MD  Indication: Bone and Joint Infection  Target Concentration: Goal AUC/ROGELIO 400-600 mg*hr/L  Day of Therapy: 6  Additional Antimicrobials: Zosyn    Pertinent Laboratory Values: Wt Readings from Last 1 Encounters:   10/11/22 79.4 kg (175 lb)     Temp Readings from Last 1 Encounters:   10/17/22 97.4 °F (36.3 °C)       Estimated Creatinine Clearance: 23.2 mL/min (A) (by C-G formula based on SCr of 3.78 mg/dL (H)). Recent Labs     10/14/22  0328   WBC 5.6       Pertinent Cultures:  Culture Date Source Results   10/12 Wound Drainage MODERATE * Methicillin Resistant Staphylococcus aureus *   MODERATE ENTEROBACTER CLOACAE COMPLEX   OCCASIONAL GRAM NEGATIVE RODS   10/11 Blood NO GROWTH 5 DAYS   MRSA Nasal Swab: N/A. Non-respiratory infection. .      Assessment:  Date/Time Current Dose Concentration Timing of Concentration (h) AUC    10/12 1706 1500 mg x 1 - - -   10/13 0425 - 18.4 11     10/14 0504 1 gm IV q36h - - 498   10/15 0327   18 22 530   10/15 1826 1gm q36h -       10/17 0340 - 17.7 33 572   Note: Serum concentrations collected for AUC dosing may appear elevated if collected in close proximity to the dose administered, this is not necessarily an indication of toxicity    Plan:  Current dosing regimen is therapeutic  Pharmacy will continue to monitor patient and adjust therapy as indicated

## 2022-10-17 NOTE — PROGRESS NOTES
Received 1 discharge prescription for patient Doxycycline Monohydrate. The out of pocket cost for patient would be $144.98 for 87 days. I also broke it down into 30day increments and it is cheaper to by as a whole. Levaquin prescription was sent to a printer,awaiting prescription.

## 2022-10-17 NOTE — PROGRESS NOTES
Problem: Falls - Risk of  Goal: *Absence of Falls  Description: Document William Becerra Fall Risk and appropriate interventions in the flowsheet. Outcome: Progressing Towards Goal  Note: Fall Risk Interventions:  Mobility Interventions: Bed/chair exit alarm, Patient to call before getting OOB, Utilize walker, cane, or other assistive device         Medication Interventions: Bed/chair exit alarm, Patient to call before getting OOB, Teach patient to arise slowly         History of Falls Interventions: Bed/chair exit alarm, Door open when patient unattended, Room close to nurse's station         Problem: Patient Education: Go to Patient Education Activity  Goal: Patient/Family Education  Outcome: Progressing Towards Goal     Problem: Pain  Goal: *Control of Pain  Outcome: Progressing Towards Goal     Problem: Patient Education: Go to Patient Education Activity  Goal: Patient/Family Education  Outcome: Progressing Towards Goal     Problem: Risk for Spread of Infection  Goal: Prevent transmission of infectious organism to others  Description: Prevent the transmission of infectious organisms to other patients, staff members, and visitors.   Outcome: Progressing Towards Goal     Problem: Patient Education:  Go to Education Activity  Goal: Patient/Family Education  Outcome: Progressing Towards Goal

## 2022-10-18 VITALS
WEIGHT: 175 LBS | OXYGEN SATURATION: 97 % | DIASTOLIC BLOOD PRESSURE: 76 MMHG | HEIGHT: 62 IN | RESPIRATION RATE: 18 BRPM | SYSTOLIC BLOOD PRESSURE: 134 MMHG | BODY MASS INDEX: 32.2 KG/M2 | TEMPERATURE: 97 F | HEART RATE: 55 BPM

## 2022-10-18 LAB
GLUCOSE BLD STRIP.AUTO-MCNC: 93 MG/DL (ref 70–110)
GLUCOSE BLD STRIP.AUTO-MCNC: 99 MG/DL (ref 70–110)

## 2022-10-18 PROCEDURE — 74011250637 HC RX REV CODE- 250/637: Performed by: FAMILY MEDICINE

## 2022-10-18 PROCEDURE — 99239 HOSP IP/OBS DSCHRG MGMT >30: CPT | Performed by: FAMILY MEDICINE

## 2022-10-18 PROCEDURE — 82962 GLUCOSE BLOOD TEST: CPT

## 2022-10-18 PROCEDURE — 74011250637 HC RX REV CODE- 250/637: Performed by: INTERNAL MEDICINE

## 2022-10-18 PROCEDURE — 99232 SBSQ HOSP IP/OBS MODERATE 35: CPT | Performed by: FAMILY MEDICINE

## 2022-10-18 RX ORDER — LEVOFLOXACIN 250 MG/1
250 TABLET ORAL DAILY
Qty: 87 TABLET | Refills: 0 | Status: SHIPPED | OUTPATIENT
Start: 2022-10-18 | End: 2023-01-13

## 2022-10-18 RX ORDER — AMLODIPINE BESYLATE 5 MG/1
5 TABLET ORAL DAILY
Qty: 30 TABLET | Refills: 0 | Status: SHIPPED | OUTPATIENT
Start: 2022-10-19

## 2022-10-18 RX ORDER — DOXYCYCLINE 100 MG/1
100 CAPSULE ORAL 2 TIMES DAILY
Qty: 174 CAPSULE | Refills: 0 | Status: SHIPPED | OUTPATIENT
Start: 2022-10-18 | End: 2023-01-13

## 2022-10-18 RX ADMIN — DIPHENHYDRAMINE HYDROCHLORIDE 25 MG: 25 CAPSULE ORAL at 00:54

## 2022-10-18 RX ADMIN — LEVOFLOXACIN 250 MG: 250 TABLET, FILM COATED ORAL at 10:13

## 2022-10-18 RX ADMIN — AMLODIPINE BESYLATE 5 MG: 5 TABLET ORAL at 10:13

## 2022-10-18 RX ADMIN — DOXYCYCLINE HYCLATE 100 MG: 100 CAPSULE ORAL at 10:13

## 2022-10-18 NOTE — PROGRESS NOTES
Spoke to Dr. Nicholas Loyola informed him outpatient pharmacy never received Levaquin prescription yesterday that it was sent to a printer. Per Dr. Nicholas Loyola he will resend prescription.

## 2022-10-18 NOTE — PROGRESS NOTES
Discharge teaching given to patient , in [facility], areas of importance high lighted. Opportunity provided for clarifying questions. None offered.  IV removed per RN(Dustin)

## 2022-10-18 NOTE — DISCHARGE INSTRUCTIONS
DISCHARGE SUMMARY from Nurse    PATIENT INSTRUCTIONS:    After general anesthesia or intravenous sedation, for 24 hours or while taking prescription Narcotics:  Limit your activities  Do not drive and operate hazardous machinery  Do not make important personal or business decisions  Do  not drink alcoholic beverages  If you have not urinated within 8 hours after discharge, please contact your surgeon on call. Report the following to your surgeon:  Excessive pain, swelling, redness or odor of or around the surgical area  Temperature over 100.5  Nausea and vomiting lasting longer than 4 hours or if unable to take medications  Any signs of decreased circulation or nerve impairment to extremity: change in color, persistent  numbness, tingling, coldness or increase pain  Any questions    What to do at [facility]  Recommended activity: Activity as tolerated,     If you experience any of the following symptoms chest pain, shortness of breath, fever greater than 100.5, nausea, vomiting, pain unrelieved by medication, please follow up with Otis Huddleston. *  Please give a list of your current medications to your Primary Care Provider. *  Please update this list whenever your medications are discontinued, doses are      changed, or new medications (including over-the-counter products) are added. *  Please carry medication information at all times in case of emergency situations. These are general instructions for a healthy lifestyle:    No smoking/ No tobacco products/ Avoid exposure to second hand smoke  Surgeon General's Warning:  Quitting smoking now greatly reduces serious risk to your health.     Obesity, smoking, and sedentary lifestyle greatly increases your risk for illness    A healthy diet, regular physical exercise & weight monitoring are important for maintaining a healthy lifestyle    You may be retaining fluid if you have a history of heart failure or if you experience any of the following symptoms: Weight gain of 3 pounds or more overnight or 5 pounds in a week, increased swelling in our hands or feet or shortness of breath while lying flat in bed. Please call your doctor as soon as you notice any of these symptoms; do not wait until your next office visit. Patient armband removed and shredded   MyChart Activation    Thank you for requesting access to [facility]. Please follow the instructions below to securely access and download your online medical record. [facility] allows you to send messages to your doctor, view your test results, renew your prescriptions, schedule appointments, and more. How Do I Sign Up? In your internet browser, go to [email or web address]  Click on the First Time User? Click Here link in the Sign In box. You will be redirect to the New Member Sign Up page. Enter your [facility] Access Code exactly as it appears below. You will not need to use this code after youve completed the sign-up process. If you do not sign up before the expiration date, you must request a new code. [facility] Access Code: [email or web address]  Expires: 2022  8:59 AM (This is the date your [facility] access code will )    Enter the last four digits of your Social Security Number (xxxx) and Date of Birth (mm/dd/yyyy) as indicated and click Submit. You will be taken to the next sign-up page. Create a [facility] ID. This will be your [facility] login ID and cannot be changed, so think of one that is secure and easy to remember. Create a [facility] password. You can change your password at any time. Enter your Password Reset Question and Answer. This can be used at a later time if you forget your password. Enter your e-mail address. You will receive e-mail notification when new information is available in [address] Click Sign Up. You can now view and download portions of your medical record. Click the [employer] link to download a portable copy of your medical information.     Additional Information    If you have questions, please visit the Frequently Asked Questions section of the [employer] website at [email or web address] Remember, [facility] is NOT to be used for urgent needs. For medical emergencies, dial 911. The discharge information has been reviewed with the {PATIENT PARENT GUARDIAN:30581}. The {PATIENT PARENT GUARDIAN:86656} verbalized understanding. Discharge medications reviewed with the {[employer] meds reviewed LDNU:97468} and appropriate educational materials and side effects teaching were provided.   ___________________________________________________________________________________________________________________________________

## 2022-10-18 NOTE — PROGRESS NOTES
[facility]  Progress Note    Patient: Millie Wilkins Age: 43 y.o. : 1980 MR#: [id number] SSN: [id number]  Date: 10/18/2022     Subjective/24-hour events:         Assessment:   Osteomyelitis of the right lower extremity BKA stump site   SAVANNA on CKD 4  DM2 with hyperglycemia  Elevated blood pressures  Obesity, BMI 32.0    Plan:   Medications sent to outpatient pharmacy to be filled. Need to ensure that outpatient follow-up is arranged. Once medications obtained and follow-up in place patient can be discharged. Therapy services: None. DME: None. Anticipated disposition: Home/self-care. Case discussed with:  [x]Patient  []Family  [x]Nursing  [x]Case Management  DVT Prophylaxis:  [x]Lovenox  []Hep SQ  []SCDs  []Coumadin   []On Heparin gtt    Objective:   VS: Visit Vitals  /76   Pulse (!) 55   Temp 97 °F (36.1 °C)   Resp 18   Ht 5' 2\" (1.575 m)   Wt 79.4 kg (175 lb)   SpO2 97%   BMI 32.01 kg/m²      Tmax/24hrs: Temp (24hrs), Av.8 °F (36.6 °C), Min:97 °F (36.1 °C), Max:98.3 °F (36.8 °C)    Intake/Output Summary (Last 24 hours) at 10/18/2022 1243  Last data filed at 10/18/2022 0820  Gross per 24 hour   Intake 1020 ml   Output 2300 ml   Net -1280 ml       General:  In NAD. Nontoxic-appearing. Cardiovascular:  RRR. Pulmonary:  Lungs clear bilaterally, no wheezes. GI:  Abdomen soft, NTTP. Extremities: Right lower extremity BKA. Neuro:  Awake and alert. Moves extremities spontaneously.     Labs:    Recent Results (from the past 24 hour(s))   GLUCOSE, POC    Collection Time: 10/17/22  4:09 PM   Result Value Ref Range    Glucose (POC) 94 70 - 110 mg/dL   GLUCOSE, POC    Collection Time: 10/17/22 10:16 PM   Result Value Ref Range    Glucose (POC) 108 70 - 110 mg/dL   GLUCOSE, POC    Collection Time: 10/18/22  8:18 AM   Result Value Ref Range    Glucose (POC) 93 70 - 110 mg/dL   GLUCOSE, POC    Collection Time: 10/18/22 11:52 AM   Result Value Ref Range Glucose (POC) 99 70 - 110 mg/dL       Signed By: Yudelka Ayala MD     October 18, 2022

## 2022-10-18 NOTE — PROGRESS NOTES
Discharge planning    Possible discharge today. Faxed paperwork to outpatient pharmacy for indigent medications. Patient will receive doxycycline and levofloxacin. Orders reviewed. No needs identified at this time. Patient will provide own transportation. CM remains available if needed.      LIZETH McknightN, RN  Pager # [phone]  Care Manager

## 2022-10-18 NOTE — PROGRESS NOTES
Infectious Disease progress Note        Reason: Right below-knee amputation stump osteomyelitis    Current abx Prior abx   Zosyn, vancomycin since 10/12/2022-10/17  Levofloxacin, doxycycline since 10/17      Lines:       Assessment :  12-year-old man with past medical history significant for type 2 diabetes, hypertension, MRSA right foot infection status post right below-knee amputation March 2018 presented to [facility] on 10/11/2022 with increasing right BKA stump pain. Hospitalization August 2022 for infected right BKA stump  wound infection  Wound cx 8/26- MSSA, enterococcus, acinetobacter      Now with few day history of worsening right BKA stump pain, x-ray right BKA stump concerning for acute/chronic osteomyelitis    Patient presents with a highly complex clinical picture. Presentation consistent with  chronic osteomyelitis right below-knee amputation stump   x-ray findings concerning for acute/chronic osteomyelitis. Wound cultures 10/12-enterobacter,MRSA  Antibiotic management complicated due to inability to use Bactrim due to concurrent acute/chronic kidney injury    MRI findings reviewed. D/w dr. Ada Courtney. Findings suggestive of right BKA stump osteomyelitis- likely chronic. Ideally patient will require surgical intervention/stump revision for cure. This was discussed with the patient by Dr. Loida Mayers and myself. Patient does not wish to have higher level of amputation at this time. Acute on chronic kidney disease-? Prerenal ? NSAIDs use    Improved right stump pain on today's exam     Recommendations:     Continue p.o. levofloxacin, doxycycline till 1/12/2023  Follow-up with vascular surgery as outpatient  Management of acute kidney injury per primary team  If patient's right BKA stump heals completely with 3 months of prolonged antibiotic, he may not need any further intervention.   If he has recurrent drainage/pain after discoloration antibiotics, his options will be lifelong suppressive antibiotics (keeping in mind risk of antibiotic side effects, resistance) versus right above-knee amputation. Patient verbalizes understanding and currently wishes to proceed with 3 months of oral antibiotics. He is requesting to make appointment with Dr. Dread Palacios and give us address so we can follow-up with him      Above plan was discussed in details with patient. Please call me if any further questions or concerns. Will continue to participate in the care of this patient. HPI:    Improved pain in right BKA stump  Past Medical History:   Diagnosis Date    Cellulitis     rt. foot    Diabetes ([address]     Hypercholesterolemia     Hypertension     Osteomyelitis ([address]     rt toe    Other ill-defined conditions(799.89)        Past Surgical History:   Procedure Laterality Date    HX BELOW KNEE AMPUTATION  03/2018    HX ORTHOPAEDIC      rt.toe       Current Discharge Medication List        START taking these medications    Details   levoFLOXacin (LEVAQUIN) 250 mg tablet Take 1 Tablet by mouth daily for 87 days. Qty: 87 Tablet, Refills: 0      doxycycline (MONODOX) 100 mg capsule Take 1 Capsule by mouth two (2) times a day for 87 days.   Qty: 174 Capsule, Refills: 0           CONTINUE these medications which have NOT CHANGED    Details   glucose blood VI test strips (blood glucose test) strip Check fasting sugars three times a day before breakfast, lunch & dinner for relion meter  Qty: 60 Strip, Refills: 0    Associated Diagnoses: Uncontrolled type 2 diabetes mellitus without complication, with long-term current use of insulin      lancets misc Check fasting sugars three times a day before breakfast, lunch & dinner for relion meter  Qty: 100 Each, Refills: 0    Associated Diagnoses: Uncontrolled type 2 diabetes mellitus without complication, with long-term current use of insulin      Blood-Glucose Meter (RELION ALL-IN-ONE METER) monitoring kit Check fasting sugars three times a day before breakfast, lunch & dinner  Qty: 1 Kit, Refills: 0    Associated Diagnoses: Uncontrolled type 2 diabetes mellitus without complication, with long-term current use of insulin             Current Facility-Administered Medications   Medication Dose Route Frequency    levoFLOXacin (LEVAQUIN) tablet 250 mg  250 mg Oral Q24H    doxycycline (VIBRAMYCIN) capsule 100 mg  100 mg Oral Q12H    diphenhydrAMINE (BENADRYL) capsule 25 mg  25 mg Oral Q4H PRN    amLODIPine (NORVASC) tablet 5 mg  5 mg Oral DAILY    sodium chloride (NS) flush 5-40 mL  5-40 mL IntraVENous Q8H    sodium chloride (NS) flush 5-40 mL  5-40 mL IntraVENous PRN    acetaminophen (TYLENOL) tablet 650 mg  650 mg Oral Q6H PRN    Or    acetaminophen (TYLENOL) suppository 650 mg  650 mg Rectal Q6H PRN    polyethylene glycol (MIRALAX) packet 17 g  17 g Oral DAILY PRN    ondansetron (ZOFRAN) injection 4 mg  4 mg IntraVENous Q6H PRN    insulin lispro (HUMALOG) injection   SubCUTAneous AC&HS    glucose chewable tablet 16 g  4 Tablet Oral PRN    glucagon (GLUCAGEN) injection 1 mg  1 mg IntraMUSCular PRN    dextrose 10% infusion 0-250 mL  0-250 mL IntraVENous PRN    enoxaparin (LOVENOX) injection 30 mg  30 mg SubCUTAneous Q24H    morphine injection 2-4 mg  2-4 mg IntraVENous Q3H PRN    naloxone (NARCAN) injection 0.4 mg  0.4 mg IntraVENous EVERY 2 MINUTES AS NEEDED       Allergies: Patient has no known allergies.     Family History   Problem Relation Age of Onset    Diabetes Mother     Diabetes Father     No Known Problems Sister     No Known Problems Brother     No Known Problems Sister      Social History     Socioeconomic History    Marital status: SINGLE     Spouse name: Not on file    Number of children: Not on file    Years of education: Not on file    Highest education level: Not on file   Occupational History    Not on file   Tobacco Use    Smoking status: Heavy Smoker     Packs/day: 0.50     Types: Cigarettes    Smokeless tobacco: Never    Tobacco comments:     pt. counseled to not smoke Substance and Sexual Activity    Alcohol use: No    Drug use: Yes     Types: Marijuana     Comment: 17    Sexual activity: Not on file   Other Topics Concern    Not on file   Social History Narrative    Not on file     Social Determinants of Health     Financial Resource Strain: Not on file   Food Insecurity: Not on file   Transportation Needs: Not on file   Physical Activity: Not on file   Stress: Not on file   Social Connections: Not on file   Intimate Partner Violence: Not on file   Housing Stability: Not on file     Social History     Tobacco Use   Smoking Status Heavy Smoker    Packs/day: 0.50    Types: Cigarettes   Smokeless Tobacco Never   Tobacco Comments    pt. counseled to not smoke        Temp (24hrs), Av °F (36.7 °C), Min:97.5 °F (36.4 °C), Max:98.3 °F (36.8 °C)    Visit Vitals  /69   Pulse (!) 52   Temp 97.5 °F (36.4 °C)   Resp 17   Ht 5' 2\" (1.575 m)   Wt 79.4 kg (175 lb)   SpO2 97%   BMI 32.01 kg/m²       ROS: 12 point ROS obtained in details. Pertinent positives as mentioned in HPI,   otherwise negative    Physical Exam:    Vitals and nursing note reviewed. Constitutional:       Appearance: He is well-developed and normal weight. He is not ill-appearing, toxic-appearing or diaphoretic. HENT:      Head: Normocephalic and atraumatic. Neck:      Supple  Cardiovascular:      Rate and Rhythm: Normal rate and regular rhythm. Heart sounds: Normal heart sounds. No friction rub. No gallop. Pulmonary:      Effort: Pulmonary effort is normal. No respiratory distress. Abdominal:      General: There is no distension. Palpations: Abdomen is soft. There is no mass. Tenderness: There is no abdominal tenderness. There is no guarding or rebound. Musculoskeletal:          Right BKA stump with dry skin. Linear ulceration perpendicular to each other with serous drainage noted on the stump.   No bright red erythema/warmth/induration/significant tenderness overlying the right BKA stump. No edema left leg. Improved tenderness right BKA stump  Skin:     General: Skin is warm and dry. Coloration: Skin is not pale. Neurological:      Mental Status: He is alert and oriented to person, place, and time. Psychiatric:         Speech: Speech normal.         Behavior: Behavior normal.         Thought Content: Thought content normal.         Judgment: Judgment normal.     Labs: Results:   Chemistry No results for input(s): GLU, NA, K, CL, CO2, BUN, CREA, CA, AGAP, BUCR, TBIL, AP, TP, ALB, GLOB, AGRAT in the last 72 hours. No lab exists for component: GPT     CBC w/Diff No results for input(s): WBC, RBC, HGB, HCT, PLT, GRANS, LYMPH, EOS, HGBEXT, HCTEXT, PLTEXT, HGBEXT, HCTEXT, PLTEXT in the last 72 hours. Microbiology No results for input(s): CULT in the last 72 hours. RADIOLOGY:    All available imaging studies/reports in [facility] for this admission were reviewed    Total time spent >35 minutes. High complexity decision making was performed during the evaluation of this patient at high risk for decompensation     Above mentioned total time spent on reviewing the case/medical record/data/notes/EMR/patient examination/documentation/coordinating care with nurse/consultants, exclusive of procedures with complex decision making performed and > 50% time spent in face to face evaluation. Disclaimer: Sections of this note are dictated utilizing voice recognition software, which may have resulted in some phonetic based errors in grammar and contents. Even though attempts were made to correct all the mistakes, some may have been missed, and remained in the body of the document. If questions arise, please contact our department.     Dr. Kayli Prado, Infectious Disease Specialist  [phone]  October 18, 2022  1:37 PM

## 2022-10-18 NOTE — PROGRESS NOTES
Received Levaquin prescription for patient at outpatient pharmacy, will deliver prescriptions when ready.

## 2022-10-31 NOTE — DISCHARGE SUMMARY
Discharge Summary    Patient: Gurmeet Garcia MRN: [id number]  CSN: [id number]    YOB: 1980  Age: 43 y.o. Sex: male    DOA: 10/11/2022 LOS:  LOS: 7 days   Discharge Date: 10/18/2022     Admission Diagnoses: Right leg pain [M79.604]    Discharge Diagnoses:    Osteomyelitis of the right lower extremity BKA stump site   SAVANNA on CKD 4  DM2 with hyperglycemia  Elevated blood pressures  Obesity, BMI 32.0    Discharge Condition: Stable    PHYSICAL EXAM  Visit Vitals  /76   Pulse (!) 55   Temp 97 °F (36.1 °C)   Resp 18   Ht 5' 2\" (1.575 m)   Wt 79.4 kg (175 lb)   SpO2 97%   BMI 32.01 kg/m²       General: In NAD. Nontoxic-appearing. HEENT: NCAT. Sclerae anicteric, EOMI. OP clear. Lungs:  Clear, no wheezes. No accessory muscle use. Heart:  RRR. Abdomen: Soft, NT/ND. Extremities: Warm, no edema or ischemia. Psych:   Mood normal.  Neurologic:  Awake and alert, moves extremities spontaneously. Motor grossly nonfocal.    Hospital Course:   See admission H&P for full details of HPI. Patient was admitted to the hospital after presenting to the ED for evaluation of pain at his right lower extremity amputation stump. Imaging showed evidence of chronic osteomyelitis. ID and vascular surgery were consulted. Initial recommendation was for patient to undergo above-the-knee amputation but patient declined to have this done. Instead, he has been continued on IV antibiotic therapy with plan to see how he does going forward. Patient has been transitioned from IV to long-term oral antibiotic therapy per ID recommendations. He is recommended to have outpatient follow-up with his primary care physician as directed for continued management. He is medically stable for discharge home as maximum benefit of hospitalization has been met.       Consults:   Infectious diseases  Vascular surgery    Significant Diagnostic Studies/Procedures:   MRI right tib-fib  COMPARISON: Radiograph 10/11/2022     TECHNIQUE: Multisequence multiplanar MRI of the right lower extremity from the  distal femur including the entire right lower extremity stump     FINDINGS:     Osseous structures and joints: Below knee amputation at the level of proximal to  mid distal diaphysis. There is interval development of new abnormal T1  hypointense/T2 and STIR hyperintense bone marrow signal at the tibial osseous  stump. No signal abnormality of the fibular stump. No fracture or AVN. Incidentally imaged left lower leg is normal in bone marrow signal.  Low volume right knee joint effusion. Low-grade patellar chondrosis. Intact  menisci. Intact cruciate and collateral ligaments     Soft tissue: Anterior muscle compartment soft tissue edema. Soft tissue edema  superficial to the fibular head and edema of the stump. No fluid collection  within limitation of no IV contrast but suspect trace fluid in the soft tissue  at the tip of the fibular stump. Likely small wound at soft tissue stump tip. IMPRESSION     1. Below-knee amputation. Osteomyelitis at the tip of the tibial stump. Regional  soft tissue edema/inflammation but no discrete fluid collection. 2. Nonspecific muscular edema anterior lower leg muscle compartment without  fluid collection. 3. Mild patellar chondrosis and low volume joint effusion. ,         XR right tib-fib:  FINDINGS:     Post amputation at the level of the proximal tibia/fibula diaphysis. Soft tissue swelling at the amputation site. Acute on chronic appearing cortical irregularity/osteolysis at the amputation  level of the proximal tibia. IMPRESSION     Acute on chronic appearing osteomyelitis of the proximal tibia at the level of  amputation. Discharge Medications:     Discharge Medication List as of 10/18/2022  2:40 PM        START taking these medications    Details   amLODIPine (NORVASC) 5 mg tablet Take 1 Tablet by mouth daily. , Normal, Disp-30 Tablet, R-0           CONTINUE these medications which have CHANGED    Details   levoFLOXacin (LEVAQUIN) 250 mg tablet Take 1 Tablet by mouth daily for 87 days. , Normal, Disp-87 Tablet, R-0      doxycycline (MONODOX) 100 mg capsule Take 1 Capsule by mouth two (2) times a day for 87 days. , Normal, Disp-174 Capsule, R-0           CONTINUE these medications which have NOT CHANGED    Details   glucose blood VI test strips (blood glucose test) strip Check fasting sugars three times a day before breakfast, lunch & dinner for relion meter, Normal, Disp-60 Strip, R-0      lancets misc Check fasting sugars three times a day before breakfast, lunch & dinner for relion meter, Normal, Disp-100 Each, R-0      Blood-Glucose Meter (RELION ALL-IN-ONE METER) monitoring kit Check fasting sugars three times a day before breakfast, lunch & dinner, Print, Disp-1 Kit, R-0             Activity: As tolerated. Diet: Cardiac diabetic. Disposition: Home. Follow-up: with PCP in 1 week. Minutes spent on discharge: >30 minutes spent coordinating this discharge. Patricia Toscano MD  [address] [place], [place] [address]ists    Disclaimer: Sections of this note are dictated using utilizing voice recognition software. Minor typographical errors may be present. If questions arise, please do not hesitate to contact me or call our department.

## (undated) DEVICE — SPONGE LAP 18X18IN STRL -- 5/PK

## (undated) DEVICE — KERLIX BANDAGE ROLL: Brand: KERLIX

## (undated) DEVICE — STAPLER SKIN H3.9MM WIRE DIA0.58MM CRWN 6.9MM 35 STPL FIX

## (undated) DEVICE — SYRINGE MED 20ML STD CLR PLAS LUERLOCK TIP N CTRL DISP

## (undated) DEVICE — SUTURE PERMAHAND SZ 2-0 L18IN NONABSORBABLE BLK L26MM SH C012D

## (undated) DEVICE — SUTURE PERMAHAND SZ 3-0 L18IN NONABSORBABLE BLK L26MM SH C013D

## (undated) DEVICE — SUTURE VCRL SZ 0 L18IN ABSRB VLT L36MM CT-1 1/2 CIR J740D

## (undated) DEVICE — NDL PRT INJ NSAF BLNT 18GX1.5 --

## (undated) DEVICE — HEX-LOCKING BLADE ELECTRODE: Brand: EDGE

## (undated) DEVICE — TAPE UMB 1/8X30IN MP COT WHT --

## (undated) DEVICE — SOLUTION IV 1000ML 0.9% SOD CHL

## (undated) DEVICE — BLADE SAW W0.98XL3.54IN THK0.05IN CUT THK0.05IN SAG

## (undated) DEVICE — STRYKER PERFORMANCE SERIES SAGITTAL BLADE: Brand: STRYKER PERFORMANCE SERIES

## (undated) DEVICE — STOCKINETTE IMPERV REG 9X48IN --

## (undated) DEVICE — (D)GLOVE SURG TRIFLX 7.5 PWD L -- DISC BY MFR USE ITEM 302993

## (undated) DEVICE — SUTURE VCRL SZ 2-0 L18IN ABSRB UD CT-1 L36MM 1/2 CIR J839D

## (undated) DEVICE — OCCLUSIVE GAUZE STRIP,3% BISMUTH TRIBROMOPHENATE IN PETROLATUM BLEND: Brand: XEROFORM

## (undated) DEVICE — PAD,ABDOMINAL,5"X9",STERILE,LF,1/PK: Brand: MEDLINE INDUSTRIES, INC.

## (undated) DEVICE — SUTURE PERMAHAND SZ 3-0 L18IN NONABSORBABLE BLK SILK BRAID A184H

## (undated) DEVICE — DEPAUL SHOULDER PACK: Brand: MEDLINE INDUSTRIES, INC.

## (undated) DEVICE — SUT SLK 3-0 30IN SH BLK --

## (undated) DEVICE — STERILE POLYISOPRENE POWDER-FREE SURGICAL GLOVES: Brand: PROTEXIS

## (undated) DEVICE — COVER LT HNDL BLU PLAS

## (undated) DEVICE — SUTURE PERMAHAND SZ 2-0 L12X18IN NONABSORBABLE BLK SILK A185H

## (undated) DEVICE — SUTURE ABSORBABLE BRAIDED 2-0 CT-1 27 IN UD VICRYL J259H

## (undated) DEVICE — TOWEL SURG W16XL26IN BLU NONFENESTRATED DLX ST 2 PER PK

## (undated) DEVICE — SUT SLK 4-0 18IN TIE MP BLK --

## (undated) DEVICE — STERILE POLYISOPRENE POWDER-FREE SURGICAL GLOVES WITH EMOLLIENT COATING: Brand: PROTEXIS

## (undated) DEVICE — SPONGE GZ W4XL4IN COT 12 PLY TYP VII WVN C FLD DSGN

## (undated) DEVICE — GOWN,SIRUS,NONRNF,SETINSLV,2XL,18/CS: Brand: MEDLINE